# Patient Record
Sex: FEMALE | Race: WHITE | Employment: OTHER | URBAN - METROPOLITAN AREA
[De-identification: names, ages, dates, MRNs, and addresses within clinical notes are randomized per-mention and may not be internally consistent; named-entity substitution may affect disease eponyms.]

---

## 2017-01-03 ENCOUNTER — ANTI-COAG VISIT (OUTPATIENT)
Dept: INTERNAL MEDICINE CLINIC | Age: 82
End: 2017-01-03

## 2017-01-03 DIAGNOSIS — I48.91 ATRIAL FIBRILLATION, UNSPECIFIED TYPE (HCC): ICD-10-CM

## 2017-01-03 LAB
INR BLD: 2.03 (ref 0.85–1.16)
PROTHROMBIN TIME: 23.5 SEC (ref 9.8–13)

## 2017-01-03 RX ORDER — LEVOTHYROXINE SODIUM 0.05 MG/1
TABLET ORAL
Qty: 45 TABLET | Refills: 3 | Status: SHIPPED | OUTPATIENT
Start: 2017-01-03 | End: 2017-12-18 | Stop reason: SDUPTHER

## 2017-01-08 RX ORDER — SIMVASTATIN 40 MG
TABLET ORAL
Qty: 90 TABLET | Refills: 3 | Status: SHIPPED | OUTPATIENT
Start: 2017-01-08 | End: 2017-11-06 | Stop reason: SDUPTHER

## 2017-01-10 ENCOUNTER — ANTI-COAG VISIT (OUTPATIENT)
Dept: INTERNAL MEDICINE CLINIC | Age: 82
End: 2017-01-10

## 2017-01-10 DIAGNOSIS — I48.91 ATRIAL FIBRILLATION, UNSPECIFIED TYPE (HCC): ICD-10-CM

## 2017-01-10 LAB
INR BLD: 2.87 (ref 0.85–1.16)
PROTHROMBIN TIME: 33.4 SEC (ref 9.8–13)

## 2017-01-10 RX ORDER — WARFARIN SODIUM 5 MG/1
TABLET ORAL
Qty: 270 TABLET | Refills: 3 | Status: ON HOLD | OUTPATIENT
Start: 2017-01-10 | End: 2019-06-21 | Stop reason: HOSPADM

## 2017-01-13 ENCOUNTER — TELEPHONE (OUTPATIENT)
Dept: INTERNAL MEDICINE CLINIC | Age: 82
End: 2017-01-13

## 2017-01-19 ENCOUNTER — OFFICE VISIT (OUTPATIENT)
Age: 82
End: 2017-01-19

## 2017-01-19 VITALS
DIASTOLIC BLOOD PRESSURE: 70 MMHG | SYSTOLIC BLOOD PRESSURE: 110 MMHG | OXYGEN SATURATION: 96 % | BODY MASS INDEX: 21.49 KG/M2 | WEIGHT: 116.8 LBS | HEIGHT: 62 IN | HEART RATE: 65 BPM

## 2017-01-19 DIAGNOSIS — I48.91 ATRIAL FIBRILLATION WITH RVR (HCC): Primary | ICD-10-CM

## 2017-01-19 DIAGNOSIS — I10 ESSENTIAL HYPERTENSION: ICD-10-CM

## 2017-01-19 DIAGNOSIS — I34.0 NON-RHEUMATIC MITRAL REGURGITATION: ICD-10-CM

## 2017-01-19 PROCEDURE — G8399 PT W/DXA RESULTS DOCUMENT: HCPCS | Performed by: INTERNAL MEDICINE

## 2017-01-19 PROCEDURE — 99214 OFFICE O/P EST MOD 30 MIN: CPT | Performed by: INTERNAL MEDICINE

## 2017-01-19 PROCEDURE — 1090F PRES/ABSN URINE INCON ASSESS: CPT | Performed by: INTERNAL MEDICINE

## 2017-01-19 PROCEDURE — 1123F ACP DISCUSS/DSCN MKR DOCD: CPT | Performed by: INTERNAL MEDICINE

## 2017-01-19 PROCEDURE — G8419 CALC BMI OUT NRM PARAM NOF/U: HCPCS | Performed by: INTERNAL MEDICINE

## 2017-01-19 PROCEDURE — 93000 ELECTROCARDIOGRAM COMPLETE: CPT | Performed by: INTERNAL MEDICINE

## 2017-01-19 PROCEDURE — G8484 FLU IMMUNIZE NO ADMIN: HCPCS | Performed by: INTERNAL MEDICINE

## 2017-01-19 PROCEDURE — G8427 DOCREV CUR MEDS BY ELIG CLIN: HCPCS | Performed by: INTERNAL MEDICINE

## 2017-01-19 PROCEDURE — 1036F TOBACCO NON-USER: CPT | Performed by: INTERNAL MEDICINE

## 2017-01-19 PROCEDURE — 4040F PNEUMOC VAC/ADMIN/RCVD: CPT | Performed by: INTERNAL MEDICINE

## 2017-01-19 RX ORDER — AMIODARONE HYDROCHLORIDE 200 MG/1
200 TABLET ORAL DAILY
Qty: 90 TABLET | Refills: 3 | Status: SHIPPED | OUTPATIENT
Start: 2017-01-19 | End: 2017-01-25 | Stop reason: SDUPTHER

## 2017-01-24 ENCOUNTER — ANTI-COAG VISIT (OUTPATIENT)
Dept: INTERNAL MEDICINE CLINIC | Age: 82
End: 2017-01-24

## 2017-01-24 DIAGNOSIS — I48.91 ATRIAL FIBRILLATION, UNSPECIFIED TYPE (HCC): ICD-10-CM

## 2017-01-24 LAB
INR BLD: 2.61 (ref 0.85–1.16)
PROTHROMBIN TIME: 30.4 SEC (ref 9.8–13)

## 2017-01-25 ENCOUNTER — TELEPHONE (OUTPATIENT)
Dept: CARDIOLOGY CLINIC | Age: 82
End: 2017-01-25

## 2017-01-25 RX ORDER — AMIODARONE HYDROCHLORIDE 200 MG/1
200 TABLET ORAL DAILY
Qty: 90 TABLET | Refills: 1 | Status: SHIPPED | OUTPATIENT
Start: 2017-01-25 | End: 2018-01-16 | Stop reason: SDUPTHER

## 2017-01-31 ENCOUNTER — OFFICE VISIT (OUTPATIENT)
Dept: INTERNAL MEDICINE CLINIC | Age: 82
End: 2017-01-31

## 2017-01-31 VITALS
DIASTOLIC BLOOD PRESSURE: 60 MMHG | SYSTOLIC BLOOD PRESSURE: 104 MMHG | HEART RATE: 64 BPM | HEIGHT: 62 IN | WEIGHT: 118.6 LBS | BODY MASS INDEX: 21.83 KG/M2 | RESPIRATION RATE: 16 BRPM

## 2017-01-31 DIAGNOSIS — I48.20 CHRONIC ATRIAL FIBRILLATION (HCC): ICD-10-CM

## 2017-01-31 DIAGNOSIS — I50.32 CHRONIC DIASTOLIC CONGESTIVE HEART FAILURE (HCC): Primary | ICD-10-CM

## 2017-01-31 PROCEDURE — 4040F PNEUMOC VAC/ADMIN/RCVD: CPT | Performed by: INTERNAL MEDICINE

## 2017-01-31 PROCEDURE — G8399 PT W/DXA RESULTS DOCUMENT: HCPCS | Performed by: INTERNAL MEDICINE

## 2017-01-31 PROCEDURE — 1090F PRES/ABSN URINE INCON ASSESS: CPT | Performed by: INTERNAL MEDICINE

## 2017-01-31 PROCEDURE — 1123F ACP DISCUSS/DSCN MKR DOCD: CPT | Performed by: INTERNAL MEDICINE

## 2017-01-31 PROCEDURE — 99213 OFFICE O/P EST LOW 20 MIN: CPT | Performed by: INTERNAL MEDICINE

## 2017-01-31 PROCEDURE — G8419 CALC BMI OUT NRM PARAM NOF/U: HCPCS | Performed by: INTERNAL MEDICINE

## 2017-01-31 PROCEDURE — G8427 DOCREV CUR MEDS BY ELIG CLIN: HCPCS | Performed by: INTERNAL MEDICINE

## 2017-01-31 PROCEDURE — G8484 FLU IMMUNIZE NO ADMIN: HCPCS | Performed by: INTERNAL MEDICINE

## 2017-01-31 PROCEDURE — 1036F TOBACCO NON-USER: CPT | Performed by: INTERNAL MEDICINE

## 2017-02-01 ENCOUNTER — TELEPHONE (OUTPATIENT)
Dept: CARDIOLOGY CLINIC | Age: 82
End: 2017-02-01

## 2017-02-02 ENCOUNTER — HOSPITAL ENCOUNTER (OUTPATIENT)
Dept: OTHER | Age: 82
Discharge: HOME | End: 2017-12-04
Attending: INTERNAL MEDICINE | Admitting: INTERNAL MEDICINE

## 2017-02-12 RX ORDER — LEVOTHYROXINE SODIUM 0.07 MG/1
TABLET ORAL
Qty: 45 TABLET | Refills: 3 | Status: SHIPPED | OUTPATIENT
Start: 2017-02-12 | End: 2018-02-18 | Stop reason: SDUPTHER

## 2017-02-22 ENCOUNTER — ANTI-COAG VISIT (OUTPATIENT)
Dept: INTERNAL MEDICINE CLINIC | Age: 82
End: 2017-02-22

## 2017-02-22 DIAGNOSIS — I48.91 ATRIAL FIBRILLATION, UNSPECIFIED TYPE (HCC): ICD-10-CM

## 2017-02-22 LAB
INR BLD: 3.8 (ref 0.85–1.15)
PROTHROMBIN TIME: 42.9 SEC (ref 9.6–13)

## 2017-02-24 ENCOUNTER — ANTI-COAG VISIT (OUTPATIENT)
Dept: INTERNAL MEDICINE CLINIC | Age: 82
End: 2017-02-24

## 2017-02-24 DIAGNOSIS — I48.91 ATRIAL FIBRILLATION, UNSPECIFIED TYPE (HCC): ICD-10-CM

## 2017-02-24 LAB
INR BLD: 3.95 (ref 0.85–1.15)
PROTHROMBIN TIME: 44.6 SEC (ref 9.6–13)

## 2017-03-06 RX ORDER — MEMANTINE HYDROCHLORIDE 10 MG/1
TABLET ORAL
Qty: 180 TABLET | Refills: 3 | Status: SHIPPED | OUTPATIENT
Start: 2017-03-06 | End: 2018-02-26 | Stop reason: SDUPTHER

## 2017-03-06 RX ORDER — FUROSEMIDE 40 MG/1
TABLET ORAL
Qty: 90 TABLET | Refills: 3 | Status: SHIPPED | OUTPATIENT
Start: 2017-03-06 | End: 2017-07-27

## 2017-03-08 DIAGNOSIS — I48.91 ATRIAL FIBRILLATION, UNSPECIFIED TYPE (HCC): ICD-10-CM

## 2017-03-08 LAB
INR BLD: 3.73 (ref 0.85–1.15)
PROTHROMBIN TIME: 42.1 SEC (ref 9.6–13)

## 2017-03-09 ENCOUNTER — ANTI-COAG VISIT (OUTPATIENT)
Dept: INTERNAL MEDICINE CLINIC | Age: 82
End: 2017-03-09

## 2017-03-24 DIAGNOSIS — I48.91 ATRIAL FIBRILLATION, UNSPECIFIED TYPE (HCC): ICD-10-CM

## 2017-03-24 LAB
INR BLD: 4.96 (ref 0.85–1.15)
PROTHROMBIN TIME: >170 SEC (ref 9.6–13)

## 2017-03-27 ENCOUNTER — ANTI-COAG VISIT (OUTPATIENT)
Dept: INTERNAL MEDICINE CLINIC | Age: 82
End: 2017-03-27

## 2017-03-27 DIAGNOSIS — I48.91 ATRIAL FIBRILLATION, UNSPECIFIED TYPE (HCC): ICD-10-CM

## 2017-03-27 LAB
INR BLD: 2.58 (ref 0.85–1.15)
PROTHROMBIN TIME: 29.1 SEC (ref 9.6–13)

## 2017-03-28 ENCOUNTER — ANTI-COAG VISIT (OUTPATIENT)
Dept: INTERNAL MEDICINE CLINIC | Age: 82
End: 2017-03-28

## 2017-03-30 ENCOUNTER — OFFICE VISIT (OUTPATIENT)
Dept: INTERNAL MEDICINE CLINIC | Age: 82
End: 2017-03-30

## 2017-03-30 ENCOUNTER — ANTI-COAG VISIT (OUTPATIENT)
Dept: INTERNAL MEDICINE CLINIC | Age: 82
End: 2017-03-30

## 2017-03-30 VITALS
SYSTOLIC BLOOD PRESSURE: 128 MMHG | BODY MASS INDEX: 22.19 KG/M2 | HEIGHT: 62 IN | RESPIRATION RATE: 16 BRPM | DIASTOLIC BLOOD PRESSURE: 82 MMHG | HEART RATE: 60 BPM | WEIGHT: 120.6 LBS

## 2017-03-30 DIAGNOSIS — I10 ESSENTIAL HYPERTENSION, BENIGN: Primary | ICD-10-CM

## 2017-03-30 DIAGNOSIS — I48.91 ATRIAL FIBRILLATION, UNSPECIFIED TYPE (HCC): ICD-10-CM

## 2017-03-30 DIAGNOSIS — I50.32 CHRONIC DIASTOLIC CONGESTIVE HEART FAILURE (HCC): ICD-10-CM

## 2017-03-30 DIAGNOSIS — E03.9 ACQUIRED HYPOTHYROIDISM: ICD-10-CM

## 2017-03-30 LAB
INR BLD: 1.77 (ref 0.85–1.15)
PROTHROMBIN TIME: 20 SEC (ref 9.6–13)

## 2017-03-30 PROCEDURE — G8419 CALC BMI OUT NRM PARAM NOF/U: HCPCS | Performed by: INTERNAL MEDICINE

## 2017-03-30 PROCEDURE — 1036F TOBACCO NON-USER: CPT | Performed by: INTERNAL MEDICINE

## 2017-03-30 PROCEDURE — 4040F PNEUMOC VAC/ADMIN/RCVD: CPT | Performed by: INTERNAL MEDICINE

## 2017-03-30 PROCEDURE — G8427 DOCREV CUR MEDS BY ELIG CLIN: HCPCS | Performed by: INTERNAL MEDICINE

## 2017-03-30 PROCEDURE — 99213 OFFICE O/P EST LOW 20 MIN: CPT | Performed by: INTERNAL MEDICINE

## 2017-03-30 PROCEDURE — G8484 FLU IMMUNIZE NO ADMIN: HCPCS | Performed by: INTERNAL MEDICINE

## 2017-03-30 PROCEDURE — 1090F PRES/ABSN URINE INCON ASSESS: CPT | Performed by: INTERNAL MEDICINE

## 2017-03-30 PROCEDURE — G8399 PT W/DXA RESULTS DOCUMENT: HCPCS | Performed by: INTERNAL MEDICINE

## 2017-03-30 PROCEDURE — 1123F ACP DISCUSS/DSCN MKR DOCD: CPT | Performed by: INTERNAL MEDICINE

## 2017-03-30 ASSESSMENT — PATIENT HEALTH QUESTIONNAIRE - PHQ9
2. FEELING DOWN, DEPRESSED OR HOPELESS: 0
SUM OF ALL RESPONSES TO PHQ9 QUESTIONS 1 & 2: 0
1. LITTLE INTEREST OR PLEASURE IN DOING THINGS: 0
SUM OF ALL RESPONSES TO PHQ QUESTIONS 1-9: 0

## 2017-04-18 DIAGNOSIS — I48.91 ATRIAL FIBRILLATION, UNSPECIFIED TYPE (HCC): ICD-10-CM

## 2017-04-18 LAB
INR BLD: 6.24 (ref 0.85–1.15)
PROTHROMBIN TIME: 70.5 SEC (ref 9.6–13)

## 2017-04-19 ENCOUNTER — ANTI-COAG VISIT (OUTPATIENT)
Dept: INTERNAL MEDICINE CLINIC | Age: 82
End: 2017-04-19

## 2017-04-21 ENCOUNTER — TELEPHONE (OUTPATIENT)
Dept: INTERNAL MEDICINE CLINIC | Age: 82
End: 2017-04-21

## 2017-04-21 ENCOUNTER — ANTI-COAG VISIT (OUTPATIENT)
Dept: INTERNAL MEDICINE CLINIC | Age: 82
End: 2017-04-21

## 2017-04-21 DIAGNOSIS — M25.512 LEFT SHOULDER PAIN, UNSPECIFIED CHRONICITY: Primary | ICD-10-CM

## 2017-04-21 DIAGNOSIS — I48.91 ATRIAL FIBRILLATION, UNSPECIFIED TYPE (HCC): ICD-10-CM

## 2017-04-21 LAB
INR BLD: 4.8 (ref 0.85–1.15)
PROTHROMBIN TIME: 54.2 SEC (ref 9.6–13)

## 2017-04-24 ENCOUNTER — ANTI-COAG VISIT (OUTPATIENT)
Dept: INTERNAL MEDICINE CLINIC | Age: 82
End: 2017-04-24

## 2017-04-24 ENCOUNTER — TELEPHONE (OUTPATIENT)
Dept: INTERNAL MEDICINE CLINIC | Age: 82
End: 2017-04-24

## 2017-04-24 DIAGNOSIS — I48.91 ATRIAL FIBRILLATION, UNSPECIFIED TYPE (HCC): ICD-10-CM

## 2017-04-24 LAB
INR BLD: 1.76 (ref 0.85–1.15)
PROTHROMBIN TIME: 19.9 SEC (ref 9.6–13)

## 2017-04-27 ENCOUNTER — OFFICE VISIT (OUTPATIENT)
Dept: INTERNAL MEDICINE CLINIC | Age: 82
End: 2017-04-27

## 2017-04-27 VITALS
HEART RATE: 60 BPM | SYSTOLIC BLOOD PRESSURE: 146 MMHG | BODY MASS INDEX: 22.19 KG/M2 | RESPIRATION RATE: 16 BRPM | WEIGHT: 120.6 LBS | HEIGHT: 62 IN | DIASTOLIC BLOOD PRESSURE: 60 MMHG

## 2017-04-27 DIAGNOSIS — R26.81 GAIT INSTABILITY: Primary | ICD-10-CM

## 2017-04-27 DIAGNOSIS — I48.20 CHRONIC ATRIAL FIBRILLATION (HCC): ICD-10-CM

## 2017-04-27 LAB
BASOPHILS ABSOLUTE: 0.1 K/UL (ref 0–0.2)
BASOPHILS RELATIVE PERCENT: 1.1 %
EOSINOPHILS ABSOLUTE: 0.4 K/UL (ref 0–0.6)
EOSINOPHILS RELATIVE PERCENT: 6.1 %
HCT VFR BLD CALC: 35.4 % (ref 36–48)
HEMOGLOBIN: 11.4 G/DL (ref 12–16)
LYMPHOCYTES ABSOLUTE: 1.5 K/UL (ref 1–5.1)
LYMPHOCYTES RELATIVE PERCENT: 21.6 %
MCH RBC QN AUTO: 29.2 PG (ref 26–34)
MCHC RBC AUTO-ENTMCNC: 32.1 G/DL (ref 31–36)
MCV RBC AUTO: 90.8 FL (ref 80–100)
MONOCYTES ABSOLUTE: 0.4 K/UL (ref 0–1.3)
MONOCYTES RELATIVE PERCENT: 6 %
NEUTROPHILS ABSOLUTE: 4.4 K/UL (ref 1.7–7.7)
NEUTROPHILS RELATIVE PERCENT: 65.2 %
PDW BLD-RTO: 17.7 % (ref 12.4–15.4)
PLATELET # BLD: 176 K/UL (ref 135–450)
PMV BLD AUTO: 9.3 FL (ref 5–10.5)
RBC # BLD: 3.9 M/UL (ref 4–5.2)
WBC # BLD: 6.8 K/UL (ref 4–11)

## 2017-04-27 PROCEDURE — G8399 PT W/DXA RESULTS DOCUMENT: HCPCS | Performed by: INTERNAL MEDICINE

## 2017-04-27 PROCEDURE — 4040F PNEUMOC VAC/ADMIN/RCVD: CPT | Performed by: INTERNAL MEDICINE

## 2017-04-27 PROCEDURE — 1090F PRES/ABSN URINE INCON ASSESS: CPT | Performed by: INTERNAL MEDICINE

## 2017-04-27 PROCEDURE — G8427 DOCREV CUR MEDS BY ELIG CLIN: HCPCS | Performed by: INTERNAL MEDICINE

## 2017-04-27 PROCEDURE — 99213 OFFICE O/P EST LOW 20 MIN: CPT | Performed by: INTERNAL MEDICINE

## 2017-04-27 PROCEDURE — 1123F ACP DISCUSS/DSCN MKR DOCD: CPT | Performed by: INTERNAL MEDICINE

## 2017-04-27 PROCEDURE — 1036F TOBACCO NON-USER: CPT | Performed by: INTERNAL MEDICINE

## 2017-04-27 PROCEDURE — G8419 CALC BMI OUT NRM PARAM NOF/U: HCPCS | Performed by: INTERNAL MEDICINE

## 2017-05-01 DIAGNOSIS — I48.91 ATRIAL FIBRILLATION, UNSPECIFIED TYPE (HCC): ICD-10-CM

## 2017-05-01 LAB
INR BLD: 1.45 (ref 0.85–1.15)
PROTHROMBIN TIME: 16.4 SEC (ref 9.6–13)

## 2017-05-02 ENCOUNTER — ANTI-COAG VISIT (OUTPATIENT)
Dept: INTERNAL MEDICINE CLINIC | Age: 82
End: 2017-05-02

## 2017-05-04 ENCOUNTER — HOSPITAL ENCOUNTER (OUTPATIENT)
Dept: MRI IMAGING | Age: 82
Discharge: HOME | End: 2017-05-04
Attending: INTERNAL MEDICINE | Admitting: INTERNAL MEDICINE

## 2017-05-04 DIAGNOSIS — R26.81 UNSTEADINESS ON FEET: ICD-10-CM

## 2017-05-04 DIAGNOSIS — R26.81 GAIT INSTABILITY: ICD-10-CM

## 2017-05-08 ENCOUNTER — TELEPHONE (OUTPATIENT)
Dept: FAMILY MEDICINE CLINIC | Age: 82
End: 2017-05-08

## 2017-05-08 ENCOUNTER — ANTI-COAG VISIT (OUTPATIENT)
Dept: INTERNAL MEDICINE CLINIC | Age: 82
End: 2017-05-08

## 2017-05-08 DIAGNOSIS — I48.91 ATRIAL FIBRILLATION, UNSPECIFIED TYPE (HCC): ICD-10-CM

## 2017-05-08 LAB
INR BLD: 4.75 (ref 0.85–1.15)
PROTHROMBIN TIME: 53.7 SEC (ref 9.6–13)

## 2017-05-11 ENCOUNTER — OFFICE VISIT (OUTPATIENT)
Dept: INTERNAL MEDICINE CLINIC | Age: 82
End: 2017-05-11

## 2017-05-11 ENCOUNTER — ANTI-COAG VISIT (OUTPATIENT)
Dept: INTERNAL MEDICINE CLINIC | Age: 82
End: 2017-05-11

## 2017-05-11 VITALS
BODY MASS INDEX: 22.63 KG/M2 | HEIGHT: 62 IN | HEART RATE: 50 BPM | SYSTOLIC BLOOD PRESSURE: 120 MMHG | RESPIRATION RATE: 16 BRPM | DIASTOLIC BLOOD PRESSURE: 60 MMHG | WEIGHT: 123 LBS

## 2017-05-11 DIAGNOSIS — I48.91 ATRIAL FIBRILLATION, UNSPECIFIED TYPE (HCC): ICD-10-CM

## 2017-05-11 DIAGNOSIS — R42 LIGHTHEADEDNESS: Primary | ICD-10-CM

## 2017-05-11 DIAGNOSIS — R26.81 GAIT INSTABILITY: ICD-10-CM

## 2017-05-11 LAB
INR BLD: 2.46 (ref 0.85–1.15)
PROTHROMBIN TIME: 27.8 SEC (ref 9.6–13)

## 2017-05-11 PROCEDURE — G8427 DOCREV CUR MEDS BY ELIG CLIN: HCPCS | Performed by: INTERNAL MEDICINE

## 2017-05-11 PROCEDURE — G8399 PT W/DXA RESULTS DOCUMENT: HCPCS | Performed by: INTERNAL MEDICINE

## 2017-05-11 PROCEDURE — 1123F ACP DISCUSS/DSCN MKR DOCD: CPT | Performed by: INTERNAL MEDICINE

## 2017-05-11 PROCEDURE — 99213 OFFICE O/P EST LOW 20 MIN: CPT | Performed by: INTERNAL MEDICINE

## 2017-05-11 PROCEDURE — 1090F PRES/ABSN URINE INCON ASSESS: CPT | Performed by: INTERNAL MEDICINE

## 2017-05-11 PROCEDURE — 4040F PNEUMOC VAC/ADMIN/RCVD: CPT | Performed by: INTERNAL MEDICINE

## 2017-05-11 PROCEDURE — G8419 CALC BMI OUT NRM PARAM NOF/U: HCPCS | Performed by: INTERNAL MEDICINE

## 2017-05-11 PROCEDURE — 1036F TOBACCO NON-USER: CPT | Performed by: INTERNAL MEDICINE

## 2017-05-25 ENCOUNTER — TELEPHONE (OUTPATIENT)
Dept: INTERNAL MEDICINE CLINIC | Age: 82
End: 2017-05-25

## 2017-05-25 ENCOUNTER — ANTI-COAG VISIT (OUTPATIENT)
Dept: INTERNAL MEDICINE CLINIC | Age: 82
End: 2017-05-25

## 2017-05-25 DIAGNOSIS — I48.91 ATRIAL FIBRILLATION, UNSPECIFIED TYPE (HCC): ICD-10-CM

## 2017-05-25 LAB
INR BLD: 5.97 (ref 0.85–1.15)
PROTHROMBIN TIME: 67.5 SEC (ref 9.6–13)

## 2017-06-01 ENCOUNTER — ANTI-COAG VISIT (OUTPATIENT)
Dept: INTERNAL MEDICINE CLINIC | Age: 82
End: 2017-06-01

## 2017-06-01 DIAGNOSIS — I48.91 ATRIAL FIBRILLATION, UNSPECIFIED TYPE (HCC): ICD-10-CM

## 2017-06-01 LAB
INR BLD: 2.32 (ref 0.85–1.15)
PROTHROMBIN TIME: 26.2 SEC (ref 9.6–13)

## 2017-06-05 ENCOUNTER — OFFICE VISIT (OUTPATIENT)
Dept: INTERNAL MEDICINE CLINIC | Age: 82
End: 2017-06-05

## 2017-06-05 VITALS
HEART RATE: 52 BPM | BODY MASS INDEX: 22.12 KG/M2 | DIASTOLIC BLOOD PRESSURE: 60 MMHG | HEIGHT: 62 IN | WEIGHT: 120.2 LBS | SYSTOLIC BLOOD PRESSURE: 150 MMHG | RESPIRATION RATE: 16 BRPM

## 2017-06-05 DIAGNOSIS — I10 ESSENTIAL HYPERTENSION, BENIGN: ICD-10-CM

## 2017-06-05 DIAGNOSIS — R42 LIGHTHEADEDNESS: Primary | ICD-10-CM

## 2017-06-05 PROCEDURE — 99213 OFFICE O/P EST LOW 20 MIN: CPT | Performed by: INTERNAL MEDICINE

## 2017-06-05 PROCEDURE — G8399 PT W/DXA RESULTS DOCUMENT: HCPCS | Performed by: INTERNAL MEDICINE

## 2017-06-05 PROCEDURE — 1123F ACP DISCUSS/DSCN MKR DOCD: CPT | Performed by: INTERNAL MEDICINE

## 2017-06-05 PROCEDURE — G8419 CALC BMI OUT NRM PARAM NOF/U: HCPCS | Performed by: INTERNAL MEDICINE

## 2017-06-05 PROCEDURE — 1036F TOBACCO NON-USER: CPT | Performed by: INTERNAL MEDICINE

## 2017-06-05 PROCEDURE — 4040F PNEUMOC VAC/ADMIN/RCVD: CPT | Performed by: INTERNAL MEDICINE

## 2017-06-05 PROCEDURE — 1090F PRES/ABSN URINE INCON ASSESS: CPT | Performed by: INTERNAL MEDICINE

## 2017-06-05 PROCEDURE — G8427 DOCREV CUR MEDS BY ELIG CLIN: HCPCS | Performed by: INTERNAL MEDICINE

## 2017-06-05 RX ORDER — METOPROLOL SUCCINATE 50 MG/1
25 TABLET, EXTENDED RELEASE ORAL 2 TIMES DAILY
Qty: 90 TABLET | Refills: 3 | Status: SHIPPED | OUTPATIENT
Start: 2017-06-05 | End: 2017-06-29 | Stop reason: DRUGHIGH

## 2017-06-08 ENCOUNTER — ANTI-COAG VISIT (OUTPATIENT)
Dept: INTERNAL MEDICINE CLINIC | Age: 82
End: 2017-06-08

## 2017-06-08 DIAGNOSIS — I48.91 ATRIAL FIBRILLATION, UNSPECIFIED TYPE (HCC): ICD-10-CM

## 2017-06-08 LAB
INR BLD: 3.29 (ref 0.85–1.15)
PROTHROMBIN TIME: 37.2 SEC (ref 9.6–13)

## 2017-06-20 ENCOUNTER — ANTI-COAG VISIT (OUTPATIENT)
Dept: INTERNAL MEDICINE CLINIC | Age: 82
End: 2017-06-20

## 2017-06-20 DIAGNOSIS — I48.91 ATRIAL FIBRILLATION, UNSPECIFIED TYPE (HCC): ICD-10-CM

## 2017-06-20 LAB
INR BLD: 3.68 (ref 0.85–1.15)
PROTHROMBIN TIME: 41.6 SEC (ref 9.6–13)

## 2017-06-28 ENCOUNTER — ANTI-COAG VISIT (OUTPATIENT)
Dept: INTERNAL MEDICINE CLINIC | Age: 82
End: 2017-06-28

## 2017-06-28 DIAGNOSIS — I48.91 ATRIAL FIBRILLATION, UNSPECIFIED TYPE (HCC): ICD-10-CM

## 2017-06-28 LAB
INR BLD: 3.43 (ref 0.85–1.15)
PROTHROMBIN TIME: 38.8 SEC (ref 9.6–13)

## 2017-06-29 ENCOUNTER — OFFICE VISIT (OUTPATIENT)
Dept: INTERNAL MEDICINE CLINIC | Age: 82
End: 2017-06-29

## 2017-06-29 VITALS
OXYGEN SATURATION: 96 % | DIASTOLIC BLOOD PRESSURE: 50 MMHG | SYSTOLIC BLOOD PRESSURE: 138 MMHG | WEIGHT: 120.8 LBS | HEART RATE: 52 BPM | BODY MASS INDEX: 22.23 KG/M2 | HEIGHT: 62 IN

## 2017-06-29 DIAGNOSIS — R53.83 FATIGUE, UNSPECIFIED TYPE: ICD-10-CM

## 2017-06-29 DIAGNOSIS — I10 ESSENTIAL HYPERTENSION, BENIGN: Primary | ICD-10-CM

## 2017-06-29 PROCEDURE — 1123F ACP DISCUSS/DSCN MKR DOCD: CPT | Performed by: INTERNAL MEDICINE

## 2017-06-29 PROCEDURE — 1090F PRES/ABSN URINE INCON ASSESS: CPT | Performed by: INTERNAL MEDICINE

## 2017-06-29 PROCEDURE — 1036F TOBACCO NON-USER: CPT | Performed by: INTERNAL MEDICINE

## 2017-06-29 PROCEDURE — 4040F PNEUMOC VAC/ADMIN/RCVD: CPT | Performed by: INTERNAL MEDICINE

## 2017-06-29 PROCEDURE — G8427 DOCREV CUR MEDS BY ELIG CLIN: HCPCS | Performed by: INTERNAL MEDICINE

## 2017-06-29 PROCEDURE — G8399 PT W/DXA RESULTS DOCUMENT: HCPCS | Performed by: INTERNAL MEDICINE

## 2017-06-29 PROCEDURE — G8420 CALC BMI NORM PARAMETERS: HCPCS | Performed by: INTERNAL MEDICINE

## 2017-06-29 PROCEDURE — 99213 OFFICE O/P EST LOW 20 MIN: CPT | Performed by: INTERNAL MEDICINE

## 2017-06-29 RX ORDER — METOPROLOL SUCCINATE 50 MG/1
25 TABLET, EXTENDED RELEASE ORAL NIGHTLY
Qty: 90 TABLET | Refills: 3 | Status: SHIPPED | OUTPATIENT
Start: 2017-06-29 | End: 2018-02-04 | Stop reason: SDUPTHER

## 2017-07-10 ENCOUNTER — ANTI-COAG VISIT (OUTPATIENT)
Dept: INTERNAL MEDICINE CLINIC | Age: 82
End: 2017-07-10

## 2017-07-10 DIAGNOSIS — I48.91 ATRIAL FIBRILLATION, UNSPECIFIED TYPE (HCC): ICD-10-CM

## 2017-07-10 LAB
INR BLD: 2.42 (ref 0.85–1.15)
PROTHROMBIN TIME: 27.3 SEC (ref 9.6–13)

## 2017-07-20 ENCOUNTER — ANTI-COAG VISIT (OUTPATIENT)
Dept: INTERNAL MEDICINE CLINIC | Age: 82
End: 2017-07-20

## 2017-07-20 ENCOUNTER — OFFICE VISIT (OUTPATIENT)
Age: 82
End: 2017-07-20

## 2017-07-20 VITALS
DIASTOLIC BLOOD PRESSURE: 60 MMHG | HEART RATE: 60 BPM | HEIGHT: 62 IN | OXYGEN SATURATION: 93 % | BODY MASS INDEX: 22.49 KG/M2 | WEIGHT: 122.2 LBS | SYSTOLIC BLOOD PRESSURE: 110 MMHG

## 2017-07-20 DIAGNOSIS — I48.91 ATRIAL FIBRILLATION, UNSPECIFIED TYPE (HCC): ICD-10-CM

## 2017-07-20 DIAGNOSIS — I27.20 PULMONARY HYPERTENSION (HCC): ICD-10-CM

## 2017-07-20 DIAGNOSIS — I48.0 PAROXYSMAL ATRIAL FIBRILLATION (HCC): Primary | ICD-10-CM

## 2017-07-20 DIAGNOSIS — I34.0 NON-RHEUMATIC MITRAL REGURGITATION: ICD-10-CM

## 2017-07-20 LAB
INR BLD: 3.25 (ref 0.85–1.15)
PROTHROMBIN TIME: 36.7 SEC (ref 9.6–13)

## 2017-07-20 PROCEDURE — G8427 DOCREV CUR MEDS BY ELIG CLIN: HCPCS | Performed by: INTERNAL MEDICINE

## 2017-07-20 PROCEDURE — 4040F PNEUMOC VAC/ADMIN/RCVD: CPT | Performed by: INTERNAL MEDICINE

## 2017-07-20 PROCEDURE — G8399 PT W/DXA RESULTS DOCUMENT: HCPCS | Performed by: INTERNAL MEDICINE

## 2017-07-20 PROCEDURE — 1090F PRES/ABSN URINE INCON ASSESS: CPT | Performed by: INTERNAL MEDICINE

## 2017-07-20 PROCEDURE — 99214 OFFICE O/P EST MOD 30 MIN: CPT | Performed by: INTERNAL MEDICINE

## 2017-07-20 PROCEDURE — G8420 CALC BMI NORM PARAMETERS: HCPCS | Performed by: INTERNAL MEDICINE

## 2017-07-20 PROCEDURE — 93000 ELECTROCARDIOGRAM COMPLETE: CPT | Performed by: INTERNAL MEDICINE

## 2017-07-20 PROCEDURE — 1123F ACP DISCUSS/DSCN MKR DOCD: CPT | Performed by: INTERNAL MEDICINE

## 2017-07-20 PROCEDURE — 1036F TOBACCO NON-USER: CPT | Performed by: INTERNAL MEDICINE

## 2017-07-27 ENCOUNTER — OFFICE VISIT (OUTPATIENT)
Dept: INTERNAL MEDICINE CLINIC | Age: 82
End: 2017-07-27

## 2017-07-27 VITALS
HEIGHT: 61 IN | HEART RATE: 60 BPM | BODY MASS INDEX: 23.07 KG/M2 | WEIGHT: 122.2 LBS | OXYGEN SATURATION: 90 % | DIASTOLIC BLOOD PRESSURE: 62 MMHG | SYSTOLIC BLOOD PRESSURE: 142 MMHG

## 2017-07-27 DIAGNOSIS — Z23 NEED FOR VACCINATION FOR PNEUMOCOCCUS: ICD-10-CM

## 2017-07-27 DIAGNOSIS — E03.9 ACQUIRED HYPOTHYROIDISM: ICD-10-CM

## 2017-07-27 DIAGNOSIS — I34.0 NON-RHEUMATIC MITRAL REGURGITATION: ICD-10-CM

## 2017-07-27 DIAGNOSIS — I50.32 CHRONIC DIASTOLIC CONGESTIVE HEART FAILURE (HCC): ICD-10-CM

## 2017-07-27 DIAGNOSIS — I10 ESSENTIAL HYPERTENSION, BENIGN: Primary | ICD-10-CM

## 2017-07-27 LAB
A/G RATIO: 1.3 (ref 1.1–2.2)
ALBUMIN SERPL-MCNC: 3.9 G/DL (ref 3.4–5)
ALP BLD-CCNC: 125 U/L (ref 40–129)
ALT SERPL-CCNC: 26 U/L (ref 10–40)
ANION GAP SERPL CALCULATED.3IONS-SCNC: 15 MMOL/L (ref 3–16)
AST SERPL-CCNC: 51 U/L (ref 15–37)
BILIRUB SERPL-MCNC: 0.4 MG/DL (ref 0–1)
BUN BLDV-MCNC: 23 MG/DL (ref 7–20)
CALCIUM SERPL-MCNC: 9.2 MG/DL (ref 8.3–10.6)
CHLORIDE BLD-SCNC: 104 MMOL/L (ref 99–110)
CHOLESTEROL, TOTAL: 149 MG/DL (ref 0–199)
CO2: 26 MMOL/L (ref 21–32)
CREAT SERPL-MCNC: 1 MG/DL (ref 0.6–1.2)
GFR AFRICAN AMERICAN: >60
GFR NON-AFRICAN AMERICAN: 53
GLOBULIN: 3.1 G/DL
GLUCOSE BLD-MCNC: 94 MG/DL (ref 70–99)
HDLC SERPL-MCNC: 60 MG/DL (ref 40–60)
LDL CHOLESTEROL CALCULATED: 73 MG/DL
POTASSIUM SERPL-SCNC: 4.5 MMOL/L (ref 3.5–5.1)
SODIUM BLD-SCNC: 145 MMOL/L (ref 136–145)
TOTAL PROTEIN: 7 G/DL (ref 6.4–8.2)
TRIGL SERPL-MCNC: 80 MG/DL (ref 0–150)
TSH SERPL DL<=0.05 MIU/L-ACNC: 3.84 UIU/ML (ref 0.27–4.2)
VLDLC SERPL CALC-MCNC: 16 MG/DL

## 2017-07-27 PROCEDURE — 1036F TOBACCO NON-USER: CPT | Performed by: INTERNAL MEDICINE

## 2017-07-27 PROCEDURE — 1123F ACP DISCUSS/DSCN MKR DOCD: CPT | Performed by: INTERNAL MEDICINE

## 2017-07-27 PROCEDURE — G0009 ADMIN PNEUMOCOCCAL VACCINE: HCPCS | Performed by: INTERNAL MEDICINE

## 2017-07-27 PROCEDURE — 99214 OFFICE O/P EST MOD 30 MIN: CPT | Performed by: INTERNAL MEDICINE

## 2017-07-27 PROCEDURE — G8399 PT W/DXA RESULTS DOCUMENT: HCPCS | Performed by: INTERNAL MEDICINE

## 2017-07-27 PROCEDURE — G8420 CALC BMI NORM PARAMETERS: HCPCS | Performed by: INTERNAL MEDICINE

## 2017-07-27 PROCEDURE — 90732 PPSV23 VACC 2 YRS+ SUBQ/IM: CPT | Performed by: INTERNAL MEDICINE

## 2017-07-27 PROCEDURE — 1090F PRES/ABSN URINE INCON ASSESS: CPT | Performed by: INTERNAL MEDICINE

## 2017-07-27 PROCEDURE — 4040F PNEUMOC VAC/ADMIN/RCVD: CPT | Performed by: INTERNAL MEDICINE

## 2017-07-27 PROCEDURE — G8427 DOCREV CUR MEDS BY ELIG CLIN: HCPCS | Performed by: INTERNAL MEDICINE

## 2017-08-08 ENCOUNTER — ANTI-COAG VISIT (OUTPATIENT)
Dept: INTERNAL MEDICINE CLINIC | Age: 82
End: 2017-08-08

## 2017-08-08 DIAGNOSIS — I48.91 ATRIAL FIBRILLATION, UNSPECIFIED TYPE (HCC): ICD-10-CM

## 2017-08-08 LAB
INR BLD: 2.74 (ref 0.85–1.15)
PROTHROMBIN TIME: 31 SEC (ref 9.6–13)

## 2017-08-24 ENCOUNTER — OFFICE VISIT (OUTPATIENT)
Dept: INTERNAL MEDICINE CLINIC | Age: 82
End: 2017-08-24

## 2017-08-24 VITALS
WEIGHT: 121 LBS | SYSTOLIC BLOOD PRESSURE: 148 MMHG | HEART RATE: 54 BPM | BODY MASS INDEX: 23.75 KG/M2 | RESPIRATION RATE: 16 BRPM | DIASTOLIC BLOOD PRESSURE: 60 MMHG | HEIGHT: 60 IN | OXYGEN SATURATION: 92 %

## 2017-08-24 DIAGNOSIS — R06.02 SOB (SHORTNESS OF BREATH): Primary | ICD-10-CM

## 2017-08-24 DIAGNOSIS — I34.0 NON-RHEUMATIC MITRAL REGURGITATION: ICD-10-CM

## 2017-08-24 DIAGNOSIS — I27.20 PULMONARY HYPERTENSION (HCC): ICD-10-CM

## 2017-08-24 LAB
BASOPHILS ABSOLUTE: 0.1 K/UL (ref 0–0.2)
BASOPHILS RELATIVE PERCENT: 1.1 %
EOSINOPHILS ABSOLUTE: 0.4 K/UL (ref 0–0.6)
EOSINOPHILS RELATIVE PERCENT: 6.2 %
HCT VFR BLD CALC: 37.1 % (ref 36–48)
HEMOGLOBIN: 11.8 G/DL (ref 12–16)
LYMPHOCYTES ABSOLUTE: 1 K/UL (ref 1–5.1)
LYMPHOCYTES RELATIVE PERCENT: 16.7 %
MCH RBC QN AUTO: 29.3 PG (ref 26–34)
MCHC RBC AUTO-ENTMCNC: 31.9 G/DL (ref 31–36)
MCV RBC AUTO: 91.9 FL (ref 80–100)
MONOCYTES ABSOLUTE: 0.5 K/UL (ref 0–1.3)
MONOCYTES RELATIVE PERCENT: 7.3 %
NEUTROPHILS ABSOLUTE: 4.3 K/UL (ref 1.7–7.7)
NEUTROPHILS RELATIVE PERCENT: 68.7 %
PDW BLD-RTO: 16 % (ref 12.4–15.4)
PLATELET # BLD: 198 K/UL (ref 135–450)
PMV BLD AUTO: 9.1 FL (ref 5–10.5)
RBC # BLD: 4.03 M/UL (ref 4–5.2)
WBC # BLD: 6.3 K/UL (ref 4–11)

## 2017-08-24 PROCEDURE — 1090F PRES/ABSN URINE INCON ASSESS: CPT | Performed by: INTERNAL MEDICINE

## 2017-08-24 PROCEDURE — 4040F PNEUMOC VAC/ADMIN/RCVD: CPT | Performed by: INTERNAL MEDICINE

## 2017-08-24 PROCEDURE — G8399 PT W/DXA RESULTS DOCUMENT: HCPCS | Performed by: INTERNAL MEDICINE

## 2017-08-24 PROCEDURE — G8427 DOCREV CUR MEDS BY ELIG CLIN: HCPCS | Performed by: INTERNAL MEDICINE

## 2017-08-24 PROCEDURE — G8420 CALC BMI NORM PARAMETERS: HCPCS | Performed by: INTERNAL MEDICINE

## 2017-08-24 PROCEDURE — 1036F TOBACCO NON-USER: CPT | Performed by: INTERNAL MEDICINE

## 2017-08-24 PROCEDURE — 99213 OFFICE O/P EST LOW 20 MIN: CPT | Performed by: INTERNAL MEDICINE

## 2017-08-24 PROCEDURE — 1123F ACP DISCUSS/DSCN MKR DOCD: CPT | Performed by: INTERNAL MEDICINE

## 2017-08-31 ENCOUNTER — HOSPITAL ENCOUNTER (OUTPATIENT)
Dept: NON INVASIVE DIAGNOSTICS | Age: 82
Discharge: HOME | End: 2017-08-31
Attending: INTERNAL MEDICINE | Admitting: INTERNAL MEDICINE

## 2017-08-31 DIAGNOSIS — R06.02 SOB (SHORTNESS OF BREATH): ICD-10-CM

## 2017-08-31 DIAGNOSIS — R06.02 SHORTNESS OF BREATH: ICD-10-CM

## 2017-08-31 LAB
LV EF: 60 %
LVEF MODALITY: NORMAL

## 2017-08-31 RX ORDER — ALBUTEROL SULFATE 90 UG/1
4 AEROSOL, METERED RESPIRATORY (INHALATION) ONCE
Status: COMPLETED | OUTPATIENT
Start: 2017-08-31 | End: 2017-08-31

## 2017-08-31 RX ADMIN — ALBUTEROL SULFATE 4 PUFF: 90 AEROSOL, METERED RESPIRATORY (INHALATION) at 12:44

## 2017-09-01 ENCOUNTER — TELEPHONE (OUTPATIENT)
Dept: CARDIOLOGY CLINIC | Age: 82
End: 2017-09-01

## 2017-09-01 LAB
DLCO %PRED: NORMAL
DLCO PRE: NORMAL
FEF 25-75%-POST: NORMAL
FEF 25-75%-PRE: NORMAL
FEV1-POST: NORMAL
FEV1-PRE: NORMAL
FEV1/FVC-POST: NORMAL
FEV1/FVC-PRE: NORMAL
FVC-POST: NORMAL
FVC-PRE: NORMAL
MEP: NORMAL
MIP: NORMAL
MVV %PRED-PRE: NORMAL
MVV-PRE: NORMAL
TLC %PRED: NORMAL
TLC PRE: NORMAL

## 2017-09-01 PROCEDURE — 94060 EVALUATION OF WHEEZING: CPT | Performed by: INTERNAL MEDICINE

## 2017-09-01 PROCEDURE — 94727 GAS DIL/WSHOT DETER LNG VOL: CPT | Performed by: INTERNAL MEDICINE

## 2017-09-01 PROCEDURE — 94729 DIFFUSING CAPACITY: CPT | Performed by: INTERNAL MEDICINE

## 2017-09-05 ENCOUNTER — ANTI-COAG VISIT (OUTPATIENT)
Dept: INTERNAL MEDICINE CLINIC | Age: 82
End: 2017-09-05

## 2017-09-05 DIAGNOSIS — I48.91 ATRIAL FIBRILLATION, UNSPECIFIED TYPE (HCC): ICD-10-CM

## 2017-09-05 LAB
INR BLD: 5.79 (ref 0.85–1.15)
PROTHROMBIN TIME: 65.4 SEC (ref 9.6–13)

## 2017-09-06 ENCOUNTER — TELEPHONE (OUTPATIENT)
Dept: INTERNAL MEDICINE CLINIC | Age: 82
End: 2017-09-06

## 2017-09-07 ENCOUNTER — ANTI-COAG VISIT (OUTPATIENT)
Dept: INTERNAL MEDICINE CLINIC | Age: 82
End: 2017-09-07

## 2017-09-07 DIAGNOSIS — I48.91 ATRIAL FIBRILLATION, UNSPECIFIED TYPE (HCC): ICD-10-CM

## 2017-09-07 LAB
INR BLD: 4.28 (ref 0.85–1.15)
PROTHROMBIN TIME: 48.4 SEC (ref 9.6–13)

## 2017-09-11 ENCOUNTER — ANTI-COAG VISIT (OUTPATIENT)
Dept: INTERNAL MEDICINE CLINIC | Age: 82
End: 2017-09-11

## 2017-09-11 DIAGNOSIS — I48.91 ATRIAL FIBRILLATION, UNSPECIFIED TYPE (HCC): ICD-10-CM

## 2017-09-11 LAB
INR BLD: 1.63 (ref 0.85–1.15)
PROTHROMBIN TIME: 18.4 SEC (ref 9.6–13)

## 2017-09-14 ENCOUNTER — OFFICE VISIT (OUTPATIENT)
Age: 82
End: 2017-09-14

## 2017-09-14 VITALS
WEIGHT: 122 LBS | BODY MASS INDEX: 23.95 KG/M2 | HEIGHT: 60 IN | HEART RATE: 54 BPM | OXYGEN SATURATION: 90 % | DIASTOLIC BLOOD PRESSURE: 70 MMHG | SYSTOLIC BLOOD PRESSURE: 130 MMHG

## 2017-09-14 DIAGNOSIS — I34.0 NON-RHEUMATIC MITRAL REGURGITATION: Primary | ICD-10-CM

## 2017-09-14 DIAGNOSIS — I27.20 PULMONARY HTN (HCC): ICD-10-CM

## 2017-09-14 DIAGNOSIS — I48.91 ATRIAL FIBRILLATION, UNSPECIFIED TYPE (HCC): ICD-10-CM

## 2017-09-14 DIAGNOSIS — I48.0 PAROXYSMAL ATRIAL FIBRILLATION (HCC): ICD-10-CM

## 2017-09-14 LAB
INR BLD: 1.54 (ref 0.85–1.15)
PROTHROMBIN TIME: 17.4 SEC (ref 9.6–13)

## 2017-09-14 PROCEDURE — G8399 PT W/DXA RESULTS DOCUMENT: HCPCS | Performed by: INTERNAL MEDICINE

## 2017-09-14 PROCEDURE — 1036F TOBACCO NON-USER: CPT | Performed by: INTERNAL MEDICINE

## 2017-09-14 PROCEDURE — G8427 DOCREV CUR MEDS BY ELIG CLIN: HCPCS | Performed by: INTERNAL MEDICINE

## 2017-09-14 PROCEDURE — 4040F PNEUMOC VAC/ADMIN/RCVD: CPT | Performed by: INTERNAL MEDICINE

## 2017-09-14 PROCEDURE — 93000 ELECTROCARDIOGRAM COMPLETE: CPT | Performed by: INTERNAL MEDICINE

## 2017-09-14 PROCEDURE — 1123F ACP DISCUSS/DSCN MKR DOCD: CPT | Performed by: INTERNAL MEDICINE

## 2017-09-14 PROCEDURE — 99215 OFFICE O/P EST HI 40 MIN: CPT | Performed by: INTERNAL MEDICINE

## 2017-09-14 PROCEDURE — G8420 CALC BMI NORM PARAMETERS: HCPCS | Performed by: INTERNAL MEDICINE

## 2017-09-14 PROCEDURE — 1090F PRES/ABSN URINE INCON ASSESS: CPT | Performed by: INTERNAL MEDICINE

## 2017-09-15 ENCOUNTER — ANTI-COAG VISIT (OUTPATIENT)
Dept: INTERNAL MEDICINE CLINIC | Age: 82
End: 2017-09-15

## 2017-09-18 ENCOUNTER — TELEPHONE (OUTPATIENT)
Dept: CARDIOLOGY CLINIC | Age: 82
End: 2017-09-18

## 2017-09-19 DIAGNOSIS — I34.0 NON-RHEUMATIC MITRAL REGURGITATION: ICD-10-CM

## 2017-09-19 LAB
ANION GAP SERPL CALCULATED.3IONS-SCNC: 18 MMOL/L (ref 3–16)
BUN BLDV-MCNC: 14 MG/DL (ref 7–20)
CALCIUM SERPL-MCNC: 9.3 MG/DL (ref 8.3–10.6)
CHLORIDE BLD-SCNC: 101 MMOL/L (ref 99–110)
CO2: 25 MMOL/L (ref 21–32)
CREAT SERPL-MCNC: 1.1 MG/DL (ref 0.6–1.2)
GFR AFRICAN AMERICAN: 57
GFR NON-AFRICAN AMERICAN: 47
GLUCOSE BLD-MCNC: 117 MG/DL (ref 70–99)
HCT VFR BLD CALC: 36.5 % (ref 36–48)
HEMOGLOBIN: 11.9 G/DL (ref 12–16)
INR BLD: 1.38 (ref 0.85–1.15)
MCH RBC QN AUTO: 29.7 PG (ref 26–34)
MCHC RBC AUTO-ENTMCNC: 32.7 G/DL (ref 31–36)
MCV RBC AUTO: 90.9 FL (ref 80–100)
PDW BLD-RTO: 16.4 % (ref 12.4–15.4)
PLATELET # BLD: 168 K/UL (ref 135–450)
PMV BLD AUTO: 9.4 FL (ref 5–10.5)
POTASSIUM SERPL-SCNC: 4.1 MMOL/L (ref 3.5–5.1)
PROTHROMBIN TIME: 15.6 SEC (ref 9.6–13)
RBC # BLD: 4.01 M/UL (ref 4–5.2)
SODIUM BLD-SCNC: 144 MMOL/L (ref 136–145)
WBC # BLD: 5.8 K/UL (ref 4–11)

## 2017-09-20 ENCOUNTER — HOSPITAL ENCOUNTER (OUTPATIENT)
Dept: CARDIAC CATH/INVASIVE PROCEDURES | Age: 82
Discharge: HOME | End: 2017-09-20
Admitting: INTERNAL MEDICINE

## 2017-09-20 VITALS — BODY MASS INDEX: 21.99 KG/M2 | WEIGHT: 112 LBS | HEIGHT: 60 IN

## 2017-09-20 LAB
LV EF: 65 %
LVEF MODALITY: NORMAL

## 2017-09-20 PROCEDURE — 93312 ECHO TRANSESOPHAGEAL: CPT | Performed by: INTERNAL MEDICINE

## 2017-09-20 PROCEDURE — 99152 MOD SED SAME PHYS/QHP 5/>YRS: CPT | Performed by: INTERNAL MEDICINE

## 2017-09-20 PROCEDURE — 93453 R&L HRT CATH W/VENTRICLGRPHY: CPT | Performed by: INTERNAL MEDICINE

## 2017-09-20 PROCEDURE — 93325 DOPPLER ECHO COLOR FLOW MAPG: CPT | Performed by: INTERNAL MEDICINE

## 2017-09-20 RX ORDER — ACETAMINOPHEN 325 MG/1
650 TABLET ORAL EVERY 4 HOURS PRN
Status: DISCONTINUED | OUTPATIENT
Start: 2017-09-20 | End: 2017-09-21 | Stop reason: HOSPADM

## 2017-09-20 RX ORDER — SODIUM CHLORIDE 0.9 % (FLUSH) 0.9 %
10 SYRINGE (ML) INJECTION PRN
Status: CANCELLED | OUTPATIENT
Start: 2017-09-20

## 2017-09-20 RX ORDER — SODIUM CHLORIDE 9 MG/ML
INJECTION, SOLUTION INTRAVENOUS CONTINUOUS
Status: DISCONTINUED | OUTPATIENT
Start: 2017-09-20 | End: 2017-09-20 | Stop reason: SDUPTHER

## 2017-09-20 RX ORDER — ASPIRIN 325 MG
325 TABLET ORAL ONCE
Status: DISCONTINUED | OUTPATIENT
Start: 2017-09-20 | End: 2017-09-21 | Stop reason: HOSPADM

## 2017-09-20 RX ORDER — ONDANSETRON 2 MG/ML
4 INJECTION INTRAMUSCULAR; INTRAVENOUS EVERY 6 HOURS PRN
Status: DISCONTINUED | OUTPATIENT
Start: 2017-09-20 | End: 2017-09-21 | Stop reason: HOSPADM

## 2017-09-20 RX ORDER — SODIUM CHLORIDE 0.9 % (FLUSH) 0.9 %
10 SYRINGE (ML) INJECTION EVERY 12 HOURS SCHEDULED
Status: CANCELLED | OUTPATIENT
Start: 2017-09-20

## 2017-09-20 RX ORDER — SODIUM CHLORIDE 9 MG/ML
INJECTION, SOLUTION INTRAVENOUS CONTINUOUS
Status: DISCONTINUED | OUTPATIENT
Start: 2017-09-20 | End: 2017-09-21 | Stop reason: HOSPADM

## 2017-09-22 DIAGNOSIS — R06.02 SHORTNESS OF BREATH: ICD-10-CM

## 2017-09-22 DIAGNOSIS — I27.20 PULMONARY HYPERTENSION (HCC): Primary | ICD-10-CM

## 2017-09-25 ENCOUNTER — TELEPHONE (OUTPATIENT)
Dept: INTERNAL MEDICINE CLINIC | Age: 82
End: 2017-09-25

## 2017-09-25 RX ORDER — METHYLPREDNISOLONE 4 MG/1
TABLET ORAL
Qty: 1 KIT | Refills: 0 | Status: SHIPPED | OUTPATIENT
Start: 2017-09-25 | End: 2017-10-01

## 2017-09-26 ENCOUNTER — TELEPHONE (OUTPATIENT)
Dept: INTERNAL MEDICINE CLINIC | Age: 82
End: 2017-09-26

## 2017-09-28 ENCOUNTER — OFFICE VISIT (OUTPATIENT)
Dept: PULMONOLOGY | Age: 82
End: 2017-09-28

## 2017-09-28 VITALS
SYSTOLIC BLOOD PRESSURE: 120 MMHG | DIASTOLIC BLOOD PRESSURE: 70 MMHG | HEIGHT: 60 IN | OXYGEN SATURATION: 96 % | TEMPERATURE: 98.2 F | HEART RATE: 52 BPM | RESPIRATION RATE: 16 BRPM

## 2017-09-28 DIAGNOSIS — J98.4 RESTRICTIVE LUNG DISEASE: ICD-10-CM

## 2017-09-28 DIAGNOSIS — R93.89 ABNORMAL CT SCAN, CHEST: ICD-10-CM

## 2017-09-28 DIAGNOSIS — R06.02 SOB (SHORTNESS OF BREATH): Primary | ICD-10-CM

## 2017-09-28 DIAGNOSIS — I27.20 PULMONARY HYPERTENSION (HCC): ICD-10-CM

## 2017-09-28 DIAGNOSIS — J98.51 FIBROSING MEDIASTINITIS: ICD-10-CM

## 2017-09-28 PROCEDURE — 1036F TOBACCO NON-USER: CPT | Performed by: INTERNAL MEDICINE

## 2017-09-28 PROCEDURE — G8420 CALC BMI NORM PARAMETERS: HCPCS | Performed by: INTERNAL MEDICINE

## 2017-09-28 PROCEDURE — G8399 PT W/DXA RESULTS DOCUMENT: HCPCS | Performed by: INTERNAL MEDICINE

## 2017-09-28 PROCEDURE — 4040F PNEUMOC VAC/ADMIN/RCVD: CPT | Performed by: INTERNAL MEDICINE

## 2017-09-28 PROCEDURE — 1123F ACP DISCUSS/DSCN MKR DOCD: CPT | Performed by: INTERNAL MEDICINE

## 2017-09-28 PROCEDURE — 99204 OFFICE O/P NEW MOD 45 MIN: CPT | Performed by: INTERNAL MEDICINE

## 2017-09-28 PROCEDURE — 1090F PRES/ABSN URINE INCON ASSESS: CPT | Performed by: INTERNAL MEDICINE

## 2017-09-28 PROCEDURE — G8427 DOCREV CUR MEDS BY ELIG CLIN: HCPCS | Performed by: INTERNAL MEDICINE

## 2017-09-28 RX ORDER — FUROSEMIDE 40 MG/1
40 TABLET ORAL
COMMUNITY
End: 2017-10-17 | Stop reason: ALTCHOICE

## 2017-09-28 RX ORDER — ALBUTEROL SULFATE 90 UG/1
2 AEROSOL, METERED RESPIRATORY (INHALATION) EVERY 4 HOURS PRN
Qty: 1 INHALER | Refills: 4 | Status: SHIPPED | OUTPATIENT
Start: 2017-09-28 | End: 2018-10-31 | Stop reason: SDUPTHER

## 2017-10-02 ENCOUNTER — ANTI-COAG VISIT (OUTPATIENT)
Dept: INTERNAL MEDICINE CLINIC | Age: 82
End: 2017-10-02

## 2017-10-02 ENCOUNTER — TELEPHONE (OUTPATIENT)
Dept: INTERNAL MEDICINE CLINIC | Age: 82
End: 2017-10-02

## 2017-10-02 DIAGNOSIS — I48.91 ATRIAL FIBRILLATION, UNSPECIFIED TYPE (HCC): ICD-10-CM

## 2017-10-02 LAB
INR BLD: 5.7 (ref 0.85–1.15)
PROTHROMBIN TIME: 64.4 SEC (ref 9.6–13)

## 2017-10-03 ENCOUNTER — OFFICE VISIT (OUTPATIENT)
Dept: INTERNAL MEDICINE CLINIC | Age: 82
End: 2017-10-03

## 2017-10-03 VITALS
SYSTOLIC BLOOD PRESSURE: 154 MMHG | HEIGHT: 60 IN | DIASTOLIC BLOOD PRESSURE: 76 MMHG | WEIGHT: 119 LBS | HEART RATE: 50 BPM | RESPIRATION RATE: 16 BRPM | BODY MASS INDEX: 23.36 KG/M2

## 2017-10-03 DIAGNOSIS — Z23 NEED FOR INFLUENZA VACCINATION: ICD-10-CM

## 2017-10-03 DIAGNOSIS — M79.605 PAIN OF BACK AND LEFT LOWER EXTREMITY: Primary | ICD-10-CM

## 2017-10-03 DIAGNOSIS — M54.9 PAIN OF BACK AND LEFT LOWER EXTREMITY: Primary | ICD-10-CM

## 2017-10-03 PROCEDURE — 90662 IIV NO PRSV INCREASED AG IM: CPT | Performed by: INTERNAL MEDICINE

## 2017-10-03 PROCEDURE — G8420 CALC BMI NORM PARAMETERS: HCPCS | Performed by: INTERNAL MEDICINE

## 2017-10-03 PROCEDURE — G8399 PT W/DXA RESULTS DOCUMENT: HCPCS | Performed by: INTERNAL MEDICINE

## 2017-10-03 PROCEDURE — G8427 DOCREV CUR MEDS BY ELIG CLIN: HCPCS | Performed by: INTERNAL MEDICINE

## 2017-10-03 PROCEDURE — 1090F PRES/ABSN URINE INCON ASSESS: CPT | Performed by: INTERNAL MEDICINE

## 2017-10-03 PROCEDURE — 1036F TOBACCO NON-USER: CPT | Performed by: INTERNAL MEDICINE

## 2017-10-03 PROCEDURE — 99213 OFFICE O/P EST LOW 20 MIN: CPT | Performed by: INTERNAL MEDICINE

## 2017-10-03 PROCEDURE — G0008 ADMIN INFLUENZA VIRUS VAC: HCPCS | Performed by: INTERNAL MEDICINE

## 2017-10-03 PROCEDURE — 1123F ACP DISCUSS/DSCN MKR DOCD: CPT | Performed by: INTERNAL MEDICINE

## 2017-10-03 PROCEDURE — 4040F PNEUMOC VAC/ADMIN/RCVD: CPT | Performed by: INTERNAL MEDICINE

## 2017-10-03 PROCEDURE — G8484 FLU IMMUNIZE NO ADMIN: HCPCS | Performed by: INTERNAL MEDICINE

## 2017-10-03 RX ORDER — METHYLPREDNISOLONE 4 MG/1
TABLET ORAL
Qty: 1 KIT | Refills: 0 | Status: SHIPPED | OUTPATIENT
Start: 2017-10-03 | End: 2017-10-09

## 2017-10-03 NOTE — MR AVS SNAPSHOT
After Visit Summary             [address]   10/3/2017 11:45 AM   Office Visit    Description:  Female : 1935   Provider:  Mali Shah MD   Department:  [facility]              Your Follow-Up and Future Appointments         Below is a list of your follow-up and future appointments. This may not be a complete list as you may have made appointments directly with providers that we are not aware of or your providers may have made some for you. Please call your providers to confirm appointments. It is important to keep your appointments. Please bring your current insurance card, photo ID, co-pay, and all medication bottles to your appointment. If self-pay, payment is expected at the time of service. Your To-Do List     Future Appointments Provider Department Dept Phone    2017 2:00 PM Lamberto Gonzales [address] Pulmonology Sleep and Critical Care [phone]    Please arrive 15 minutes prior to appointment, bring photo ID and insurance card. 2018 10:15 AM Mali Shah MD [facility] Internal Medicine [phone]    Please arrive 15 minutes prior to appointment, bring photo ID and insurance card. Follow-Up    Return if symptoms worsen or fail to improve. Information from Your Visit        Department     Name Address Phone Fax    [facility] Internal Medicine [address] [phone]      You Were Seen for:         Comments    Pain of back and left lower extremity   [[id number]]         Vital Signs     Blood Pressure Pulse Respirations Height Weight Body Mass Index    154/76 50 16 5' (1.524 m) 119 lb (54 kg) 23.24 kg/m2    Smoking Status                   Former Smoker              Today's Medication Changes          These changes are accurate as of: 10/3/17 12:12 PM.  If you have any questions, ask your nurse or doctor.                START taking these medications methylPREDNISolone 4 MG tablet   Commonly known as:  MEDROL (TRUDY)   Instructions: Take by mouth, as directed   Quantity:  1 kit   Refills:  0   Started by:  Ming Li MD            Where to Get Your Medications      These medications were sent to [facility], [address], [address] [place] [id number]     Phone:  [phone]     methylPREDNISolone 4 MG tablet               Your Current Medications Are              methylPREDNISolone (MEDROL, TRUDY,) 4 MG tablet Take by mouth, as directed    furosemide (LASIX) 40 MG tablet Take 40 mg by mouth every 48 hours    albuterol sulfate HFA (PROAIR HFA) 108 (90 Base) MCG/ACT inhaler Inhale 2 puffs into the lungs every 4 hours as needed for Wheezing or Shortness of Breath    metoprolol succinate (TOPROL XL) 50 MG extended release tablet Take 0.5 tablets by mouth nightly    memantine (NAMENDA) 10 MG tablet TAKE 1 TABLET TWICE DAILY    levothyroxine (SYNTHROID) 75 MCG tablet TAKE 1 TABLET EVERY OTHER DAY    amiodarone (CORDARONE) 200 MG tablet Take 1 tablet by mouth daily    warfarin (COUMADIN) 5 MG tablet TAKE 2 TO 3 TABLETS EVERY DAY AS DIRECTED    simvastatin (ZOCOR) 40 MG tablet TAKE 1 TABLET EVERY NIGHT    levothyroxine (SYNTHROID) 50 MCG tablet TAKE 1 TABLET EVERY OTHER DAY    latanoprost (XALATAN) 0.005 % ophthalmic solution Place 1 drop into both eyes nightly. vitamin D (CHOLECALCIFEROL) 400 UNITS TABS tablet Take 400 Units by mouth daily. calcium carbonate (OSCAL) 500 MG TABS tablet Take 500 mg by mouth daily.         Allergies              Ace Inhibitors     coughing    Aricept [Donepezil Hydrochloride]     Can not recall    Benicar [Olmesartan Medoxomil]     Can not recall    Exelon [Rivastigmine Tartrate]     Can not recall    Pcn [Penicillins] Hives, Swelling    Quinapril Hcl     Can not recall         Additional Information        Basic Information

## 2017-10-03 NOTE — PROGRESS NOTES
Vaccine Information Sheet, \"Influenza - Inactivated\"  given to Liz Bui, or parent/legal guardian of  Liz Bui and verbalized understanding. Patient responses:    Have you ever had a reaction to a flu vaccine? No  Are you able to eat eggs without adverse effects? Yes  Do you have any current illness? No  Have you ever had Guillian Lockhart Syndrome? No    Flu vaccine given per order. Please see immunization tab.

## 2017-10-05 ENCOUNTER — ANTI-COAG VISIT (OUTPATIENT)
Dept: INTERNAL MEDICINE CLINIC | Age: 82
End: 2017-10-05

## 2017-10-05 ENCOUNTER — TELEPHONE (OUTPATIENT)
Dept: INTERNAL MEDICINE CLINIC | Age: 82
End: 2017-10-05

## 2017-10-05 DIAGNOSIS — I48.91 ATRIAL FIBRILLATION, UNSPECIFIED TYPE (HCC): ICD-10-CM

## 2017-10-05 LAB
INR BLD: 2.2 (ref 0.85–1.15)
PROTHROMBIN TIME: 24.9 SEC (ref 9.6–13)

## 2017-10-05 NOTE — TELEPHONE ENCOUNTER
I called patient and gave her Dr. Thornton Males INR results and instructions. Patient asked if Dr. Kae Closs knew of any \"arthroscopic\" type reatment for her leaky heart valve. Please advise.

## 2017-10-05 NOTE — PROGRESS NOTES
Called patient and reported that Dr. Romulo Lino instructions are to restart the Coumadin at 2.5 mg daily, with no Coumadin on Mondays and Fridays.   Repeat the INR in 2 weeks

## 2017-10-09 ENCOUNTER — TELEPHONE (OUTPATIENT)
Dept: INTERNAL MEDICINE CLINIC | Age: 82
End: 2017-10-09

## 2017-10-09 NOTE — TELEPHONE ENCOUNTER
Pt states she finished her prednisone and still having pain. She is asking to drop off a urine sample tomorrow to see if that has anything to do with her pain?     #[phone]

## 2017-10-10 ENCOUNTER — NURSE ONLY (OUTPATIENT)
Dept: INTERNAL MEDICINE CLINIC | Age: 82
End: 2017-10-10

## 2017-10-10 DIAGNOSIS — N39.0 URINARY TRACT INFECTION WITHOUT HEMATURIA, SITE UNSPECIFIED: Primary | ICD-10-CM

## 2017-10-10 LAB
BILIRUBIN, POC: NORMAL
BLOOD URINE, POC: NORMAL
CLARITY, POC: NORMAL
COLOR, POC: NORMAL
GLUCOSE URINE, POC: NORMAL
KETONES, POC: NORMAL
LEUKOCYTE EST, POC: NORMAL
NITRITE, POC: POSITIVE
PH, POC: 5.5
PROTEIN, POC: NORMAL
SPECIFIC GRAVITY, POC: >1.005
UROBILINOGEN, POC: 0.2

## 2017-10-10 PROCEDURE — 81002 URINALYSIS NONAUTO W/O SCOPE: CPT | Performed by: INTERNAL MEDICINE

## 2017-10-12 ENCOUNTER — HOSPITAL ENCOUNTER (OUTPATIENT)
Dept: CARDIAC REHAB | Age: 82
Discharge: HOME | End: 2017-10-12
Attending: INTERNAL MEDICINE | Admitting: INTERNAL MEDICINE

## 2017-10-12 LAB
ORGANISM: ABNORMAL
URINE CULTURE, ROUTINE: ABNORMAL
URINE CULTURE, ROUTINE: ABNORMAL

## 2017-10-12 PROCEDURE — 94620 PR PULMONARY STRESS TESTING,SIMPLE: CPT | Performed by: INTERNAL MEDICINE

## 2017-10-12 RX ORDER — TETRACYCLINE HYDROCHLORIDE 500 MG/1
500 CAPSULE ORAL 2 TIMES DAILY
Qty: 14 CAPSULE | Refills: 0 | Status: SHIPPED | OUTPATIENT
Start: 2017-10-12 | End: 2017-10-13

## 2017-10-12 NOTE — PROGRESS NOTES
[facility] PULMONARY REHABILITATION ORDER  Medical Director:  Dr. Tan Ly  (X)Phase II Pulmonary Rehabilitation [facility] Facility-based, supervised exercise with SpO2 / HR monitoring and Oxygen Titration (if necessary) each session, 2-3 times weekly, with individualized education sessions. Patient Name: Arjun Bishop  : 1935  Referring Physician: Dr. Jordan Tovar  Date: 10/12/2017    Medically Necessary Pulmonary Rehab for:  Severe COPD (from my dictation or the PFT report), which is GOLD Stage 3. Physician Prescribed Exercise:  Length of program:  Up to 36 sessions, subject to insurance limitations. Program to include aerobic endurance conditioning, resistance training (RT), step training (ST), flexibility training, and education (all relevant topics including psychosocial assessment). FITT Principles + Progression for Exercise Prescription (also found on the ITP):     Frequency: 2-3x / wk for up to 36 sessions    Intensity:   Set from Initial 6MWT (Feet achieved converted to METs)   Type:          Aerobic and Strength (Treadmill, AD, NuStep, SciFit, UBE, RT, ST)   Time:        15-60 min. of Treatment; Aerobic, RT, ST, Rests and Education  Progression:  1-2 minute increase in Time, per Type, per session, 5-20% increase in Intensity per week if SpO2 >88 AND Kofi RPE/RPD is <14      Note:  These are guidelines. The Pulmonary Rehab staff may adjust the treatment to suit the patient's individual needs, goals, oxygen saturation and functional level. Plan of Care:  Pulmonary Rehab aerobic endurance and strength training sessions for a total of 30-91 min/day, including Education time, 2-3 days/week with suggested supplemented matching minutes of walking at home on most days not participating in Pulmonary Rehab. Patient is willing to cooperate and participate in the plan of care.  Patient is physically able, motivated, and willing to participate in ID, and I

## 2017-10-13 ENCOUNTER — TELEPHONE (OUTPATIENT)
Dept: INTERNAL MEDICINE CLINIC | Age: 82
End: 2017-10-13

## 2017-10-13 RX ORDER — CEFUROXIME AXETIL 250 MG/1
250 TABLET ORAL 2 TIMES DAILY
Qty: 14 TABLET | Refills: 0 | Status: SHIPPED | OUTPATIENT
Start: 2017-10-13 | End: 2017-10-20

## 2017-10-13 NOTE — TELEPHONE ENCOUNTER
She has a urinary tract infection and requires tetracycline. Doxycycline does not get excreted into the urine, therefore cannot be used for urinary tract infections. She gets hives with penicillins, and I don't see that she has been exposed to cephalosporins in the past.    How expensive it is the tetracycline?

## 2017-10-16 ENCOUNTER — TELEPHONE (OUTPATIENT)
Dept: PULMONOLOGY | Age: 82
End: 2017-10-16

## 2017-10-17 ENCOUNTER — HOSPITAL ENCOUNTER (OUTPATIENT)
Dept: CARDIAC REHAB | Age: 82
Discharge: HOME | End: 2017-12-31
Attending: INTERNAL MEDICINE | Admitting: INTERNAL MEDICINE

## 2017-10-17 VITALS — BODY MASS INDEX: 23.53 KG/M2 | WEIGHT: 120.5 LBS

## 2017-10-17 NOTE — PROGRESS NOTES
[address] Facility-Based Therapy for COPD  INDIVIDUAL TREATMENT PLAN (ITP)     NAME: Cecilio Dotson  YOB: 1935  Account Number: [de-identified]  AGE: 80 y.o. Diagnosis: MODERATELY SEVERE COPD which is GOLD Stage 2-3. PFT:  FEV1/FVCl: 68%, FEV1: 58% FVC: 63%  Notes: Medicare requirements for Pulmonary Rehabilitation include a PFT within the last 12 months revealing: FEV1/FVC <70, and FEV1 <80%, and documentation from the referring physician stating that the patient has quit smoking or will participate in smoking cessation activities prior to, or during the Pulmonary Rehab program.  A 6 Minute Walk Test (6 MWT) at the beginning and end of the program is also indicated.   GLOSSARY: PF= Physical Fitness  TM=Treadmill  AD= [employer] Bike  UBE=Upperbody Ergometry LBE=Lowerbody Ergometer  NS=NuStep SF= SciFit  ST=Step Training  RT=Resistance Training   PLB=Pursed Lip Breathing   DY= Dynabands  HW= Handweights   Cybex RT= Cybex Mult istation weight machine   RB=Recumbent    REFERRING PHYSICIAN: Dr. Sara Roth History:     Past Medical History:   Diagnosis Date    Anemia     Atrial fibrillation ([address] 6/2011    Macel Dust CHF (congestive heart failure) ([address]     Clostridium difficile diarrhea 09/14/2016    Humerus fracture     Hyperlipidemia     Hypertension     Mitral regurgitation     Optic neuritis     Osteopenia     Fosamax stopped 2/2014, had been treated at least since 2004    Polymyalgia rheumatica ([address]     Pulmonary embolus ([address] 6/2011    Right pulmonary obstruction suspected to be possible chronic pulmonary embolus    Thyroid disease     Vitamin D deficiency        Surgical History:     Past Surgical History:   Procedure Laterality Date    COLONOSCOPY  09/14/2016    [facility]    LUNG SURGERY      MALIGNANT SKIN LESION EXCISION      UPPER GASTROINTESTINAL ENDOSCOPY  09/14/2016    [facility] Eliptical:level  X  Min  [] Arc: level   X    mins  [] RB:  Level  X   mins  [] AB: level   X     Min              Did pt. progress in rehab?:     30 DAY TARGET GOAL  [x] Start slowly but progress each week  [x] Increase duration on the equipment  [x] Start resistance training    -30 day PF goal: 5/10                 Current Home Exercise :None    Frequency:      Type:                     Health Knowledge   Test Score:N/A       Current Home Exercise:   Frequency:      Type:         Time:  min                                  Current Home Exercise:   Frequency:       Type:         Time:  min                                Current Home Exercise:   Frequency:       Type:         Time:  min                                                      Discharge exercise plan                                  Repeat Health Knowledge Test Score :    /25     [facility] INDIVIDUAL TREATMENT PLAN  SMOKING AND   OTHER COMPONENTS    Smoking/Other  Components   Initial Assessment  Day 1 Smoking/Other  Components  Intervention  Day 2-30 Smoking/Other  Components  Re-Assessment  Day 31-60 Smoking/Other  Components  Re-Assessment Day 61-90+ Smoking/Other  Components  Discharge  Final Day   Tobacco Use Hx  [x] Y  [] N?:   Quit Date: 1964  Cig/Day: 10  Years: 6  Tobacco Use  Any change in Smoking Status?:  no  []  not smoking  Quit Date:   (if applicable)  Intervention  []  Information/ed material given on cessation techniques  []  smoking cessation class schedule given Tobacco Use  Any change in Smoking Status?:      Quit Date:   (if applicable)          Intervention  [] Referral to Tobacco Cessation Specialist (TCS) Tobacco Use  Any change in Smoking Status?:     Quit Date:   (if applicable)        Intervention Tobacco Use  Any change in Smoking Status?:     Quit Date:   (if applicable)          Intervention Tobacco Use  Any change in Smoking Status?:   Quit Date:   (if applicable)            Intervention  [] Pt used TCS counseling Intervention    Pt aware of:     [] Pulmonary eating techniques   [] Smart choices, Calories   []Gas-producing foods   [] Wt gain / loss strategies     GOALS    Weight Loss         30 DAY TARGET GOAL: N/A    [] Decrease portion size by 250-500 calories/day  [] Increase fluid intake to 6-8 8oz.  [] Eat less sweets      Reasonable, achievable 30 day weight goal:lbs   (1-2 lb per week is recommended)            Weight Gain      30 day   Target Goal:  N/A   [] increase proteins  [] add nutrition  Supplement  [] 6 small meals      Did pt meet Initial 30 day Target Goal(s)?:     New 30 DAY TARGET GOAL:    [] Decrease saturated fats  [] Decrease gas producing  cruciferous veggies   -  Reasonable, achievable 30 day weight goal:     Did pt meet previous 30 day Target   Goal(s)?:     New 30 DAY TARGET GOAL:    [] Switch to whole grains whenever possible  [] Decrease/ Eliminate carbonated beverages    Reasonable, achievable 30 day weight goal:    Did pt meet previous 30 day Target   Goal(s)?:      New 30 DAY TARGET GOAL:    [] Smaller meals more frequently  [] Decrease portion sizes by 25%      Reasonable, achievable 30 day weight goal:                  Did pt meet previous 30 day Target   Goal(s)?:                         [facility] INDIVIDUAL TREATMENT PLAN  PSYCHOSOCIAL DOMAIN:    Psychosocial   Initial Assessment  Day 1 Psychosocial   Intervention  Day 2-30 Psychosocial  Re-Assessment  Day 31-60 Psychosocial   Re-Assessment  Day 61-90+ Psychosocial Discharge  Final Day   Psychosocial Screening Tools    (X) PHQ-9 score: 9      (X) SF-36: 72       (X) UCSD SOB score: 36         PHQ-9 Score: If score >or =15, Action:     SF-36 Mental Score:     UCSD Score: Any action on Screening Tools?:                  Psychosocial Screening   Tools    Repeat PHQ-9 Score if henri:    Repeat SF-36      Repeat UCSD SOB Score:       Assessment  []  S/S of depression identified?  NO    []  PCP notified of PHQ-9 results      []  On managing depression  []  A&P of the Respiratory System   [] Environ. Issues+ Travel   [] Bronchial Hygiene / Preventing Infection  []  Resistance Training  [] Benefits of Exercise  [] Energy Cons    Education  Individual instruction  [] PLB  [] RPD/RPE   []  O2 use  []  review PFT  [] Inhalers   [] Home Activity/Warm up/ stretches  Attend Classes:  [] Nutrition  []  Panic conntrol, relaxation, managing depression  [] A&P of the Respiratory System   [] Environ. Issues+ Travel   [] Bronchial Hygiene / Preventing Infection  []  Resistance Training  [] Benefits of Exercise  [] Energy Cons   Education  []  Addressed pt questions on Education Topics                                                         Physician Interaction / Response:  (If none, continue on present care plan)     Physician Interaction / Response:   (If none, continue on present care plan)   Physician Interaction / Response:   (If none, continue on present care plan)   Physician Interaction / Response:   (If none, continue on present care plan)   Physician Interaction / Response:       Discharge the patient. Rehab Potential:  [x]  Good [] Fair [] Poor    Notes: Gurpreet Hoff is and [place] Sender old female with Hx of COPD, Pulmonary HTN, CHF, AF, Thyroid disease who has been referred to [facility]. She lives alone and is retired. She recently had problems with left hip and lower back pain but states this was treated with a course of steroids and is much better. She is currently on and antibiotic for UTI. She states she has had some degree of shortness of breath for years but has worsened since the first of this year. Conner Tobar states she had to give up playing golf and is unable to walk very far without getting short of breath and occasionally light headed. She feels like her sob has limited her activity level and subsequently gets easily fatigued. She is eager to participate in [facility] to build up her endurance and improve her sob.  She will attend [facility] 2 days a

## 2017-10-19 ENCOUNTER — OFFICE VISIT (OUTPATIENT)
Dept: INTERNAL MEDICINE CLINIC | Age: 82
End: 2017-10-19

## 2017-10-19 ENCOUNTER — HOSPITAL ENCOUNTER (OUTPATIENT)
Dept: CARDIAC REHAB | Age: 82
Discharge: HOME | End: 2017-10-20

## 2017-10-19 ENCOUNTER — ANTI-COAG VISIT (OUTPATIENT)
Dept: INTERNAL MEDICINE CLINIC | Age: 82
End: 2017-10-19

## 2017-10-19 VITALS — BODY MASS INDEX: 23.44 KG/M2 | WEIGHT: 120 LBS

## 2017-10-19 VITALS
DIASTOLIC BLOOD PRESSURE: 58 MMHG | HEART RATE: 60 BPM | BODY MASS INDEX: 23.24 KG/M2 | SYSTOLIC BLOOD PRESSURE: 104 MMHG | WEIGHT: 119 LBS | OXYGEN SATURATION: 93 %

## 2017-10-19 DIAGNOSIS — D23.9 DERMATOFIBROMA: Primary | ICD-10-CM

## 2017-10-19 DIAGNOSIS — T14.8XXA HEMATOMA: ICD-10-CM

## 2017-10-19 DIAGNOSIS — Z92.29 HX OF LONG TERM USE OF BLOOD THINNERS: ICD-10-CM

## 2017-10-19 DIAGNOSIS — I48.91 ATRIAL FIBRILLATION, UNSPECIFIED TYPE (HCC): ICD-10-CM

## 2017-10-19 LAB
INR BLD: 2.36 (ref 0.85–1.15)
PROTHROMBIN TIME: 26.7 SEC (ref 9.6–13)

## 2017-10-19 PROCEDURE — 99213 OFFICE O/P EST LOW 20 MIN: CPT | Performed by: INTERNAL MEDICINE

## 2017-10-19 NOTE — PROGRESS NOTES
Department of Internal Medicine  Clinic Note    Date: 10/19/2017                                               Subjective/Objective:     Chief Complaint   Patient presents with    Other     moveable lump under skin on rt forearm noted this past weekend. States wqas really red but not now., no pain, itch drainage. HPI: Patient presents today for evaluation of mass on right lateral proximal forearm. She states she notices first over the weekend. She inadvertently noticed a bump first and upon inspection noticed a very large area of erythema around it. This erythema has now . No bruise is roughly 5 cm in diameter. The patient denies any trauma to the area, she is on blood thinners and this may have contributed to the size of the bruise from the mass in question.            Patient Active Problem List    Diagnosis Date Noted    CHF (congestive heart failure) ([address] 07/10/2014     Priority: High    Hx of venous thromboembolic disease     Shortness of breath     S/P right heart catheterization 07/21/2016    A-fib ([address] 07/21/2016    Abnormal chest x-ray 08/14/2015    Weight loss 03/12/2015    Atrial fibrillation with RVR ([address]     Non-rheumatic mitral regurgitation 07/10/2014    Pulmonary hypertension 07/10/2014    Osteopenia 10/29/2013    Fatigue 04/26/2013    Anemia 10/26/2012    Vitamin D deficiency 10/26/2012    Disorder of bone and cartilage     Edema     Essential hypertension, benign     Other and unspecified hyperlipidemia     Hypothyroidism        Past Medical History:   Diagnosis Date    Anemia     Atrial fibrillation ([address] 6/2011    Beverly Nolen CHF (congestive heart failure) ([address]     Clostridium difficile diarrhea 09/14/2016    Humerus fracture     Hyperlipidemia     Hypertension     Mitral regurgitation     Optic neuritis     Osteopenia     Fosamax stopped 2/2014, had been treated at least since 2004    Polymyalgia rheumatica ([address]     Pulmonary embolus ([address] 6/2011 solution Place 1 drop into both eyes nightly.  vitamin D (CHOLECALCIFEROL) 400 UNITS TABS tablet Take 400 Units by mouth daily.  calcium carbonate (OSCAL) 500 MG TABS tablet Take 500 mg by mouth daily. No current facility-administered medications for this visit. Allergies   Allergen Reactions    Ace Inhibitors      coughing    Aricept [Donepezil Hydrochloride]      Can not recall    Benicar [Olmesartan Medoxomil]      Can not recall      Exelon [Rivastigmine Tartrate]      Can not recall      Pcn [Penicillins] Hives and Swelling    Quinapril Hcl      Can not recall         Review of Systems   Constitutional: Negative for chills, fatigue and fever. HENT: Negative for ear pain, sore throat, tinnitus and trouble swallowing. Eyes: Negative for visual disturbance. Respiratory: Negative for shortness of breath and wheezing. Cardiovascular: Negative for chest pain and palpitations. Gastrointestinal: Negative for abdominal pain, diarrhea, nausea and vomiting. Endocrine: Negative for cold intolerance and heat intolerance. Genitourinary: Negative for difficulty urinating and dysuria. Skin: Positive for color change. Negative for rash and wound. Bruise on right proximal lateral forearm roughly 5 cm in diameter   Neurological: Negative for dizziness, weakness and numbness. Psychiatric/Behavioral: Negative for agitation, decreased concentration and suicidal ideas. The patient is not nervous/anxious. All other systems reviewed and are negative. Vitals:  BP (!) 104/58 (Site: Right Arm, Cuff Size: Medium Adult)   Pulse 60   Wt 119 lb (54 kg)   SpO2 93%   BMI 23.24 kg/m²     Physical Exam   Constitutional: She is oriented to person, place, and time. She appears well-developed and well-nourished. No distress. HENT:   Head: Normocephalic and atraumatic.    Right Ear: Hearing, tympanic membrane and external ear normal.   Left Ear: Hearing, tympanic membrane and inadvertent unintentional computerized transcription errors may be present.

## 2017-10-20 ASSESSMENT — ENCOUNTER SYMPTOMS
SORE THROAT: 0
SHORTNESS OF BREATH: 0
TROUBLE SWALLOWING: 0
DIARRHEA: 0
COLOR CHANGE: 1
WHEEZING: 0
VOMITING: 0
NAUSEA: 0
ABDOMINAL PAIN: 0

## 2017-10-24 ENCOUNTER — HOSPITAL ENCOUNTER (OUTPATIENT)
Dept: CARDIAC REHAB | Age: 82
Discharge: HOME | End: 2017-10-25

## 2017-10-24 VITALS — BODY MASS INDEX: 23.57 KG/M2 | WEIGHT: 120.7 LBS

## 2017-10-26 ENCOUNTER — HOSPITAL ENCOUNTER (OUTPATIENT)
Dept: CARDIAC REHAB | Age: 82
Discharge: HOME | End: 2017-10-27

## 2017-10-26 VITALS — BODY MASS INDEX: 23.61 KG/M2 | WEIGHT: 120.9 LBS

## 2017-10-31 ENCOUNTER — HOSPITAL ENCOUNTER (OUTPATIENT)
Dept: OTHER | Age: 82
Discharge: HOME | End: 2017-10-31
Attending: INTERNAL MEDICINE | Admitting: INTERNAL MEDICINE

## 2017-10-31 ENCOUNTER — HOSPITAL ENCOUNTER (OUTPATIENT)
Dept: CARDIAC REHAB | Age: 82
Discharge: HOME | End: 2017-11-01

## 2017-10-31 VITALS — WEIGHT: 120.8 LBS | BODY MASS INDEX: 23.59 KG/M2

## 2017-11-01 ENCOUNTER — HOSPITAL ENCOUNTER (OUTPATIENT)
Dept: OTHER | Age: 82
Discharge: HOME | End: 2017-11-30
Attending: INTERNAL MEDICINE | Admitting: INTERNAL MEDICINE

## 2017-11-02 ENCOUNTER — HOSPITAL ENCOUNTER (OUTPATIENT)
Dept: CARDIAC REHAB | Age: 82
Discharge: HOME | End: 2017-11-03

## 2017-11-02 VITALS — WEIGHT: 120.6 LBS | BODY MASS INDEX: 23.55 KG/M2

## 2017-11-03 DIAGNOSIS — I48.91 ATRIAL FIBRILLATION, UNSPECIFIED TYPE (HCC): ICD-10-CM

## 2017-11-03 LAB
INR BLD: 2.18 (ref 0.85–1.15)
PROTHROMBIN TIME: 24.6 SEC (ref 9.6–13)

## 2017-11-06 ENCOUNTER — TELEPHONE (OUTPATIENT)
Dept: CARDIOLOGY CLINIC | Age: 82
End: 2017-11-06

## 2017-11-06 ENCOUNTER — ANTI-COAG VISIT (OUTPATIENT)
Dept: INTERNAL MEDICINE CLINIC | Age: 82
End: 2017-11-06

## 2017-11-06 RX ORDER — SIMVASTATIN 20 MG
20 TABLET ORAL DAILY
Qty: 30 TABLET | Refills: 3
Start: 2017-11-06 | End: 2018-03-01 | Stop reason: SDUPTHER

## 2017-11-06 NOTE — TELEPHONE ENCOUNTER
Spoke with patient and informed her that we have received her message, once we receive an answer , we will call her back either this evening or tomorrow morning. Per Melia Treadwell, forward to nurse liu as Dr Tata Silva may want to change her statin therapy?

## 2017-11-07 ENCOUNTER — HOSPITAL ENCOUNTER (OUTPATIENT)
Dept: CARDIAC REHAB | Age: 82
Discharge: HOME | End: 2017-11-08

## 2017-11-07 VITALS — WEIGHT: 119.4 LBS | BODY MASS INDEX: 23.32 KG/M2

## 2017-11-09 ENCOUNTER — HOSPITAL ENCOUNTER (OUTPATIENT)
Dept: CARDIAC REHAB | Age: 82
Discharge: HOME | End: 2017-11-10

## 2017-11-09 VITALS — WEIGHT: 117.6 LBS | BODY MASS INDEX: 22.97 KG/M2

## 2017-11-14 ENCOUNTER — HOSPITAL ENCOUNTER (OUTPATIENT)
Dept: CARDIAC REHAB | Age: 82
Discharge: HOME | End: 2017-11-15

## 2017-11-14 VITALS — WEIGHT: 117 LBS | BODY MASS INDEX: 22.85 KG/M2

## 2017-11-16 ENCOUNTER — ANTI-COAG VISIT (OUTPATIENT)
Dept: INTERNAL MEDICINE CLINIC | Age: 82
End: 2017-11-16

## 2017-11-16 ENCOUNTER — HOSPITAL ENCOUNTER (OUTPATIENT)
Dept: CARDIAC REHAB | Age: 82
Discharge: HOME | End: 2017-11-17

## 2017-11-16 VITALS — WEIGHT: 117 LBS | BODY MASS INDEX: 22.85 KG/M2

## 2017-11-16 DIAGNOSIS — I48.91 ATRIAL FIBRILLATION, UNSPECIFIED TYPE (HCC): ICD-10-CM

## 2017-11-16 LAB
INR BLD: 2.7 (ref 0.85–1.15)
PROTHROMBIN TIME: 30.5 SEC (ref 9.6–13)

## 2017-11-21 ENCOUNTER — HOSPITAL ENCOUNTER (OUTPATIENT)
Dept: CARDIAC REHAB | Age: 82
Discharge: HOME | End: 2017-11-22

## 2017-11-21 VITALS — WEIGHT: 114.8 LBS | BODY MASS INDEX: 22.42 KG/M2

## 2017-11-28 ENCOUNTER — HOSPITAL ENCOUNTER (OUTPATIENT)
Dept: CARDIAC REHAB | Age: 82
Discharge: HOME | End: 2017-11-29

## 2017-11-28 VITALS — WEIGHT: 116.5 LBS | BODY MASS INDEX: 22.75 KG/M2

## 2017-11-30 ENCOUNTER — HOSPITAL ENCOUNTER (OUTPATIENT)
Dept: CARDIAC REHAB | Age: 82
Discharge: HOME | End: 2017-12-01

## 2017-11-30 VITALS — WEIGHT: 117 LBS | BODY MASS INDEX: 22.85 KG/M2

## 2017-12-07 ENCOUNTER — HOSPITAL ENCOUNTER (OUTPATIENT)
Dept: CARDIAC REHAB | Age: 82
Discharge: HOME | End: 2017-12-08

## 2017-12-07 VITALS — BODY MASS INDEX: 23.36 KG/M2 | WEIGHT: 119.6 LBS

## 2017-12-11 ENCOUNTER — OFFICE VISIT (OUTPATIENT)
Dept: PULMONOLOGY | Age: 82
End: 2017-12-11

## 2017-12-11 VITALS
BODY MASS INDEX: 23.16 KG/M2 | TEMPERATURE: 97.5 F | OXYGEN SATURATION: 97 % | RESPIRATION RATE: 16 BRPM | DIASTOLIC BLOOD PRESSURE: 60 MMHG | SYSTOLIC BLOOD PRESSURE: 158 MMHG | HEIGHT: 60 IN | HEART RATE: 50 BPM | WEIGHT: 118 LBS

## 2017-12-11 DIAGNOSIS — I27.20 PULMONARY HYPERTENSION (HCC): ICD-10-CM

## 2017-12-11 DIAGNOSIS — J98.51 FIBROSING MEDIASTINITIS: ICD-10-CM

## 2017-12-11 DIAGNOSIS — R93.89 ABNORMAL CT SCAN, CHEST: ICD-10-CM

## 2017-12-11 DIAGNOSIS — R06.02 SOB (SHORTNESS OF BREATH): Primary | ICD-10-CM

## 2017-12-11 PROCEDURE — 1090F PRES/ABSN URINE INCON ASSESS: CPT | Performed by: INTERNAL MEDICINE

## 2017-12-11 PROCEDURE — G8420 CALC BMI NORM PARAMETERS: HCPCS | Performed by: INTERNAL MEDICINE

## 2017-12-11 PROCEDURE — G8427 DOCREV CUR MEDS BY ELIG CLIN: HCPCS | Performed by: INTERNAL MEDICINE

## 2017-12-11 PROCEDURE — 99214 OFFICE O/P EST MOD 30 MIN: CPT | Performed by: INTERNAL MEDICINE

## 2017-12-11 PROCEDURE — 1123F ACP DISCUSS/DSCN MKR DOCD: CPT | Performed by: INTERNAL MEDICINE

## 2017-12-11 PROCEDURE — 4040F PNEUMOC VAC/ADMIN/RCVD: CPT | Performed by: INTERNAL MEDICINE

## 2017-12-11 PROCEDURE — G8399 PT W/DXA RESULTS DOCUMENT: HCPCS | Performed by: INTERNAL MEDICINE

## 2017-12-11 PROCEDURE — 1036F TOBACCO NON-USER: CPT | Performed by: INTERNAL MEDICINE

## 2017-12-11 PROCEDURE — G8484 FLU IMMUNIZE NO ADMIN: HCPCS | Performed by: INTERNAL MEDICINE

## 2017-12-11 NOTE — PROGRESS NOTES
Chief complaint  This is a 80y.o. year old female  who comes to see me with a chief complaint of   Chief Complaint   Patient presents with    COPD       HPI  Here with cc of SOB    Last visit I ordered 6 MWT and referred her to pulmonary rehab.  6 MWT showed desaturation down to 89% (for which she qualifies for oxygen based on having pulmonary hypertension) but she did not want oxygen. She is in rehab and feels that her breathing has improved. She is down to using 1 liter of oxygen with exercise. She has not used albuterol at all. Doing well. Says she paces herself      Past Medical History:   Diagnosis Date    Anemia     Atrial fibrillation ([address] 6/2011    Ortiz Alvarez CHF (congestive heart failure) ([address]     Clostridium difficile diarrhea 09/14/2016    Humerus fracture     Hyperlipidemia     Hypertension     Mitral regurgitation     Optic neuritis     Osteopenia     Fosamax stopped 2/2014, had been treated at least since 2004    Polymyalgia rheumatica ([address]     Pulmonary embolus ([address] 6/2011    Right pulmonary obstruction suspected to be possible chronic pulmonary embolus    Thyroid disease     Vitamin D deficiency        Past Surgical History:   Procedure Laterality Date    [facility]    LUNG SURGERY      MALIGNANT SKIN LESION EXCISION      UPPER GASTROINTESTINAL ENDOSCOPY  09/14/2016    [facility]       Social History     Social History    Marital status:      Spouse name: N/A    Number of children: 2    Years of education: N/A     Occupational History    Not on file.      Social History Main Topics    Smoking status: Former Smoker     Quit date: 1/1/1965    Smokeless tobacco: Never Used    Alcohol use Yes      Comment: occ    Drug use: Unknown    Sexual activity: Not on file     Other Topics Concern    Not on file     Social History Narrative    No narrative on file       Family History   Problem Relation Age of Onset    Cancer Mother unknown primary    Other Father      Tuberculosis    Lung Cancer Sister     Tuberculosis Brother     Diabetes Brother          Current Outpatient Prescriptions:     simvastatin (ZOCOR) 20 MG tablet, Take 1 tablet by mouth daily, Disp: 30 tablet, Rfl: 3    albuterol sulfate HFA (PROAIR HFA) 108 (90 Base) MCG/ACT inhaler, Inhale 2 puffs into the lungs every 4 hours as needed for Wheezing or Shortness of Breath, Disp: 1 Inhaler, Rfl: 4    metoprolol succinate (TOPROL XL) 50 MG extended release tablet, Take 0.5 tablets by mouth nightly, Disp: 90 tablet, Rfl: 3    memantine (NAMENDA) 10 MG tablet, TAKE 1 TABLET TWICE DAILY, Disp: 180 tablet, Rfl: 3    levothyroxine (SYNTHROID) 75 MCG tablet, TAKE 1 TABLET EVERY OTHER DAY, Disp: 45 tablet, Rfl: 3    amiodarone (CORDARONE) 200 MG tablet, Take 1 tablet by mouth daily, Disp: 90 tablet, Rfl: 1    warfarin (COUMADIN) 5 MG tablet, TAKE 2 TO 3 TABLETS EVERY DAY AS DIRECTED, Disp: 270 tablet, Rfl: 3    levothyroxine (SYNTHROID) 50 MCG tablet, TAKE 1 TABLET EVERY OTHER DAY, Disp: 45 tablet, Rfl: 3    latanoprost (XALATAN) 0.005 % ophthalmic solution, Place 1 drop into both eyes nightly., Disp: , Rfl:     vitamin D (CHOLECALCIFEROL) 400 UNITS TABS tablet, Take 400 Units by mouth daily. , Disp: , Rfl:     calcium carbonate (OSCAL) 500 MG TABS tablet, Take 500 mg by mouth daily.   , Disp: , Rfl:       ROS:  GENERAL:  No fevers, chills, or night sweats  HEENT:  No double vision, blurry vision, or difficulty swallowing  HEART:  No chest pain, no palpitations  LUNGS: Less SOB with exertion  ABDOMEN:  No abdominal pains, no changes in stools  GENITOURINARY:  No increased frequency, no blood in urine  EXTREMITIES:  No swelling, no lesions  NEURO:  No numbness or tingling, no seizures  SKIN:  No new rashes, no changes in hair or nails  LYMPH:  No masses, no swelling neck, armpits, or groin    PHYSICAL EXAM:  Vitals:    12/11/17 1354   BP: (!) 154/59   Pulse: 50   Resp: 16

## 2017-12-12 ENCOUNTER — HOSPITAL ENCOUNTER (OUTPATIENT)
Dept: CARDIAC REHAB | Age: 82
Discharge: HOME | End: 2017-12-13

## 2017-12-12 VITALS — BODY MASS INDEX: 23.32 KG/M2 | WEIGHT: 119.4 LBS

## 2017-12-14 ENCOUNTER — HOSPITAL ENCOUNTER (OUTPATIENT)
Dept: CARDIAC REHAB | Age: 82
Discharge: HOME | End: 2017-12-15

## 2017-12-14 ENCOUNTER — ANTI-COAG VISIT (OUTPATIENT)
Dept: INTERNAL MEDICINE CLINIC | Age: 82
End: 2017-12-14

## 2017-12-14 VITALS — BODY MASS INDEX: 23.51 KG/M2 | WEIGHT: 120.4 LBS

## 2017-12-14 DIAGNOSIS — I48.91 ATRIAL FIBRILLATION, UNSPECIFIED TYPE (HCC): ICD-10-CM

## 2017-12-14 LAB
INR BLD: 1.86 (ref 0.85–1.15)
PROTHROMBIN TIME: 21 SEC (ref 9.6–13)

## 2017-12-19 ENCOUNTER — HOSPITAL ENCOUNTER (OUTPATIENT)
Dept: CARDIAC REHAB | Age: 82
Discharge: HOME | End: 2017-12-20

## 2017-12-19 VITALS — WEIGHT: 121 LBS | BODY MASS INDEX: 23.63 KG/M2

## 2017-12-19 RX ORDER — LEVOTHYROXINE SODIUM 0.05 MG/1
TABLET ORAL
Qty: 45 TABLET | Refills: 3 | Status: SHIPPED | OUTPATIENT
Start: 2017-12-19 | End: 2018-08-02

## 2017-12-21 ENCOUNTER — HOSPITAL ENCOUNTER (OUTPATIENT)
Dept: CARDIAC REHAB | Age: 82
Discharge: HOME | End: 2017-12-22

## 2017-12-21 VITALS — BODY MASS INDEX: 23.69 KG/M2 | WEIGHT: 121.3 LBS

## 2018-01-01 ENCOUNTER — HOSPITAL ENCOUNTER (OUTPATIENT)
Dept: OTHER | Age: 83
Discharge: HOME | End: 2018-01-31
Attending: INTERNAL MEDICINE | Admitting: INTERNAL MEDICINE

## 2018-01-09 ENCOUNTER — HOSPITAL ENCOUNTER (OUTPATIENT)
Dept: CARDIAC REHAB | Age: 83
Discharge: HOME | End: 2018-01-10

## 2018-01-09 VITALS — BODY MASS INDEX: 23.14 KG/M2 | WEIGHT: 118.5 LBS

## 2018-01-10 ENCOUNTER — ANTI-COAG VISIT (OUTPATIENT)
Dept: INTERNAL MEDICINE CLINIC | Age: 83
End: 2018-01-10

## 2018-01-10 DIAGNOSIS — I48.91 ATRIAL FIBRILLATION, UNSPECIFIED TYPE (HCC): ICD-10-CM

## 2018-01-10 LAB
INR BLD: 2.38 (ref 0.85–1.15)
PROTHROMBIN TIME: 26.9 SEC (ref 9.6–13)

## 2018-01-11 ENCOUNTER — HOSPITAL ENCOUNTER (OUTPATIENT)
Dept: CARDIAC REHAB | Age: 83
Discharge: HOME | End: 2018-01-12

## 2018-01-11 NOTE — PROGRESS NOTES
[address] Facility-Based Therapy for COPD  INDIVIDUAL TREATMENT PLAN (ITP)     NAME: Donta Jacquesllor  FEMI WEBBERB: 1935  Account Number: [de-identified]  AGE: 80 y.o. Diagnosis: MODERATELY SEVERE COPD which is GOLD Stage 2-3. PFT:  FEV1/FVCl: 68%, FEV1: 58% FVC: 63%  Notes: Medicare requirements for Pulmonary Rehabilitation include a PFT within the last 12 months revealing: FEV1/FVC <70, and FEV1 <80%, and documentation from the referring physician stating that the patient has quit smoking or will participate in smoking cessation activities prior to, or during the Pulmonary Rehab program.  A 6 Minute Walk Test (6 MWT) at the beginning and end of the program is also indicated.   GLOSSARY: PF= Physical Fitness  TM=Treadmill  AD= [employer] AirD[employer] Bike  UBE=Upperbody Ergometry LBE=Lowerbody Ergometer  NS=NuStep SF= SciFit  ST=Step Training  RT=Resistance Training   PLB=Pursed Lip Breathing   DY= Dynabands  HW= Handweights   Cybex RT= Cybex Mult istation weight machine   RB=Recumbent    REFERRING PHYSICIAN: Dr. Julaine Gilford History:     Past Medical History:   Diagnosis Date    Anemia     Atrial fibrillation ([address] 6/2011    Trammell Rings CHF (congestive heart failure) ([address]     Clostridium difficile diarrhea 09/14/2016    Humerus fracture     Hyperlipidemia     Hypertension     Mitral regurgitation     Optic neuritis     Osteopenia     Fosamax stopped 2/2014, had been treated at least since 2004    Polymyalgia rheumatica ([address]     Pulmonary embolus ([address] 6/2011    Right pulmonary obstruction suspected to be possible chronic pulmonary embolus    Thyroid disease     Vitamin D deficiency        Surgical History:     Past Surgical History:   Procedure Laterality Date    COLONOSCOPY  09/14/2016    Ilya    LUNG SURGERY      MALIGNANT SKIN LESION EXCISION      UPPER GASTROINTESTINAL ENDOSCOPY  09/14/2016    Ilya limitations:  No      Psychosocial assessment:    Support System:Friends, 2 sons that live in [place] & visit occasionally    Physical/Behavioral signs of abuse/neglect:No    Advance Directives:  Yes  [] info given    Learning Preferences: Primary Language:English        Preferred method of learning []  video []  handouts        [] listening/lecture   [x]  all    Memory impairment? No     EXERCISE  Initial Assessment  Day 1 EXERCISE  Intervention  Day 2-30 EXERCISE  Re-Assessment  Day 31-60 EXERCISE  Re-Assessment  Day 61-90+ EXERCISE  Last Day   Date: 10/17/17   Date: 11/14/17 Date: 12/14/17 Date: 1/11/18 Date:           Pre Rehab  6 MWT  1000 ft = 77% pred (normal)  2.45 METs  SpO2: 89%    1.9  MPH   HR: 72bpm      RPD: 3, RPE: 5                                           Post Rehab   6 MWT  ft = % pred   METs  SpO2: %  MPH  HR:  bpm      RPD:  , RPE:                                        GOALS  List at least 2 patient specific goals    1. Would like to be able to walk without exertion. 2.Be able to perform household chores without difficulty                            Learning Barrier(s)  [] Speech           [] Literacy              [] Hearing          [] Cognitive            [] Vision             [x]  Ready to Learn          Balance Issues  [] Y  [x] N            Orthopedic Issues  []  Y[x]  N        Rate your Physical   Fitness (PF)?    4/10    Handgrip:  Right:  Left:    EDUCATION  [x]  Staff Introduction  [x]  Proper setup/O2 use  [x]  Equipment [place]  [x]  RPD/RPE Explain  [x]  SpO2/HR Explain  [x]  Breathing Retraining  [x]  Explain Intensity  [x] Initial Levels set GOALS      1. Ongoing    2.  Can't do much-5/10; vacuuming is stuff                                  If Patient has a Learning Barrier, action:                   If pt has balance or orthopedic issues:  Interventions:                    Rate your physical   fitness (PF)?:   4/10        -30 day PF goal:  5/10    EDUCATION  [] Understands proper set/up O2 use  []  Understands SpO2 and RPD? [] Aerobic progression explained? []  Intensity progression explained? GOALS      1. [place] pacing    2. 5/10- Has \"cleaning lady\"                                                                                     Rate your physical   fitness (PF)?:   5-6/10        -30 day PF goal: 6-7/10    EDUCATION   []  Home Activity education offered  [x] Stretching/ Flexibility education offered  [x] Attended Benefits of Exercise Class  [x]  Attended Resistance Training Class                                                    GOALS        1. [place] and is somewhat better    2. Yes- 8/10- but \"doesn't do much. \"                                                                                   Rate your physical   fitness (PF)?:   5-6/10    -30 day PF goal: 6-7/10    EDUCATION  [x] Attended all individually relevant exercise education sessions? [x]  Knows current exercise goals and recmndtns?:                                                  Goals Achieved? Rate your physical fitness now?:     /10  Handgrip:  Right:  Left:    Discharge  EDUCATION  []  Attended all individually relevant exercise education sessions?    [] Knows current exercise goals and recmndtns?:                                                                                        PHYSICIAN DIRECTED   EXERCISE RX     RPE : 11 - 14  Titrate O2 to keep SpO2 >89%    Frequency (F): 2-3x/wk in Rehab,         Initial Intensity : 2.45 METs (from 6MWT)   1.5-2.0/ 60-80% of walk speed for TM    Type (T): Aerobic (TM,NS, SF, AE, AB, RB, EL, Arc), RT 1-3#, 8-16 reps    CAN USE ALL EQUIPMENT EXCEPT       Time (Ti): Aerobic:   15-40 min               Progression: 1-2 min total time for TM, AB, NS, SF or Arm ergometer per session or when a steady state has occurred in RPE if  SpO2 and HR levels are acceptable applicable)          Intervention  [] Referral to Tobacco Cessation Specialist (TCS) Tobacco Use  Any change in Smoking Status?: N/A    Quit Date:   (if applicable)        Intervention Tobacco Use  Any change in Smoking Status?: N/A    Quit Date:   (if applicable)          Intervention Tobacco Use  Any change in Smoking Status?:   Quit Date:   (if applicable)            Intervention  [] Pt used TCS counseling           Other  Components:    [x]  Environmental Factors    [x]  Prevention Management of Exacerbations    [x]  CHF Management    []  Hypertension Management     Intervention/  Education                Martha's INDIVIDUAL TREATMENT PLAN  OXYGEN AND MEDICATIONS    OXYGEN  MEDICATIONS   Initial Assessment  Day 1 OXYGEN  MEDICATIONS  Intervention  Day 2-30 OXYGEN  MEDICATION  Re-Assessment  Day 31-60 OXYGEN  MEDICATION  ReAssessment Day 61-90+ OXYGEN  MEDICATION  Discharge  Final Day    10/17/17 11/14/17 12/14/17    1/11/18          Medications  [x]  Uses as prescribed  []  Non- compliant with prescribed meds    Respiratory Medications    [x]  Proper use   and technique of MDI, DPI and/or nebs  []  Incorrect use and technique of MDI, DPI and /or nebs    Hypoxemia  Problem:    []  No problem  [] Noncompliant with O2 use  [x]  Oxygen in [place] only  []  No home O2  []  No portable O2  []  Needs O2 prescription and O2 qualifying data sent for setup   ()Poor knowledge of O2 use/safety    Current Oxygen Prescription:   0l/min rest  2 l/min exercise    CPAP/BIPAP:N/A    Goals:     []  Manage Hypoxemia  []  receive adequate portable system  []  Compliant with use as prescribed  []  Learn O2 safety guidelines   Medications  [x]  Uses as prescribed  []  Non- compliant with prescribed meds    Respiratory Medications    [] Proper use and technique of MDI, DPI and/or nebs  []  Incorrect use and technique of MDI, DPI and /or nebs    Hypoxemia  Problem:      Current Oxygen Prescription:   0 l/min rest   2 l/min exercise Rosanna Lugo is and Laban Jumbo old female with Hx of COPD, Pulmonary HTN, CHF, AF, Thyroid disease who has been referred to AK. She lives alone and is retired. She recently had problems with left hip and lower back pain but states this was treated with a course of steroids and is much better. She is currently on and antibiotic for UTI. She states she has had some degree of shortness of breath for years but has worsened since the first of this year. Franklin Santiago states she had to give up playing golf and is unable to walk very far without getting short of breath and occasionally light headed. She feels like her sob has limited her activity level and subsequently gets easily fatigued. She is eager to participate in AK to build up her endurance and improve her sob. She will attend AK 2 days a week. Summary    Franklin Santiago will attend 28 Sessions of AK Phase 2. Exercise: Exercise intensity and time based on 6 min walk results and will be increased based on patients RPE. Oxygen: 2 lpm while exercising    Medications: 1. Albuterol MDI 2 puffs q 4hr prn                       2. Metoprolol 25mg daily                       3. Amiodarone 200mg daily    Nutrition: Wt is 120.5, BMI 22.8. This is stable for her. She will attend a general nutrition class during AK    Psychosocial including Dyspnea with ADL's, Depression/Anxiety and Social Functioning: Franklin Santiago denies any issues with anxiety/depression. She has 2 son's that live in [place] that she only sees occasionally. She goes out to dinner regularly with friends and relies on them for support. Other:      30 Day Report: 11/14/17-  Attendance: Completed 9 sessions and 3 education classes. Exercise: Exercises for 35 minutes at low levels. Oxygen: Uses O2 in rehab only at 2lpm. SaO2 levels well above 90%   Medications: Takes as prescribed. Nutrition: Trying to gain/maintain weight. Increasing protein by 3-5oz per day.   Psychosocial: Reports no anxiety, minimal depression, and

## 2018-01-16 VITALS — WEIGHT: 119.7 LBS | BODY MASS INDEX: 23.38 KG/M2

## 2018-01-18 RX ORDER — AMIODARONE HYDROCHLORIDE 200 MG/1
TABLET ORAL
Qty: 90 TABLET | Refills: 3 | Status: SHIPPED | OUTPATIENT
Start: 2018-01-18 | End: 2018-07-26 | Stop reason: SDUPTHER

## 2018-01-23 ENCOUNTER — HOSPITAL ENCOUNTER (OUTPATIENT)
Dept: CARDIAC REHAB | Age: 83
Discharge: HOME | End: 2018-01-24
Admitting: INTERNAL MEDICINE

## 2018-01-25 ENCOUNTER — HOSPITAL ENCOUNTER (OUTPATIENT)
Dept: CARDIAC REHAB | Age: 83
Discharge: HOME | End: 2018-01-26
Admitting: INTERNAL MEDICINE

## 2018-01-25 VITALS — BODY MASS INDEX: 22.6 KG/M2 | WEIGHT: 115.7 LBS

## 2018-02-01 ENCOUNTER — HOSPITAL ENCOUNTER (OUTPATIENT)
Dept: CARDIAC REHAB | Age: 83
Discharge: HOME | End: 2018-02-02
Admitting: INTERNAL MEDICINE

## 2018-02-01 ENCOUNTER — HOSPITAL ENCOUNTER (OUTPATIENT)
Dept: OTHER | Age: 83
Discharge: HOME | End: 2018-02-28
Attending: INTERNAL MEDICINE | Admitting: INTERNAL MEDICINE

## 2018-02-02 ENCOUNTER — OFFICE VISIT (OUTPATIENT)
Dept: INTERNAL MEDICINE CLINIC | Age: 83
End: 2018-02-02

## 2018-02-02 VITALS
HEIGHT: 60 IN | WEIGHT: 117.4 LBS | SYSTOLIC BLOOD PRESSURE: 128 MMHG | HEART RATE: 78 BPM | BODY MASS INDEX: 23.05 KG/M2 | RESPIRATION RATE: 16 BRPM | DIASTOLIC BLOOD PRESSURE: 54 MMHG

## 2018-02-02 DIAGNOSIS — I50.32 CHRONIC DIASTOLIC CONGESTIVE HEART FAILURE (HCC): ICD-10-CM

## 2018-02-02 DIAGNOSIS — I10 ESSENTIAL HYPERTENSION, BENIGN: Primary | ICD-10-CM

## 2018-02-02 DIAGNOSIS — I48.20 CHRONIC ATRIAL FIBRILLATION (HCC): ICD-10-CM

## 2018-02-02 DIAGNOSIS — E03.9 ACQUIRED HYPOTHYROIDISM: ICD-10-CM

## 2018-02-02 LAB
A/G RATIO: 1.3 (ref 1.1–2.2)
ALBUMIN SERPL-MCNC: 4.1 G/DL (ref 3.4–5)
ALP BLD-CCNC: 99 U/L (ref 40–129)
ALT SERPL-CCNC: 35 U/L (ref 10–40)
ANION GAP SERPL CALCULATED.3IONS-SCNC: 11 MMOL/L (ref 3–16)
AST SERPL-CCNC: 64 U/L (ref 15–37)
BASOPHILS ABSOLUTE: 0.1 K/UL (ref 0–0.2)
BASOPHILS RELATIVE PERCENT: 1 %
BILIRUB SERPL-MCNC: 0.4 MG/DL (ref 0–1)
BUN BLDV-MCNC: 23 MG/DL (ref 7–20)
CALCIUM SERPL-MCNC: 9.6 MG/DL (ref 8.3–10.6)
CHLORIDE BLD-SCNC: 99 MMOL/L (ref 99–110)
CO2: 34 MMOL/L (ref 21–32)
CREAT SERPL-MCNC: 1.4 MG/DL (ref 0.6–1.2)
EOSINOPHILS ABSOLUTE: 0.4 K/UL (ref 0–0.6)
EOSINOPHILS RELATIVE PERCENT: 7 %
GFR AFRICAN AMERICAN: 43
GFR NON-AFRICAN AMERICAN: 36
GLOBULIN: 3.1 G/DL
GLUCOSE BLD-MCNC: 82 MG/DL (ref 70–99)
HCT VFR BLD CALC: 36.2 % (ref 36–48)
HEMOGLOBIN: 11.8 G/DL (ref 12–16)
LDL CHOLESTEROL DIRECT: 82 MG/DL
LYMPHOCYTES ABSOLUTE: 1.1 K/UL (ref 1–5.1)
LYMPHOCYTES RELATIVE PERCENT: 19.6 %
MCH RBC QN AUTO: 30.5 PG (ref 26–34)
MCHC RBC AUTO-ENTMCNC: 32.4 G/DL (ref 31–36)
MCV RBC AUTO: 94.2 FL (ref 80–100)
MONOCYTES ABSOLUTE: 0.5 K/UL (ref 0–1.3)
MONOCYTES RELATIVE PERCENT: 8.5 %
NEUTROPHILS ABSOLUTE: 3.5 K/UL (ref 1.7–7.7)
NEUTROPHILS RELATIVE PERCENT: 63.9 %
PDW BLD-RTO: 15.6 % (ref 12.4–15.4)
PLATELET # BLD: 162 K/UL (ref 135–450)
PMV BLD AUTO: 9.7 FL (ref 5–10.5)
POTASSIUM SERPL-SCNC: 4.6 MMOL/L (ref 3.5–5.1)
RBC # BLD: 3.85 M/UL (ref 4–5.2)
SODIUM BLD-SCNC: 144 MMOL/L (ref 136–145)
TOTAL PROTEIN: 7.2 G/DL (ref 6.4–8.2)
TSH SERPL DL<=0.05 MIU/L-ACNC: 4.54 UIU/ML (ref 0.27–4.2)
WBC # BLD: 5.5 K/UL (ref 4–11)

## 2018-02-02 PROCEDURE — G8484 FLU IMMUNIZE NO ADMIN: HCPCS | Performed by: INTERNAL MEDICINE

## 2018-02-02 PROCEDURE — 1123F ACP DISCUSS/DSCN MKR DOCD: CPT | Performed by: INTERNAL MEDICINE

## 2018-02-02 PROCEDURE — G8399 PT W/DXA RESULTS DOCUMENT: HCPCS | Performed by: INTERNAL MEDICINE

## 2018-02-02 PROCEDURE — 1036F TOBACCO NON-USER: CPT | Performed by: INTERNAL MEDICINE

## 2018-02-02 PROCEDURE — G8420 CALC BMI NORM PARAMETERS: HCPCS | Performed by: INTERNAL MEDICINE

## 2018-02-02 PROCEDURE — 99214 OFFICE O/P EST MOD 30 MIN: CPT | Performed by: INTERNAL MEDICINE

## 2018-02-02 PROCEDURE — 1090F PRES/ABSN URINE INCON ASSESS: CPT | Performed by: INTERNAL MEDICINE

## 2018-02-02 PROCEDURE — G8427 DOCREV CUR MEDS BY ELIG CLIN: HCPCS | Performed by: INTERNAL MEDICINE

## 2018-02-02 PROCEDURE — 4040F PNEUMOC VAC/ADMIN/RCVD: CPT | Performed by: INTERNAL MEDICINE

## 2018-02-02 RX ORDER — FUROSEMIDE 40 MG/1
40 TABLET ORAL DAILY
COMMUNITY
End: 2018-05-30 | Stop reason: SDUPTHER

## 2018-02-02 NOTE — PROGRESS NOTES
She has no cervical adenopathy. Assessment:      1. Essential hypertension, benign  - well controlled, no changes needed. 2. Acquired hypothyroidism  - TSH today, energy is not great. 3. Chronic diastolic congestive heart failure ([address]  - well compensated, has minimal pretibial edema. Continue the current dosing   4.  Chronic atrial fibrillation (HCC)  - RRR on exam, check INR today          Plan:      F/u in 6 months

## 2018-02-04 DIAGNOSIS — I10 ESSENTIAL HYPERTENSION, BENIGN: Primary | ICD-10-CM

## 2018-02-04 DIAGNOSIS — E03.9 ACQUIRED HYPOTHYROIDISM: ICD-10-CM

## 2018-02-04 RX ORDER — METOPROLOL SUCCINATE 50 MG/1
TABLET, EXTENDED RELEASE ORAL
Qty: 180 TABLET | Refills: 3 | Status: ON HOLD | OUTPATIENT
Start: 2018-02-04 | End: 2019-06-21 | Stop reason: HOSPADM

## 2018-02-06 ENCOUNTER — HOSPITAL ENCOUNTER (OUTPATIENT)
Dept: CARDIAC REHAB | Age: 83
Discharge: HOME | End: 2018-02-07
Admitting: INTERNAL MEDICINE

## 2018-02-06 ENCOUNTER — ANTI-COAG VISIT (OUTPATIENT)
Dept: INTERNAL MEDICINE CLINIC | Age: 83
End: 2018-02-06

## 2018-02-06 VITALS — WEIGHT: 116.7 LBS | BODY MASS INDEX: 22.79 KG/M2

## 2018-02-06 DIAGNOSIS — I10 ESSENTIAL HYPERTENSION, BENIGN: ICD-10-CM

## 2018-02-06 DIAGNOSIS — E03.9 ACQUIRED HYPOTHYROIDISM: ICD-10-CM

## 2018-02-06 DIAGNOSIS — I48.91 ATRIAL FIBRILLATION, UNSPECIFIED TYPE (HCC): ICD-10-CM

## 2018-02-06 LAB
INR BLD: 2.27 (ref 0.85–1.15)
PROTHROMBIN TIME: 25.6 SEC (ref 9.6–13)

## 2018-02-06 NOTE — PROGRESS NOTES
Attended education class on preventing and recognizing infection and when to call the doctor.  Also explained bronchial hygiene techniques

## 2018-02-08 ENCOUNTER — HOSPITAL ENCOUNTER (OUTPATIENT)
Dept: CARDIAC REHAB | Age: 83
Discharge: HOME | End: 2018-02-09
Admitting: INTERNAL MEDICINE

## 2018-02-09 ENCOUNTER — TELEPHONE (OUTPATIENT)
Dept: INTERNAL MEDICINE CLINIC | Age: 83
End: 2018-02-09

## 2018-02-13 ENCOUNTER — HOSPITAL ENCOUNTER (OUTPATIENT)
Dept: CARDIAC REHAB | Age: 83
Discharge: HOME | End: 2018-02-14
Admitting: INTERNAL MEDICINE

## 2018-02-13 VITALS — WEIGHT: 117 LBS | BODY MASS INDEX: 22.85 KG/M2

## 2018-02-15 ENCOUNTER — HOSPITAL ENCOUNTER (OUTPATIENT)
Dept: CARDIAC REHAB | Age: 83
Discharge: HOME | End: 2018-02-16
Admitting: INTERNAL MEDICINE

## 2018-02-15 VITALS — BODY MASS INDEX: 23.1 KG/M2 | WEIGHT: 118.3 LBS

## 2018-02-16 ENCOUNTER — TELEPHONE (OUTPATIENT)
Dept: INTERNAL MEDICINE CLINIC | Age: 83
End: 2018-02-16

## 2018-02-16 NOTE — TELEPHONE ENCOUNTER
Left message on machine to let us know if she intends on having TSH Without Reflex and BMP ordered by Dr. Herbert Lopez.

## 2018-02-18 RX ORDER — LEVOTHYROXINE SODIUM 0.07 MG/1
TABLET ORAL
Qty: 45 TABLET | Refills: 3 | Status: SHIPPED | OUTPATIENT
Start: 2018-02-18 | End: 2018-08-02 | Stop reason: SDUPTHER

## 2018-02-20 ENCOUNTER — HOSPITAL ENCOUNTER (OUTPATIENT)
Dept: CARDIAC REHAB | Age: 83
Discharge: HOME | End: 2018-02-21
Admitting: INTERNAL MEDICINE

## 2018-02-20 VITALS — BODY MASS INDEX: 23.36 KG/M2 | WEIGHT: 119.6 LBS

## 2018-02-20 PROCEDURE — 94618 PULMONARY STRESS TESTING: CPT | Performed by: INTERNAL MEDICINE

## 2018-02-20 NOTE — PROCEDURES
normal, the blood pressure response was normal, oxygen saturations were abnormal in that she desaturated while on room air. The patient desaturated to 87% while ambulating on room air, and was placed on 2 L of oxygen to keep saturations above 90%. The degree of symptoms based on the Kofi Dyspnea/Fatigue scale were increased with testing. This test does indicate the need for supplemental oxygen. She walked an additional 18 meters when compared to pre-rehab walk test.  This is not a substantial change        MATEO Díaz, DO  Pulmonary Rehab Director

## 2018-02-22 DIAGNOSIS — E03.9 ACQUIRED HYPOTHYROIDISM: ICD-10-CM

## 2018-02-22 DIAGNOSIS — I10 ESSENTIAL HYPERTENSION, BENIGN: Primary | ICD-10-CM

## 2018-02-26 RX ORDER — MEMANTINE HYDROCHLORIDE 10 MG/1
TABLET ORAL
Qty: 180 TABLET | Refills: 3 | Status: SHIPPED | OUTPATIENT
Start: 2018-02-26 | End: 2019-02-11 | Stop reason: SDUPTHER

## 2018-03-01 ENCOUNTER — HOSPITAL ENCOUNTER (OUTPATIENT)
Dept: OTHER | Age: 83
Discharge: HOME | End: 2018-03-31
Attending: INTERNAL MEDICINE | Admitting: INTERNAL MEDICINE

## 2018-03-01 ENCOUNTER — ANTI-COAG VISIT (OUTPATIENT)
Dept: INTERNAL MEDICINE CLINIC | Age: 83
End: 2018-03-01

## 2018-03-01 DIAGNOSIS — I10 ESSENTIAL HYPERTENSION, BENIGN: ICD-10-CM

## 2018-03-01 DIAGNOSIS — E03.9 ACQUIRED HYPOTHYROIDISM: ICD-10-CM

## 2018-03-01 DIAGNOSIS — I48.91 ATRIAL FIBRILLATION, UNSPECIFIED TYPE (HCC): ICD-10-CM

## 2018-03-01 LAB
ANION GAP SERPL CALCULATED.3IONS-SCNC: 15 MMOL/L (ref 3–16)
BUN BLDV-MCNC: 23 MG/DL (ref 7–20)
CALCIUM SERPL-MCNC: 9.2 MG/DL (ref 8.3–10.6)
CHLORIDE BLD-SCNC: 100 MMOL/L (ref 99–110)
CO2: 27 MMOL/L (ref 21–32)
CREAT SERPL-MCNC: 1.3 MG/DL (ref 0.6–1.2)
GFR AFRICAN AMERICAN: 47
GFR NON-AFRICAN AMERICAN: 39
GLUCOSE BLD-MCNC: 87 MG/DL (ref 70–99)
INR BLD: 2.03 (ref 0.85–1.15)
POTASSIUM SERPL-SCNC: 4.4 MMOL/L (ref 3.5–5.1)
PROTHROMBIN TIME: 22.9 SEC (ref 9.6–13)
SODIUM BLD-SCNC: 142 MMOL/L (ref 136–145)
TSH SERPL DL<=0.05 MIU/L-ACNC: 6.35 UIU/ML (ref 0.27–4.2)

## 2018-03-01 RX ORDER — SIMVASTATIN 20 MG
20 TABLET ORAL DAILY
Qty: 90 TABLET | Refills: 3 | Status: SHIPPED | OUTPATIENT
Start: 2018-03-01 | End: 2019-02-25 | Stop reason: SDUPTHER

## 2018-03-04 DIAGNOSIS — E03.9 ACQUIRED HYPOTHYROIDISM: Primary | ICD-10-CM

## 2018-03-09 ENCOUNTER — TELEPHONE (OUTPATIENT)
Dept: INTERNAL MEDICINE CLINIC | Age: 83
End: 2018-03-09

## 2018-03-10 ENCOUNTER — OFFICE VISIT (OUTPATIENT)
Dept: INTERNAL MEDICINE CLINIC | Age: 83
End: 2018-03-10

## 2018-03-10 VITALS
SYSTOLIC BLOOD PRESSURE: 158 MMHG | HEART RATE: 50 BPM | BODY MASS INDEX: 22.13 KG/M2 | WEIGHT: 117.2 LBS | OXYGEN SATURATION: 93 % | DIASTOLIC BLOOD PRESSURE: 70 MMHG | HEIGHT: 61 IN

## 2018-03-10 DIAGNOSIS — J40 BRONCHITIS: Primary | ICD-10-CM

## 2018-03-10 PROCEDURE — 4040F PNEUMOC VAC/ADMIN/RCVD: CPT | Performed by: INTERNAL MEDICINE

## 2018-03-10 PROCEDURE — 1090F PRES/ABSN URINE INCON ASSESS: CPT | Performed by: INTERNAL MEDICINE

## 2018-03-10 PROCEDURE — G8482 FLU IMMUNIZE ORDER/ADMIN: HCPCS | Performed by: INTERNAL MEDICINE

## 2018-03-10 PROCEDURE — G8399 PT W/DXA RESULTS DOCUMENT: HCPCS | Performed by: INTERNAL MEDICINE

## 2018-03-10 PROCEDURE — 1123F ACP DISCUSS/DSCN MKR DOCD: CPT | Performed by: INTERNAL MEDICINE

## 2018-03-10 PROCEDURE — 99213 OFFICE O/P EST LOW 20 MIN: CPT | Performed by: INTERNAL MEDICINE

## 2018-03-10 PROCEDURE — G8420 CALC BMI NORM PARAMETERS: HCPCS | Performed by: INTERNAL MEDICINE

## 2018-03-10 PROCEDURE — 1036F TOBACCO NON-USER: CPT | Performed by: INTERNAL MEDICINE

## 2018-03-10 PROCEDURE — G8428 CUR MEDS NOT DOCUMENT: HCPCS | Performed by: INTERNAL MEDICINE

## 2018-03-10 RX ORDER — DOXYCYCLINE HYCLATE 100 MG
100 TABLET ORAL 2 TIMES DAILY
Qty: 14 TABLET | Refills: 0 | Status: SHIPPED | OUTPATIENT
Start: 2018-03-10 | End: 2018-03-13

## 2018-03-10 RX ORDER — GUAIFENESIN AND CODEINE PHOSPHATE 100; 10 MG/5ML; MG/5ML
5-10 SOLUTION ORAL EVERY 4 HOURS PRN
Qty: 180 ML | Refills: 1 | Status: SHIPPED | OUTPATIENT
Start: 2018-03-10 | End: 2018-04-04

## 2018-03-10 NOTE — PROGRESS NOTES
appears well-developed and well-nourished. HENT:   Head: Normocephalic and atraumatic. Right Ear: Tympanic membrane and external ear normal.   Left Ear: Tympanic membrane and external ear normal.   Nose: Nose normal.   Mouth/Throat: Oropharynx is clear and moist. No oropharyngeal exudate. Neck: Normal range of motion. Neck supple. No thyromegaly present. Cardiovascular: Normal rate, regular rhythm and normal heart sounds. Pulmonary/Chest: Effort normal. She has no wheezes. She has rales (right lower lung with crackles). The patient has a non-productive cough but it is very moist   Lymphadenopathy:     She has no cervical adenopathy. Vitals reviewed. Assessment:      Bronchitis-given the moist nature of the cough, despite not expectorating, will treat with doxycycline and Robitussin with codeine. Repeat the INR on Monday.       Plan:      As above

## 2018-03-13 ENCOUNTER — TELEPHONE (OUTPATIENT)
Dept: INTERNAL MEDICINE CLINIC | Age: 83
End: 2018-03-13

## 2018-03-13 RX ORDER — CEFUROXIME AXETIL 250 MG/1
250 TABLET ORAL 2 TIMES DAILY
Qty: 10 TABLET | Refills: 0 | Status: SHIPPED | OUTPATIENT
Start: 2018-03-13 | End: 2018-03-14 | Stop reason: SDUPTHER

## 2018-03-13 NOTE — TELEPHONE ENCOUNTER
Pt states that she would like Dr Goldstein Spotted to know that she is nauseous and vomited yesterday. Pt states that she think it is that antibiotic doxycycline hyclate 100mg. Pt states that she has been following directions for dosage. Pt would like to called back to discuss possible options to help with nausea.

## 2018-03-13 NOTE — TELEPHONE ENCOUNTER
The best option would be to probably switch her antibiotic. Alternatively I can give her a medication for nausea.

## 2018-03-14 ENCOUNTER — TELEPHONE (OUTPATIENT)
Dept: INTERNAL MEDICINE CLINIC | Age: 83
End: 2018-03-14

## 2018-03-14 RX ORDER — CEFUROXIME AXETIL 250 MG/1
250 TABLET ORAL 2 TIMES DAILY
Qty: 10 TABLET | Refills: 0 | Status: SHIPPED | OUTPATIENT
Start: 2018-03-14 | End: 2018-03-19

## 2018-03-14 NOTE — TELEPHONE ENCOUNTER
Pt states that Dr Rob Serrato was going to send a script for an antibiotic to treat chest congestion in to pt pharmacy. Pharmacy states that they have not yet received a script. Please send to [address] [facility] [address]. Please advise pt when script has been sent.

## 2018-03-26 ENCOUNTER — TELEPHONE (OUTPATIENT)
Dept: PULMONOLOGY | Age: 83
End: 2018-03-26

## 2018-04-01 ENCOUNTER — HOSPITAL ENCOUNTER (OUTPATIENT)
Dept: OTHER | Age: 83
Discharge: HOME | End: 2018-04-30
Attending: INTERNAL MEDICINE | Admitting: INTERNAL MEDICINE

## 2018-04-03 ENCOUNTER — OFFICE VISIT (OUTPATIENT)
Dept: PULMONOLOGY | Age: 83
End: 2018-04-03

## 2018-04-03 VITALS
TEMPERATURE: 97 F | DIASTOLIC BLOOD PRESSURE: 60 MMHG | HEART RATE: 50 BPM | SYSTOLIC BLOOD PRESSURE: 150 MMHG | OXYGEN SATURATION: 90 % | HEIGHT: 60 IN | RESPIRATION RATE: 16 BRPM | WEIGHT: 119 LBS | BODY MASS INDEX: 23.36 KG/M2

## 2018-04-03 DIAGNOSIS — I48.91 ATRIAL FIBRILLATION, UNSPECIFIED TYPE (HCC): ICD-10-CM

## 2018-04-03 DIAGNOSIS — R06.02 SOB (SHORTNESS OF BREATH): Primary | ICD-10-CM

## 2018-04-03 DIAGNOSIS — E03.9 ACQUIRED HYPOTHYROIDISM: ICD-10-CM

## 2018-04-03 DIAGNOSIS — J98.51 FIBROSING MEDIASTINITIS: ICD-10-CM

## 2018-04-03 DIAGNOSIS — I34.0 MITRAL VALVE INSUFFICIENCY, UNSPECIFIED ETIOLOGY: ICD-10-CM

## 2018-04-03 DIAGNOSIS — I27.20 PULMONARY HYPERTENSION (HCC): ICD-10-CM

## 2018-04-03 LAB
INR BLD: 2.11 (ref 0.85–1.15)
PROTHROMBIN TIME: 23.8 SEC (ref 9.6–13)
TSH SERPL DL<=0.05 MIU/L-ACNC: 5.8 UIU/ML (ref 0.27–4.2)

## 2018-04-03 PROCEDURE — 1036F TOBACCO NON-USER: CPT | Performed by: INTERNAL MEDICINE

## 2018-04-03 PROCEDURE — G8420 CALC BMI NORM PARAMETERS: HCPCS | Performed by: INTERNAL MEDICINE

## 2018-04-03 PROCEDURE — 99214 OFFICE O/P EST MOD 30 MIN: CPT | Performed by: INTERNAL MEDICINE

## 2018-04-03 PROCEDURE — 1123F ACP DISCUSS/DSCN MKR DOCD: CPT | Performed by: INTERNAL MEDICINE

## 2018-04-03 PROCEDURE — 4040F PNEUMOC VAC/ADMIN/RCVD: CPT | Performed by: INTERNAL MEDICINE

## 2018-04-03 PROCEDURE — G8399 PT W/DXA RESULTS DOCUMENT: HCPCS | Performed by: INTERNAL MEDICINE

## 2018-04-03 PROCEDURE — 1090F PRES/ABSN URINE INCON ASSESS: CPT | Performed by: INTERNAL MEDICINE

## 2018-04-03 PROCEDURE — G8427 DOCREV CUR MEDS BY ELIG CLIN: HCPCS | Performed by: INTERNAL MEDICINE

## 2018-04-04 ENCOUNTER — ANTI-COAG VISIT (OUTPATIENT)
Dept: INTERNAL MEDICINE CLINIC | Age: 83
End: 2018-04-04

## 2018-04-04 DIAGNOSIS — E03.9 ACQUIRED HYPOTHYROIDISM: Primary | ICD-10-CM

## 2018-04-20 ENCOUNTER — OFFICE VISIT (OUTPATIENT)
Dept: CARDIOLOGY CLINIC | Age: 83
End: 2018-04-20

## 2018-04-20 VITALS
HEART RATE: 43 BPM | SYSTOLIC BLOOD PRESSURE: 122 MMHG | WEIGHT: 112.8 LBS | OXYGEN SATURATION: 81 % | HEIGHT: 60 IN | DIASTOLIC BLOOD PRESSURE: 70 MMHG | BODY MASS INDEX: 22.15 KG/M2

## 2018-04-20 DIAGNOSIS — I27.20 PULMONARY HTN (HCC): ICD-10-CM

## 2018-04-20 DIAGNOSIS — R06.02 SHORTNESS OF BREATH: ICD-10-CM

## 2018-04-20 DIAGNOSIS — I48.0 PAROXYSMAL ATRIAL FIBRILLATION (HCC): Primary | ICD-10-CM

## 2018-04-20 DIAGNOSIS — I34.0 SEVERE MITRAL REGURGITATION: ICD-10-CM

## 2018-04-20 PROCEDURE — G8420 CALC BMI NORM PARAMETERS: HCPCS | Performed by: INTERNAL MEDICINE

## 2018-04-20 PROCEDURE — 93000 ELECTROCARDIOGRAM COMPLETE: CPT | Performed by: INTERNAL MEDICINE

## 2018-04-20 PROCEDURE — G8427 DOCREV CUR MEDS BY ELIG CLIN: HCPCS | Performed by: INTERNAL MEDICINE

## 2018-04-20 PROCEDURE — 4040F PNEUMOC VAC/ADMIN/RCVD: CPT | Performed by: INTERNAL MEDICINE

## 2018-04-20 PROCEDURE — 99214 OFFICE O/P EST MOD 30 MIN: CPT | Performed by: INTERNAL MEDICINE

## 2018-04-20 PROCEDURE — 1090F PRES/ABSN URINE INCON ASSESS: CPT | Performed by: INTERNAL MEDICINE

## 2018-04-20 PROCEDURE — G8399 PT W/DXA RESULTS DOCUMENT: HCPCS | Performed by: INTERNAL MEDICINE

## 2018-04-20 PROCEDURE — 1123F ACP DISCUSS/DSCN MKR DOCD: CPT | Performed by: INTERNAL MEDICINE

## 2018-04-20 PROCEDURE — 1036F TOBACCO NON-USER: CPT | Performed by: INTERNAL MEDICINE

## 2018-05-01 ENCOUNTER — HOSPITAL ENCOUNTER (OUTPATIENT)
Dept: OTHER | Age: 83
Discharge: HOME | End: 2018-05-31
Attending: INTERNAL MEDICINE | Admitting: INTERNAL MEDICINE

## 2018-05-10 ENCOUNTER — TELEPHONE (OUTPATIENT)
Dept: INTERNAL MEDICINE CLINIC | Age: 83
End: 2018-05-10

## 2018-05-10 ENCOUNTER — ANTI-COAG VISIT (OUTPATIENT)
Dept: INTERNAL MEDICINE CLINIC | Age: 83
End: 2018-05-10

## 2018-05-10 DIAGNOSIS — I48.91 ATRIAL FIBRILLATION, UNSPECIFIED TYPE (HCC): ICD-10-CM

## 2018-05-10 DIAGNOSIS — E03.8 OTHER SPECIFIED HYPOTHYROIDISM: Primary | ICD-10-CM

## 2018-05-10 LAB
INR BLD: 1.66 (ref 0.85–1.15)
PROTHROMBIN TIME: 18.8 SEC (ref 9.6–13)
TSH SERPL DL<=0.05 MIU/L-ACNC: 2.94 UIU/ML (ref 0.27–4.2)

## 2018-05-17 ENCOUNTER — ANTI-COAG VISIT (OUTPATIENT)
Dept: INTERNAL MEDICINE CLINIC | Age: 83
End: 2018-05-17

## 2018-05-17 DIAGNOSIS — I48.91 ATRIAL FIBRILLATION, UNSPECIFIED TYPE (HCC): ICD-10-CM

## 2018-05-17 LAB
INR BLD: 2.07 (ref 0.85–1.15)
PROTHROMBIN TIME: 23.4 SEC (ref 9.6–13)

## 2018-05-30 RX ORDER — FUROSEMIDE 40 MG/1
40 TABLET ORAL DAILY
Qty: 180 TABLET | Refills: 3 | Status: ON HOLD | OUTPATIENT
Start: 2018-05-30 | End: 2019-06-21 | Stop reason: HOSPADM

## 2018-06-01 ENCOUNTER — HOSPITAL ENCOUNTER (OUTPATIENT)
Dept: OTHER | Age: 83
Discharge: HOME | End: 2018-06-30
Attending: INTERNAL MEDICINE | Admitting: INTERNAL MEDICINE

## 2018-06-19 DIAGNOSIS — I48.91 ATRIAL FIBRILLATION, UNSPECIFIED TYPE (HCC): ICD-10-CM

## 2018-06-19 LAB
INR BLD: 2.02 (ref 0.86–1.14)
PROTHROMBIN TIME: 23 SEC (ref 9.8–13)

## 2018-06-20 ENCOUNTER — ANTI-COAG VISIT (OUTPATIENT)
Dept: INTERNAL MEDICINE CLINIC | Age: 83
End: 2018-06-20

## 2018-06-27 ENCOUNTER — TELEPHONE (OUTPATIENT)
Dept: INTERNAL MEDICINE CLINIC | Age: 83
End: 2018-06-27

## 2018-07-01 ENCOUNTER — HOSPITAL ENCOUNTER (OUTPATIENT)
Dept: OTHER | Age: 83
Discharge: HOME | End: 2018-07-31
Attending: INTERNAL MEDICINE | Admitting: INTERNAL MEDICINE

## 2018-07-19 ENCOUNTER — ANTI-COAG VISIT (OUTPATIENT)
Dept: INTERNAL MEDICINE CLINIC | Age: 83
End: 2018-07-19

## 2018-07-19 DIAGNOSIS — I48.91 ATRIAL FIBRILLATION, UNSPECIFIED TYPE (HCC): ICD-10-CM

## 2018-07-19 LAB
INR BLD: 2.09 (ref 0.86–1.14)
PROTHROMBIN TIME: 23.8 SEC (ref 9.8–13)

## 2018-07-26 ENCOUNTER — OFFICE VISIT (OUTPATIENT)
Age: 83
End: 2018-07-26

## 2018-07-26 VITALS
DIASTOLIC BLOOD PRESSURE: 80 MMHG | WEIGHT: 114 LBS | SYSTOLIC BLOOD PRESSURE: 138 MMHG | HEART RATE: 50 BPM | HEIGHT: 60 IN | OXYGEN SATURATION: 90 % | BODY MASS INDEX: 22.38 KG/M2

## 2018-07-26 DIAGNOSIS — I48.0 PAROXYSMAL ATRIAL FIBRILLATION (HCC): Primary | ICD-10-CM

## 2018-07-26 DIAGNOSIS — I27.20 PULMONARY HTN (HCC): ICD-10-CM

## 2018-07-26 DIAGNOSIS — I34.0 SEVERE MITRAL REGURGITATION: ICD-10-CM

## 2018-07-26 PROCEDURE — 93000 ELECTROCARDIOGRAM COMPLETE: CPT | Performed by: INTERNAL MEDICINE

## 2018-07-26 PROCEDURE — 99214 OFFICE O/P EST MOD 30 MIN: CPT | Performed by: INTERNAL MEDICINE

## 2018-07-26 PROCEDURE — 1090F PRES/ABSN URINE INCON ASSESS: CPT | Performed by: INTERNAL MEDICINE

## 2018-07-26 PROCEDURE — 1101F PT FALLS ASSESS-DOCD LE1/YR: CPT | Performed by: INTERNAL MEDICINE

## 2018-07-26 PROCEDURE — G8420 CALC BMI NORM PARAMETERS: HCPCS | Performed by: INTERNAL MEDICINE

## 2018-07-26 PROCEDURE — 1036F TOBACCO NON-USER: CPT | Performed by: INTERNAL MEDICINE

## 2018-07-26 PROCEDURE — 1123F ACP DISCUSS/DSCN MKR DOCD: CPT | Performed by: INTERNAL MEDICINE

## 2018-07-26 PROCEDURE — G8399 PT W/DXA RESULTS DOCUMENT: HCPCS | Performed by: INTERNAL MEDICINE

## 2018-07-26 PROCEDURE — 4040F PNEUMOC VAC/ADMIN/RCVD: CPT | Performed by: INTERNAL MEDICINE

## 2018-07-26 PROCEDURE — G8427 DOCREV CUR MEDS BY ELIG CLIN: HCPCS | Performed by: INTERNAL MEDICINE

## 2018-07-26 RX ORDER — AMIODARONE HYDROCHLORIDE 200 MG/1
100 TABLET ORAL DAILY
Qty: 90 TABLET | Refills: 3
Start: 2018-07-26 | End: 2019-01-24 | Stop reason: SDUPTHER

## 2018-07-26 NOTE — PROGRESS NOTES
[address] - Cardiology      CC: Shortness of breath    HPI:  Naomi Garcia is a 80 y.o. patient with a past medical history significant for COPD, severe mitral regurgitation, paroxysmal atrial fibrillation, chronic diastolic CHF, pulmonary hypertension and chronic venous thromboembolic disease. She also follows with Dr. Delvis Moore from Pulmonary. She was not considered a candidate for repair of her mitral valve due to her comorbidities including only having one functioning lung with concomitant COPD. She has been refusing home oxygen from Dr. Delvis Moore despite hypoxia. She returns to the office today in follow-up     Since we last saw Tavo Ruiz she reports feeling okay. She is not currently using any supplement O2. She is currently participating in pulmonary rehab but denies much improvement with this. There has been no chest pain or awareness of her heart racing. She is on chronic anticoagulation that is managed Dr. Flores Friday. Denies any PND or orthopnea. Denies any cough productive or otherwise. No recent change in weight. No recent changes in bowel or bladder habits. No easy bleeding or bruising. Denies any arthralgias or myalgias. Review of systems is negative to a 12 point review except as noted above.       Social History   Substance Use Topics    Smoking status: Former Smoker     Quit date: 1/1/1965    Smokeless tobacco: Never Used    Alcohol use Yes      Comment: occ       Current Outpatient Prescriptions   Medication Sig Dispense Refill    furosemide (LASIX) 40 MG tablet Take 1 tablet by mouth daily 180 tablet 3    simvastatin (ZOCOR) 20 MG tablet Take 1 tablet by mouth daily 90 tablet 3    memantine (NAMENDA) 10 MG tablet TAKE 1 TABLET TWICE DAILY 180 tablet 3    levothyroxine (SYNTHROID) 75 MCG tablet TAKE 1 TABLET EVERY OTHER DAY 45 tablet 3    metoprolol succinate (TOPROL XL) 50 MG extended release tablet TAKE 1 TABLET BY MOUTH 2 TIMES DAILY 180 tablet 3    amiodarone (CORDARONE) 200 MG tablet TAKE 1 TABLET EVERY DAY 90 tablet 3    levothyroxine (SYNTHROID) 50 MCG tablet TAKE 1 TABLET EVERY OTHER DAY 45 tablet 3    albuterol sulfate HFA (PROAIR HFA) 108 (90 Base) MCG/ACT inhaler Inhale 2 puffs into the lungs every 4 hours as needed for Wheezing or Shortness of Breath 1 Inhaler 4    warfarin (COUMADIN) 5 MG tablet TAKE 2 TO 3 TABLETS EVERY DAY AS DIRECTED 270 tablet 3    latanoprost (XALATAN) 0.005 % ophthalmic solution Place 1 drop into both eyes nightly.  vitamin D (CHOLECALCIFEROL) 400 UNITS TABS tablet Take 400 Units by mouth daily.  calcium carbonate (OSCAL) 500 MG TABS tablet Take 500 mg by mouth daily. No current facility-administered medications for this visit.         Allergies   Allergen Reactions    Ace Inhibitors      coughing    Aricept [Donepezil Hydrochloride]      Can not recall    Benicar [Olmesartan Medoxomil]      Can not recall      Exelon [Rivastigmine Tartrate]      Can not recall      Pcn [Penicillins] Hives and Swelling    Quinapril Hcl      Can not recall      Doxycycline Nausea And Vomiting       Physical Exam:  Vitals:    07/26/18 1249   BP: 138/80   Pulse: 50   SpO2:        General Appearance:  Alert, cooperative, no distress, appears stated age   Head:  Normocephalic, without obvious abnormality, atraumatic   Eyes:  PERRL, conjunctiva/corneas clear       Nose: Nares normal, no drainage or sinus tenderness   Throat: Lips, mucosa, and tongue normal   Neck: Supple, symmetrical, trachea midline, no adenopathy, thyroid: not enlarged, symmetric, no tenderness/mass/nodules, no carotid bruit or JVD       Lungs:   Clear to auscultation bilaterally, respirations unlabored   Chest Wall:  No tenderness or deformity   Heart:  Irreg irreg, S1, S2 with loud P2 component, 3/6 holosystolic murmur at the apex, no rub or gallop   Abdomen:   Soft, non-tender, bowel sounds active all four quadrants,  no masses, no flow/continuous and pulse wave Doppler used to assess valvular   function.   Normal left ventricle size, wall thickness and systolic function with an   estimated ejection fraction of 65%. No regional wall motion abnormalities   are seen.   Normal right ventricular size and function.   Moderately dilated left atrium. Aneurysmal atrial septum with bowing from   the left to the right.   The mitral valve leaflets are thickened.   There is prolapse of the P2 segment of the posterior leaflet resulting in   moderately severe mitral regurgitation. There is no stenosis.   Moderate tricuspid regurgitation.   No shunt at the atrial level by agitated saline contrast study.   No thrombus visualized in the left atrium, left atrial appendage or left   ventricle. Cath 9/21/17  FINDINGS:  1.  Mild nonobstructive coronary artery disease with a 40% ostial LAD  lesion and 25% mid-RCA disease. This is a right-dominant coronary  arterial system. 2.  Normal left ventricular systolic function with LV ejection  fraction of 65%. 3.  Mildly elevated left ventricular end-diastolic pressure of 15  mmHg. 4.  Moderate to severe mitral regurgitation with moderate dilatation  of the left atrium on the left ventriculogram.  5.  No gradient across the aortic valve on pullback to suggest aortic  stenosis. 6.  Evidence of mild volume overload with a pulmonary capillary wedge  pressure of 22 and a left ventricular end-diastolic pressure on repeat  of 15 to 20.  7.  Evidence of pulmonary hypertension, possibly pulmonary arterial  hypertension, arteriovenous malformation and prior pneumonectomy. Instantaneous pulmonary artery pressure was 68/19 for a mean pulmonary  arterial pressure of 37 mmHg. 8.  Oxygen step-up in the right pulmonary artery to 90%, pulmonary  artery main was 62% with a right atrial pressure of 52%. Inferior  vena cava was 57%.   9.  Pulmonary angiogram showing minimal pulmonary arterial flow to the  right lung with flow in the right upper lobe but no flow into  the right lower middle lobe. In fact, there appears to be a possible  arteriovenous malformation with backflow of blood towards the pigtail  catheter into the right pulmonary artery and an oxygen saturation of  90% in the right lower lobe. ECG 9/22/15: Atrial fibrillation  ECG 4/20/18: Sinus bradycardia with first degree AV block   ECG 7/26/18: Sinus bradycardia with first degree AV block     Assessment:  1. Atrial fibrillation, paroxysmal  2. Severe mitral regurgitation  3. Pulmonary Venous Hypertension      Plan:  She remains in a regular rhythm today in office. I will have her decrease her dose of amiodarone to 100 mg daily. She may remain on Coumadin for anticoagulation and this is managed by Dr. Elias Boucher office. Her respiratory status is stable. I will see her in office for follow up in 6 months.

## 2018-07-26 NOTE — PATIENT INSTRUCTIONS
Patient Education        Atrial Fibrillation: Care Instructions  Your Care Instructions    Atrial fibrillation is an irregular and often fast heartbeat. Treating this condition is important for several reasons. It can cause blood clots, which can travel from your heart to your brain and cause a stroke. If you have a fast heartbeat, you may feel lightheaded, dizzy, and weak. An irregular heartbeat can also increase your risk for heart failure. Atrial fibrillation is often the result of another heart condition, such as high blood pressure or coronary artery disease. Making changes to improve your heart condition will help you stay healthy and active. Follow-up care is a key part of your treatment and safety. Be sure to make and go to all appointments, and call your doctor if you are having problems. It's also a good idea to know your test results and keep a list of the medicines you take. How can you care for yourself at home? Medicines    · Take your medicines exactly as prescribed. Call your doctor if you think you are having a problem with your medicine. You will get more details on the specific medicines your doctor prescribes.     · If your doctor has given you a blood thinner to prevent a stroke, be sure you get instructions about how to take your medicine safely. Blood thinners can cause serious bleeding problems.     · Do not take any vitamins, over-the-counter drugs, or herbal products without talking to your doctor first.    Lifestyle changes    · Do not smoke. Smoking can increase your chance of a stroke and heart attack. If you need help quitting, talk to your doctor about stop-smoking programs and medicines. These can increase your chances of quitting for good.     · Eat a heart-healthy diet.     · Stay at a healthy weight. Lose weight if you need to.     · Limit alcohol to 2 drinks a day for men and 1 drink a day for women. Too much alcohol can cause health problems.     · Avoid colds and flu.  Get · You have symptoms of a stroke. These may include:  ¨ Sudden numbness, tingling, weakness, or loss of movement in your face, arm, or leg, especially on only one side of your body. ¨ Sudden vision changes. ¨ Sudden trouble speaking. ¨ Sudden confusion or trouble understanding simple statements. ¨ Sudden problems with walking or balance. ¨ A sudden, severe headache that is different from past headaches.     · You passed out (lost consciousness).    Call your doctor now or seek immediate medical care if:    · You have new or increased shortness of breath.     · You feel dizzy or lightheaded, or you feel like you may faint.     · Your heart rate becomes irregular.     · You can feel your heart flutter in your chest or skip heartbeats. Tell your doctor if these symptoms are new or worse.    Watch closely for changes in your health, and be sure to contact your doctor if you have any problems. Where can you learn more? Go to https://[email or web address]. org and sign in to your [employer] account. Enter U020 in the AMVONET box to learn more about \"Atrial Fibrillation: Care Instructions. \"     If you do not have an account, please click on the \"Sign Up Now\" link. Current as of: December 6, 2017  Content Version: 11.6  © 5526-1466 [employer]. Care instructions adapted under license by [facility] ([facility]). If you have questions about a medical condition or this instruction, always ask your healthcare professional. [place] [address] any warranty or liability for your use of this information.

## 2018-08-01 ENCOUNTER — HOSPITAL ENCOUNTER (OUTPATIENT)
Dept: OTHER | Age: 83
Discharge: HOME | End: 2018-08-31
Attending: INTERNAL MEDICINE | Admitting: INTERNAL MEDICINE

## 2018-08-02 ENCOUNTER — OFFICE VISIT (OUTPATIENT)
Dept: INTERNAL MEDICINE CLINIC | Age: 83
End: 2018-08-02

## 2018-08-02 ENCOUNTER — ANTI-COAG VISIT (OUTPATIENT)
Dept: INTERNAL MEDICINE CLINIC | Age: 83
End: 2018-08-02

## 2018-08-02 VITALS
HEART RATE: 52 BPM | WEIGHT: 115.2 LBS | SYSTOLIC BLOOD PRESSURE: 138 MMHG | RESPIRATION RATE: 16 BRPM | OXYGEN SATURATION: 92 % | DIASTOLIC BLOOD PRESSURE: 60 MMHG | HEIGHT: 60 IN | BODY MASS INDEX: 22.62 KG/M2

## 2018-08-02 DIAGNOSIS — E03.2 HYPOTHYROIDISM DUE TO MEDICATION: Primary | ICD-10-CM

## 2018-08-02 DIAGNOSIS — D64.9 ANEMIA, UNSPECIFIED TYPE: ICD-10-CM

## 2018-08-02 DIAGNOSIS — M85.89 OSTEOPENIA OF MULTIPLE SITES: ICD-10-CM

## 2018-08-02 DIAGNOSIS — I48.91 ATRIAL FIBRILLATION, UNSPECIFIED TYPE (HCC): ICD-10-CM

## 2018-08-02 DIAGNOSIS — I48.20 CHRONIC ATRIAL FIBRILLATION (HCC): ICD-10-CM

## 2018-08-02 DIAGNOSIS — I50.32 CHRONIC DIASTOLIC CONGESTIVE HEART FAILURE (HCC): ICD-10-CM

## 2018-08-02 DIAGNOSIS — E55.9 VITAMIN D DEFICIENCY: ICD-10-CM

## 2018-08-02 DIAGNOSIS — I10 ESSENTIAL HYPERTENSION, BENIGN: Primary | ICD-10-CM

## 2018-08-02 DIAGNOSIS — E03.2 HYPOTHYROIDISM DUE TO MEDICATION: ICD-10-CM

## 2018-08-02 LAB
A/G RATIO: 1.3 (ref 1.1–2.2)
ALBUMIN SERPL-MCNC: 3.9 G/DL (ref 3.4–5)
ALP BLD-CCNC: 106 U/L (ref 40–129)
ALT SERPL-CCNC: 30 U/L (ref 10–40)
ANION GAP SERPL CALCULATED.3IONS-SCNC: 12 MMOL/L (ref 3–16)
AST SERPL-CCNC: 46 U/L (ref 15–37)
BASOPHILS ABSOLUTE: 0.1 K/UL (ref 0–0.2)
BASOPHILS RELATIVE PERCENT: 1.3 %
BILIRUB SERPL-MCNC: 0.4 MG/DL (ref 0–1)
BUN BLDV-MCNC: 17 MG/DL (ref 7–20)
CALCIUM SERPL-MCNC: 9.4 MG/DL (ref 8.3–10.6)
CHLORIDE BLD-SCNC: 105 MMOL/L (ref 99–110)
CHOLESTEROL, TOTAL: 151 MG/DL (ref 0–199)
CO2: 26 MMOL/L (ref 21–32)
CREAT SERPL-MCNC: 1.3 MG/DL (ref 0.6–1.2)
EOSINOPHILS ABSOLUTE: 0.6 K/UL (ref 0–0.6)
EOSINOPHILS RELATIVE PERCENT: 9.8 %
GFR AFRICAN AMERICAN: 47
GFR NON-AFRICAN AMERICAN: 39
GLOBULIN: 3.1 G/DL
GLUCOSE BLD-MCNC: 113 MG/DL (ref 70–99)
HCT VFR BLD CALC: 35.2 % (ref 36–48)
HDLC SERPL-MCNC: 68 MG/DL (ref 40–60)
HEMOGLOBIN: 11.8 G/DL (ref 12–16)
INR BLD: 2.02 (ref 0.86–1.14)
LDL CHOLESTEROL CALCULATED: 67 MG/DL
LYMPHOCYTES ABSOLUTE: 1.1 K/UL (ref 1–5.1)
LYMPHOCYTES RELATIVE PERCENT: 18.1 %
MCH RBC QN AUTO: 31.5 PG (ref 26–34)
MCHC RBC AUTO-ENTMCNC: 33.5 G/DL (ref 31–36)
MCV RBC AUTO: 93.9 FL (ref 80–100)
MONOCYTES ABSOLUTE: 0.4 K/UL (ref 0–1.3)
MONOCYTES RELATIVE PERCENT: 7.3 %
NEUTROPHILS ABSOLUTE: 3.7 K/UL (ref 1.7–7.7)
NEUTROPHILS RELATIVE PERCENT: 63.5 %
PDW BLD-RTO: 15.3 % (ref 12.4–15.4)
PLATELET # BLD: 158 K/UL (ref 135–450)
PMV BLD AUTO: 9.4 FL (ref 5–10.5)
POTASSIUM SERPL-SCNC: 4.3 MMOL/L (ref 3.5–5.1)
PROTHROMBIN TIME: 23 SEC (ref 9.8–13)
RBC # BLD: 3.75 M/UL (ref 4–5.2)
SODIUM BLD-SCNC: 143 MMOL/L (ref 136–145)
TOTAL PROTEIN: 7 G/DL (ref 6.4–8.2)
TRIGL SERPL-MCNC: 81 MG/DL (ref 0–150)
TSH SERPL DL<=0.05 MIU/L-ACNC: 6.68 UIU/ML (ref 0.27–4.2)
VITAMIN D 25-HYDROXY: 52.6 NG/ML
VLDLC SERPL CALC-MCNC: 16 MG/DL
WBC # BLD: 5.8 K/UL (ref 4–11)

## 2018-08-02 PROCEDURE — 1101F PT FALLS ASSESS-DOCD LE1/YR: CPT | Performed by: INTERNAL MEDICINE

## 2018-08-02 PROCEDURE — 1123F ACP DISCUSS/DSCN MKR DOCD: CPT | Performed by: INTERNAL MEDICINE

## 2018-08-02 PROCEDURE — 4040F PNEUMOC VAC/ADMIN/RCVD: CPT | Performed by: INTERNAL MEDICINE

## 2018-08-02 PROCEDURE — 99214 OFFICE O/P EST MOD 30 MIN: CPT | Performed by: INTERNAL MEDICINE

## 2018-08-02 PROCEDURE — G8420 CALC BMI NORM PARAMETERS: HCPCS | Performed by: INTERNAL MEDICINE

## 2018-08-02 PROCEDURE — G8427 DOCREV CUR MEDS BY ELIG CLIN: HCPCS | Performed by: INTERNAL MEDICINE

## 2018-08-02 PROCEDURE — 1036F TOBACCO NON-USER: CPT | Performed by: INTERNAL MEDICINE

## 2018-08-02 PROCEDURE — G8399 PT W/DXA RESULTS DOCUMENT: HCPCS | Performed by: INTERNAL MEDICINE

## 2018-08-02 PROCEDURE — 1090F PRES/ABSN URINE INCON ASSESS: CPT | Performed by: INTERNAL MEDICINE

## 2018-08-02 RX ORDER — LEVOTHYROXINE SODIUM 88 UG/1
88 TABLET ORAL DAILY
Qty: 30 TABLET | Refills: 1 | Status: SHIPPED | OUTPATIENT
Start: 2018-08-02 | End: 2018-08-02

## 2018-08-02 ASSESSMENT — PATIENT HEALTH QUESTIONNAIRE - PHQ9
2. FEELING DOWN, DEPRESSED OR HOPELESS: 0
SUM OF ALL RESPONSES TO PHQ QUESTIONS 1-9: 0
2. FEELING DOWN, DEPRESSED OR HOPELESS: 0
SUM OF ALL RESPONSES TO PHQ9 QUESTIONS 1 & 2: 0
1. LITTLE INTEREST OR PLEASURE IN DOING THINGS: 0
1. LITTLE INTEREST OR PLEASURE IN DOING THINGS: 0
SUM OF ALL RESPONSES TO PHQ9 QUESTIONS 1 & 2: 0
SUM OF ALL RESPONSES TO PHQ QUESTIONS 1-9: 0

## 2018-08-02 NOTE — PROGRESS NOTES
Subjective:      Patient ID: Johnathan Nelson is a 80 y.o. female. HPI  Here for f/u of her htn and a fib. She has been doing well overall. No new problems noted since the last visit. She has been without SOB. Goes to rehab twice weekly and feels well with this. 1. Essential hypertension, benign  - Denies chest pain or shortness of breath. Denies PND or orthopnea. No lower extremity edema noted. 2. Chronic atrial fibrillation (HCC)  - No palpitations, no CP or SOB. No significant bruising, hematuria, melena, or hematochezia    3. Chronic diastolic congestive heart failure (HCC)  - no PND or orthopnea, no edema   4. Hypothyroidism due to medication  -clinically euthyroid. No changes in her hair or nails   5. Anemia, unspecified type  -no SOB, no bleeding   6. Osteopenia of multiple sites  - Off meds for several years. DEXA is due   7. Vitamin D deficiency  - taking Vit d supplements      Review of Systems   All other systems reviewed and are negative.     Current Outpatient Prescriptions   Medication Sig Dispense Refill    amiodarone (CORDARONE) 200 MG tablet Take 0.5 tablets by mouth daily 90 tablet 3    furosemide (LASIX) 40 MG tablet Take 1 tablet by mouth daily 180 tablet 3    simvastatin (ZOCOR) 20 MG tablet Take 1 tablet by mouth daily 90 tablet 3    memantine (NAMENDA) 10 MG tablet TAKE 1 TABLET TWICE DAILY 180 tablet 3    levothyroxine (SYNTHROID) 75 MCG tablet TAKE 1 TABLET EVERY OTHER DAY 45 tablet 3    metoprolol succinate (TOPROL XL) 50 MG extended release tablet TAKE 1 TABLET BY MOUTH 2 TIMES DAILY 180 tablet 3    levothyroxine (SYNTHROID) 50 MCG tablet TAKE 1 TABLET EVERY OTHER DAY 45 tablet 3    albuterol sulfate HFA (PROAIR HFA) 108 (90 Base) MCG/ACT inhaler Inhale 2 puffs into the lungs every 4 hours as needed for Wheezing or Shortness of Breath 1 Inhaler 4    warfarin (COUMADIN) 5 MG tablet TAKE 2 TO 3 TABLETS EVERY DAY AS DIRECTED 270 tablet 3    latanoprost (XALATAN) 0.005 % ophthalmic solution Place 1 drop into both eyes nightly.  vitamin D (CHOLECALCIFEROL) 400 UNITS TABS tablet Take 400 Units by mouth daily.  calcium carbonate (OSCAL) 500 MG TABS tablet Take 500 mg by mouth daily. No current facility-administered medications for this visit. Allergies:  Ace inhibitors; Aricept [donepezil hydrochloride]; Benicar [olmesartan medoxomil]; Exelon [rivastigmine tartrate];  Pcn [penicillins]; Quinapril hcl; and Doxycycline      Past Medical History:   Diagnosis Date    Anemia     Anticoagulant long-term use     Atrial fibrillation ([address] 6/2011    Jerry Crate CHF [facility]congestive heart failure) [facility]     Clostridium difficile diarrhea 09/14/2016    Depression     Humerus fracture     Hyperlipidemia     Hypertension     Hypothyroidism     Mitral regurgitation     Optic neuritis     Osteopenia     Fosamax stopped 2/2014, had been treated at least since 2004    Polymyalgia rheumatica ([facility] [address]     Pulmonary embolus ([facility] [address] 6/2011    Right pulmonary obstruction suspected to be possible chronic pulmonary embolus    Thyroid disease     Vitamin D deficiency          Past Surgical History:   Procedure Laterality Date    [facility]14[facility]2016    [facility]    LUNG SURGERY      MALIGNANT SKIN LESION EXCISION      UPPER GASTROINTESTINAL ENDOSCOPY  09/14/2016    [facility]         Social History   Substance Use Topics    Smoking status: Former Smoker     Quit date: 1/1/1965    Smokeless tobacco: Never Used    Alcohol use Yes      Comment: occ         Family History   Problem Relation Age of Onset    Cancer Mother         unknown primary    Other Father         Tuberculosis    Lung Cancer Sister     Tuberculosis Brother     Diabetes Brother          Vitals:    08/02/18 1047   BP: 126/60   Site: Right Arm   Position: Sitting   Cuff Size: Medium Adult   Pulse: 52   Resp: 16   SpO2: 92%   Weight: 115 lb 3.2 oz (52.3 kg)   Height: 5' (1.524 m)        Objective: Physical Exam   Constitutional: She appears well-developed and well-nourished. Cardiovascular: Normal rate and regular rhythm. Murmur (3/6 syst murmur) heard. Pulmonary/Chest: Effort normal and breath sounds normal. She has no wheezes. She has no rales. Vitals reviewed. Assessment:      1. Essential hypertension, benign  - the blood pressure is very well controlled. No changes are needed at this time. Follow. 2. Chronic atrial fibrillation ([address]  -she is regular on exam, the amiodarone dose was reduced 100 mg daily last week. Continues with Coumadin for stroke prophylaxis. 3. Chronic diastolic congestive heart failure ([address]  -no signs of decompensation. Follow on the current regimen. 4. Hypothyroidism due to medication  -last thyroid blood test was okay, repeat today. Particular need to follow this since the amiodarone dose has been reduced. 5. Anemia, unspecified type  -repeat the CBC today. 6. Osteopenia of multiple sites  -has been on a 4 year holiday from alendronate. Repeat the DEXA scan. 7. Vitamin D deficiency  -plan to double check a vitamin D level today.  Continue with current supplements          Plan:      F/u in 6 months

## 2018-08-03 ENCOUNTER — TELEPHONE (OUTPATIENT)
Dept: INTERNAL MEDICINE CLINIC | Age: 83
End: 2018-08-03

## 2018-08-03 DIAGNOSIS — E03.2 HYPOTHYROIDISM DUE TO MEDICATION: Primary | ICD-10-CM

## 2018-08-03 RX ORDER — LEVOTHYROXINE SODIUM 0.07 MG/1
TABLET ORAL
Qty: 30 TABLET | Refills: 1 | Status: SHIPPED | OUTPATIENT
Start: 2018-08-03 | End: 2018-10-18 | Stop reason: SDUPTHER

## 2018-08-07 ENCOUNTER — TELEPHONE (OUTPATIENT)
Dept: INTERNAL MEDICINE CLINIC | Age: 83
End: 2018-08-07

## 2018-08-07 NOTE — TELEPHONE ENCOUNTER
Pharmacy calling wanting to verify which prescription Dr. Nikita Bailey actually wanted, I told them it looks like he discontinued the      levothyroxine (SYNTHROID) 88 MCG tablet (Discontinued) 30 tablet 1 8/2/2018 8/2/2018    Sig - Route: Take 1 tablet by mouth Daily - Oral      An reordered     levothyroxine (SYNTHROID) 75 MCG tablet 30 tablet 1 8/3/2018     Sig: TAKE 1 TABLET EVERY OTHER DAY      Would like someone to call an confirm this switch for sure as the patient wasn't aware of the change of medications an new set of directions for every other day.     Please Advise   #[phone]

## 2018-08-07 NOTE — TELEPHONE ENCOUNTER
Patient went to  prescription for levothyroxine (Synthroid)   the pharmacy had two prescriptions ready for patient of the same medication   One was 75 mcg and the other one was 80    Patient would like to know which one she should be picking up    Please [address] # [phone]  Fax #[phone]

## 2018-08-09 ENCOUNTER — OFFICE VISIT (OUTPATIENT)
Dept: PULMONOLOGY | Age: 83
End: 2018-08-09

## 2018-08-09 ENCOUNTER — TELEPHONE (OUTPATIENT)
Dept: INTERNAL MEDICINE CLINIC | Age: 83
End: 2018-08-09

## 2018-08-09 VITALS
WEIGHT: 115 LBS | OXYGEN SATURATION: 92 % | SYSTOLIC BLOOD PRESSURE: 138 MMHG | BODY MASS INDEX: 22.58 KG/M2 | HEIGHT: 60 IN | TEMPERATURE: 97.5 F | DIASTOLIC BLOOD PRESSURE: 64 MMHG | HEART RATE: 50 BPM | RESPIRATION RATE: 16 BRPM

## 2018-08-09 DIAGNOSIS — J98.51 FIBROSING MEDIASTINITIS: ICD-10-CM

## 2018-08-09 DIAGNOSIS — J98.4 RESTRICTIVE LUNG DISEASE: ICD-10-CM

## 2018-08-09 DIAGNOSIS — I27.20 PULMONARY HYPERTENSION (HCC): ICD-10-CM

## 2018-08-09 DIAGNOSIS — R06.02 SOB (SHORTNESS OF BREATH): Primary | ICD-10-CM

## 2018-08-09 PROCEDURE — 1123F ACP DISCUSS/DSCN MKR DOCD: CPT | Performed by: INTERNAL MEDICINE

## 2018-08-09 PROCEDURE — 1036F TOBACCO NON-USER: CPT | Performed by: INTERNAL MEDICINE

## 2018-08-09 PROCEDURE — 99213 OFFICE O/P EST LOW 20 MIN: CPT | Performed by: INTERNAL MEDICINE

## 2018-08-09 PROCEDURE — 4040F PNEUMOC VAC/ADMIN/RCVD: CPT | Performed by: INTERNAL MEDICINE

## 2018-08-09 PROCEDURE — G8420 CALC BMI NORM PARAMETERS: HCPCS | Performed by: INTERNAL MEDICINE

## 2018-08-09 PROCEDURE — 1101F PT FALLS ASSESS-DOCD LE1/YR: CPT | Performed by: INTERNAL MEDICINE

## 2018-08-09 PROCEDURE — G8399 PT W/DXA RESULTS DOCUMENT: HCPCS | Performed by: INTERNAL MEDICINE

## 2018-08-09 PROCEDURE — G8427 DOCREV CUR MEDS BY ELIG CLIN: HCPCS | Performed by: INTERNAL MEDICINE

## 2018-08-09 PROCEDURE — 1090F PRES/ABSN URINE INCON ASSESS: CPT | Performed by: INTERNAL MEDICINE

## 2018-08-09 RX ORDER — LEVOTHYROXINE SODIUM 0.07 MG/1
TABLET ORAL
Qty: 90 TABLET | Refills: 0 | OUTPATIENT
Start: 2018-08-09

## 2018-08-09 NOTE — TELEPHONE ENCOUNTER
Patient calling about a mix up with filling her medication levothyroxine 75MCG, wont go into details just wants to talk with Alyssia.      Please Advise   RR#[phone]

## 2018-08-09 NOTE — PROGRESS NOTES
Chief complaint  This is a 80y.o. year old female  who comes to see me with a chief complaint of   Chief Complaint   Patient presents with    Follow-up     COPD       HPI  Here with cc of SOB    Doing better. Her weight is lower and BP is better controlled, thus her symptoms have improved. She is less SOB and has never used albuterol.   She is content with things and active with pulmonary rehab    Past Medical History:   Diagnosis Date    Anemia     Anticoagulant long-term use     Atrial fibrillation ([address] 6/2011    Tolu Backer CHF (congestive heart failure) ([address]     Clostridium difficile diarrhea 09/14/2016    Depression     Humerus fracture     Hyperlipidemia     Hypertension     Hypothyroidism     Mitral regurgitation     Optic neuritis     Osteopenia     Fosamax stopped 2/2014, had been treated at least since 2004    Polymyalgia rheumatica ([address]     Pulmonary embolus ([address] 6/2011    Right pulmonary obstruction suspected to be possible chronic pulmonary embolus    Thyroid disease     Vitamin D deficiency        Past Surgical History:   Procedure Laterality Date    [facility]  [facility]14/2016    [facility]    LUNG SURGERY      MALIGNANT SKIN LESION EXCISION      UPPER GASTROINTESTINAL ENDOSCOPY  09/14/2016    [facility]         Current Outpatient Prescriptions:     levothyroxine (SYNTHROID) 75 MCG tablet, TAKE 1 TABLET EVERY OTHER DAY, Disp: 30 tablet, Rfl: 1    amiodarone (CORDARONE) 200 MG tablet, Take 0.5 tablets by mouth daily, Disp: 90 tablet, Rfl: 3    furosemide (LASIX) 40 MG tablet, Take 1 tablet by mouth daily, Disp: 180 tablet, Rfl: 3    simvastatin (ZOCOR) 20 MG tablet, Take 1 tablet by mouth daily, Disp: 90 tablet, Rfl: 3    memantine (NAMENDA) 10 MG tablet, TAKE 1 TABLET TWICE DAILY, Disp: 180 tablet, Rfl: 3    metoprolol succinate (TOPROL XL) 50 MG extended release tablet, TAKE 1 TABLET BY MOUTH 2 TIMES DAILY, Disp: 180 tablet, Rfl: 3    albuterol sulfate HFA (PROAIR HFA) 108 (90 Base) MCG/ACT inhaler, Inhale 2 puffs into the lungs every 4 hours as needed for Wheezing or Shortness of Breath, Disp: 1 Inhaler, Rfl: 4    warfarin (COUMADIN) 5 MG tablet, TAKE 2 TO 3 TABLETS EVERY DAY AS DIRECTED, Disp: 270 tablet, Rfl: 3    latanoprost (XALATAN) 0.005 % ophthalmic solution, Place 1 drop into both eyes nightly., Disp: , Rfl:     vitamin D (CHOLECALCIFEROL) 400 UNITS TABS tablet, Take 400 Units by mouth daily. , Disp: , Rfl:     calcium carbonate (OSCAL) 500 MG TABS tablet, Take 500 mg by mouth daily. , Disp: , Rfl:       PHYSICAL EXAM:  There were no vitals filed for this visit. 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  There was associated   decrease in diffusion capacity.      Chest imaging from 8/2017 is reviewed. My interpretation is RLL effusion, elevated diaphragm, scarring     Chest imaging from 2015 is reviewed. My interpretation is RLL patchy infiltrates, small effusion, calcific changes to mediastinal nodes, calcified airways, calcified mediastinum, mosaic lung in the left    FLAVIO 9/2017  Normal left ventricle size, wall thickness and systolic function with an   estimated ejection fraction of 65%. No regional wall motion abnormalities   are seen. Normal right ventricular size and function.   Moderately dilated left atrium. Aneurysmal atrial septum with bowing from   the left to the right.  The mitral valve leaflets are thickened.   There is prolapse of the P2 segment of the posterior leaflet resulting in   moderately severe mitral regurgitation. There is no stenosis.   Moderate tricuspid regurgitation. No shunt at the atrial level by agitated saline contrast study.   No thrombus visualized in the left atrium, left atrial appendage or left   ventricle. Heart cath 9/2017  1. Mild nonobstructive coronary artery disease with a 40% ostial LAD  lesion and 25% mid-RCA disease. This is a right-dominant coronary  arterial system. 2.  Normal left ventricular systolic function with LV ejection  fraction of 65%. 3.  Mildly elevated left ventricular end-diastolic pressure of 15  mmHg. 4.  Moderate to severe mitral regurgitation with moderate dilatation  of the left atrium on the left ventriculogram.  5.  No gradient across the aortic valve on pullback to suggest aortic  stenosis. 6.  Evidence of mild volume overload with a pulmonary capillary wedge  pressure of 22 and a left ventricular end-diastolic pressure on repeat  of 15 to 20.  7.  Evidence of pulmonary hypertension, possibly pulmonary arterial  hypertension, arteriovenous malformation and prior pneumonectomy. Instantaneous pulmonary artery pressure was 68/19 for a mean pulmonary  arterial pressure of 37 mmHg. 8.  Oxygen step-up in the right pulmonary artery to 90%, pulmonary  artery main was 62% with a right atrial pressure of 52%. Inferior  vena cava was 57%. 9.  Pulmonary angiogram showing minimal pulmonary arterial flow to the  right lung with _____ flow in the right upper lobe but no flow into  the right lower middle lobe. In fact, there appears to be a possible  arteriovenous malformation with backflow of blood towards the pigtail  catheter into the right pulmonary artery and an oxygen saturation of  90% in the right lower lobe.    6 MWT 10/2017  The patient ambulated 305 meters which is normal-77% predicted.   Her  heart rate response was normal, the blood pressure

## 2018-08-09 NOTE — TELEPHONE ENCOUNTER
Called rx in again because pharmacy cancelled out both prescriptions instead of the 88mcg(thyriod med)

## 2018-08-15 ENCOUNTER — TELEPHONE (OUTPATIENT)
Dept: INTERNAL MEDICINE CLINIC | Age: 83
End: 2018-08-15

## 2018-08-30 DIAGNOSIS — E03.2 HYPOTHYROIDISM DUE TO MEDICATION: ICD-10-CM

## 2018-08-30 DIAGNOSIS — I48.91 ATRIAL FIBRILLATION, UNSPECIFIED TYPE (HCC): ICD-10-CM

## 2018-08-30 LAB
INR BLD: 2.39 (ref 0.86–1.14)
PROTHROMBIN TIME: 27.2 SEC (ref 9.8–13)
TSH SERPL DL<=0.05 MIU/L-ACNC: 3.4 UIU/ML (ref 0.27–4.2)

## 2018-08-31 ENCOUNTER — ANTI-COAG VISIT (OUTPATIENT)
Dept: INTERNAL MEDICINE CLINIC | Age: 83
End: 2018-08-31

## 2018-09-01 ENCOUNTER — HOSPITAL ENCOUNTER (OUTPATIENT)
Dept: OTHER | Age: 83
Discharge: HOME | End: 2018-09-01
Attending: INTERNAL MEDICINE | Admitting: INTERNAL MEDICINE

## 2018-10-01 ENCOUNTER — HOSPITAL ENCOUNTER (OUTPATIENT)
Age: 83
Discharge: HOME | End: 2018-10-01

## 2018-10-01 PROCEDURE — 9900000038 HC CARDIAC REHAB PHASE 3 - MONTHLY

## 2018-10-09 DIAGNOSIS — I48.91 ATRIAL FIBRILLATION, UNSPECIFIED TYPE (HCC): ICD-10-CM

## 2018-10-09 LAB
INR BLD: 1.98 (ref 0.86–1.14)
PROTHROMBIN TIME: 22.6 SEC (ref 9.8–13)

## 2018-10-18 RX ORDER — LEVOTHYROXINE SODIUM 0.07 MG/1
TABLET ORAL
Qty: 45 TABLET | Refills: 3 | Status: SHIPPED | OUTPATIENT
Start: 2018-10-18 | End: 2019-04-01 | Stop reason: SDUPTHER

## 2018-10-31 ENCOUNTER — OFFICE VISIT (OUTPATIENT)
Dept: PULMONOLOGY | Age: 83
End: 2018-10-31
Payer: MEDICARE

## 2018-10-31 VITALS
BODY MASS INDEX: 22.19 KG/M2 | SYSTOLIC BLOOD PRESSURE: 173 MMHG | OXYGEN SATURATION: 92 % | HEIGHT: 60 IN | DIASTOLIC BLOOD PRESSURE: 62 MMHG | HEART RATE: 46 BPM | RESPIRATION RATE: 16 BRPM | TEMPERATURE: 96.9 F | WEIGHT: 113 LBS

## 2018-10-31 DIAGNOSIS — J98.51 FIBROSING MEDIASTINITIS: ICD-10-CM

## 2018-10-31 DIAGNOSIS — R06.02 SOB (SHORTNESS OF BREATH): Primary | ICD-10-CM

## 2018-10-31 DIAGNOSIS — I27.20 PULMONARY HYPERTENSION (HCC): ICD-10-CM

## 2018-10-31 DIAGNOSIS — J98.4 RESTRICTIVE LUNG DISEASE: ICD-10-CM

## 2018-10-31 DIAGNOSIS — R00.1 BRADYCARDIA: ICD-10-CM

## 2018-10-31 PROCEDURE — 1123F ACP DISCUSS/DSCN MKR DOCD: CPT | Performed by: INTERNAL MEDICINE

## 2018-10-31 PROCEDURE — G8399 PT W/DXA RESULTS DOCUMENT: HCPCS | Performed by: INTERNAL MEDICINE

## 2018-10-31 PROCEDURE — 1036F TOBACCO NON-USER: CPT | Performed by: INTERNAL MEDICINE

## 2018-10-31 PROCEDURE — 4040F PNEUMOC VAC/ADMIN/RCVD: CPT | Performed by: INTERNAL MEDICINE

## 2018-10-31 PROCEDURE — G8420 CALC BMI NORM PARAMETERS: HCPCS | Performed by: INTERNAL MEDICINE

## 2018-10-31 PROCEDURE — 99213 OFFICE O/P EST LOW 20 MIN: CPT | Performed by: INTERNAL MEDICINE

## 2018-10-31 PROCEDURE — G8427 DOCREV CUR MEDS BY ELIG CLIN: HCPCS | Performed by: INTERNAL MEDICINE

## 2018-10-31 PROCEDURE — G8482 FLU IMMUNIZE ORDER/ADMIN: HCPCS | Performed by: INTERNAL MEDICINE

## 2018-10-31 PROCEDURE — 1101F PT FALLS ASSESS-DOCD LE1/YR: CPT | Performed by: INTERNAL MEDICINE

## 2018-10-31 PROCEDURE — 1090F PRES/ABSN URINE INCON ASSESS: CPT | Performed by: INTERNAL MEDICINE

## 2018-10-31 RX ORDER — ALBUTEROL SULFATE 90 UG/1
2 AEROSOL, METERED RESPIRATORY (INHALATION) EVERY 4 HOURS PRN
Qty: 3 INHALER | Refills: 1 | Status: SHIPPED | OUTPATIENT
Start: 2018-10-31

## 2018-10-31 NOTE — PROGRESS NOTES
Inhale 2 puffs into the lungs every 4 hours as needed for Wheezing or Shortness of Breath  Dispense: 3 Inhaler; Refill: 1    2. Bradycardia  Likely due to beta blocker. I asked that she check her heart rate with exercise to make sure it is appropriately increasing. She may not have an appropriate response and this could limit her exercise ability    3. Restrictive lung disease  Due to #4    4. Fibrosing mediastinitis  Nothing that can be done about this. 5. Pulmonary hypertension (HCC)  Multifactorial.  Needs better BP control too.      Flu shot up to date    Follow up in 6 months

## 2018-11-01 ENCOUNTER — HOSPITAL ENCOUNTER (OUTPATIENT)
Age: 83
Discharge: HOME | End: 2018-11-01

## 2018-11-01 PROCEDURE — 9900000038 HC CARDIAC REHAB PHASE 3 - MONTHLY

## 2018-11-30 DIAGNOSIS — I48.91 ATRIAL FIBRILLATION, UNSPECIFIED TYPE (HCC): ICD-10-CM

## 2018-11-30 LAB
INR BLD: 2.82 (ref 0.86–1.14)
PROTHROMBIN TIME: 32.1 SEC (ref 9.8–13)

## 2019-01-15 ENCOUNTER — HOSPITAL ENCOUNTER (OUTPATIENT)
Dept: GENERAL RADIOLOGY | Age: 84
Discharge: HOME | End: 2019-01-15
Payer: MEDICARE

## 2019-01-15 ENCOUNTER — HOSPITAL ENCOUNTER (OUTPATIENT)
Age: 84
Discharge: HOME | End: 2019-01-15
Payer: MEDICARE

## 2019-01-15 DIAGNOSIS — R05.3 CHRONIC COUGH: ICD-10-CM

## 2019-01-15 PROCEDURE — 71046 X-RAY EXAM CHEST 2 VIEWS: CPT

## 2019-01-24 RX ORDER — AMIODARONE HYDROCHLORIDE 200 MG/1
100 TABLET ORAL DAILY
Qty: 90 TABLET | Refills: 3 | Status: SHIPPED | OUTPATIENT
Start: 2019-01-24 | End: 2019-02-21 | Stop reason: SDUPTHER

## 2019-02-11 PROBLEM — N18.30 CKD (CHRONIC KIDNEY DISEASE) STAGE 3, GFR 30-59 ML/MIN (HCC): Status: ACTIVE | Noted: 2019-02-11

## 2019-02-21 ENCOUNTER — OFFICE VISIT (OUTPATIENT)
Dept: CARDIOLOGY CLINIC | Age: 84
End: 2019-02-21
Payer: MEDICARE

## 2019-02-21 VITALS
HEART RATE: 88 BPM | OXYGEN SATURATION: 94 % | WEIGHT: 113 LBS | HEIGHT: 60 IN | SYSTOLIC BLOOD PRESSURE: 124 MMHG | BODY MASS INDEX: 22.19 KG/M2 | DIASTOLIC BLOOD PRESSURE: 72 MMHG

## 2019-02-21 DIAGNOSIS — I48.0 PAROXYSMAL ATRIAL FIBRILLATION (HCC): Primary | ICD-10-CM

## 2019-02-21 DIAGNOSIS — I10 ESSENTIAL HYPERTENSION, BENIGN: ICD-10-CM

## 2019-02-21 DIAGNOSIS — I34.0 SEVERE MITRAL REGURGITATION: ICD-10-CM

## 2019-02-21 PROCEDURE — G8428 CUR MEDS NOT DOCUMENT: HCPCS | Performed by: INTERNAL MEDICINE

## 2019-02-21 PROCEDURE — G8482 FLU IMMUNIZE ORDER/ADMIN: HCPCS | Performed by: INTERNAL MEDICINE

## 2019-02-21 PROCEDURE — 1101F PT FALLS ASSESS-DOCD LE1/YR: CPT | Performed by: INTERNAL MEDICINE

## 2019-02-21 PROCEDURE — 99214 OFFICE O/P EST MOD 30 MIN: CPT | Performed by: INTERNAL MEDICINE

## 2019-02-21 PROCEDURE — G8399 PT W/DXA RESULTS DOCUMENT: HCPCS | Performed by: INTERNAL MEDICINE

## 2019-02-21 PROCEDURE — 1036F TOBACCO NON-USER: CPT | Performed by: INTERNAL MEDICINE

## 2019-02-21 PROCEDURE — 1123F ACP DISCUSS/DSCN MKR DOCD: CPT | Performed by: INTERNAL MEDICINE

## 2019-02-21 PROCEDURE — 4040F PNEUMOC VAC/ADMIN/RCVD: CPT | Performed by: INTERNAL MEDICINE

## 2019-02-21 PROCEDURE — G8420 CALC BMI NORM PARAMETERS: HCPCS | Performed by: INTERNAL MEDICINE

## 2019-02-21 PROCEDURE — 93000 ELECTROCARDIOGRAM COMPLETE: CPT | Performed by: INTERNAL MEDICINE

## 2019-02-21 PROCEDURE — 1090F PRES/ABSN URINE INCON ASSESS: CPT | Performed by: INTERNAL MEDICINE

## 2019-02-21 RX ORDER — AMIODARONE HYDROCHLORIDE 200 MG/1
200 TABLET ORAL DAILY
Qty: 90 TABLET | Refills: 3 | Status: ON HOLD | OUTPATIENT
Start: 2019-02-21 | End: 2019-06-21 | Stop reason: HOSPADM

## 2019-03-01 ENCOUNTER — HOSPITAL ENCOUNTER (OUTPATIENT)
Dept: WOMENS IMAGING | Age: 84
Discharge: HOME | End: 2019-03-01
Payer: MEDICARE

## 2019-03-01 DIAGNOSIS — Z13.820 SCREENING FOR OSTEOPOROSIS: ICD-10-CM

## 2019-03-01 DIAGNOSIS — I48.91 ATRIAL FIBRILLATION, UNSPECIFIED TYPE (HCC): ICD-10-CM

## 2019-03-01 LAB
INR BLD: 2.31 (ref 0.86–1.14)
PROTHROMBIN TIME: 26.3 SEC (ref 9.8–13)

## 2019-03-01 PROCEDURE — 77080 DXA BONE DENSITY AXIAL: CPT

## 2019-03-04 DIAGNOSIS — I48.91 ATRIAL FIBRILLATION, UNSPECIFIED TYPE (HCC): ICD-10-CM

## 2019-03-04 LAB
INR BLD: 1.9 (ref 0.86–1.14)
PROTHROMBIN TIME: 21.7 SEC (ref 9.8–13)

## 2019-03-07 DIAGNOSIS — I48.91 ATRIAL FIBRILLATION, UNSPECIFIED TYPE (HCC): ICD-10-CM

## 2019-03-07 LAB
INR BLD: 1.85 (ref 0.86–1.14)
PROTHROMBIN TIME: 21.1 SEC (ref 9.8–13)

## 2019-03-08 ENCOUNTER — HOSPITAL ENCOUNTER (OUTPATIENT)
Dept: CT IMAGING | Age: 84
Discharge: HOME | End: 2019-03-08
Payer: MEDICARE

## 2019-03-08 DIAGNOSIS — R05.3 CHRONIC COUGH: ICD-10-CM

## 2019-03-08 PROCEDURE — 71250 CT THORAX DX C-: CPT

## 2019-03-12 DIAGNOSIS — I48.91 ATRIAL FIBRILLATION, UNSPECIFIED TYPE (HCC): ICD-10-CM

## 2019-03-12 LAB
INR BLD: 2.68 (ref 0.86–1.14)
PROTHROMBIN TIME: 30.5 SEC (ref 9.8–13)

## 2019-03-26 ENCOUNTER — OFFICE VISIT (OUTPATIENT)
Dept: PULMONOLOGY | Age: 84
End: 2019-03-26
Payer: MEDICARE

## 2019-03-26 VITALS
BODY MASS INDEX: 21.6 KG/M2 | SYSTOLIC BLOOD PRESSURE: 151 MMHG | TEMPERATURE: 96.7 F | RESPIRATION RATE: 20 BRPM | OXYGEN SATURATION: 96 % | DIASTOLIC BLOOD PRESSURE: 64 MMHG | WEIGHT: 110 LBS | HEIGHT: 60 IN | HEART RATE: 58 BPM

## 2019-03-26 DIAGNOSIS — D72.10 EOSINOPHILIA: ICD-10-CM

## 2019-03-26 DIAGNOSIS — J98.4 RESTRICTIVE LUNG DISEASE: ICD-10-CM

## 2019-03-26 DIAGNOSIS — J98.51 FIBROSING MEDIASTINITIS: ICD-10-CM

## 2019-03-26 DIAGNOSIS — R06.02 SOB (SHORTNESS OF BREATH): Primary | ICD-10-CM

## 2019-03-26 DIAGNOSIS — I48.91 ATRIAL FIBRILLATION, UNSPECIFIED TYPE (HCC): ICD-10-CM

## 2019-03-26 LAB
INR BLD: 2.82 (ref 0.86–1.14)
PROTHROMBIN TIME: 32.2 SEC (ref 9.8–13)

## 2019-03-26 PROCEDURE — 4040F PNEUMOC VAC/ADMIN/RCVD: CPT | Performed by: INTERNAL MEDICINE

## 2019-03-26 PROCEDURE — 1036F TOBACCO NON-USER: CPT | Performed by: INTERNAL MEDICINE

## 2019-03-26 PROCEDURE — 1090F PRES/ABSN URINE INCON ASSESS: CPT | Performed by: INTERNAL MEDICINE

## 2019-03-26 PROCEDURE — G8427 DOCREV CUR MEDS BY ELIG CLIN: HCPCS | Performed by: INTERNAL MEDICINE

## 2019-03-26 PROCEDURE — 1123F ACP DISCUSS/DSCN MKR DOCD: CPT | Performed by: INTERNAL MEDICINE

## 2019-03-26 PROCEDURE — G8420 CALC BMI NORM PARAMETERS: HCPCS | Performed by: INTERNAL MEDICINE

## 2019-03-26 PROCEDURE — G8399 PT W/DXA RESULTS DOCUMENT: HCPCS | Performed by: INTERNAL MEDICINE

## 2019-03-26 PROCEDURE — G8482 FLU IMMUNIZE ORDER/ADMIN: HCPCS | Performed by: INTERNAL MEDICINE

## 2019-03-26 PROCEDURE — 99214 OFFICE O/P EST MOD 30 MIN: CPT | Performed by: INTERNAL MEDICINE

## 2019-03-27 RX ORDER — BUDESONIDE AND FORMOTEROL FUMARATE DIHYDRATE 160; 4.5 UG/1; UG/1
2 AEROSOL RESPIRATORY (INHALATION) 2 TIMES DAILY
Qty: 1 INHALER | Refills: 0 | Status: ON HOLD | COMMUNITY
Start: 2019-03-27 | End: 2019-06-18

## 2019-04-08 ENCOUNTER — TELEPHONE (OUTPATIENT)
Dept: PULMONOLOGY | Age: 84
End: 2019-04-08

## 2019-04-09 RX ORDER — FLUTICASONE FUROATE AND VILANTEROL 200; 25 UG/1; UG/1
1 POWDER RESPIRATORY (INHALATION) DAILY
Qty: 1 EACH | Refills: 1 | COMMUNITY
Start: 2019-04-09 | End: 2020-01-24 | Stop reason: ALTCHOICE

## 2019-04-09 NOTE — TELEPHONE ENCOUNTER
Did it make her less SOB at all? We can try samples of Breo but it won't likely be covered. But she can try that.   I would not want her to continue with symbicort if it did not help her

## 2019-04-11 DIAGNOSIS — I48.91 ATRIAL FIBRILLATION, UNSPECIFIED TYPE (HCC): ICD-10-CM

## 2019-04-11 LAB
INR BLD: 4.36 (ref 0.86–1.14)
PROTHROMBIN TIME: 49.7 SEC (ref 9.8–13)

## 2019-04-29 DIAGNOSIS — I48.91 ATRIAL FIBRILLATION, UNSPECIFIED TYPE (HCC): ICD-10-CM

## 2019-04-29 LAB
INR BLD: 2.68 (ref 0.86–1.14)
PROTHROMBIN TIME: 30.5 SEC (ref 9.8–13)

## 2019-05-01 ENCOUNTER — TELEPHONE (OUTPATIENT)
Dept: PULMONOLOGY | Age: 84
End: 2019-05-01

## 2019-05-01 DIAGNOSIS — J45.40 MODERATE PERSISTENT ASTHMA WITHOUT COMPLICATION: Primary | ICD-10-CM

## 2019-05-02 RX ORDER — FLUTICASONE FUROATE AND VILANTEROL 200; 25 UG/1; UG/1
1 POWDER RESPIRATORY (INHALATION) DAILY
Qty: 3 EACH | Refills: 1 | Status: ON HOLD | OUTPATIENT
Start: 2019-05-02 | End: 2019-06-18

## 2019-05-28 ENCOUNTER — HOSPITAL ENCOUNTER (INPATIENT)
Age: 84
LOS: 9 days | Discharge: NURSING, REHAB OR LONG-TERM CARE | DRG: 377 | End: 2019-06-06
Attending: EMERGENCY MEDICINE | Admitting: INTERNAL MEDICINE
Payer: MEDICARE

## 2019-05-28 DIAGNOSIS — R79.1 SUPRATHERAPEUTIC INR: ICD-10-CM

## 2019-05-28 DIAGNOSIS — D64.9 ANEMIA, UNSPECIFIED TYPE: ICD-10-CM

## 2019-05-28 DIAGNOSIS — K92.2 UGI BLEED: Primary | ICD-10-CM

## 2019-05-28 LAB
A/G RATIO: 1 (ref 1.1–2.2)
ABO/RH: NORMAL
ALBUMIN SERPL-MCNC: 3.6 G/DL (ref 3.4–5)
ALP BLD-CCNC: 95 U/L (ref 40–129)
ALT SERPL-CCNC: 19 U/L (ref 10–40)
ANION GAP SERPL CALCULATED.3IONS-SCNC: 14 MMOL/L (ref 3–16)
ANTIBODY SCREEN: NORMAL
AST SERPL-CCNC: 27 U/L (ref 15–37)
BASOPHILS ABSOLUTE: 0.1 K/UL (ref 0–0.2)
BASOPHILS RELATIVE PERCENT: 0.8 %
BILIRUB SERPL-MCNC: 0.4 MG/DL (ref 0–1)
BUN BLDV-MCNC: 40 MG/DL (ref 7–20)
CALCIUM SERPL-MCNC: 9.3 MG/DL (ref 8.3–10.6)
CHLORIDE BLD-SCNC: 103 MMOL/L (ref 99–110)
CO2: 26 MMOL/L (ref 21–32)
CREAT SERPL-MCNC: 1.3 MG/DL (ref 0.6–1.2)
EOSINOPHILS ABSOLUTE: 0.3 K/UL (ref 0–0.6)
EOSINOPHILS RELATIVE PERCENT: 4.1 %
GFR AFRICAN AMERICAN: 47
GFR NON-AFRICAN AMERICAN: 39
GLOBULIN: 3.7 G/DL
GLUCOSE BLD-MCNC: 108 MG/DL (ref 70–99)
HCT VFR BLD CALC: 24 % (ref 36–48)
HEMOGLOBIN: 7.9 G/DL (ref 12–16)
INR BLD: 4.33 (ref 0.86–1.14)
LYMPHOCYTES ABSOLUTE: 1.2 K/UL (ref 1–5.1)
LYMPHOCYTES RELATIVE PERCENT: 14.5 %
MCH RBC QN AUTO: 29 PG (ref 26–34)
MCHC RBC AUTO-ENTMCNC: 32.8 G/DL (ref 31–36)
MCV RBC AUTO: 88.4 FL (ref 80–100)
MONOCYTES ABSOLUTE: 0.6 K/UL (ref 0–1.3)
MONOCYTES RELATIVE PERCENT: 7.1 %
NEUTROPHILS ABSOLUTE: 6.1 K/UL (ref 1.7–7.7)
NEUTROPHILS RELATIVE PERCENT: 73.5 %
OCCULT BLOOD DIAGNOSTIC: ABNORMAL
PDW BLD-RTO: 15.4 % (ref 12.4–15.4)
PLATELET # BLD: 199 K/UL (ref 135–450)
PMV BLD AUTO: 8.7 FL (ref 5–10.5)
POTASSIUM SERPL-SCNC: 3.8 MMOL/L (ref 3.5–5.1)
PROTHROMBIN TIME: 49.4 SEC (ref 9.8–13)
RBC # BLD: 2.71 M/UL (ref 4–5.2)
SODIUM BLD-SCNC: 143 MMOL/L (ref 136–145)
TOTAL PROTEIN: 7.3 G/DL (ref 6.4–8.2)
TROPONIN: <0.01 NG/ML
WBC # BLD: 8.2 K/UL (ref 4–11)

## 2019-05-28 PROCEDURE — 80053 COMPREHEN METABOLIC PANEL: CPT

## 2019-05-28 PROCEDURE — 6370000000 HC RX 637 (ALT 250 FOR IP): Performed by: INTERNAL MEDICINE

## 2019-05-28 PROCEDURE — 85610 PROTHROMBIN TIME: CPT

## 2019-05-28 PROCEDURE — 2580000003 HC RX 258: Performed by: EMERGENCY MEDICINE

## 2019-05-28 PROCEDURE — 93005 ELECTROCARDIOGRAM TRACING: CPT | Performed by: EMERGENCY MEDICINE

## 2019-05-28 PROCEDURE — 99285 EMERGENCY DEPT VISIT HI MDM: CPT

## 2019-05-28 PROCEDURE — 2000000000 HC ICU R&B

## 2019-05-28 PROCEDURE — 6360000002 HC RX W HCPCS: Performed by: EMERGENCY MEDICINE

## 2019-05-28 PROCEDURE — 86901 BLOOD TYPING SEROLOGIC RH(D): CPT

## 2019-05-28 PROCEDURE — 86850 RBC ANTIBODY SCREEN: CPT

## 2019-05-28 PROCEDURE — 84484 ASSAY OF TROPONIN QUANT: CPT

## 2019-05-28 PROCEDURE — 86900 BLOOD TYPING SEROLOGIC ABO: CPT

## 2019-05-28 PROCEDURE — 85025 COMPLETE CBC W/AUTO DIFF WBC: CPT

## 2019-05-28 PROCEDURE — G0328 FECAL BLOOD SCRN IMMUNOASSAY: HCPCS

## 2019-05-28 PROCEDURE — 96372 THER/PROPH/DIAG INJ SC/IM: CPT

## 2019-05-28 PROCEDURE — C9113 INJ PANTOPRAZOLE SODIUM, VIA: HCPCS | Performed by: EMERGENCY MEDICINE

## 2019-05-28 PROCEDURE — 96366 THER/PROPH/DIAG IV INF ADDON: CPT

## 2019-05-28 PROCEDURE — 86923 COMPATIBILITY TEST ELECTRIC: CPT

## 2019-05-28 PROCEDURE — 2580000003 HC RX 258: Performed by: INTERNAL MEDICINE

## 2019-05-28 PROCEDURE — 96365 THER/PROPH/DIAG IV INF INIT: CPT

## 2019-05-28 PROCEDURE — P9016 RBC LEUKOCYTES REDUCED: HCPCS

## 2019-05-28 PROCEDURE — 36430 TRANSFUSION BLD/BLD COMPNT: CPT

## 2019-05-28 PROCEDURE — 96361 HYDRATE IV INFUSION ADD-ON: CPT

## 2019-05-28 RX ORDER — ONDANSETRON 2 MG/ML
4 INJECTION INTRAMUSCULAR; INTRAVENOUS EVERY 6 HOURS PRN
Status: DISCONTINUED | OUTPATIENT
Start: 2019-05-28 | End: 2019-06-06 | Stop reason: HOSPADM

## 2019-05-28 RX ORDER — ALBUTEROL SULFATE 90 UG/1
2 AEROSOL, METERED RESPIRATORY (INHALATION) EVERY 4 HOURS PRN
Status: DISCONTINUED | OUTPATIENT
Start: 2019-05-28 | End: 2019-06-06 | Stop reason: HOSPADM

## 2019-05-28 RX ORDER — ACETAMINOPHEN 325 MG/1
650 TABLET ORAL EVERY 4 HOURS PRN
Status: DISCONTINUED | OUTPATIENT
Start: 2019-05-28 | End: 2019-06-06 | Stop reason: HOSPADM

## 2019-05-28 RX ORDER — SODIUM CHLORIDE 9 MG/ML
INJECTION, SOLUTION INTRAVENOUS CONTINUOUS
Status: DISCONTINUED | OUTPATIENT
Start: 2019-05-28 | End: 2019-05-29

## 2019-05-28 RX ORDER — 0.9 % SODIUM CHLORIDE 0.9 %
1000 INTRAVENOUS SOLUTION INTRAVENOUS ONCE
Status: COMPLETED | OUTPATIENT
Start: 2019-05-28 | End: 2019-05-28

## 2019-05-28 RX ORDER — PHYTONADIONE 10 MG/ML
10 INJECTION, EMULSION INTRAMUSCULAR; INTRAVENOUS; SUBCUTANEOUS ONCE
Status: COMPLETED | OUTPATIENT
Start: 2019-05-28 | End: 2019-05-28

## 2019-05-28 RX ORDER — MEMANTINE HYDROCHLORIDE 5 MG/1
10 TABLET ORAL 2 TIMES DAILY
Status: DISCONTINUED | OUTPATIENT
Start: 2019-05-28 | End: 2019-06-06 | Stop reason: HOSPADM

## 2019-05-28 RX ORDER — LEVOTHYROXINE SODIUM 0.07 MG/1
75 TABLET ORAL DAILY
Status: DISCONTINUED | OUTPATIENT
Start: 2019-05-29 | End: 2019-06-06 | Stop reason: HOSPADM

## 2019-05-28 RX ORDER — LANOLIN ALCOHOL/MO/W.PET/CERES
3 CREAM (GRAM) TOPICAL NIGHTLY PRN
Status: DISCONTINUED | OUTPATIENT
Start: 2019-05-28 | End: 2019-06-06 | Stop reason: HOSPADM

## 2019-05-28 RX ORDER — LATANOPROST 50 UG/ML
1 SOLUTION/ DROPS OPHTHALMIC NIGHTLY
Status: DISCONTINUED | OUTPATIENT
Start: 2019-05-28 | End: 2019-06-06 | Stop reason: HOSPADM

## 2019-05-28 RX ORDER — 0.9 % SODIUM CHLORIDE 0.9 %
250 INTRAVENOUS SOLUTION INTRAVENOUS ONCE
Status: COMPLETED | OUTPATIENT
Start: 2019-05-28 | End: 2019-05-29

## 2019-05-28 RX ORDER — AMIODARONE HYDROCHLORIDE 200 MG/1
200 TABLET ORAL DAILY
Status: DISCONTINUED | OUTPATIENT
Start: 2019-05-29 | End: 2019-06-02

## 2019-05-28 RX ORDER — SODIUM CHLORIDE 0.9 % (FLUSH) 0.9 %
10 SYRINGE (ML) INJECTION EVERY 12 HOURS SCHEDULED
Status: DISCONTINUED | OUTPATIENT
Start: 2019-05-28 | End: 2019-06-06 | Stop reason: HOSPADM

## 2019-05-28 RX ORDER — SODIUM CHLORIDE 0.9 % (FLUSH) 0.9 %
10 SYRINGE (ML) INJECTION PRN
Status: DISCONTINUED | OUTPATIENT
Start: 2019-05-28 | End: 2019-06-06 | Stop reason: HOSPADM

## 2019-05-28 RX ADMIN — SODIUM CHLORIDE 8 MG/HR: 9 INJECTION, SOLUTION INTRAVENOUS at 19:40

## 2019-05-28 RX ADMIN — SODIUM CHLORIDE: 9 INJECTION, SOLUTION INTRAVENOUS at 23:09

## 2019-05-28 RX ADMIN — Medication 10 ML: at 23:09

## 2019-05-28 RX ADMIN — PHYTONADIONE 10 MG: 10 INJECTION, EMULSION INTRAMUSCULAR; INTRAVENOUS; SUBCUTANEOUS at 19:44

## 2019-05-28 RX ADMIN — SODIUM CHLORIDE 80 MG: 9 INJECTION, SOLUTION INTRAVENOUS at 19:01

## 2019-05-28 RX ADMIN — Medication 3 MG: at 23:39

## 2019-05-28 RX ADMIN — SODIUM CHLORIDE 1000 ML: 9 INJECTION, SOLUTION INTRAVENOUS at 18:13

## 2019-05-28 RX ADMIN — MEMANTINE HYDROCHLORIDE 10 MG: 5 TABLET, FILM COATED ORAL at 23:39

## 2019-05-28 RX ADMIN — SODIUM CHLORIDE 250 ML: 9 INJECTION, SOLUTION INTRAVENOUS at 23:08

## 2019-05-28 ASSESSMENT — PAIN SCALES - GENERAL: PAINLEVEL_OUTOF10: 0

## 2019-05-28 NOTE — ED NOTES
Bed: [facility]  Expected date:   Expected time:   Means of arrival:   Comments:     Kevin Edwards RN  05/28/19 1585

## 2019-05-28 NOTE — ED PROVIDER NOTES
Past Medical History:   Diagnosis Date    Anemia     Anticoagulant long-term use     Atrial fibrillation ([address] 6/2011    Sammy Gross CHF [facility]congestive [facility] failure) ([facility])     CKD (chronic kidney disease) stage 3, GFR 30-59 ml/min ([facility]) 2/11/2019    Clostridium difficile diarrhea 09/14/2016    Depression     Humerus fracture     Hyperlipidemia     Hypertension     Hypothyroidism     Mitral regurgitation     Optic neuritis     Osteopenia     Fosamax stopped 2/2014, had been treated at least since 2004    Polymyalgia rheumatica ([address]     Pulmonary embolus ([address] 6/2011    Right pulmonary obstruction suspected to be possible chronic pulmonary embolus    Thyroid disease     Vitamin D deficiency          SURGICAL HISTORY       Past Surgical History:   Procedure Laterality Date    COLONOSCOPY  09/14/2016    Ilya    LUNG SURGERY      MALIGNANT SKIN LESION EXCISION      UPPER GASTROINTESTINAL ENDOSCOPY  09/14/2016    Ilya         CURRENT MEDICATIONS       Current Discharge Medication List      CONTINUE these medications which have NOT CHANGED    Details   !! Fluticasone Furoate-Vilanterol (BREO ELLIPTA) 200-25 MCG/INH AEPB Inhale 1 puff into the lungs daily  Qty: 3 each, Refills: 1    Comments: 90 day supply  Associated Diagnoses: Moderate persistent asthma without complication      !!  Fluticasone Furoate-Vilanterol (BREO ELLIPTA) 200-25 MCG/INH AEPB Inhale 1 puff into the lungs daily  Qty: 1 each, Refills: 1    Comments: [id number]  4/20      levothyroxine (SYNTHROID) 75 MCG tablet Take 1 tablet by mouth Daily  Qty: 90 tablet, Refills: 3      budesonide-formoterol (SYMBICORT) 160-4.5 MCG/ACT AERO Inhale 2 puffs into the lungs 2 times daily  Qty: 1 Inhaler, Refills: 0    Comments: [id number]  2/20  Associated Diagnoses: SOB (shortness of breath)      Multiple Vitamins-Minerals (PRESERVISION AREDS 2+MULTI VIT PO) Take by mouth      simvastatin (ZOCOR) 20 MG tablet TAKE 1 TABLET EVERY DAY  Qty: 90 tablet, Refills: 3      amiodarone (CORDARONE) 200 MG tablet Take 1 tablet by mouth daily  Qty: 90 tablet, Refills: 3      memantine (NAMENDA) 10 MG tablet TAKE 1 TABLET TWICE DAILY  Qty: 180 tablet, Refills: 3      ipratropium (ATROVENT) 0.06 % nasal spray 2 sprays by Nasal route 4 times daily  Qty: 1 Bottle, Refills: 3      albuterol sulfate HFA (PROAIR HFA) 108 (90 Base) MCG/ACT inhaler Inhale 2 puffs into the lungs every 4 hours as needed for Wheezing or Shortness of Breath  Qty: 3 Inhaler, Refills: 1    Associated Diagnoses: SOB (shortness of breath)      furosemide (LASIX) 40 MG tablet Take 1 tablet by mouth daily  Qty: 180 tablet, Refills: 3      metoprolol succinate (TOPROL XL) 50 MG extended release tablet TAKE 1 TABLET BY MOUTH 2 TIMES DAILY  Qty: 180 tablet, Refills: 3      warfarin (COUMADIN) 5 MG tablet TAKE 2 TO 3 TABLETS EVERY DAY AS DIRECTED  Qty: 270 tablet, Refills: 3      latanoprost (XALATAN) 0.005 % ophthalmic solution Place 1 drop into both eyes nightly. vitamin D (CHOLECALCIFEROL) 400 UNITS TABS tablet Take 400 Units by mouth daily. calcium carbonate (OSCAL) 500 MG TABS tablet Take 500 mg by mouth daily. !! - Potential duplicate medications found. Please discuss with provider. ALLERGIES     Ace inhibitors; Aricept [donepezil hydrochloride]; Benicar [olmesartan medoxomil]; Exelon [rivastigmine tartrate]; Pcn [penicillins]; Quinapril hcl; and Doxycycline    FAMILY HISTORY       Family History   Problem Relation Age of Onset   [address] [facility] Cancer Mother         unknown primary    Other Father         Tuberculosis    Lung Cancer Sister     Tuberculosis Brother     Diabetes Brother           SOCIAL HISTORY       Social History     Socioeconomic History    Marital status:       Spouse name: None    Number of children: 2    Years of education: None    Highest education level: None   Occupational History    None   Social Needs    Financial resource strain: None   Tennyson-Melania insecurity:     Worry: None     Inability: None    Transportation needs:     Medical: None     Non-medical: None   Tobacco Use    Smoking status: Former Smoker     Last attempt to quit: 1965     Years since quittin.4    Smokeless tobacco: Never Used   Substance and Sexual Activity    Alcohol use: Yes     Comment: occ    Drug use: No    Sexual activity: None   Lifestyle    Physical activity:     Days per week: None     Minutes per session: None    Stress: None   Relationships    Social connections:     Talks on phone: None     Gets together: None     Attends Religion service: None     Active member of club or organization: None     Attends meetings of clubs or organizations: None     Relationship status: None    Intimate partner violence:     Fear of current or ex partner: None     Emotionally abused: None     Physically abused: None     Forced sexual activity: None   Other Topics Concern    None   Social History Narrative    None         PHYSICAL EXAM    (up to 7 for level 4, 8 or more for level 5)     ED Triage Vitals [19 1748]   BP Temp Temp Source Pulse Resp SpO2 Height Weight   (!) 114/93 98.2 °F (36.8 °C) Oral 113 16 96 % 5' (1.524 m) 116 lb 13.5 oz (53 kg)       Gen. : Alert thin elderly female in no acute distress. Head: Atraumatic and normocephalic. Eyes: No conjunctival injection. Pupils equal round reactive. No conjunctival pallor. HEENT: Nose is clear. Oropharynx is moist without erythema. Neck: Supple without adenopathy or JVD. Heart: Regular rate and rhythm. No murmur lungs: Breath sounds equal bilaterally and clear. Abdomen: Soft, nondistended, nontender. No masses or organomegaly. Bowel sounds normal.  Rectal exam: Melanotic stool. No masses. Nontender. Stool Hemoccult sent. Musculoskeletal: No lower extremity edema. Skin: Warm and dry. No cyanosis or diaphoresis. No pallor. Neuro: Awake alert oriented. No focal motor deficits.       DIFFERENTIAL DIAGNOSIS Differential includes but is not limited to peptic ulcer disease, gastritis, AV malformation, esophagitis, supratherapeutic INR. DIAGNOSTIC RESULTS     EKG: All EKG's are interpreted by Fuentes Vela MD in the absence of a cardiologist.    Sinus tachycardia, rate of 103, inferior lateral ischemic changes with ST segment depression. Rhythm strip shows sinus tachycardia with rate 103, MO interval 168 ms, QRS 86 ms with no other ectopy is interpreted by me. This is compared to an EKG dated 2/21/19, the inferior lateral changes are more pronounced on today's tracing.     RADIOLOGY:   Non-plain film images such as CT, Ultrasound and MRI are read by the radiologist. Plain radiographic images are visualized and preliminarily interpreted Fuentes Vela MD with the below findings:      Interpretation per the Radiologist below, if available at the time of this note:    No orders to display         ED BEDSIDE ULTRASOUND:   Performed by ED Physician - none    LABS:  Labs Reviewed   CBC WITH AUTO DIFFERENTIAL - Abnormal; Notable for the following components:       Result Value    RBC 2.71 (*)     Hemoglobin 7.9 (*)     Hematocrit 24.0 (*)     All other components within normal limits    Narrative:     Performed at:  [facility]  [address] [employer] [facility] [address]   Phone [phone]   COMPREHENSIVE METABOLIC PANEL - Abnormal; Notable for the following components:    Glucose 108 (*)     BUN 40 (*)     CREATININE 1.3 (*)     GFR Non- 39 (*)     GFR African American 47 (*)     Albumin/Globulin Ratio 1.0 (*)     All other components within normal limits    Narrative:     Performed at:  [facility]  [address] [employer] [address]   Phone [phone]   PROTIME-INR - Abnormal; Notable for the following components:    Protime 49.4 (*)     INR 4.33 (*)     All other components within normal limits Narrative:     Performed at:  [facility]  [address] [address]   Phone [phone]   BLOOD OCCULT STOOL DIAGNOSTIC - Abnormal; Notable for the following components:    Occult Blood Diagnostic   (*)     Value: Result: POSITIVE  Normal range: Negative      All other components within normal limits    Narrative:     ORDER#: [id number]                          ORDERED BY: Shikha Banks  SOURCE: Stool                              COLLECTED:  05/28/19 18:12  ANTIBIOTICS AT HENRY.:                      RECEIVED :  05/28/19 18:12  Performed at:  [facility]  [address] [employer] [facility] [place] [address]   Phone [phone]   TROPONIN    Narrative:     Performed at:  [facility]  [address] [place] [address]   Phone [phone]   TYPE AND SCREEN    Narrative:     Performed at:  [facility]  [address] [place] [address]   Phone [phone]   PREPARE RBC (CROSSMATCH)       All other labs were within normal range or not returned as of this dictation. EMERGENCY DEPARTMENT COURSE and DIFFERENTIAL DIAGNOSIS/MDM:   Vitals:    Vitals:    05/28/19 1845 05/28/19 1900 05/28/19 1948 05/28/19 2135   BP: (!) 138/48 (!) 149/51 (!) 136/48 (!) 144/58   Pulse: 65 66 66 66   Resp: 17 17 19 18   Temp:    98.4 °F (36.9 °C)   TempSrc:    Oral   SpO2: 92% 90% 92% 92%   Weight:    108 lb 14.5 oz (49.4 kg)   Height:    5' (1.524 m)       The patient has melanotic stools. Her hemoglobin is low at 7.9. She has a suprapubic therapeutic INR over 4. She was told with Protonix and placed on a Protonix drip. I consult to GI, Dr. Stevie Armendariz. We are in agreement to proceed with vitamin K. We will not give [facility] at this point in time.  We are going to have 2 units of packed red blood cells and she is tachycardic and her hemoglobin is below 8, and she may still be actively bleeding. I spoke with Dr. Miky Zamarripa covering for Dr. Joy Sapp. Since the patient will need to go to the intensive care unit she asked me to consult the hospitalist for an ICU admission. Test results and treatment plan were discussed with the patient. She understands treatment plan and the need for admission. Her heart rate is down to 101. Her blood pressure remained stable. Her abdomen is benign, nontender. CONSULTS:  IP CONSULT TO GI  IP CONSULT TO INTERNAL MEDICINE  IP CONSULT TO HOSPITALIST  IP CONSULT TO GI    PROCEDURES:  None    FINAL IMPRESSION      1. UGI bleed    2. Anemia, unspecified type    3.  Supratherapeutic INR          DISPOSITION/PLAN   DISPOSITION Admitted 05/28/2019 07:38:17 PM      PATIENT REFERRED TO:  uSlema Villasenor MD  [facility]  [phone]            DISCHARGE MEDICATIONS:  Current Discharge Medication List          (Please note that portions of this note were completed with a voice recognition program.  Efforts were made to edit the dictations but occasionally words are mis-transcribed.)    Florencia Osborn MD  Attending Emergency Physician        Jorge Ponce MD  05/28/19 8011

## 2019-05-29 ENCOUNTER — ANESTHESIA EVENT (OUTPATIENT)
Dept: ENDOSCOPY | Age: 84
DRG: 377 | End: 2019-05-29
Payer: MEDICARE

## 2019-05-29 ENCOUNTER — APPOINTMENT (OUTPATIENT)
Dept: GENERAL RADIOLOGY | Age: 84
DRG: 377 | End: 2019-05-29
Payer: MEDICARE

## 2019-05-29 PROBLEM — K92.2 GI BLEED: Status: RESOLVED | Noted: 2019-05-28 | Resolved: 2019-05-29

## 2019-05-29 LAB
ANION GAP SERPL CALCULATED.3IONS-SCNC: 11 MMOL/L (ref 3–16)
ANION GAP SERPL CALCULATED.3IONS-SCNC: 16 MMOL/L (ref 3–16)
BASE EXCESS ARTERIAL: -3.3 MMOL/L (ref -3–3)
BASE EXCESS ARTERIAL: 1.6 MMOL/L (ref -3–3)
BLOOD BANK DISPENSE STATUS: NORMAL
BLOOD BANK PRODUCT CODE: NORMAL
BPU ID: NORMAL
BUN BLDV-MCNC: 33 MG/DL (ref 7–20)
BUN BLDV-MCNC: 33 MG/DL (ref 7–20)
CALCIUM SERPL-MCNC: 8.1 MG/DL (ref 8.3–10.6)
CALCIUM SERPL-MCNC: 9.3 MG/DL (ref 8.3–10.6)
CARBOXYHEMOGLOBIN ARTERIAL: 0.8 % (ref 0–1.5)
CARBOXYHEMOGLOBIN ARTERIAL: 1.1 % (ref 0–1.5)
CHLORIDE BLD-SCNC: 106 MMOL/L (ref 99–110)
CHLORIDE BLD-SCNC: 107 MMOL/L (ref 99–110)
CO2: 23 MMOL/L (ref 21–32)
CO2: 24 MMOL/L (ref 21–32)
CREAT SERPL-MCNC: 1.2 MG/DL (ref 0.6–1.2)
CREAT SERPL-MCNC: 1.3 MG/DL (ref 0.6–1.2)
DESCRIPTION BLOOD BANK: NORMAL
EKG ATRIAL RATE: 103 BPM
EKG ATRIAL RATE: 76 BPM
EKG DIAGNOSIS: NORMAL
EKG DIAGNOSIS: NORMAL
EKG P AXIS: 48 DEGREES
EKG P AXIS: 95 DEGREES
EKG P-R INTERVAL: 210 MS
EKG P-R INTERVAL: 218 MS
EKG Q-T INTERVAL: 364 MS
EKG Q-T INTERVAL: 402 MS
EKG QRS DURATION: 86 MS
EKG QRS DURATION: 96 MS
EKG QTC CALCULATION (BAZETT): 452 MS
EKG QTC CALCULATION (BAZETT): 476 MS
EKG R AXIS: 1 DEGREES
EKG R AXIS: 55 DEGREES
EKG T AXIS: 217 DEGREES
EKG T AXIS: 52 DEGREES
EKG VENTRICULAR RATE: 103 BPM
EKG VENTRICULAR RATE: 76 BPM
GFR AFRICAN AMERICAN: 47
GFR AFRICAN AMERICAN: 52
GFR NON-AFRICAN AMERICAN: 39
GFR NON-AFRICAN AMERICAN: 43
GLUCOSE BLD-MCNC: 132 MG/DL (ref 70–99)
GLUCOSE BLD-MCNC: 88 MG/DL (ref 70–99)
HCO3 ARTERIAL: 25.3 MMOL/L (ref 21–29)
HCO3 ARTERIAL: 25.5 MMOL/L (ref 21–29)
HCT VFR BLD CALC: 28.4 % (ref 36–48)
HCT VFR BLD CALC: 28.8 % (ref 36–48)
HCT VFR BLD CALC: 31.7 % (ref 36–48)
HEMOGLOBIN, ART, EXTENDED: 10.3 G/DL (ref 12–16)
HEMOGLOBIN, ART, EXTENDED: 8.9 G/DL (ref 12–16)
HEMOGLOBIN: 10.2 G/DL (ref 12–16)
HEMOGLOBIN: 9.4 G/DL (ref 12–16)
HEMOGLOBIN: 9.6 G/DL (ref 12–16)
INR BLD: 1.84 (ref 0.86–1.14)
INR BLD: 3.68 (ref 0.86–1.14)
MAGNESIUM: 2.2 MG/DL (ref 1.8–2.4)
MCH RBC QN AUTO: 29.8 PG (ref 26–34)
MCHC RBC AUTO-ENTMCNC: 33.2 G/DL (ref 31–36)
MCV RBC AUTO: 89.8 FL (ref 80–100)
METHEMOGLOBIN ARTERIAL: 1 %
METHEMOGLOBIN ARTERIAL: 1 %
O2 CONTENT ARTERIAL: 12 ML/DL
O2 CONTENT ARTERIAL: 14 ML/DL
O2 SAT, ARTERIAL: 94.2 %
O2 SAT, ARTERIAL: 98.9 %
O2 THERAPY: ABNORMAL
O2 THERAPY: ABNORMAL
PCO2 ARTERIAL: 38.3 MMHG (ref 35–45)
PCO2 ARTERIAL: 70.8 MMHG (ref 35–45)
PDW BLD-RTO: 15.1 % (ref 12.4–15.4)
PH ARTERIAL: 7.18 (ref 7.35–7.45)
PH ARTERIAL: 7.44 (ref 7.35–7.45)
PHOSPHORUS: 2.8 MG/DL (ref 2.5–4.9)
PLATELET # BLD: 146 K/UL (ref 135–450)
PMV BLD AUTO: 8.3 FL (ref 5–10.5)
PO2 ARTERIAL: 135 MMHG (ref 75–108)
PO2 ARTERIAL: 82.7 MMHG (ref 75–108)
POTASSIUM REFLEX MAGNESIUM: 3.7 MMOL/L (ref 3.5–5.1)
POTASSIUM SERPL-SCNC: 3.3 MMOL/L (ref 3.5–5.1)
PROTHROMBIN TIME: 21 SEC (ref 9.8–13)
PROTHROMBIN TIME: 41.9 SEC (ref 9.8–13)
RBC # BLD: 3.16 M/UL (ref 4–5.2)
SODIUM BLD-SCNC: 141 MMOL/L (ref 136–145)
SODIUM BLD-SCNC: 146 MMOL/L (ref 136–145)
TCO2 ARTERIAL: 26.5 MMOL/L
TCO2 ARTERIAL: 27.7 MMOL/L
TROPONIN: <0.01 NG/ML
WBC # BLD: 9 K/UL (ref 4–11)

## 2019-05-29 PROCEDURE — 94640 AIRWAY INHALATION TREATMENT: CPT

## 2019-05-29 PROCEDURE — 93005 ELECTROCARDIOGRAM TRACING: CPT | Performed by: INTERNAL MEDICINE

## 2019-05-29 PROCEDURE — 94660 CPAP INITIATION&MGMT: CPT

## 2019-05-29 PROCEDURE — 51702 INSERT TEMP BLADDER CATH: CPT

## 2019-05-29 PROCEDURE — P9016 RBC LEUKOCYTES REDUCED: HCPCS

## 2019-05-29 PROCEDURE — 2580000003 HC RX 258: Performed by: INTERNAL MEDICINE

## 2019-05-29 PROCEDURE — 6370000000 HC RX 637 (ALT 250 FOR IP): Performed by: INTERNAL MEDICINE

## 2019-05-29 PROCEDURE — 36430 TRANSFUSION BLD/BLD COMPNT: CPT

## 2019-05-29 PROCEDURE — 94761 N-INVAS EAR/PLS OXIMETRY MLT: CPT

## 2019-05-29 PROCEDURE — 85610 PROTHROMBIN TIME: CPT

## 2019-05-29 PROCEDURE — 2580000003 HC RX 258: Performed by: NURSE PRACTITIONER

## 2019-05-29 PROCEDURE — APPNB15 APP NON BILLABLE TIME 0-15 MINS: Performed by: NURSE PRACTITIONER

## 2019-05-29 PROCEDURE — C9113 INJ PANTOPRAZOLE SODIUM, VIA: HCPCS | Performed by: INTERNAL MEDICINE

## 2019-05-29 PROCEDURE — P9017 PLASMA 1 DONOR FRZ W/IN 8 HR: HCPCS

## 2019-05-29 PROCEDURE — 6360000002 HC RX W HCPCS: Performed by: INTERNAL MEDICINE

## 2019-05-29 PROCEDURE — 94762 N-INVAS EAR/PLS OXIMTRY CONT: CPT

## 2019-05-29 PROCEDURE — 83735 ASSAY OF MAGNESIUM: CPT

## 2019-05-29 PROCEDURE — 84484 ASSAY OF TROPONIN QUANT: CPT

## 2019-05-29 PROCEDURE — 85027 COMPLETE CBC AUTOMATED: CPT

## 2019-05-29 PROCEDURE — 6360000002 HC RX W HCPCS: Performed by: NURSE PRACTITIONER

## 2019-05-29 PROCEDURE — 80048 BASIC METABOLIC PNL TOTAL CA: CPT

## 2019-05-29 PROCEDURE — 85014 HEMATOCRIT: CPT

## 2019-05-29 PROCEDURE — 2700000000 HC OXYGEN THERAPY PER DAY

## 2019-05-29 PROCEDURE — 2000000000 HC ICU R&B

## 2019-05-29 PROCEDURE — 93010 ELECTROCARDIOGRAM REPORT: CPT | Performed by: INTERNAL MEDICINE

## 2019-05-29 PROCEDURE — 94760 N-INVAS EAR/PLS OXIMETRY 1: CPT

## 2019-05-29 PROCEDURE — 85018 HEMOGLOBIN: CPT

## 2019-05-29 PROCEDURE — 99233 SBSQ HOSP IP/OBS HIGH 50: CPT | Performed by: INTERNAL MEDICINE

## 2019-05-29 PROCEDURE — 82803 BLOOD GASES ANY COMBINATION: CPT

## 2019-05-29 PROCEDURE — 99223 1ST HOSP IP/OBS HIGH 75: CPT | Performed by: INTERNAL MEDICINE

## 2019-05-29 PROCEDURE — 84100 ASSAY OF PHOSPHORUS: CPT

## 2019-05-29 PROCEDURE — 36415 COLL VENOUS BLD VENIPUNCTURE: CPT

## 2019-05-29 PROCEDURE — 2500000003 HC RX 250 WO HCPCS: Performed by: INTERNAL MEDICINE

## 2019-05-29 PROCEDURE — 71045 X-RAY EXAM CHEST 1 VIEW: CPT

## 2019-05-29 RX ORDER — FUROSEMIDE 10 MG/ML
40 INJECTION INTRAMUSCULAR; INTRAVENOUS ONCE
Status: COMPLETED | OUTPATIENT
Start: 2019-05-29 | End: 2019-05-29

## 2019-05-29 RX ORDER — IPRATROPIUM BROMIDE AND ALBUTEROL SULFATE 2.5; .5 MG/3ML; MG/3ML
1 SOLUTION RESPIRATORY (INHALATION)
Status: DISCONTINUED | OUTPATIENT
Start: 2019-05-29 | End: 2019-06-06 | Stop reason: HOSPADM

## 2019-05-29 RX ORDER — POTASSIUM CHLORIDE 7.45 MG/ML
10 INJECTION INTRAVENOUS
Status: COMPLETED | OUTPATIENT
Start: 2019-05-29 | End: 2019-05-30

## 2019-05-29 RX ORDER — 0.9 % SODIUM CHLORIDE 0.9 %
250 INTRAVENOUS SOLUTION INTRAVENOUS ONCE
Status: DISCONTINUED | OUTPATIENT
Start: 2019-05-29 | End: 2019-05-30

## 2019-05-29 RX ORDER — NITROGLYCERIN 20 MG/100ML
20 INJECTION INTRAVENOUS CONTINUOUS
Status: DISCONTINUED | OUTPATIENT
Start: 2019-05-29 | End: 2019-05-31

## 2019-05-29 RX ADMIN — Medication 10 ML: at 20:29

## 2019-05-29 RX ADMIN — PHYTONADIONE 5 MG: 10 INJECTION, EMULSION INTRAMUSCULAR; INTRAVENOUS; SUBCUTANEOUS at 09:39

## 2019-05-29 RX ADMIN — FUROSEMIDE 40 MG: 10 INJECTION, SOLUTION INTRAMUSCULAR; INTRAVENOUS at 10:31

## 2019-05-29 RX ADMIN — LEVOTHYROXINE SODIUM 75 MCG: 75 TABLET ORAL at 05:48

## 2019-05-29 RX ADMIN — IPRATROPIUM BROMIDE AND ALBUTEROL SULFATE 1 AMPULE: .5; 3 SOLUTION RESPIRATORY (INHALATION) at 12:25

## 2019-05-29 RX ADMIN — FUROSEMIDE 40 MG: 10 INJECTION, SOLUTION INTRAMUSCULAR; INTRAVENOUS at 11:39

## 2019-05-29 RX ADMIN — ALBUTEROL SULFATE 2 PUFF: 90 AEROSOL, METERED RESPIRATORY (INHALATION) at 09:26

## 2019-05-29 RX ADMIN — POTASSIUM CHLORIDE 10 MEQ: 7.46 INJECTION, SOLUTION INTRAVENOUS at 23:59

## 2019-05-29 RX ADMIN — LATANOPROST 1 DROP: 50 SOLUTION OPHTHALMIC at 20:30

## 2019-05-29 RX ADMIN — SODIUM CHLORIDE 8 MG/HR: 9 INJECTION, SOLUTION INTRAVENOUS at 17:39

## 2019-05-29 RX ADMIN — Medication 10 ML: at 09:00

## 2019-05-29 RX ADMIN — SODIUM CHLORIDE 8 MG/HR: 9 INJECTION, SOLUTION INTRAVENOUS at 01:49

## 2019-05-29 RX ADMIN — NITROGLYCERIN 10 MCG/MIN: 20 INJECTION INTRAVENOUS at 12:29

## 2019-05-29 RX ADMIN — POTASSIUM CHLORIDE 10 MEQ: 7.46 INJECTION, SOLUTION INTRAVENOUS at 22:59

## 2019-05-29 RX ADMIN — IPRATROPIUM BROMIDE AND ALBUTEROL SULFATE 1 AMPULE: .5; 3 SOLUTION RESPIRATORY (INHALATION) at 19:31

## 2019-05-29 RX ADMIN — LATANOPROST 1 DROP: 50 SOLUTION OPHTHALMIC at 00:15

## 2019-05-29 RX ADMIN — NITROGLYCERIN 0.5 INCH: 20 OINTMENT TOPICAL at 11:39

## 2019-05-29 RX ADMIN — CALCIUM GLUCONATE 2 G: 98 INJECTION, SOLUTION INTRAVENOUS at 09:02

## 2019-05-29 RX ADMIN — MEMANTINE HYDROCHLORIDE 10 MG: 5 TABLET, FILM COATED ORAL at 20:29

## 2019-05-29 RX ADMIN — IPRATROPIUM BROMIDE AND ALBUTEROL SULFATE 1 AMPULE: .5; 3 SOLUTION RESPIRATORY (INHALATION) at 15:26

## 2019-05-29 RX ADMIN — SODIUM CHLORIDE: 9 INJECTION, SOLUTION INTRAVENOUS at 09:02

## 2019-05-29 RX ADMIN — MOMETASONE FUROATE AND FORMOTEROL FUMARATE DIHYDRATE 2 PUFF: 200; 5 AEROSOL RESPIRATORY (INHALATION) at 09:26

## 2019-05-29 ASSESSMENT — PAIN SCALES - GENERAL
PAINLEVEL_OUTOF10: 0

## 2019-05-29 NOTE — H&P
Hospital Medicine History & Physical      PCP: Sreedhar Posada MD    Date of Admission: 5/28/2019    Date of Service: Pt seen/examined on 5/28/19 and Admitted to Inpatient with expected LOS greater than two midnights due to medical therapy. Chief Complaint:  Dark stools      History Of Present Illness:    80 y.o. female who presented to [facility] [place] with bloody stools. Patient was sent in from primary care physician's office. She reported dark stools today and some fatigue and dizziness. Patient denied abdominal pain. Patient is on chronic anticoagulation. She denied coffee-ground emesis. Denied recent changes in diet or antibiotics. Patient was lightheaded in her primary care physician's office and was sent to the ED for evaluation  Past Medical History:          Diagnosis Date    Anemia     Anticoagulant long-term use     Atrial fibrillation ([address] 6/2011    Christina Fernandes CHF (congestive heart failure) ([address]     CKD (chronic kidney disease) stage 3, GFR 30-59 ml/min ([address] 2/11/2019    Clostridium difficile diarrhea 09/14/2016    Depression     Humerus fracture     Hyperlipidemia     Hypertension     Hypothyroidism     Mitral regurgitation     Optic neuritis     Osteopenia     Fosamax stopped 2/2014, had been treated at least since 2004    Polymyalgia rheumatica ([address]     Pulmonary embolus ([address] 6/2011    Right pulmonary obstruction suspected to be possible chronic pulmonary embolus    Thyroid disease     Vitamin D deficiency        Past Surgical History:          Procedure Laterality Date    [facility]    LUNG SURGERY      MALIGNANT SKIN LESION EXCISION      UPPER GASTROINTESTINAL ENDOSCOPY  09/14/2016    Ilya       Medications Prior to Admission:      Prior to Admission medications    Medication Sig Start Date End Date Taking?  Authorizing Provider   Fluticasone Furoate-Vilanterol (BREO ELLIPTA) 200-25 MCG/INH AEPB Inhale 1 puff into the Exelon [rivastigmine tartrate]; Pcn [penicillins]; Quinapril hcl; and Doxycycline    Social History:      The patient currently lives with family    TOBACCO:   reports that she quit smoking about 54 years ago. She has never used smokeless tobacco.  ETOH:   reports that she drinks alcohol. Family History:       Reviewed in detail and negative for DM, CAD, Cancer, CVA. Positive as follows:        Problem Relation Age of Onset    Cancer Mother         unknown primary    Other Father         Tuberculosis    Lung Cancer Sister     Tuberculosis Brother     Diabetes Brother        REVIEW OF SYSTEMS:   Pertinent positives as noted in the HPI. All other systems reviewed and negative. PHYSICAL EXAM PERFORMED:    BP (!) 137/45   Pulse 65   Temp 97.5 °F (36.4 °C) (Oral)   Resp 17   Ht 5' (1.524 m)   Wt 105 lb 9.6 oz (47.9 kg)   SpO2 (!) 88%   BMI 20.62 kg/m²     General appearance:  No apparent distress, appears stated age and cooperative. HEENT:  Normal cephalic, atraumatic without obvious deformity. Pupils equal, round, and reactive to light. Extra ocular muscles intact. Conjunctivae pale  Neck: Supple, with full range of motion. No jugular venous distention. Trachea midline. Respiratory:  Normal respiratory effort. Clear to auscultation, bilaterally without Rales/Wheezes/Rhonchi. Cardiovascular:  Regular rate and rhythm with normal S1/S2 without murmurs, rubs or gallops. Abdomen: Soft, non-tender, non-distended with normal bowel sounds. Musculoskeletal:  No clubbing, cyanosis or edema bilaterally. Full range of motion without deformity. Skin: Skin color, texture, turgor normal.  No rashes or lesions. Neurologic:  Neurovascularly intact without any focal sensory/motor deficits.  Cranial nerves: II-XII intact, grossly non-focal.  Psychiatric:  Alert and oriented, thought content appropriate, normal insight  Capillary Refill: Brisk,< 3 seconds   Peripheral Pulses: +2 palpable, equal bilaterally Labs:     Recent Labs     05/28/19  1807   WBC 8.2   HGB 7.9*   HCT 24.0*        Recent Labs     05/28/19  1807      K 3.8      CO2 26   BUN 40*   CREATININE 1.3*   CALCIUM 9.3     Recent Labs     05/28/19  1807   AST 27   ALT 19   BILITOT 0.4   ALKPHOS 95     Recent Labs     05/28/19  1807   INR 4.33*     Recent Labs     05/28/19  1807   TROPONINI <0.01       Urinalysis:      Lab Results   Component Value Date    NITRU Negative 07/10/2014    WBCUA  07/10/2014    BACTERIA 3+ 06/11/2011    RBCUA 5-10 07/10/2014    BLOODU trace 10/10/2017    BLOODU SMALL 07/10/2014    SPECGRAV >1.005 10/10/2017    SPECGRAV 1.020 07/10/2014    GLUCOSEU neg 10/10/2017    GLUCOSEU Negative 07/10/2014    GLUCOSEU NEGATIVE 06/11/2011       Radiology:         ASSESSMENT:      GI bleed  Acute blood loss anemia due to gi bleed  supratherapeutic inr  parox afib  htn  hypothyroidism    PLAN:    2 large bore iv access  protonix drip  Serial h and h  Let inr drift down  GI consulted  Clears  Npo after mn  Planned endoscopy in am  toprol and amiodarone for rate rhythm control  Synthroid po for hypothyroidism    DVT Prophylaxis: scd  Diet: Diet NPO Effective Now Exceptions are: Ice Chips  Code Status: Full Code      Dispo - 2-3 days    Pt belongs to Dr Roman Dominguez on call does not admit to ICU--we will care for patient until am and Dr Lily Recinos can resume care in the am. GI requested ICU admission     Rebekah Lagunas MD    Thank you Josh Corrales MD for the opportunity to be involved in this patient's care. If you have any questions or concerns please feel free to contact me at [phone].

## 2019-05-29 NOTE — PLAN OF CARE
Nutrition Problem: Inadequate oral intake  Intervention: Food and/or Nutrient Delivery: Continue NPO(monitor plan for nutrition)  Nutritional Goals: tolerate most appropriate form of nutrition

## 2019-05-29 NOTE — CONSULTS
PATIENT IS SEEN AT THE REQUEST OF DR. Modesto Esparza for GI Bleeding    CONSULTING PHYSICIAN: Denisse    HISTORY OF PRESENT ILLNESS:  This is a 80 y.o. female who presented to her PCP's office with SOB and dark stools. She was went to the ED due to concerns for GI bleeding. She has been having maroon stools and had one this am.  She is on coumadin as outpatient. INR was slightly high. She has been more SOB due to humidity and more lightheaded. She has been avoiding going outside due to humidity. She did receive 2 units of PRBC      Established Pulmonologist:  Aracely    PAST MEDICAL HISTORY:  Past Medical History:   Diagnosis Date    Anemia     Anticoagulant long-term use     Atrial fibrillation ([address] 6/2011    Armand Lester [facility]     CKD (chronic kidney disease) stage 3, GFR 30-59 ml/min ([facility]) 2/11/2019    Clostridium difficile diarrhea 09/14/2016    Depression     Humerus fracture     Hyperlipidemia     Hypertension     Hypothyroidism     Mitral regurgitation     Optic neuritis     Osteopenia     Fosamax stopped 2/2014, had been treated at least since 2004    Polymyalgia rheumatica ([address]     Pulmonary embolus ([address] 6/2011    Right pulmonary obstruction suspected to be possible chronic pulmonary embolus    Thyroid disease     Vitamin D deficiency        PAST SURGICAL HISTORY:  Past Surgical History:   Procedure Laterality Date    COLONOSCOPY  09/14/2016    Ilya    LUNG SURGERY      MALIGNANT SKIN LESION EXCISION      UPPER GASTROINTESTINAL ENDOSCOPY  09/14/2016    Ilya       FAMILY HISTORY:  family history includes Cancer in her mother; Diabetes in her brother; Abram Jj in her sister; Other in her father; Tuberculosis in her brother. SOCIAL HISTORY:   reports that she quit smoking about 54 years ago.  She has never used smokeless tobacco.    Scheduled Meds:   sodium chloride  250 mL Intravenous Once    calcium gluconate IVPB  2 g Intravenous Once    sodium chloride  250 mL Intravenous Once    amiodarone  200 mg Oral Daily    mometasone-formoterol  2 puff Inhalation BID    latanoprost  1 drop Both Eyes Nightly    levothyroxine  75 mcg Oral Daily    memantine  10 mg Oral BID    sodium chloride flush  10 mL Intravenous 2 times per day       Continuous Infusions:   sodium chloride 100 mL/hr at 05/28/19 2309    pantoprozole (PROTONIX) infusion 8 mg/hr (05/29/19 0641)       PRN Meds:  albuterol sulfate HFA, sodium chloride flush, acetaminophen, ondansetron, melatonin    ALLERGIES:  Patient is allergic to ace inhibitors; aricept [donepezil hydrochloride]; benicar [olmesartan medoxomil]; exelon [rivastigmine tartrate]; pcn [penicillins]; quinapril hcl; and doxycycline. REVIEW OF SYSTEMS:  Constitutional: Negative for fever or chills  HENT: Negative for sore throat  Eyes: Negative for redness   Respiratory: SOB  Cardiovascular: Negative for chest pain  Gastrointestinal: Dark stools   Genitourinary: Negative for hematuria, negative for dysuria  Musculoskeletal: Negative for arthralgias   Skin: Negative for rash  Neurological: Lightheaded  Hematological: Negative for adenopathy  Extremities:  Negative for swelling    PHYSICAL EXAM:  Blood pressure (!) 158/55, pulse 61, temperature 97.3 °F (36.3 °C), temperature source Oral, resp. rate 21, height 5' (1.524 m), weight 106 lb 11.2 oz (48.4 kg), SpO2 91 %, not currently breastfeeding.'  Gen: No distress. Eyes: PERRL. No sclera icterus. No conjunctival injection. ENT: No discharge. Pharynx clear. Neck: Trachea midline. No obvious mass. Resp:Crackles   CV: Regular rate. Regular rhythm. No murmur or rub. GI: Non-tender. Non-distended. No hernia. BS present. Skin: Warm and dry. No nodule on exposed extremities. Lymph: No cervical LAD. No supraclavicular LAD. M/S: No cyanosis. No joint deformity. Neuro: Awake. Alert. Moves all four extremities.    EXT:   no edema, no clubbing    LABS:  CBC:   Recent Labs 05/28/19 1807 05/29/19 0450   WBC 8.2 9.0   HGB 7.9* 9.4*   HCT 24.0* 28.4*   MCV 88.4 89.8    146     BMP:   Recent Labs     05/28/19 1807 05/29/19 0450    141   K 3.8 3.7    107   CO2 26 23   BUN 40* 33*   CREATININE 1.3* 1.2     LIVER PROFILE:   Recent Labs     05/28/19 1807   AST 27   ALT 19   BILITOT 0.4   ALKPHOS 95     PT/INR:   Recent Labs     05/28/19 1807 05/29/19 0450   PROTIME 49.4* 41.9*   INR 4.33* 3.68*     APTT: No results for input(s): APTT in the last 72 hours. UA:No results for input(s): NITRITE, COLORU, PHUR, LABCAST, WBCUA, RBCUA, MUCUS, TRICHOMONAS, YEAST, BACTERIA, CLARITYU, SPECGRAV, LEUKOCYTESUR, UROBILINOGEN, BILIRUBINUR, BLOODU, GLUCOSEU, AMORPHOUS in the last 72 hours. Invalid input(s): KETONESU  No results for input(s): PHART, GJM6SKT, PO2ART in the last 72 hours. Cultures:  None    PFTs:     /  ECHO: 8/2017  Normal left ventricle size, wall thickness, and systolic function with an   estimated ejection fraction of 60%. No regional wall motion abnormalities   are seen. Normal right ventricular size with mildly reduced systolic function.   Severely dilated left atrium. Severe eccentric mitral regurgitation.   Severe tricuspid regurgitation. Estimated pulmonary artery systolic pressure is severely elevated at 81 mmHg assuming a right atrial pressure of 3 mmHg. ABG:  None    Chest X-ray:  None    Chest CT: 3/2019  Chest imaging was reviewed by me and showed heavy calcific burden in the right hilar region affecting vasculature. Calcified airways. Mosaic pattern. Nodule    I reviewed all the above labs and studies pertaining to this visit.       ASSESSMENT:  · Acute GI Bleeding  · Acute Blood loss anemia  · Coumadin Coagulopathy   · SOB  · Pulmonary Hypertension:  Due to lung disease, heart disease and obstruction of PA in the right lung (noted on previous V/Q scan 2017)  · Bronchiolitis  · Fibrosing Mediastinitis     PLAN:  · DC IV fluids due to volume status and SOB. May need IV Lasix due to pulmonary hypertension.   She is +3.5 liters   · Duonebs q 4 hours  · Dulera q 12  · Transfuse for Hb <7  · FFP and Vitamin K  · Protonix gtt  · GI consult  · SCDs for DVT prophylaxis once INR is subtherapeutic       D/W Dr. Romeo Resendiz, [facility]

## 2019-05-29 NOTE — PROGRESS NOTES
Nutrition Assessment    Type and Reason for Visit: Initial, Positive Nutrition Screen(poor po)    Nutrition Recommendations:   Offer supplementation when appropriate  NFPE when appropriate    Nutrition Assessment: Pt at risk for nutrition compromise r/t increased needs, altered GI, altered labs r/t GI bleed, need for limited diet orders, CKD. Pt currently npo for endoscopy this date. Malnutrition Assessment:  · Malnutrition Status: Insufficient data    Nutrition Risk Level: Moderate    Nutrient Needs:  · Estimated Daily Total Kcal: 0122-7019 (25-30 x ABW of 49 kg)  · Estimated Daily Protein (g): 39-59 (.8-1.2 x ABW (adj for CKD)  · Estimated Daily Total Fluid (ml/day): 1 ml per kcal    Nutrition Diagnosis:   · Problem: Inadequate oral intake  · Etiology: related to Alteration in GI function     Signs and symptoms:  as evidenced by NPO status due to medical condition    Objective Information:  · Nutrition-Focused Physical Findings: BM 5/28.  Noted no edema  · Wound Type: None  · Current Nutrition Therapies:  · Oral Diet Orders: NPO   · Oral Diet intake: NPO  · Oral Nutrition Supplement (ONS) Orders: None  · ONS intake: NPO  · Anthropometric Measures:  · Ht: 5' (152.4 cm)   · Current Body Wt: 107 lb (48.5 kg)  · Admission Body Wt: 109 lb (49.4 kg)  · % Weight Change:  ,  Pt looks to have wt loss trend but not at significant rate  · Ideal Body Wt: 100 lb (45.4 kg), % Ideal Body    · BMI Classification: BMI 18.5 - 24.9 Normal Weight    Nutrition Interventions:   Continue NPO(monitor plan for nutrition)  Continued Inpatient Monitoring, Education Not Indicated    Nutrition Evaluation:   · Evaluation: Goals set   · Goals: tolerate most appropriate form of nutrition    · Monitoring: Nutrition Progression, Skin Integrity, Weight, Pertinent Labs, Diarrhea, Constipation      Electronically signed by Cherry Olvera RD, LD on 5/29/19 at 10:07 AM    Contact Number: [phone]

## 2019-05-29 NOTE — ED NOTES
SBAR report called to [facility]. Informed that room is being cleaned and not ready. Will monitor for change in room status.      Lenwood Bumpers, RN  05/28/19 2003

## 2019-05-29 NOTE — PROGRESS NOTES
Pt received from ER via stretcher, RN reviewed orders. Spoke with Dr. Gerson Alatorre CNP. Pt is to be admitted to ICU not Med surg. Pt is advised, obtained consent for blood transfusion. VSS. RN called report to Nissa Meng in ER. Will transfer via bed.

## 2019-05-29 NOTE — PROGRESS NOTES
4 Eyes Skin Assessment     The patient is being assess for  Admission    I agree that 2 RN's have performed a thorough Head to Toe Skin Assessment on the patient. ALL assessment sites listed below have been assessed. Areas assessed by both nurses: kate/jimi  [x]   Head, Face, and Ears   [x]   Shoulders, Back, and Chest  [x]   Arms, Elbows, and Hands   [x]   Coccyx, Sacrum, and IschIum  [x]   Legs, Feet, and Heels        Does the Patient have Skin Breakdown? No , right knee bruise      Jef Prevention initiated:  Yes   Wound Care Orders initiated:  NA      [facility] nurse consulted for Pressure Injury (Stage 3,4, Unstageable, DTI, NWPT, and Complex wounds), New and Established Ostomies:  NA      Nurse 1 eSignature: Electronically signed by Guanaco Gaffney RN on 5/28/19 at 10:33 PM    **SHARE this note so that the co-signing nurse is able to place an eSignature**    Nurse 2 eSignature: Electronically signed by Mary Carmen Carvajal.  Karin Peralta RN on 5/29/19 at 2:45 AM

## 2019-05-29 NOTE — CONSULTS
[address] [facility]  1935    May 29, 2019    Reason for Consult: Atrial fibrillation    CC: Dark Stools  HPI:  The patient is 80 y.o. female with a past medical history significant for COPD, severe mitral regurgitation, paroxysmal atrial fibrillation, chronic diastolic CHF, pulmonary hypertension and chronic venous thromboembolic disease. She also follows with Dr. Demi Mercer from Pulmonary. She was not considered a candidate for repair of her mitral valve due to her comorbidities including only having one functioning lung with concomitant COPD. She presented to the ED form Dr. Lauren Thomasw office with dark stools. On presentation to the ED she was Hemoccult positive with a supratherapeutic INR and anemic to a hemoglobin of 7.9. Velvet Jones is not conversant now as she is on Bipap ventilation. She does open her eyes and respond to me. She has nitropaste in place and her nurse just gave her another dose of IV lasix with good urine output. She apparently received 2 units of PRBC's and 3 liters of fluid overnight. She was doing okay this morning but abruptly developed respiratory failure. Review of Systems:  As above and otherwise unobtainable due to Bipap ventilation mask and lethargy.       Past Medical History:   Diagnosis Date    Anemia     Anticoagulant long-term use     Atrial fibrillation ([address] 6/2011    Ana Rudy [facility]congestive [facility] failure) [facility]     CKD (chronic kidney disease) stage 3, GFR 30-59 ml/min ([facility]) 2/11/2019    Clostridium difficile diarrhea 09/14/2016    Depression     Humerus fracture     Hyperlipidemia     Hypertension     Hypothyroidism     Mitral regurgitation     Optic neuritis     Osteopenia     Fosamax stopped 2/2014, had been treated at least since 2004    Polymyalgia rheumatica ([address]     Pulmonary embolus ([address] 6/2011    Right pulmonary obstruction suspected to be possible chronic pulmonary embolus    Thyroid disease     Vitamin D deficiency      Past Surgical History:   Procedure Laterality Date    COLONOSCOPY  2016    Devota Pleasure LUNG SURGERY      MALIGNANT SKIN LESION EXCISION      UPPER GASTROINTESTINAL ENDOSCOPY  2016    Ilya     Family History   Problem Relation Age of Onset    Cancer Mother         unknown primary    Other Father         Tuberculosis    Lung Cancer Sister     Tuberculosis Brother     Diabetes Brother      Social History     Tobacco Use    Smoking status: Former Smoker     Last attempt to quit: 1965     Years since quittin.4    Smokeless tobacco: Never Used   Substance Use Topics    Alcohol use: Yes     Comment: occ    Drug use: No       Allergies   Allergen Reactions    Ace Inhibitors      coughing    Aricept [Donepezil Hydrochloride]      Can not recall    Benicar [Olmesartan Medoxomil]      Can not recall      Exelon [Rivastigmine Tartrate]      Can not recall      Pcn [Penicillins] Hives and Swelling    Quinapril Hcl      Can not recall      Doxycycline Nausea And Vomiting     Current Facility-Administered Medications   Medication Dose Route Frequency Provider Last Rate Last Dose    0.9 % sodium chloride bolus  250 mL Intravenous Once Deny Walsh MD        ipratropium-albuterol (DUONEB) nebulizer solution 1 ampule  1 ampule Inhalation Q4H RENU Cutler DO        nitroglycerin (NITRO-BID) 2 % ointment 0.5 inch  0.5 inch Topical Once Deny Walsh MD        furosemide (LASIX) injection 40 mg  40 mg Intravenous Once Dney Walsh MD        0.9 % sodium chloride bolus  250 mL Intravenous Once Mimi Urbina MD 20 mL/hr at 19 2308 250 mL at 19 2308    albuterol sulfate  (90 Base) MCG/ACT inhaler 2 puff  2 puff Inhalation Q4H PRN Mimi Urbina MD   2 puff at 19 0926    amiodarone (CORDARONE) tablet 200 mg  200 mg Oral Daily Mimi Urbina MD        mometasone-formoterol [facility]) 200-5 MCG/ACT inhaler 2 puff  2 puff Inhalation BID Meeta Mazariegos MD   2 puff at 05/29/19 0926    latanoprost (XALATAN) 0.005 % ophthalmic solution 1 drop  1 drop Both Eyes Nightly Meeta Mazariegos MD   1 drop at 05/29/19 0015    levothyroxine (SYNTHROID) tablet 75 mcg  75 mcg Oral Daily Meeta Mazariegos MD   75 mcg at 05/29/19 0548    memantine (NAMENDA) tablet 10 mg  10 mg Oral BID Meeta Mazariegos MD   10 mg at 05/28/19 2339    sodium chloride flush 0.9 % injection 10 mL  10 mL Intravenous 2 times per day Meeta Mazariegos MD   10 mL at 05/28/19 2309    sodium chloride flush 0.9 % injection 10 mL  10 mL Intravenous PRN Meeta Mazariegos MD        acetaminophen (TYLENOL) tablet 650 mg  650 mg Oral Q4H PRN Meeta Mazariegos MD        ondansetron [facility] injection 4 mg  4 mg Intravenous Q6H PRN Meeta Mazariegos MD        pantoprazole (PROTONIX) 80 mg in sodium chloride 0.9 % 100 mL infusion  8 mg/hr Intravenous Continuous Meeta Mazariegos MD 10 mL/hr at 05/29/19 0641 8 mg/hr at 05/29/19 0641    melatonin tablet 3 mg  3 mg Oral Nightly PRN Meeta Mazariegos MD   3 mg at 05/28/19 2339       Physical Exam:   BP (!) 170/59   Pulse 76   Temp 98.1 °F (36.7 °C)   Resp 30   Ht 5' (1.524 m)   Wt 106 lb 11.2 oz (48.4 kg)   SpO2 94%   BMI 20.84 kg/m²     Intake/Output Summary (Last 24 hours) at 5/29/2019 1137  Last data filed at 5/29/2019 1054  Gross per 24 hour   Intake 4665.01 ml   Output 270 ml   Net 4395.01 ml     Wt Readings from Last 2 Encounters:   05/29/19 106 lb 11.2 oz (48.4 kg)   05/28/19 107 lb 6.4 oz (48.7 kg)     Constitutional: She is oriented to person, place, and time. She appears well-developed and well-nourished. In no acute distress. Head: Normocephalic and atraumatic. Neck: Neck supple. No JVD present. Carotid bruit is not present. No mass and no thyromegaly present. No lymphadenopathy present. Cardiovascular: Normal rate, regular rhythm, normal heart sounds and intact distal pulses. Exam reveals no gallop and no friction rub.   No murmur heard.  Pulmonary/Chest: Effort . She has bilateral rales to mid lung. Abdominal: Soft, non-tender. Bowel sounds and aorta are normal. She exhibits no organomegaly, mass or bruit. Extremities: No edema, cyanosis, or clubbing. Pulses are 2+ radial/carotid/dorsalis pedis and posterior tibial bilaterally. Neurological: She is alert and oriented to person, place, and time. She has normal reflexes. No cranial nerve deficit. Coordination normal.   Skin: Skin is warm and dry. There is no rash or diaphoresis. Psychiatric: She has a normal mood and affect. Her speech is normal and behavior is normal.     EKG Interpretation: Sinus tachycardia with lateral ST changes suggestive of possible ischemia    Lab Review:   Lab Results   Component Value Date    TRIG 81 08/02/2018    HDL 68 08/02/2018    HDL 78 04/27/2012    LDLCALC 67 08/02/2018    LDLDIRECT 81 02/11/2019    LABVLDL 16 08/02/2018     Lab Results   Component Value Date     05/29/2019    K 3.7 05/29/2019    BUN 33 05/29/2019    CREATININE 1.2 05/29/2019     Recent Labs     05/28/19  1807 05/29/19  0450   WBC 8.2 9.0   HGB 7.9* 9.4*   HCT 24.0* 28.4*    146     Right Heart Catheterization 9/20/17:  1. Mild nonobstructive coronary artery disease with a 40% ostial LAD  lesion and 25% mid-RCA disease. This is a right-dominant coronary  arterial system. 2.  Normal left ventricular systolic function with LV ejection  fraction of 65%. 3.  Mildly elevated left ventricular end-diastolic pressure of 15  mmHg. 4.  Moderate to severe mitral regurgitation with moderate dilatation  of the left atrium on the left ventriculogram.  5.  No gradient across the aortic valve on pullback to suggest aortic  stenosis.   6.  Evidence of mild volume overload with a pulmonary capillary wedge  pressure of 22 and a left ventricular end-diastolic pressure on repeat  of 15 to 20.  7.  Evidence of pulmonary hypertension, possibly pulmonary arterial  hypertension, arteriovenous malformation and prior pneumonectomy. Instantaneous pulmonary artery pressure was 68/19 for a mean pulmonary  arterial pressure of 37 mmHg. 8.  Oxygen step-up in the right pulmonary artery to 90%, pulmonary  artery main was 62% with a right atrial pressure of 52%. Inferior  vena cava was 57%. 9.  Pulmonary angiogram showing minimal pulmonary arterial flow to the  right lung with _____ flow in the right upper lobe but no flow into  the right lower middle lobe. In fact, there appears to be a possible  arteriovenous malformation with backflow of blood towards the pigtail  catheter into the right pulmonary artery and an oxygen saturation of  90% in the right lower lobe. Echo 8/31/17:  Normal left ventricle size, wall thickness, and systolic function with an   estimated ejection fraction of 60%. No regional wall motion abnormalities   are seen.   Normal right ventricular size with mildly reduced systolic function.   Severely dilated left atrium.   Severe eccentric mitral regurgitation.   Severe tricuspid regurgitation.   Estimated pulmonary artery systolic pressure is severely elevated at 81 mmHg   assuming a right atrial pressure of 3 mmHg. Assessment:  1. Anemia secondary to chronic GI blood loss  2. Paroxysmal Atrial fibrillation  3. Severe mitral regurgitation  4. Pulmonary Venous Hypertension  5. COPD    Plan:  Kay Walls is in respiratory failure from volume overload with her severe mitral regurgitation. I agree with nitrates but would take off the paste and start a nitro drip. This can be started at 30mcg/min and titrated up to keep a SBP<130mmHg. She will diurese better with this. Continue IV lasix as ordered. I also agree that she would not do well with intubated and mechanical invasive ventilation.

## 2019-05-29 NOTE — PROGRESS NOTES
[address] [facility] Internal Medicine  History and Physical      CHIEF COMPLAINT: I am so weak    History of Present Illness: This is an 57-year-old white female who presented to the outpatient office yesterday with complaints of weakness and dark stools that have been ongoing for approximately one day. Patient reports weakness for the last week, increasing shortness of breath, and dark stools that started the day she presented to the outpatient office. She was very lightheaded in the outpatient office and given her symptoms was directed to the emergency room. In the ER she was found to have an elevated INR and her hemoglobin was down to 7.9. She was transfused 2 units of packed red blood cells and her hemoglobin increased to an 0.4 overnight. Her INR was 4.33 on admission and with 10 mg of vitamin K has improved to report 3.68. The patient had a maroon stool this morning. She denies pain. No chest pain. She's had increasing shortness of breath. She denies nausea or vomiting or diarrhea. She states her appetite overall is very poor. The review of systems is otherwise negative. The patient was admitted to the ICU last night, and was seen initially by the hospitalists. Dr. Mauricio Muro is now assuming care for this patient. The patient has been discussed with the ICU nurse Froy Rutledge as well as Dr. Ibrahima Richards. Her past medical history is been reviewed in detail, and the patient has been known to me for many years.         Past Medical History:   Diagnosis Date    Anemia     Anticoagulant long-term use     Atrial fibrillation ([facility] [address] 6/2011    Marissa Quintero CHF (congestive heart failure) ([facility])     CKD (chronic kidney disease) stage 3, GFR 30-59 ml/min ([facility]) 2/11/2019    Clostridium difficile diarrhea 09/14/2016    Depression     Humerus fracture     Hyperlipidemia     Hypertension     Hypothyroidism     Mitral regurgitation     Optic neuritis     Osteopenia     Fosamax stopped 2/2014, had been treated at least since 2004    Polymyalgia rheumatica ([facility] [address]     Pulmonary embolus (HCC) 6/2011    Right pulmonary obstruction suspected to be possible chronic pulmonary embolus    Thyroid disease     Vitamin D deficiency          Past Surgical History:   Procedure Laterality Date    [facility]14/2016    [facility]    LUNG SURGERY      MALIGNANT SKIN LESION EXCISION      UPPER GASTROINTESTINAL ENDOSCOPY  09/14/2016    [facility]       Medications Prior to Admission:    Medications Prior to Admission: Fluticasone Furoate-Vilanterol (BREO ELLIPTA) 200-25 MCG/INH AEPB, Inhale 1 puff into the lungs daily  Fluticasone Furoate-Vilanterol (BREO ELLIPTA) 200-25 MCG/INH AEPB, Inhale 1 puff into the lungs daily  levothyroxine (SYNTHROID) 75 MCG tablet, Take 1 tablet by mouth Daily  budesonide-formoterol (SYMBICORT) 160-4.5 MCG/ACT AERO, Inhale 2 puffs into the lungs 2 times daily  Multiple Vitamins-Minerals (PRESERVISION AREDS 2+MULTI VIT PO), Take by mouth  simvastatin (ZOCOR) 20 MG tablet, TAKE 1 TABLET EVERY DAY  amiodarone (CORDARONE) 200 MG tablet, Take 1 tablet by mouth daily  memantine (NAMENDA) 10 MG tablet, TAKE 1 TABLET TWICE DAILY  ipratropium (ATROVENT) 0.06 % nasal spray, 2 sprays by Nasal route 4 times daily  albuterol sulfate HFA (PROAIR HFA) 108 (90 Base) MCG/ACT inhaler, Inhale 2 puffs into the lungs every 4 hours as needed for Wheezing or Shortness of Breath  furosemide (LASIX) 40 MG tablet, Take 1 tablet by mouth daily  metoprolol succinate (TOPROL XL) 50 MG extended release tablet, TAKE 1 TABLET BY MOUTH 2 TIMES DAILY  warfarin (COUMADIN) 5 MG tablet, TAKE 2 TO 3 TABLETS EVERY DAY AS DIRECTED  latanoprost (XALATAN) 0.005 % ophthalmic solution, Place 1 drop into both eyes nightly. vitamin D (CHOLECALCIFEROL) 400 UNITS TABS tablet, Take 400 Units by mouth daily. calcium carbonate (OSCAL) 500 MG TABS tablet, Take 500 mg by mouth daily. Allergies:    Ace inhibitors; Aricept [donepezil hydrochloride];  Benicar [olmesartan medoxomil]; Exelon [rivastigmine tartrate]; Pcn [penicillins]; Quinapril hcl; and Doxycycline    Social History:    reports that she quit smoking about 54 years ago. She has never used smokeless tobacco. She reports that she drinks alcohol. She reports that she does not use drugs. Family History:   family history includes Cancer in her mother; Diabetes in her brother; Cinda Krishnamurthy in her sister; Other in her father; Tuberculosis in her brother. REVIEW OF SYSTEMS:  As above in the HPI, otherwise negative    PHYSICAL EXAM:    Vitals:  BP (!) 147/86   Pulse 65   Temp 97.3 °F (36.3 °C) (Oral)   Resp 18   Ht 5' (1.524 m)   Wt 106 lb 11.2 oz (48.4 kg)   SpO2 96%   BMI 20.84 kg/m²   Temp  Av °F (36.7 °C)  Min: 97.3 °F (36.3 °C)  Max: 98.4 °F (36.9 °C)    General:  Awake, alert, oriented X 3. Well developed, well nourished. No apparent distress. Room smells of melena. HEENT:  Normocephalic, atraumatic. Pupils equal, round, reactive to light. No scleral icterus. No conjunctival injection. Normal lips, teeth, and gums. No nasal discharge. Neck:  Supple. No carotid bruit, carotid upstroke normal.  Heart:  RRR, heart sounds distant. PMI non-displaced  Lungs:  CTA bilaterally, bilat symmetrical expansion, no wheeze, rales, or rhonchi. Respirations easy at rest.  Abdomen: Bowel sounds present, normoactive. Soft, nontender/nondistended. No masses, no peritoneal signs. Extremities:  No clubbing, cyanosis, or edema  Skin:  Warm and dry, no open lesions or rash. Neuro:  Cranial nerves 2-12 intact, no focal deficits. Moves all extremities without difficulty, just generally weak without localizing deficits.     LABS:    Recent Results (from the past 24 hour(s))   EKG 12 Lead    Collection Time: 19  6:01 PM   Result Value Ref Range    Ventricular Rate 103 BPM    Atrial Rate 103 BPM    P-R Interval 168 ms    QRS Duration 86 ms    Q-T Interval 364 ms    QTc Calculation (Bazett) 476 ms P Axis 95 degrees    R Axis 1 degrees    T Axis 217 degrees    Diagnosis       Sinus tachycardiaMarked ST abnormality, possible inferior subendocardial injuryAbnormal ECGWhen compared with ECG of 10-JUL-2014 06:56,Sinus rhythm has replaced Atrial fibrillationST now depressed in Inferior leadsST more depressed Anterior leadsT wave inversion now evident in Lateral leads   CBC Auto Differential    Collection Time: 05/28/19  6:07 PM   Result Value Ref Range    WBC 8.2 4.0 - 11.0 K/uL    RBC 2.71 (L) 4.00 - 5.20 M/uL    Hemoglobin 7.9 (L) 12.0 - 16.0 g/dL    Hematocrit 24.0 (L) 36.0 - 48.0 %    MCV 88.4 80.0 - 100.0 fL    MCH 29.0 26.0 - 34.0 pg    MCHC 32.8 31.0 - 36.0 g/dL    RDW 15.4 12.4 - 15.4 %    Platelets 111 915 - 328 K/uL    MPV 8.7 5.0 - 10.5 fL    Neutrophils % 73.5 %    Lymphocytes % 14.5 %    Monocytes % 7.1 %    Eosinophils % 4.1 %    Basophils % 0.8 %    Neutrophils # 6.1 1.7 - 7.7 K/uL    Lymphocytes # 1.2 1.0 - 5.1 K/uL    Monocytes # 0.6 0.0 - 1.3 K/uL    Eosinophils # 0.3 0.0 - 0.6 K/uL    Basophils # 0.1 0.0 - 0.2 K/uL   Comprehensive Metabolic Panel    Collection Time: 05/28/19  6:07 PM   Result Value Ref Range    Sodium 143 136 - 145 mmol/L    Potassium 3.8 3.5 - 5.1 mmol/L    Chloride 103 99 - 110 mmol/L    CO2 26 21 - 32 mmol/L    Anion Gap 14 3 - 16    Glucose 108 (H) 70 - 99 mg/dL    BUN 40 (H) 7 - 20 mg/dL    CREATININE 1.3 (H) 0.6 - 1.2 mg/dL    GFR Non-African American 39 (A) >60    GFR  47 (A) >60    Calcium 9.3 8.3 - 10.6 mg/dL    Total Protein 7.3 6.4 - 8.2 g/dL    Alb 3.6 3.4 - 5.0 g/dL    Albumin/Globulin Ratio 1.0 (L) 1.1 - 2.2    Total Bilirubin 0.4 0.0 - 1.0 mg/dL    Alkaline Phosphatase 95 40 - 129 U/L    ALT 19 10 - 40 U/L    AST 27 15 - 37 U/L    Globulin 3.7 g/dL   Protime-INR    Collection Time: 05/28/19  6:07 PM   Result Value Ref Range    Protime 49.4 (H) 9.8 - 13.0 sec    INR 4.33 (H) 0.86 - 1.14   Troponin    Collection Time: 05/28/19  6:07 PM   Result Value Ref Range    Troponin <0.01 <0.01 ng/mL   TYPE AND SCREEN    Collection Time: 05/28/19  6:07 PM   Result Value Ref Range    ABO/Rh O POS     Antibody Screen NEG    PREPARE RBC (CROSSMATCH), 2 Units    Collection Time: 05/28/19  6:07 PM   Result Value Ref Range    Product Code Blood Bank [id number]     Description Blood Bank Red Blood Cells, Leuko-reduced     Unit Number [id number]     Dispense Status Blood Bank transfused     Product Code Blood Bank [id number]     Description Blood Bank Red Blood Cells, Leuko-reduced     Unit Number [id number]     Dispense Status Blood Bank transfused    Blood Occult Stool #1    Collection Time: 05/28/19  6:12 PM   Result Value Ref Range    Occult Blood Diagnostic Result: POSITIVE  Normal range: Negative   (A)    Basic Metabolic Panel w/ Reflex to MG    Collection Time: 05/29/19  4:50 AM   Result Value Ref Range    Sodium 141 136 - 145 mmol/L    Potassium reflex Magnesium 3.7 3.5 - 5.1 mmol/L    Chloride 107 99 - 110 mmol/L    CO2 23 21 - 32 mmol/L    Anion Gap 11 3 - 16    Glucose 88 70 - 99 mg/dL    BUN 33 (H) 7 - 20 mg/dL    CREATININE 1.2 0.6 - 1.2 mg/dL    GFR Non- 43 (A) >60    GFR  52 (A) >60    Calcium 8.1 (L) 8.3 - 10.6 mg/dL   CBC    Collection Time: 05/29/19  4:50 AM   Result Value Ref Range    WBC 9.0 4.0 - 11.0 K/uL    RBC 3.16 (L) 4.00 - 5.20 M/uL    Hemoglobin 9.4 (L) 12.0 - 16.0 g/dL    Hematocrit 28.4 (L) 36.0 - 48.0 %    MCV 89.8 80.0 - 100.0 fL    MCH 29.8 26.0 - 34.0 pg    MCHC 33.2 31.0 - 36.0 g/dL    RDW 15.1 12.4 - 15.4 %    Platelets 056 166 - 707 K/uL    MPV 8.3 5.0 - 10.5 fL   Protime-INR    Collection Time: 05/29/19  4:50 AM   Result Value Ref Range    Protime 41.9 (H) 9.8 - 13.0 sec    INR 3.68 (H) 0.86 - 1.14   Magnesium    Collection Time: 05/29/19  4:50 AM   Result Value Ref Range    Magnesium 2.20 1.80 - 2.40 mg/dL   Phosphorus    Collection Time: 05/29/19  4:50 AM   Result Value Ref Range    Phosphorus 2.8 2.5 - 4.9 mg/dL       ASSESSMENT/PLAN:      Principal Problem:    UGI bleed-  maroon stools this morning and elevated INR persists. Transfused 2 units of FFP and GI has been consulted for possible esophagogastroduodenoscopy this morning. Check H/H every 6 hours and monitor closely. For the time being keep her in the intensive care unit. Active Problems:    Chronic diastolic congestive heart failure (HCC)-no evidence of heart failure at this point. Continue to monitor with transfusions and volume resuscitation. Supratherapeutic INR-monitor INR daily for now, and repeat INR later this morning. Abnormal EKG-repeat troponin this morning and have cardiology look at the patient. Repeat EKG. There are ischemic changes which are new compared to last EKG. Hopefully resolve with blood transfusion. Non-rheumatic mitral regurgitation-continue to monitor. Pulmonary hypertension-continue supplemental oxygen. Essential hypertension, benign-follow, no changes right now. Hypothyroidism due to medication- TSH therapeutic 3 months ago. Continue current supplementation. Chronic atrial fibrillation (HCC)-currently sinus rhythm. Follow. CKD (chronic kidney disease) stage 3, GFR 30-59 ml/min ([facility])-baseline creatinine 1.13 months ago.  Continue to monitor    Please note that over 30 minutes was spent in evaluating the patient, review of records and results, discussion with staff/family, etc.    Abhay Mcintosh MD  9:17 AM  5/29/2019

## 2019-05-29 NOTE — PROGRESS NOTES
26- Dr. Sophie Roman at bedside and given updates. Rec'd new order for FFP. Pt had large, maroon BM on bedpan. On 1 L O2 and gets tired with activity. 0106- Critical care rounds with Dr. Duncan Burkitt. New order rec'd for vit. K. Pt c/o SOB. Pt saturation 90% on 1 L- O2 increased to 3L at this time    0845- Dr. Cecelia Mata at bedside. 1000- Pt RR 37-40 and appears distressed. Lungs are wheezy and crackly. O2 increased to 5L. Dr. Duncan Burkitt notified at this time. Rec'd new order for 40 lasix and benitez catheter. 1020- HOB <30 degrees for benitez catheter placement. Pt held O2 sats >90% until post catheter placement. Pt's O2 sat dropped to 70%, HOB was elevated, and non rebreather was placed at 15L. Pt's O2 sats slowly increased to 90%. RT called to bedside. Dr. Duncan Burkitt aware. No new orders. 80- Dr. Sophie Roman paged and updated. Rec'd new orders at this time. 1140- Critical results from ABG rec'd pH 7.182, PCo2 71, PO2 83. Results reported to EMERSON Macdonald. Rec'd new order for Bipap. Baljeet, RT notified. 1145- Updates given to Dr. Sophie Roman. No new orders at this time. 80- Dr. Kailyn Donahue at bedside. Rec'd new orders to remove nitro paste and start nitro gtt. Will titrate for SBP <130.     1430- Dr. Cecelia Mata at bedside. Bipap briefly removed and pt was able to converse. Will attempt EGD tomorrow. 1500- ABG results reviewed. FiO2 decreased. Pt tolerating Bipap well. 1900- Report given to Paxton Banegas RN.      Electronically signed by Blessing Garcia RN on 5/29/2019 at 6:58 PM

## 2019-05-29 NOTE — PROGRESS NOTES
1900: Handoff received from [place], [facility]. Medication check and 4 eyes skin assessment complete. Patient mouth swabbed to help moisten mucous membranes. Patient remains on Bipap at this time. Pt benitez care complete. 2210: Patient Bipap removed for mouth swab. Small indentation on bridge of nose. Mepilex border placed on nose for prevention. Pt requesting RN call son, Annette Unger. Annette Unger called, updated. Stating he has [place] flight to [place], should get in around 3 pm.\"   6908: Patient SBP >130, nitro gtt restarted. Aimee White, pharmacist aware of vital signs,   3235: Patient SBP >130, nitro titrated. See emar for details. 4067: Patient had large, black bowel movement. Patient cleaned and repositioned. Patient has red excoriation on labia folds. Zinc cream placed. 0700: Handoff with Jax De Los Santos RN. Bedside handoff and 4 eyes skin assessment complete. Medication scan complete.

## 2019-05-29 NOTE — PROGRESS NOTES
2157: Report called to this RN from patient's nurse on floor 4N, [place] [address]. 2220: Patient in room 2123, transferred from floor 4N for GI bleed. Patient to receive 2 PRBC. 4 eyes handoff complete with KENN Wylie. Admission assessment complete. [id number] Morales NP notified of patient's arrival to unit over secure messaging service. No new orders. Dr. Israel Hennessy notified of [address] warning in Handshake, verbally. Stated that it was a normal dose for the patient and that it was okay to give. 0117: 1 Unit PRBC complete. Call to [facility] in Blood Bank for Second unit of PRBC  0340: Second unit of blood complete. Patient tolerated to units well. Call to lab for CBC at 0400. Patient using bedpan at this time. Patient urinated in bedpan and on bed pad. 1 urine occurrence documented. 0974: Lab at patient's bedside for morning labs. 9902: Patient placed on bedpan per request. Patient voided a small amount of urine. 0600: Patient resting in bed quietly. 0700: Report given to KENN Woo. Bedside handoff and 4 eyes skin assessment complete. Medication check complete. VSS. Patient kept NPO throughout the night.      Electronically signed by Ashvin Costa RN on 5/29/2019 at 7:30 PM

## 2019-05-29 NOTE — ED NOTES
Rm marked ready Shannan Alvares RN notified that there are 2 units of PRBC ready and will need to notify them when pt arrives in room     Maty PimentelCrichton Rehabilitation Center  05/28/19 2048

## 2019-05-29 NOTE — CONSULTS
GASTROENTEROLOGY INPATIENT CONSULTATION:      IDENTIFYING DATA/REASON FOR CONSULTATION   PATIENT:  Elsie Bellamy  MRN:  [id number]  ADMIT DATE: 5/28/2019  TIME OF EVALUATION: 5/29/2019 2:59 PM  HOSPITAL STAY:   LOS: 1 day     REASON FOR CONSULTATION:  Concern for upper gastrointestinal bleeding. HISTORY OF PRESENT ILLNESS   Elsie Bellamy is a 80 y.o. female who has a past history notable for CHF, CKD, A. Fib on coumadin, HTN, HLD, hypothyroidism, and PE who presented [facility] [place] [facility] [place] with symptomatic anemia. We have been consulted regarding concern for upper gastrointestinal bleeding. The patient contacted her PCP Briana Magallanes MD 5/28/19 after an episode of painless rectal bleeding. She presented to the ED, where she was noted to have melenic stool on rectal exam, a Hgb 7.9 (baseline ~12), and an INR of 4.33. In the ED she was supported with IVF bolus, 2U PRBC, and subQ vitamin K. She was admitted to the MICU, and was noted to have a maroon/melenic stool following admission. This morning, she noted feeling fatigued, and mildly short breath. She subsequently received 2U FFP, and developed respiratory distress, resulting in hypercapneic respiratory failure, requiring PPV. She denies any abdominal pain, NSAID use, or hematemesis/coffee ground emesis prior to onset of symptoms, or thereafter.      EGD 9/14/16: Esophageal diverticulum  Colonoscopy 9/14/16: Diffuse colitis    PAST MEDICAL, SURGICAL, FAMILY, and SOCIAL HISTORY     Past Medical History:   Diagnosis Date    Anemia     Anticoagulant long-term use     Atrial fibrillation ([facility] [address] 6/2011    Madhav Edward [facility]congestive heart failure) [facility])     CKD (chronic kidney disease) stage 3, GFR 30-59 ml/min ([facility]) 2/11/2019    Clostridium difficile diarrhea 09/14/2016    Depression     Humerus fracture     Hyperlipidemia     Hypertension     Hypothyroidism     Mitral regurgitation     Optic neuritis     Osteopenia Fosamax stopped 2014, had been treated at least since     Polymyalgia rheumatica ([facility] [address]     Pulmonary embolus ([address] 2011    Right pulmonary obstruction suspected to be possible chronic pulmonary embolus    Thyroid disease     Vitamin D deficiency      Past Surgical History:   Procedure Laterality Date    COLONOSCOPY  2016    Stephanie Mendezer LUNG SURGERY      MALIGNANT SKIN LESION EXCISION      UPPER GASTROINTESTINAL ENDOSCOPY  2016    Ilya     Family History   Problem Relation Age of Onset    Cancer Mother         unknown primary    Other Father         Tuberculosis    Lung Cancer Sister     Tuberculosis Brother     Diabetes Brother      Social History     Socioeconomic History    Marital status:       Spouse name: None    Number of children: 2    Years of education: None    Highest education level: None   Occupational History    None   Social Needs    Financial resource strain: None    Food insecurity:     Worry: None     Inability: None    Transportation needs:     Medical: None     Non-medical: None   Tobacco Use    Smoking status: Former Smoker     Last attempt to quit: 1965     Years since quittin.4    Smokeless tobacco: Never Used   Substance and Sexual Activity    Alcohol use: Yes     Comment: occ    Drug use: No    Sexual activity: None   Lifestyle    Physical activity:     Days per week: None     Minutes per session: None    Stress: None   Relationships    Social connections:     Talks on phone: None     Gets together: None     Attends Yarsanism service: None     Active member of club or organization: None     Attends meetings of clubs or organizations: None     Relationship status: None    Intimate partner violence:     Fear of current or ex partner: None     Emotionally abused: None     Physically abused: None     Forced sexual activity: None   Other Topics Concern    None   Social History Narrative    None       MEDICATIONS   SCHEDULED: sodium chloride 250 mL Once   ipratropium-albuterol 1 ampule Q4H WA   amiodarone 200 mg Daily   mometasone-formoterol 2 puff BID   latanoprost 1 drop Nightly   levothyroxine 75 mcg Daily   memantine 10 mg BID   sodium chloride flush 10 mL 2 times per day     FLUIDS/DRIPS:     nitroGLYCERIN Stopped (05/29/19 1247)    pantoprozole (PROTONIX) infusion 8 mg/hr (05/29/19 0641)     PRNs:   albuterol sulfate HFA 2 puff Q4H PRN   sodium chloride flush 10 mL PRN   acetaminophen 650 mg Q4H PRN   ondansetron 4 mg Q6H PRN   melatonin 3 mg Nightly PRN     ALLERGIES:    Allergies   Allergen Reactions    Ace Inhibitors      coughing    Aricept [Donepezil Hydrochloride]      Can not recall    Benicar [Olmesartan Medoxomil]      Can not recall      Exelon [Rivastigmine Tartrate]      Can not recall      Pcn [Penicillins] Hives and Swelling    Quinapril Hcl      Can not recall      Doxycycline Nausea And Vomiting         REVIEW OF SYSTEMS   A full 12 pt ROS is negative other than noted in HPI    PHYSICAL EXAM     Vitals:    05/29/19 1200 05/29/19 1230 05/29/19 1300 05/29/19 1400   BP: (!) 158/49 136/82 (!) 122/46 (!) 102/30   Pulse: 72 75 67 61   Resp: 26 24 19 16   Temp:  97.6 °F (36.4 °C)     TempSrc:  Axillary     SpO2: 97% 94% 96% 97%   Weight:       Height:          Physical Exam 8:15 AM:  Gen: Resting in bed, NAD   CV: RRR, + systolic murmur, no RG   Pul: CTAB on anterior auscultation with normal WOB.   Abd: Good bowel sounds throughout, no scars, soft, NT/ND, no masses, no HSM   Ext: No edema, 2+ LE pulses   Neuro: No gross deficits, moves all 4 extremities  Skin: No jaundice, spider angiomas, vu erythema    LABS AND IMAGING     Recent Results (from the past 24 hour(s))   EKG 12 Lead    Collection Time: 05/28/19  6:01 PM   Result Value Ref Range    Ventricular Rate 103 BPM    Atrial Rate 103 BPM    P-R Interval 168 ms    QRS Duration 86 ms    Q-T Interval 364 ms    QTc Calculation (Bazett) 476 ms    P Axis 95 Troponin <0.01 <0.01 ng/mL   TYPE AND SCREEN    Collection Time: 05/28/19  6:07 PM   Result Value Ref Range    ABO/Rh O POS     Antibody Screen NEG    PREPARE RBC (CROSSMATCH), 2 Units    Collection Time: 05/28/19  6:07 PM   Result Value Ref Range    Product Code Blood Bank T5097T58     Description Blood Bank Red Blood Cells, Leuko-reduced     Unit Number [id number]     Dispense Status Blood Bank transfused     Product Code Blood Bank N0770C48     Description Blood Bank Red Blood Cells, Leuko-reduced     Unit Number [id number]     Dispense Status Blood Bank transfused    PREPARE FRESH FROZEN PLASMA, 2 Units    Collection Time: 05/28/19  6:07 PM   Result Value Ref Range    Product Code Blood Bank B2083N04     Description Blood Bank Fresh Frozen Plasma, Thawed     Unit Number [id number]     Dispense Status Blood Bank transfused     Product Code Blood Bank [id number]     Description Blood Bank Fresh Frozen Plasma, Thawed     Unit Number [id number]     Dispense Status Blood Bank transfused    Blood Occult Stool #1    Collection Time: 05/28/19  6:12 PM   Result Value Ref Range    Occult Blood Diagnostic Result: POSITIVE  Normal range: Negative   (A)    Basic Metabolic Panel w/ Reflex to MG    Collection Time: 05/29/19  4:50 AM   Result Value Ref Range    Sodium 141 136 - 145 mmol/L    Potassium reflex Magnesium 3.7 3.5 - 5.1 mmol/L    Chloride 107 99 - 110 mmol/L    CO2 23 21 - 32 mmol/L    Anion Gap 11 3 - 16    Glucose 88 70 - 99 mg/dL    BUN 33 (H) 7 - 20 mg/dL    CREATININE 1.2 0.6 - 1.2 mg/dL    GFR Non- 43 (A) >60    GFR  52 (A) >60    Calcium 8.1 (L) 8.3 - 10.6 mg/dL   CBC    Collection Time: 05/29/19  4:50 AM   Result Value Ref Range    WBC 9.0 4.0 - 11.0 K/uL    RBC 3.16 (L) 4.00 - 5.20 M/uL    Hemoglobin 9.4 (L) 12.0 - 16.0 g/dL    Hematocrit 28.4 (L) 36.0 - 48.0 %    MCV 89.8 80.0 - 100.0 fL    MCH 29.8 26.0 - 34.0 pg    MCHC 33.2 31.0 - 36.0 g/dL    RDW 15.1 12.4 - 15.4 %    Platelets 255 767 - 873 K/uL    MPV 8.3 5.0 - 10.5 fL   Protime-INR    Collection Time: 05/29/19  4:50 AM   Result Value Ref Range    Protime 41.9 (H) 9.8 - 13.0 sec    INR 3.68 (H) 0.86 - 1.14   Magnesium    Collection Time: 05/29/19  4:50 AM   Result Value Ref Range    Magnesium 2.20 1.80 - 2.40 mg/dL   Phosphorus    Collection Time: 05/29/19  4:50 AM   Result Value Ref Range    Phosphorus 2.8 2.5 - 4.9 mg/dL   Troponin    Collection Time: 05/29/19  9:46 AM   Result Value Ref Range    Troponin <0.01 <0.01 ng/mL   EKG 12 Lead    Collection Time: 05/29/19 11:06 AM   Result Value Ref Range    Ventricular Rate 76 BPM    Atrial Rate 76 BPM    P-R Interval 218 ms    QRS Duration 96 ms    Q-T Interval 402 ms    QTc Calculation (Bazett) 452 ms    P Axis 48 degrees    R Axis 55 degrees    T Axis 52 degrees    Diagnosis       Sinus rhythm with 1st degree A-V block with occasional Premature ventricular complexesNonspecific ST and T wave abnormalityAbnormal ECGWhen compared with ECG of 28-MAY-2019 18:01, (unconfirmed)Premature ventricular complexes are now PresentPR interval has increasedST no longer depressed in Inferior leadsST less depressed in Anterior leads   Blood Gas, Arterial    Collection Time: 05/29/19 11:17 AM   Result Value Ref Range    pH, Arterial 7.182 (LL) 7.350 - 7.450    pCO2, Arterial 70.8 (HH) 35.0 - 45.0 mmHg    pO2, Arterial 82.7 75.0 - 108.0 mmHg    HCO3, Arterial 25.5 21.0 - 29.0 mmol/L    Base Excess, Arterial -3.3 (L) -3.0 - 3.0 mmol/L    Hemoglobin, Art, Extended 10.3 (L) 12.0 - 16.0 g/dL    O2 Sat, Arterial 94.2 >92 %    Carboxyhgb, Arterial 0.8 0.0 - 1.5 %    Methemoglobin, Arterial 1.0 <1.5 %    TCO2, Arterial 27.7 Not Established mmol/L    O2 Content, Arterial 14 Not Established mL/dL    O2 Therapy Unknown    Protime-INR    Collection Time: 05/29/19 11:24 AM   Result Value Ref Range    Protime 21.0 (H) 9.8 - 13.0 sec    INR 1.84 (H) 0.86 - 1.14   Hemoglobin and Hematocrit, Blood Collection Time: 05/29/19 11:24 AM   Result Value Ref Range    Hemoglobin 10.2 (L) 12.0 - 16.0 g/dL    Hematocrit 31.7 (L) 36.0 - 48.0 %     Other Labs      Imaging      ASSESSMENT AND RECOMMENDATIONS   Jules Rodriguez is a 80 y.o. female who has a past medical history of Anemia, Anticoagulant long-term use, Atrial fibrillation ([facility] [address] CHF (congestive heart failure) ([facility] [address] CKD (chronic kidney disease) stage 3, GFR 30-59 ml/min ([facility]), Clostridium difficile diarrhea, Depression, Humerus fracture, Hyperlipidemia, Hypertension, Hypothyroidism, Mitral regurgitation, Optic neuritis, Osteopenia, Polymyalgia rheumatica ([facility] [address] Pulmonary embolus ([facility] [address] Thyroid disease, and Vitamin D deficiency. . We have been consulted regarding  concern for upper gastrointestinal bleeding. IMPRESSION:    1. GI bleed-Concern for Upper GI source given reported melena, and BUN 40. Differential includes PUD, angioectasia, Dieulafoy lesion. 2. Acute blood loss anemia  3. Hypercapnic respiratory failure:  4. A. Fib and h/o PE on coumadin: Hold coumadin, s/p 2U FFP and vitamin K x2 doses    RECOMMENDATIONS:    -Monitor H&H, transfuse to Hgb >7.  -Maintain at least 2 large bore (18g or larger) IV lines. -Recommend continuous PPI drip   -Keep NPO, with plan for EGD 5/30/19 to evaluate for source of bleeding. Deferring EGD at this time due to respiratory status, given hemodynamic stability.  -Continue BiPap and wean per critical care. -Monitor daily INR, and continue vitamin K with goal INR <1.5      If you have any questions or need any further information, please feel free to contact our consult team.  Thank you for allowing us to participate in the care of Jules Rodriguez. Navid Torres.  [address] [facility]

## 2019-05-29 NOTE — PROGRESS NOTES
4 Eyes Skin Assessment     The patient is being assess for  Shift Handoff    I agree that 2 RN's have performed a thorough Head to Toe Skin Assessment on the patient. ALL assessment sites listed below have been assessed. Areas assessed by both nurses: tonny/kate  [x]   Head, Face, and Ears   [x]   Shoulders, Back, and Chest  [x]   Arms, Elbows, and Hands   [x]   Coccyx, Sacrum, and IschIum  [x]   Legs, Feet, and Heels        Does the Patient have Skin Breakdown?   No, blanchable redness on gluteal cleft        Jef Prevention initiated:  Yes   Wound Care Orders initiated:  No      WOC nurse consulted for Pressure Injury (Stage 3,4, Unstageable, DTI, NWPT, and Complex wounds), New and Established Ostomies:  No      Nurse 1 eSignature: Electronically signed by Caleb Serrano RN on 5/29/19 at 6:59 PM    **SHARE this note so that the co-signing nurse is able to place an eSignature**    Nurse 2 eSignature: Electronically signed by Usman Patel RN on 5/29/19 at 7:30 PM

## 2019-05-29 NOTE — FLOWSHEET NOTE
Received consult for DNR. Per flowsheet pt has AD docs but not on file. Per RN pt not able to appropriately respond today. Left AD doc follow-up card for family in pt's room. Talked with pt's RN in regards to DNR being a doctor's order.      05/29/19 1118   Encounter Summary   Services provided to: Patient   Referral/Consult From: Nurse   Support System Family members   Continue Visiting   (left AD doc request note in pt's room 5/30 CL)   Complexity of Encounter Moderate   Length of Encounter 15 minutes   Routine   Type Initial   Assessment Unable to respond   Advance Directives (For Healthcare)   Healthcare Directive Yes, patient has an advance directive for healthcare treatment   Type of Healthcare Directive Durable power of  for health care;Living will   Copy in Chart No, copy requested from family  (left AD reminder in pt's room, consult complete)   Electronically signed by Sidney Miller on 5/29/2019 at 11:29 AM

## 2019-05-29 NOTE — PROGRESS NOTES
4 Eyes Skin Assessment     The patient is being assess for  Shift Handoff    I agree that 2 RN's have performed a thorough Head to Toe Skin Assessment on the patient. ALL assessment sites listed below have been assessed. Areas assessed by both nurses: kate/  [x]   Head, Face, and Ears   [x]   Shoulders, Back, and Chest  [x]   Arms, Elbows, and Hands   [x]   Coccyx, Sacrum, and IschIum  [x]   Legs, Feet, and Heels        Does the Patient have Skin Breakdown?   No         Jef Prevention initiated:  Yes   Wound Care Orders initiated:  NA      [facility] nurse consulted for Pressure Injury (Stage 3,4, Unstageable, DTI, NWPT, and Complex wounds), New and Established Ostomies:  NA      Nurse 1 eSignature: Electronically signed by Jennifer Shah RN on 5/29/19 at 6:13 AM    **SHARE this note so that the co-signing nurse is able to place an eSignature**    Nurse 2 eSignature: Electronically signed by Eze Hidalgo RN on 5/29/19 at 8:02 AM

## 2019-05-30 ENCOUNTER — ANESTHESIA (OUTPATIENT)
Dept: ENDOSCOPY | Age: 84
DRG: 377 | End: 2019-05-30
Payer: MEDICARE

## 2019-05-30 VITALS — DIASTOLIC BLOOD PRESSURE: 51 MMHG | SYSTOLIC BLOOD PRESSURE: 108 MMHG

## 2019-05-30 LAB
ANION GAP SERPL CALCULATED.3IONS-SCNC: 11 MMOL/L (ref 3–16)
APTT: 35 SEC (ref 26–36)
BUN BLDV-MCNC: 32 MG/DL (ref 7–20)
CALCIUM SERPL-MCNC: 8.9 MG/DL (ref 8.3–10.6)
CHLORIDE BLD-SCNC: 107 MMOL/L (ref 99–110)
CO2: 27 MMOL/L (ref 21–32)
CREAT SERPL-MCNC: 1.4 MG/DL (ref 0.6–1.2)
GFR AFRICAN AMERICAN: 43
GFR NON-AFRICAN AMERICAN: 36
GLUCOSE BLD-MCNC: 94 MG/DL (ref 70–99)
HCT VFR BLD CALC: 26.9 % (ref 36–48)
HCT VFR BLD CALC: 28.2 % (ref 36–48)
HEMOGLOBIN: 8.9 G/DL (ref 12–16)
HEMOGLOBIN: 9.4 G/DL (ref 12–16)
INR BLD: 1.43 (ref 0.86–1.14)
MAGNESIUM: 1.9 MG/DL (ref 1.8–2.4)
MCH RBC QN AUTO: 29.5 PG (ref 26–34)
MCHC RBC AUTO-ENTMCNC: 33.2 G/DL (ref 31–36)
MCV RBC AUTO: 88.8 FL (ref 80–100)
PDW BLD-RTO: 15.2 % (ref 12.4–15.4)
PHOSPHORUS: 3.2 MG/DL (ref 2.5–4.9)
PLATELET # BLD: 140 K/UL (ref 135–450)
PMV BLD AUTO: 8.5 FL (ref 5–10.5)
POTASSIUM SERPL-SCNC: 4.2 MMOL/L (ref 3.5–5.1)
PROTHROMBIN TIME: 16.3 SEC (ref 9.8–13)
RBC # BLD: 3.18 M/UL (ref 4–5.2)
SODIUM BLD-SCNC: 145 MMOL/L (ref 136–145)
WBC # BLD: 13.8 K/UL (ref 4–11)

## 2019-05-30 PROCEDURE — 85610 PROTHROMBIN TIME: CPT

## 2019-05-30 PROCEDURE — 83735 ASSAY OF MAGNESIUM: CPT

## 2019-05-30 PROCEDURE — 3609013200 HC EGD W/ ABLATION: Performed by: INTERNAL MEDICINE

## 2019-05-30 PROCEDURE — 6370000000 HC RX 637 (ALT 250 FOR IP): Performed by: INTERNAL MEDICINE

## 2019-05-30 PROCEDURE — 94640 AIRWAY INHALATION TREATMENT: CPT

## 2019-05-30 PROCEDURE — 6360000002 HC RX W HCPCS: Performed by: INTERNAL MEDICINE

## 2019-05-30 PROCEDURE — 2500000003 HC RX 250 WO HCPCS

## 2019-05-30 PROCEDURE — 2709999900 HC NON-CHARGEABLE SUPPLY: Performed by: INTERNAL MEDICINE

## 2019-05-30 PROCEDURE — 99233 SBSQ HOSP IP/OBS HIGH 50: CPT | Performed by: INTERNAL MEDICINE

## 2019-05-30 PROCEDURE — 84100 ASSAY OF PHOSPHORUS: CPT

## 2019-05-30 PROCEDURE — 80048 BASIC METABOLIC PNL TOTAL CA: CPT

## 2019-05-30 PROCEDURE — 2000000000 HC ICU R&B

## 2019-05-30 PROCEDURE — 0D598ZZ DESTRUCTION OF DUODENUM, VIA NATURAL OR ARTIFICIAL OPENING ENDOSCOPIC: ICD-10-PCS | Performed by: INTERNAL MEDICINE

## 2019-05-30 PROCEDURE — 2580000003 HC RX 258: Performed by: ANESTHESIOLOGY

## 2019-05-30 PROCEDURE — 6370000000 HC RX 637 (ALT 250 FOR IP): Performed by: PHYSICIAN ASSISTANT

## 2019-05-30 PROCEDURE — APPNB15 APP NON BILLABLE TIME 0-15 MINS: Performed by: NURSE PRACTITIONER

## 2019-05-30 PROCEDURE — 85730 THROMBOPLASTIN TIME PARTIAL: CPT

## 2019-05-30 PROCEDURE — 2580000003 HC RX 258: Performed by: INTERNAL MEDICINE

## 2019-05-30 PROCEDURE — 3700000000 HC ANESTHESIA ATTENDED CARE: Performed by: INTERNAL MEDICINE

## 2019-05-30 PROCEDURE — 2720000010 HC SURG SUPPLY STERILE: Performed by: INTERNAL MEDICINE

## 2019-05-30 PROCEDURE — 94761 N-INVAS EAR/PLS OXIMETRY MLT: CPT

## 2019-05-30 PROCEDURE — 3700000001 HC ADD 15 MINUTES (ANESTHESIA): Performed by: INTERNAL MEDICINE

## 2019-05-30 PROCEDURE — C9113 INJ PANTOPRAZOLE SODIUM, VIA: HCPCS | Performed by: INTERNAL MEDICINE

## 2019-05-30 PROCEDURE — 2700000000 HC OXYGEN THERAPY PER DAY

## 2019-05-30 PROCEDURE — 99232 SBSQ HOSP IP/OBS MODERATE 35: CPT | Performed by: INTERNAL MEDICINE

## 2019-05-30 PROCEDURE — 94762 N-INVAS EAR/PLS OXIMTRY CONT: CPT

## 2019-05-30 PROCEDURE — 85027 COMPLETE CBC AUTOMATED: CPT

## 2019-05-30 PROCEDURE — 36415 COLL VENOUS BLD VENIPUNCTURE: CPT

## 2019-05-30 PROCEDURE — 6360000002 HC RX W HCPCS

## 2019-05-30 RX ORDER — PROPOFOL 10 MG/ML
INJECTION, EMULSION INTRAVENOUS PRN
Status: DISCONTINUED | OUTPATIENT
Start: 2019-05-30 | End: 2019-05-30 | Stop reason: SDUPTHER

## 2019-05-30 RX ORDER — SODIUM CHLORIDE 0.9 % (FLUSH) 0.9 %
10 SYRINGE (ML) INJECTION PRN
Status: DISCONTINUED | OUTPATIENT
Start: 2019-05-30 | End: 2019-05-31

## 2019-05-30 RX ORDER — FUROSEMIDE 10 MG/ML
40 INJECTION INTRAMUSCULAR; INTRAVENOUS ONCE
Status: COMPLETED | OUTPATIENT
Start: 2019-05-30 | End: 2019-05-30

## 2019-05-30 RX ORDER — LIDOCAINE HYDROCHLORIDE 20 MG/ML
INJECTION, SOLUTION INFILTRATION; PERINEURAL PRN
Status: DISCONTINUED | OUTPATIENT
Start: 2019-05-30 | End: 2019-05-30 | Stop reason: SDUPTHER

## 2019-05-30 RX ORDER — SODIUM CHLORIDE 0.9 % (FLUSH) 0.9 %
10 SYRINGE (ML) INJECTION EVERY 12 HOURS SCHEDULED
Status: DISCONTINUED | OUTPATIENT
Start: 2019-05-30 | End: 2019-05-31

## 2019-05-30 RX ORDER — METOPROLOL SUCCINATE 25 MG/1
12.5 TABLET, EXTENDED RELEASE ORAL 2 TIMES DAILY
Status: DISCONTINUED | OUTPATIENT
Start: 2019-05-30 | End: 2019-05-31

## 2019-05-30 RX ORDER — SODIUM CHLORIDE 9 MG/ML
INJECTION, SOLUTION INTRAVENOUS CONTINUOUS
Status: DISCONTINUED | OUTPATIENT
Start: 2019-05-30 | End: 2019-05-30

## 2019-05-30 RX ADMIN — PROPOFOL 20 MG: 10 INJECTION, EMULSION INTRAVENOUS at 13:57

## 2019-05-30 RX ADMIN — Medication 10 ML: at 21:15

## 2019-05-30 RX ADMIN — Medication 10 ML: at 20:48

## 2019-05-30 RX ADMIN — MOMETASONE FUROATE AND FORMOTEROL FUMARATE DIHYDRATE 2 PUFF: 200; 5 AEROSOL RESPIRATORY (INHALATION) at 08:32

## 2019-05-30 RX ADMIN — POTASSIUM CHLORIDE 10 MEQ: 7.46 INJECTION, SOLUTION INTRAVENOUS at 01:00

## 2019-05-30 RX ADMIN — LIDOCAINE HYDROCHLORIDE 20 MG: 20 INJECTION, SOLUTION INFILTRATION; PERINEURAL at 13:53

## 2019-05-30 RX ADMIN — Medication 10 ML: at 08:58

## 2019-05-30 RX ADMIN — POLYETHYLENE GLYCOL-3350 AND ELECTROLYTES 4000 ML: 236; 6.74; 5.86; 2.97; 22.74 POWDER, FOR SOLUTION ORAL at 18:37

## 2019-05-30 RX ADMIN — IPRATROPIUM BROMIDE AND ALBUTEROL SULFATE 1 AMPULE: .5; 3 SOLUTION RESPIRATORY (INHALATION) at 20:46

## 2019-05-30 RX ADMIN — LATANOPROST 1 DROP: 50 SOLUTION OPHTHALMIC at 20:48

## 2019-05-30 RX ADMIN — AMIODARONE HYDROCHLORIDE 200 MG: 200 TABLET ORAL at 08:57

## 2019-05-30 RX ADMIN — SODIUM CHLORIDE 8 MG/HR: 9 INJECTION, SOLUTION INTRAVENOUS at 14:33

## 2019-05-30 RX ADMIN — SODIUM CHLORIDE 8 MG/HR: 9 INJECTION, SOLUTION INTRAVENOUS at 02:01

## 2019-05-30 RX ADMIN — IPRATROPIUM BROMIDE AND ALBUTEROL SULFATE 1 AMPULE: .5; 3 SOLUTION RESPIRATORY (INHALATION) at 15:32

## 2019-05-30 RX ADMIN — METOPROLOL SUCCINATE 12.5 MG: 25 TABLET, EXTENDED RELEASE ORAL at 12:48

## 2019-05-30 RX ADMIN — MEMANTINE HYDROCHLORIDE 10 MG: 5 TABLET, FILM COATED ORAL at 20:45

## 2019-05-30 RX ADMIN — PROPOFOL 50 MG: 10 INJECTION, EMULSION INTRAVENOUS at 13:48

## 2019-05-30 RX ADMIN — METOPROLOL SUCCINATE 12.5 MG: 25 TABLET, EXTENDED RELEASE ORAL at 20:45

## 2019-05-30 RX ADMIN — IPRATROPIUM BROMIDE AND ALBUTEROL SULFATE 1 AMPULE: .5; 3 SOLUTION RESPIRATORY (INHALATION) at 11:34

## 2019-05-30 RX ADMIN — LEVOTHYROXINE SODIUM 75 MCG: 75 TABLET ORAL at 05:31

## 2019-05-30 RX ADMIN — FUROSEMIDE 40 MG: 10 INJECTION, SOLUTION INTRAMUSCULAR; INTRAVENOUS at 20:44

## 2019-05-30 RX ADMIN — IPRATROPIUM BROMIDE AND ALBUTEROL SULFATE 1 AMPULE: .5; 3 SOLUTION RESPIRATORY (INHALATION) at 08:32

## 2019-05-30 RX ADMIN — MEMANTINE HYDROCHLORIDE 10 MG: 5 TABLET, FILM COATED ORAL at 08:57

## 2019-05-30 RX ADMIN — POTASSIUM CHLORIDE 10 MEQ: 7.46 INJECTION, SOLUTION INTRAVENOUS at 01:57

## 2019-05-30 ASSESSMENT — PAIN SCALES - GENERAL
PAINLEVEL_OUTOF10: 0

## 2019-05-30 ASSESSMENT — ENCOUNTER SYMPTOMS: SHORTNESS OF BREATH: 1

## 2019-05-30 ASSESSMENT — PAIN - FUNCTIONAL ASSESSMENT: PAIN_FUNCTIONAL_ASSESSMENT: 0-10

## 2019-05-30 NOTE — H&P
Pre-operative History and Physical    Patient: Ronald Crook  : 1935  Acct#:     Intended Procedure:  EGD    HISTORY OF PRESENT ILLNESS:  The patient is a 80 y.o. female  who presents for EGD due to melena and acute blood loss anemia. Past Medical History:        Diagnosis Date    Anemia     Anticoagulant long-term use     Atrial fibrillation ([address] 2011    Meena Ocasio [facility]congestive [facility] failure) ([facility])     CKD (chronic kidney disease) stage 3, GFR 30-59 ml/min ([facility]) 2019    Clostridium difficile diarrhea 2016    Depression     Humerus fracture     Hyperlipidemia     Hypertension     Hypothyroidism     Mitral regurgitation     Optic neuritis     Osteopenia     Fosamax stopped 2014, had been treated at least since     Polymyalgia rheumatica ([address]     Pulmonary embolus ([address] 2011    Right pulmonary obstruction suspected to be possible chronic pulmonary embolus    Thyroid disease     Vitamin D deficiency      Past Surgical History:        Procedure Laterality Date    [facility]  2016    [facility]    LUNG SURGERY      MALIGNANT SKIN LESION EXCISION      UPPER GASTROINTESTINAL ENDOSCOPY  2016    [facility]     Medications Prior to Admission:   No current facility-administered medications on file prior to encounter.       Current Outpatient Medications on File Prior to Encounter   Medication Sig Dispense Refill    Fluticasone Furoate-Vilanterol (BREO ELLIPTA) 200-25 MCG/INH AEPB Inhale 1 puff into the lungs daily 3 each 1    Fluticasone Furoate-Vilanterol (BREO ELLIPTA) 200-25 MCG/INH AEPB Inhale 1 puff into the lungs daily 1 each 1    levothyroxine (SYNTHROID) 75 MCG tablet Take 1 tablet by mouth Daily 90 tablet 3    budesonide-formoterol (SYMBICORT) 160-4.5 MCG/ACT AERO Inhale 2 puffs into the lungs 2 times daily 1 Inhaler 0    Multiple Vitamins-Minerals (PRESERVISION AREDS 2+MULTI VIT PO) Take by mouth      simvastatin (ZOCOR) 20 MG tablet TAKE 1 TABLET EVERY DAY 90 tablet 3    amiodarone (CORDARONE) 200 MG tablet Take 1 tablet by mouth daily 90 tablet 3    memantine (NAMENDA) 10 MG tablet TAKE 1 TABLET TWICE DAILY 180 tablet 3    ipratropium (ATROVENT) 0.06 % nasal spray 2 sprays by Nasal route 4 times daily 1 Bottle 3    albuterol sulfate HFA (PROAIR HFA) 108 (90 Base) MCG/ACT inhaler Inhale 2 puffs into the lungs every 4 hours as needed for Wheezing or Shortness of Breath 3 Inhaler 1    furosemide (LASIX) 40 MG tablet Take 1 tablet by mouth daily 180 tablet 3    metoprolol succinate (TOPROL XL) 50 MG extended release tablet TAKE 1 TABLET BY MOUTH 2 TIMES DAILY 180 tablet 3    warfarin (COUMADIN) 5 MG tablet TAKE 2 TO 3 TABLETS EVERY DAY AS DIRECTED 270 tablet 3    latanoprost (XALATAN) 0.005 % ophthalmic solution Place 1 drop into both eyes nightly.  vitamin D (CHOLECALCIFEROL) 400 UNITS TABS tablet Take 400 Units by mouth daily.  calcium carbonate (OSCAL) 500 MG TABS tablet Take 500 mg by mouth daily. Allergies:  Ace inhibitors; Aricept [donepezil hydrochloride]; Benicar [olmesartan medoxomil]; Exelon [rivastigmine tartrate]; Pcn [penicillins]; Quinapril hcl; and Doxycycline    Social History:   TOBACCO:   reports that she quit smoking about 54 years ago. She has never used smokeless tobacco.  ETOH:   reports that she drinks alcohol. DRUGS:   reports that she does not use drugs. PHYSICAL EXAM:      Vital Signs: BP (!) 129/50   Pulse 105   Temp 99.1 °F (37.3 °C) (Oral)   Resp 26   Ht 5' (1.524 m)   Wt 107 lb 2.3 oz (48.6 kg)   SpO2 96%   BMI 20.93 kg/m²    Airway: No stridor or wheezing noted. Good air movement. Pulmonary: without wheezes.   Clear to auscultation  Cardiac:regular rate and rhythm without loud murmurs  Abdomen:soft, nontender,  Bowel sounds present    Pre-Procedure Assessment / Plan:  1) EGD    ASA Grade:  ASA 3 - Patient with moderate systemic disease with functional limitations  Mallampati

## 2019-05-30 NOTE — PROGRESS NOTES
[facility]   Daily Progress Note      Admit Date:  5/28/2019      Subjective:   Ms. Joana Steward is a 81yo female with a past medical history significant for COPD, severe mitral regurgitation, paroxysmal atrial fibrillation, chronic diastolic CHF, pulmonary hypertension and chronic venous thromboembolic disease. She also follows with Dr. Bridget Bee from Pulmonary. She was not considered a candidate for repair of her mitral valve due to her comorbidities including only having one functioning lung with concomitant COPD. She presented to the ED form Dr. Morena Payton office with dark stools. On presentation to the ED she was Hemoccult positive with a supratherapeutic INR and anemic to a hemoglobin of 7.9. Today, Cruz Echols is doing better. She is off Bipap. She remains in sinus rhythm but is tachy into the 110's. She is lethargic but answers questions appropriately.        Objective:     BP (!) 129/50   Pulse 105   Temp 99.1 °F (37.3 °C) (Oral)   Resp 26   Ht 5' (1.524 m)   Wt 107 lb 2.3 oz (48.6 kg)   SpO2 96%   BMI 20.93 kg/m²      Intake/Output Summary (Last 24 hours) at 5/30/2019 1401  Last data filed at 5/30/2019 1243  Gross per 24 hour   Intake 938 ml   Output 2110 ml   Net -1172 ml       Physical Exam:  General:  Awake, alert, NAD  Skin:  Warm and dry  Neck:  JVD<8, no carotid bruits  Chest:  Clear to auscultation, no wheezes/rhonchi/rales  Cardiovascular:  Regular but tachy, normal S1/S2, 2/6 hs murmur  Abdomen:  Soft, nontender, +bowel sounds  Extremities:  No edema  Pulses: 2+ bilat carotid    2+ bilat radial    2+ bilat femoral        Medications:    metoprolol succinate  12.5 mg Oral BID    ipratropium-albuterol  1 ampule Inhalation Q4H WA    amiodarone  200 mg Oral Daily    mometasone-formoterol  2 puff Inhalation BID    latanoprost  1 drop Both Eyes Nightly    levothyroxine  75 mcg Oral Daily    memantine  10 mg Oral BID    sodium chloride flush  10 mL Intravenous 2 times per day      nitroGLYCERIN Stopped (05/30/19 0857)    pantoprozole (PROTONIX) infusion 8 mg/hr (05/30/19 0201)       Lab Data:  CBC:   Recent Labs     05/28/19  1807 05/29/19  0450  05/29/19  1748 05/29/19  2353 05/30/19  0430   WBC 8.2 9.0  --   --   --  13.8*   HGB 7.9* 9.4*   < > 9.6* 8.9* 9.4*    146  --   --   --  140    < > = values in this interval not displayed. BMP:    Recent Labs     05/29/19  0450 05/29/19  1748 05/30/19  0430    146* 145   K 3.7 3.3* 4.2   CO2 23 24 27   BUN 33* 33* 32*   CREATININE 1.2 1.3* 1.4*     LIVR:   Recent Labs     05/28/19 1807   AST 27   ALT 19     PT/INR:   Recent Labs     05/28/19  1807 05/29/19  0450 05/29/19  1124 05/30/19  0430   PROT 7.3  --   --   --    INR 4.33* 3.68* 1.84* 1.43*     APTT:   Recent Labs     05/30/19  0430   APTT 35.0     Recent Labs     05/28/19 1807 05/29/19  0946   TROPONINI <0.01 <0.01     FASTING LIPID PANEL:  Lab Results   Component Value Date    CHOL 151 08/02/2018    HDL 68 08/02/2018    HDL 78 04/27/2012    TRIG 81 08/02/2018     Right Heart Catheterization 9/20/17:  1.  Mild nonobstructive coronary artery disease with a 40% ostial LAD  lesion and 25% mid-RCA disease.  This is a right-dominant coronary  arterial system. 2.  Normal left ventricular systolic function with LV ejection  fraction of 65%. 3.  Mildly elevated left ventricular end-diastolic pressure of 15  mmHg. 4.  Moderate to severe mitral regurgitation with moderate dilatation  of the left atrium on the left ventriculogram.  5.  No gradient across the aortic valve on pullback to suggest aortic  stenosis. 6.  Evidence of mild volume overload with a pulmonary capillary wedge  pressure of 22 and a left ventricular end-diastolic pressure on repeat  of 15 to 20.  7.  Evidence of pulmonary hypertension, possibly pulmonary arterial  hypertension, arteriovenous malformation and prior pneumonectomy.    Instantaneous pulmonary artery pressure was 68/19 for a mean pulmonary  arterial pressure of 37 mmHg. 8.  Oxygen step-up in the right pulmonary artery to 90%, pulmonary  artery main was 62% with a right atrial pressure of 52%.  Inferior  vena cava was 57%. 9.  Pulmonary angiogram showing minimal pulmonary arterial flow to the  right lung with _____ flow in the right upper lobe but no flow into  the right lower middle lobe.  In fact, there appears to be a possible  arteriovenous malformation with backflow of blood towards the pigtail  catheter into the right pulmonary artery and an oxygen saturation of  90% in the right lower lobe.     Echo 8/31/17:  Normal left ventricle size, wall thickness, and systolic function with an   estimated ejection fraction of 60%. No regional wall motion abnormalities   are seen.   Normal right ventricular size with mildly reduced systolic function.   Severely dilated left atrium.   Severe eccentric mitral regurgitation.   Severe tricuspid regurgitation.   Estimated pulmonary artery systolic pressure is severely elevated at 81 mmHg   assuming a right atrial pressure of 3 mmHg.        Assessment:  1. Anemia secondary to chronic GI blood loss  2. Paroxysmal Atrial fibrillation  3. Severe mitral regurgitation  4. Pulmonary Venous Hypertension  5. COPD        Plan:   Mikal Felty has improved from a respiratory standpoint. I would restart her Toprol XL at 12.5mg bid now. She may need a bit more diuresis but this is difficult to assess right now. Hold diuretics for now given her hypernatremia.         Signed:  Yani Avila MD

## 2019-05-30 NOTE — PLAN OF CARE
Problem: Risk for Impaired Skin Integrity  Goal: Tissue integrity - skin and mucous membranes  Description  Structural intactness and normal physiological function of skin and  mucous membranes. Outcome: Ongoing  Note:   Continuing to monitor and implement tissue perfusion promotion. Patient's tissue perfusion remains adequate to all fields. Vital signs and labs stable. Problem: Falls - Risk of:  Goal: Will remain free from falls  Description  Will remain free from falls  Outcome: Ongoing  Note:   Pt is at risk for falls. Bed wheels locked and bed in lowest position. Pt reminded to call before getting out of bed. Call light within reach. Falling star system in place. Goal: Absence of physical injury  Description  Absence of physical injury  Outcome: Ongoing  Note:   Patient remains free from physical injuries of any kind acquired in the ICU at [facility].       Problem: Discharge Planning:  Goal: Discharged to appropriate level of care  Description  Discharged to appropriate level of care  Outcome: Ongoing  Note:   Patient verbalizes understanding of care plan and discharge instructions. Denies anticipated discharge needs. Problem:  Bowel Function - Altered:  Goal: Bowel elimination is within specified parameters  Description  Bowel elimination is within specified parameters  Outcome: Ongoing  Note:   Patient has eliminated 1 time this shift     Problem: Fluid Volume - Imbalance:  Goal: Absence of imbalanced fluid volume signs and symptoms  Description  Absence of imbalanced fluid volume signs and symptoms  Outcome: Ongoing  Note:   Patient showed signs of fluid overload earlier in day, however, patient VSS on bipap  Goal: Will show no signs and symptoms of excessive bleeding  Description  Will show no signs and symptoms of excessive bleeding  Outcome: Ongoing  Note:     Electronically signed by Mitesh Lew RN on 5/30/2019 at 12:46 AM   .

## 2019-05-30 NOTE — ANESTHESIA PRE PROCEDURE
[facility] Department of Anesthesiology  Pre-Anesthesia Evaluation/Consultation       Name:  Daisy Soto  : 1935  Age:  80 y. o.                                            MRN:  [id number]  Date: 2019           Surgeon: Surgeon(s):  Marcello Connors MD    Procedure: Procedure(s):  ESOPHAGOGASTRODUODENOSCOPY     Allergies   Allergen Reactions    Ace Inhibitors      coughing    Aricept [Donepezil Hydrochloride]      Can not recall    Benicar [Olmesartan Medoxomil]      Can not recall      Exelon [Rivastigmine Tartrate]      Can not recall      Pcn [Penicillins] Hives and Swelling    Quinapril Hcl      Can not recall      Doxycycline Nausea And Vomiting     Patient Active Problem List   Diagnosis    Disorder of bone and cartilage    Edema    Essential hypertension, benign    Other and unspecified hyperlipidemia    Hypothyroidism due to medication    Paroxysmal atrial fibrillation (HCC)    Anemia    Vitamin D deficiency    Fatigue    Osteopenia    Chronic diastolic congestive heart failure (HCC)    Non-rheumatic mitral regurgitation    Pulmonary hypertension    Weight loss    Abnormal chest x-ray    S/P right heart catheterization    Chronic atrial fibrillation (HCC)    Dyspnea    Hx of venous thromboembolic disease    CKD (chronic kidney disease) stage 3, GFR 30-59 ml/min (HCC)    UGI bleed    Supratherapeutic INR    Acute blood loss anemia    Coagulopathy (HCC)    Acute pulmonary edema (HCC)    Acute respiratory failure with hypoxia and hypercapnia (HCC)     Past Medical History:   Diagnosis Date    Anemia     Anticoagulant long-term use     Atrial fibrillation ([facility] [address] 2011    Angel Pickard CHF (congestive heart failure) (HCC)     CKD (chronic kidney disease) stage 3, GFR 30-59 ml/min ([facility]) 2019    Clostridium difficile diarrhea 2016    Depression     Humerus fracture     Hyperlipidemia     Hypertension     Hypothyroidism     Mitral Breath 3 Inhaler 1    furosemide (LASIX) 40 MG tablet Take 1 tablet by mouth daily 180 tablet 3    metoprolol succinate (TOPROL XL) 50 MG extended release tablet TAKE 1 TABLET BY MOUTH 2 TIMES DAILY 180 tablet 3    warfarin (COUMADIN) 5 MG tablet TAKE 2 TO 3 TABLETS EVERY DAY AS DIRECTED 270 tablet 3    latanoprost (XALATAN) 0.005 % ophthalmic solution Place 1 drop into both eyes nightly.  vitamin D (CHOLECALCIFEROL) 400 UNITS TABS tablet Take 400 Units by mouth daily.  calcium carbonate (OSCAL) 500 MG TABS tablet Take 500 mg by mouth daily.          Current Facility-Administered Medications   Medication Dose Route Frequency Provider Last Rate Last Dose    metoprolol succinate (TOPROL XL) extended release tablet 12.5 mg  12.5 mg Oral BID Jonnie Griffin MD   12.5 mg at 05/30/19 1248    ipratropium-albuterol (DUONEB) nebulizer solution 1 ampule  1 ampule Inhalation Q4H WA Killian Hook, DO   1 ampule at 05/30/19 1134    nitroGLYCERIN 50 mg in dextrose 5% 250 mL infusion  20 mcg/min Intravenous Continuous Jonnie Griffin MD   Stopped at 05/30/19 0857    albuterol sulfate  (90 Base) MCG/ACT inhaler 2 puff  2 puff Inhalation Q4H PRN Tip Crouch MD   2 puff at 05/29/19 0926    amiodarone (CORDARONE) tablet 200 mg  200 mg Oral Daily Tip Crouch MD   200 mg at 05/30/19 0857    mometasone-formoterol (DULERA) 200-5 MCG/ACT inhaler 2 puff  2 puff Inhalation BID Tip Crouch MD   2 puff at 05/30/19 0832    latanoprost (XALATAN) 0.005 % ophthalmic solution 1 drop  1 drop Both Eyes Nightly Tip Crouch MD   1 drop at 05/29/19 2030    levothyroxine (SYNTHROID) tablet 75 mcg  75 mcg Oral Daily Tip Crouch MD   75 mcg at 05/30/19 0531    memantine (NAMENDA) tablet 10 mg  10 mg Oral BID Tip Crouch MD   10 mg at 05/30/19 0857    sodium chloride flush 0.9 % injection 10 mL  10 mL Intravenous 2 times per day Tip Crouch MD   10 mL at 05/30/19 0858    sodium chloride flush 0.9 % injection 10 mL  10 mL Intravenous PRN Abril Blandon MD        acetaminophen (TYLENOL) tablet 650 mg  650 mg Oral Q4H PRN Abril Blandon MD        ondansetron [facility]) injection 4 mg  4 mg Intravenous Q6H PRN Abril Blandon MD        pantoprazole (PROTONIX) 80 mg in sodium chloride 0.9 % 100 mL infusion  8 mg/hr Intravenous Continuous Abril Blandon MD 10 mL/hr at 19 0201 8 mg/hr at 19 0201    melatonin tablet 3 mg  3 mg Oral Nightly PRN Abril Blandon MD   3 mg at 19 2339     Vital Signs (Current)   Vitals:    19 1245   BP: (!) 129/50   Pulse: 105   Resp: 26   Temp: 99.1 °F (37.3 °C)   SpO2: 96%     Vital Signs Statistics (for past 48 hrs)     Temp  Av.2 °F (36.8 °C)  Min: 97.3 °F (36.3 °C)   Min taken time: 19 0345  Max: 99.1 °F (37.3 °C)   Max taken time: 19 1245  Pulse  Av  Min: 46   Min taken time: 19 0302  Max: 127   Max taken time: 19 0900  Resp  Av.6  Min: 15   Min taken time: 19 1606  Max: 28   Max taken time: 19 1014  BP  Min: 101/42   Min taken time: 19 1800  Max: 187/71   Max taken time: 19 1014  SpO2  Av.2 %  Min: 88 %   Min taken time: 19 2347  Max: 100 %   Max taken time: 19 1100    BP Readings from Last 3 Encounters:   19 (!) 129/50   19 (!) 160/64   19 (!) 151/64     BMI  Body mass index is 20.93 kg/m². Estimated body mass index is 20.93 kg/m² as calculated from the following:    Height as of this encounter: 5' (1.524 m). Weight as of this encounter: 107 lb 2.3 oz (48.6 kg).     CBC   Lab Results   Component Value Date    WBC 13.8 2019    RBC 3.18 2019    HGB 9.4 2019    HCT 28.2 2019    MCV 88.8 2019    RDW 15.2 2019     2019     CMP    Lab Results   Component Value Date     2019    K 4.2 2019    K 3.7 2019     2019    CO2 27 2019    BUN 32 2019    CREATININE 1.4 2019    GFRAA 43 05/30/2019    GFRAA >60 04/26/2013    AGRATIO 1.0 05/28/2019    LABGLOM 36 05/30/2019    GLUCOSE 94 05/30/2019    PROT 7.3 05/28/2019    PROT 6.8 10/26/2012    CALCIUM 8.9 05/30/2019    BILITOT 0.4 05/28/2019    ALKPHOS 95 05/28/2019    AST 27 05/28/2019    ALT 19 05/28/2019     BMP    Lab Results   Component Value Date     05/30/2019    K 4.2 05/30/2019    K 3.7 05/29/2019     05/30/2019    CO2 27 05/30/2019    BUN 32 05/30/2019    CREATININE 1.4 05/30/2019    CALCIUM 8.9 05/30/2019    GFRAA 43 05/30/2019    GFRAA >60 04/26/2013    LABGLOM 36 05/30/2019    GLUCOSE 94 05/30/2019     POCGlucose  Recent Labs     05/29/19  0450 05/29/19  1748 05/30/19  0430   GLUCOSE 88 132* 94      Coags    Lab Results   Component Value Date    PROTIME 16.3 05/30/2019    PROTIME 14.5 06/11/2011    INR 1.43 05/30/2019    APTT 35.0 29/28/8202     HCG (If Applicable) No results found for: PREGTESTUR, PREGSERUM, HCG, HCGQUANT   ABGs   Lab Results   Component Value Date    PHART 7.436 05/29/2019    PO2ART 135.0 05/29/2019    BOE3QEY 38.3 05/29/2019    RVN0OSO 25.3 05/29/2019    BEART 1.6 05/29/2019    S3KKFKBX 98.9 05/29/2019      Type & Screen (If Applicable)  No results found for: LABABO, LABRH                         BMI: Wt Readings from Last 3 Encounters:       NPO Status:  >8h                          Anesthesia Evaluation  Patient summary reviewed no history of anesthetic complications:   Airway: Mallampati: II  TM distance: >3 FB   Neck ROM: full  Mouth opening: > = 3 FB Dental:          Pulmonary:   (+) shortness of breath:  decreased breath sounds,      (-) COPD and asthma                           Cardiovascular:  Exercise tolerance: poor (<4 METS),   (+) hypertension:, CHF:,     (-) pacemaker, past MI and  angina      Rhythm: regular  Rate: normal                    Neuro/Psych:   (+) psychiatric history:   (-) seizures, TIA and CVA           GI/Hepatic/Renal:   (+) PUD,           Endo/Other:    (+) hypothyroidism, blood dyscrasia: anticoagulation therapy, arthritis:., .                 Abdominal:           Vascular:     - DVT and PE. Anesthesia Plan      MAC and TIVA     ASA 3       Induction: intravenous. Anesthetic plan and risks discussed with patient. Plan discussed with CRNA. This pre-anesthesia assessment may be used as a history and physical.    DOS STAFF ADDENDUM:    Pt seen and examined, chart reviewed (including anesthesia, drug and allergy history). No interval changes to history and physical examination. Anesthetic plan, risks, benefits, alternatives, and personnel involved discussed with patient. Patient verbalized an understanding and agrees to proceed.       Neha Roman MD  May 30, 2019  1:39 PM

## 2019-05-30 NOTE — ANESTHESIA POSTPROCEDURE EVALUATION
Department of Anesthesiology  Postprocedure Note    Patient: Aron Walker  MRN: [id number]  YOB: 1935  Date of evaluation: 5/30/2019  Time:  5:38 PM     Procedure Summary     Date:  05/30/19 [facility]    Anesthesia Start:  6033 Anesthesia Stop:  7840    Procedure:  EGD POLYP ABLATION/OTHER (N/A ) Diagnosis:  (UPPER GI BLEED)    Surgeon:  Barb Tabares MD Responsible Provider:  Jennifer Langston MD    Anesthesia Type:  MAC, TIVA ASA Status:  3          Anesthesia Type: MAC, TIVA    Balta Phase I:      Balta Phase II:      Last vitals: Reviewed and per EMR flowsheets.        Anesthesia Post Evaluation    Patient location during evaluation: PACU  Patient participation: complete - patient participated  Level of consciousness: awake and alert  Pain score: 0  Airway patency: patent  Nausea & Vomiting: no nausea and no vomiting  Complications: no  Cardiovascular status: blood pressure returned to baseline  Respiratory status: acceptable  Hydration status: euvolemic

## 2019-05-30 NOTE — PROGRESS NOTES
8 mg/hr (19 0201)       PRN Meds:  albuterol sulfate HFA, sodium chloride flush, acetaminophen, ondansetron, melatonin    Labs:  CBC:   Recent Labs     19  1807 19  0450  19  1748 19  2353 19  0430   WBC 8.2 9.0  --   --   --  13.8*   HGB 7.9* 9.4*   < > 9.6* 8.9* 9.4*   HCT 24.0* 28.4*   < > 28.8* 26.9* 28.2*   MCV 88.4 89.8  --   --   --  88.8    146  --   --   --  140    < > = values in this interval not displayed. BMP:   Recent Labs     19  0450 19  1748 19  0430    146* 145   K 3.7 3.3* 4.2    106 107   CO2 23 24 27   PHOS 2.8  --   --    BUN 33* 33* 32*   CREATININE 1.2 1.3* 1.4*     LIVER PROFILE:   Recent Labs     19  1807   AST 27   ALT 19   BILITOT 0.4   ALKPHOS 95     PT/INR:   Recent Labs     19  0450 19  1124 19  0430   PROTIME 41.9* 21.0* 16.3*   INR 3.68* 1.84* 1.43*     APTT:   Recent Labs     19   APTT 35.0     UA:No results for input(s): NITRITE, COLORU, PHUR, LABCAST, WBCUA, RBCUA, MUCUS, TRICHOMONAS, YEAST, BACTERIA, CLARITYU, SPECGRAV, LEUKOCYTESUR, UROBILINOGEN, BILIRUBINUR, BLOODU, GLUCOSEU, AMORPHOUS in the last 72 hours. Invalid input(s): Nolan Mcdonough  No results for input(s): PH, PCO2, PO2 in the last 72 hours.         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 Radha Ochoa, DO  [facility]

## 2019-05-30 NOTE — PROGRESS NOTES
0800- GI by to see patient, plan for bedside EGD around 1300  0815- Nursing assessment completed, vitals stable, Bipap removed and patient placed on 3L NC, alert and oriented  0900- Rounds done and Dr. Juan Jarrett by to see patient, updated. Nitro drip turned off.  1120- patient placed on bedpan  1130- no output on bedpan, patient repositioned and needs met  1215- Dr. Rik Carlson by to see patient, updated, see new order. Patient somewhat short of breath, RR and HR increased, sats stable. 1345- endo at bedside setting up for EGD  1410- procedure completed, report received. Dr. Janette Lizarraga for colonoscopy tomorrow. [address] [place], [place] w/ GI by to talk w/ patient , see new orders. 1600- patient placed on bedpan, cleaned of small loose stool, zinc cream placed to redness on buttock/perineum. 1630- patient request to go back on bedpan, states feels when she has to pass gas that she will have BM. Cleaned of smear of BM, repositioned. Son visiting at bedside. Patient became very short of breath w/ movement, sats borderline, RR increased. Discussed use of Bipap, patient would like to visit w/ her son for a bit first.  [id number] 0982- patient placed on Bipap for ongoing shortness of breath  1835- Patient taken off Bipap, placed on 4L NC. Golytely prep started at this time, patient tolerating. 1940- handoff done, patient placed on bedpan, Lety Hou RN to assume care. Patient very short of breath, son visiting and updated.     Electronically signed by Darrel Garcia RN on 5/30/2019 at 7:45 PM

## 2019-05-30 NOTE — PROGRESS NOTES
Report of procedure given to [facility]

## 2019-05-30 NOTE — PROGRESS NOTES
452 ms    P Axis 48 degrees    R Axis 55 degrees    T Axis 52 degrees    Diagnosis       Sinus rhythm with prolonged AV conduction with occasional Premature ventricular complexesNonspecific ST and T wave abnormalityAbnormal ECGWhen compared with ECG of 28-MAY-2019 18:01,Premature ventricular complexes are now PresentPR interval has increasedST no longer depressed in Inferior leadsST less depressed in Anterior leadsConfirmed by Bethany REHMAN ([id number]) on 5/29/2019 4:09:26 PM   Blood Gas, Arterial    Collection Time: 05/29/19 11:17 AM   Result Value Ref Range    pH, Arterial 7.182 (LL) 7.350 - 7.450    pCO2, Arterial 70.8 (HH) 35.0 - 45.0 mmHg    pO2, Arterial 82.7 75.0 - 108.0 mmHg    HCO3, Arterial 25.5 21.0 - 29.0 mmol/L    Base Excess, Arterial -3.3 (L) -3.0 - 3.0 mmol/L    Hemoglobin, Art, Extended 10.3 (L) 12.0 - 16.0 g/dL    O2 Sat, Arterial 94.2 >92 %    Carboxyhgb, Arterial 0.8 0.0 - 1.5 %    Methemoglobin, Arterial 1.0 <1.5 %    TCO2, Arterial 27.7 Not Established mmol/L    O2 Content, Arterial 14 Not Established mL/dL    O2 Therapy Unknown    Protime-INR    Collection Time: 05/29/19 11:24 AM   Result Value Ref Range    Protime 21.0 (H) 9.8 - 13.0 sec    INR 1.84 (H) 0.86 - 1.14   Hemoglobin and Hematocrit, Blood    Collection Time: 05/29/19 11:24 AM   Result Value Ref Range    Hemoglobin 10.2 (L) 12.0 - 16.0 g/dL    Hematocrit 31.7 (L) 36.0 - 48.0 %   Blood Gas, Arterial    Collection Time: 05/29/19  2:40 PM   Result Value Ref Range    pH, Arterial 7.436 7.350 - 7.450    pCO2, Arterial 38.3 35.0 - 45.0 mmHg    pO2, Arterial 135.0 (H) 75.0 - 108.0 mmHg    HCO3, Arterial 25.3 21.0 - 29.0 mmol/L    Base Excess, Arterial 1.6 -3.0 - 3.0 mmol/L    Hemoglobin, Art, Extended 8.9 (L) 12.0 - 16.0 g/dL    O2 Sat, Arterial 98.9 >92 %    Carboxyhgb, Arterial 1.1 0.0 - 1.5 %    Methemoglobin, Arterial 1.0 <1.5 %    TCO2, Arterial 26.5 Not Established mmol/L    O2 Content, Arterial 12 Not Established mL/dL    O2 Therapy 2.40 mg/dL   Phosphorus    Collection Time: 05/30/19  4:30 AM   Result Value Ref Range    Phosphorus 3.2 2.5 - 4.9 mg/dL       ASSESSMENT/PLAN:      Principal Problem:    UGI bleed-hemoglobin has stabilized, esophagogastroduodenoscopy planned for today. INR has been corrected. Active Problems:    Acute respiratory failure with hypoxia and hypercapnia, due to volume overload from mitral regurgitation-  patient has improved with diuresis and nitroglycerin. Off of BiPAP now, now on 2 L of oxygen. Continue to monitor in the ICU. Discussed with Dr. Andrade Coleman, plan to give more Lasix after esophagogastroduodenoscopy today. Paroxysmal atrial fibrillation (HCC)-plan for re-anticoagulation  Will be based on findings on esophagogastroduodenoscopy. Chronic diastolic congestive heart failure ([address]- had been stable up until fluid volume challenge at admission. Plan to continue Lasix to maintain euvolemia after esophagogastroduodenoscopy    Supratherapeutic INR- this has been corrected with FFP and vitamin K    Essential hypertension, benign-continue to monitor closely in the ICU setting. Hypothyroidism due to medication-TSH was therapeutic 3 months ago. Continue current supplementation. CKD (chronic kidney disease) stage 3, GFR 30-59 ml/min ([facility])-continue to monitor her creatinine. Acute blood loss anemia-exacerbated by coagulopathy from Coumadin. Await finding of the esophagogastroduodenoscopy.       Time > 35 minutes reviewing chart and patient data, examining and interviewing patient, and discussing with nursing staff, family, etc.       Saad Park MD, FACP  9:02 AM  5/30/2019

## 2019-05-30 NOTE — OP NOTE
Endoscopy Note    Patient: Jackie Ramsey  : 1935  Acct#:     Procedure: Esophagogastroduodenoscopy with argon plasma coagulation                         Date:  2019     Surgeon:   Arabella Goodwin MD    Referring Physician:  Roxana Russell MD    Indications: The patient is a 80 y.o. female  who presents for EGD due to melena and acute blood loss anemia. Postoperative Diagnosis:    1. Punctate non-bleeding APC in the second portion of the duodenum, obliterated with APC  2. 3 cm sliding hiatal hernia. 3. No source of GI blood loss identified. Anesthesia:  MAC    Consent:  The patient or their legal guardian has signed an informed consent, and is aware of the potential risks, benefits, alternatives, and potential complications of this procedure. These include, but are not limited to hemorrhage, bleeding, post procedural pain, perforation, phlebitis, aspiration, hypotension, hypoxia, cardiovascular events such as arryhthmia, and possibly death. Description of Procedure: The patient was then taken to the endoscopy suite, placed in the left lateral decubitus position and the above IV sedation was administrered. The Olympus video endoscope was placed through the patient's oropharynx without difficulty to the extent of the 2nd portion of the duodenum. Both forward and retroflexed views of the stomach were obtained. Findings:    Esophagus: The esophagus was notable for a 1 cm diverticulum located in the mid-esophgus, 29 cm from the incisors. The esophagus otherwise appeared normal without evidence of Omalley's esophagus or reflux esophagitis. Stomach: The stomach was viewed on forward and retroflexed views. A 3 cm sliding hiatal hernia was present without erythema or erosion. The stomach was otherwise normal without erythema, erosion, old blood or active bleeding.   Duodenum: A punctate non-bleeding APC in the second portion of the duodenum, approximately 2 mm in size. This was completely obliterated with APC. The first, 2nd, and 3rd portions of the duodenum otherwise appeared normal with normal villous pattern    The scope was then withdrawn back into the stomach, it was decompressed, and the scope was completely withdrawn. The patient tolerated the procedure well and was taken to the post anesthesia care unit in good condition. Estimated Blood Loss: Minimal     Impression:   1) See post procedure diagnoses    Recommendations:   1) Clear liquid diet. NPO at midnight  2) Colonoscopy +/- EGD with video capsule endoscopy 5/31/19 to evaluate for source of melena.   3) Monitor H&H, transfuse to Hgb >7.    NICK Duff [address] and [address] [facility]

## 2019-05-31 ENCOUNTER — ANESTHESIA (OUTPATIENT)
Dept: ENDOSCOPY | Age: 84
DRG: 377 | End: 2019-05-31
Payer: MEDICARE

## 2019-05-31 ENCOUNTER — ANESTHESIA EVENT (OUTPATIENT)
Dept: ENDOSCOPY | Age: 84
DRG: 377 | End: 2019-05-31
Payer: MEDICARE

## 2019-05-31 VITALS
DIASTOLIC BLOOD PRESSURE: 52 MMHG | OXYGEN SATURATION: 100 % | RESPIRATION RATE: 23 BRPM | SYSTOLIC BLOOD PRESSURE: 94 MMHG

## 2019-05-31 PROBLEM — R42 LIGHTHEADED: Status: ACTIVE | Noted: 2019-05-31

## 2019-05-31 LAB
ANION GAP SERPL CALCULATED.3IONS-SCNC: 14 MMOL/L (ref 3–16)
BASOPHILS ABSOLUTE: 0.1 K/UL (ref 0–0.2)
BASOPHILS RELATIVE PERCENT: 0.4 %
BUN BLDV-MCNC: 20 MG/DL (ref 7–20)
CALCIUM SERPL-MCNC: 8.6 MG/DL (ref 8.3–10.6)
CHLORIDE BLD-SCNC: 106 MMOL/L (ref 99–110)
CO2: 28 MMOL/L (ref 21–32)
CREAT SERPL-MCNC: 1.1 MG/DL (ref 0.6–1.2)
EOSINOPHILS ABSOLUTE: 0 K/UL (ref 0–0.6)
EOSINOPHILS RELATIVE PERCENT: 0.1 %
GFR AFRICAN AMERICAN: 57
GFR NON-AFRICAN AMERICAN: 47
GLUCOSE BLD-MCNC: 85 MG/DL (ref 70–99)
HCT VFR BLD CALC: 27.9 % (ref 36–48)
HEMOGLOBIN: 9.1 G/DL (ref 12–16)
LYMPHOCYTES ABSOLUTE: 0.6 K/UL (ref 1–5.1)
LYMPHOCYTES RELATIVE PERCENT: 4.8 %
MAGNESIUM: 1.8 MG/DL (ref 1.8–2.4)
MCH RBC QN AUTO: 29.8 PG (ref 26–34)
MCHC RBC AUTO-ENTMCNC: 32.7 G/DL (ref 31–36)
MCV RBC AUTO: 90.9 FL (ref 80–100)
MONOCYTES ABSOLUTE: 0.6 K/UL (ref 0–1.3)
MONOCYTES RELATIVE PERCENT: 4.2 %
NEUTROPHILS ABSOLUTE: 12.2 K/UL (ref 1.7–7.7)
NEUTROPHILS RELATIVE PERCENT: 90.5 %
PDW BLD-RTO: 15.1 % (ref 12.4–15.4)
PHOSPHORUS: 2 MG/DL (ref 2.5–4.9)
PLATELET # BLD: 128 K/UL (ref 135–450)
PMV BLD AUTO: 8.9 FL (ref 5–10.5)
POTASSIUM SERPL-SCNC: 2.8 MMOL/L (ref 3.5–5.1)
RBC # BLD: 3.07 M/UL (ref 4–5.2)
SODIUM BLD-SCNC: 148 MMOL/L (ref 136–145)
TROPONIN: 0.02 NG/ML
WBC # BLD: 13.5 K/UL (ref 4–11)

## 2019-05-31 PROCEDURE — 2500000003 HC RX 250 WO HCPCS: Performed by: NURSE ANESTHETIST, CERTIFIED REGISTERED

## 2019-05-31 PROCEDURE — 6360000002 HC RX W HCPCS: Performed by: NURSE PRACTITIONER

## 2019-05-31 PROCEDURE — C9113 INJ PANTOPRAZOLE SODIUM, VIA: HCPCS | Performed by: INTERNAL MEDICINE

## 2019-05-31 PROCEDURE — 6370000000 HC RX 637 (ALT 250 FOR IP): Performed by: INTERNAL MEDICINE

## 2019-05-31 PROCEDURE — 99233 SBSQ HOSP IP/OBS HIGH 50: CPT | Performed by: INTERNAL MEDICINE

## 2019-05-31 PROCEDURE — 94660 CPAP INITIATION&MGMT: CPT

## 2019-05-31 PROCEDURE — 94640 AIRWAY INHALATION TREATMENT: CPT

## 2019-05-31 PROCEDURE — 94761 N-INVAS EAR/PLS OXIMETRY MLT: CPT

## 2019-05-31 PROCEDURE — 2720000010 HC SURG SUPPLY STERILE: Performed by: INTERNAL MEDICINE

## 2019-05-31 PROCEDURE — 2000000000 HC ICU R&B

## 2019-05-31 PROCEDURE — 2500000003 HC RX 250 WO HCPCS: Performed by: INTERNAL MEDICINE

## 2019-05-31 PROCEDURE — 80048 BASIC METABOLIC PNL TOTAL CA: CPT

## 2019-05-31 PROCEDURE — 3609009500 HC COLONOSCOPY DIAGNOSTIC OR SCREENING: Performed by: INTERNAL MEDICINE

## 2019-05-31 PROCEDURE — 84484 ASSAY OF TROPONIN QUANT: CPT

## 2019-05-31 PROCEDURE — 2580000003 HC RX 258: Performed by: NURSE ANESTHETIST, CERTIFIED REGISTERED

## 2019-05-31 PROCEDURE — 3700000000 HC ANESTHESIA ATTENDED CARE: Performed by: INTERNAL MEDICINE

## 2019-05-31 PROCEDURE — 99232 SBSQ HOSP IP/OBS MODERATE 35: CPT | Performed by: NURSE PRACTITIONER

## 2019-05-31 PROCEDURE — 84100 ASSAY OF PHOSPHORUS: CPT

## 2019-05-31 PROCEDURE — 2709999900 HC NON-CHARGEABLE SUPPLY: Performed by: INTERNAL MEDICINE

## 2019-05-31 PROCEDURE — 2700000000 HC OXYGEN THERAPY PER DAY

## 2019-05-31 PROCEDURE — 2580000003 HC RX 258: Performed by: INTERNAL MEDICINE

## 2019-05-31 PROCEDURE — 6370000000 HC RX 637 (ALT 250 FOR IP): Performed by: NURSE PRACTITIONER

## 2019-05-31 PROCEDURE — APPNB15 APP NON BILLABLE TIME 0-15 MINS: Performed by: NURSE PRACTITIONER

## 2019-05-31 PROCEDURE — 3700000001 HC ADD 15 MINUTES (ANESTHESIA): Performed by: INTERNAL MEDICINE

## 2019-05-31 PROCEDURE — 85025 COMPLETE CBC W/AUTO DIFF WBC: CPT

## 2019-05-31 PROCEDURE — 6360000002 HC RX W HCPCS: Performed by: NURSE ANESTHETIST, CERTIFIED REGISTERED

## 2019-05-31 PROCEDURE — 36415 COLL VENOUS BLD VENIPUNCTURE: CPT

## 2019-05-31 PROCEDURE — 6360000002 HC RX W HCPCS: Performed by: INTERNAL MEDICINE

## 2019-05-31 PROCEDURE — 3609012800 HC EGD DIAGNOSTIC ONLY: Performed by: INTERNAL MEDICINE

## 2019-05-31 PROCEDURE — 0DJ08ZZ INSPECTION OF UPPER INTESTINAL TRACT, VIA NATURAL OR ARTIFICIAL OPENING ENDOSCOPIC: ICD-10-PCS | Performed by: INTERNAL MEDICINE

## 2019-05-31 PROCEDURE — 83735 ASSAY OF MAGNESIUM: CPT

## 2019-05-31 PROCEDURE — 3609019000 HC EGD CAPSULE ENDOSCOPY: Performed by: INTERNAL MEDICINE

## 2019-05-31 RX ORDER — PROPOFOL 10 MG/ML
INJECTION, EMULSION INTRAVENOUS PRN
Status: DISCONTINUED | OUTPATIENT
Start: 2019-05-31 | End: 2019-05-31 | Stop reason: SDUPTHER

## 2019-05-31 RX ORDER — MAGNESIUM SULFATE IN WATER 40 MG/ML
2 INJECTION, SOLUTION INTRAVENOUS ONCE
Status: COMPLETED | OUTPATIENT
Start: 2019-05-31 | End: 2019-05-31

## 2019-05-31 RX ORDER — POTASSIUM CHLORIDE 7.45 MG/ML
10 INJECTION INTRAVENOUS ONCE
Status: COMPLETED | OUTPATIENT
Start: 2019-05-31 | End: 2019-05-31

## 2019-05-31 RX ORDER — LIDOCAINE HYDROCHLORIDE 20 MG/ML
INJECTION, SOLUTION EPIDURAL; INFILTRATION; INTRACAUDAL; PERINEURAL PRN
Status: DISCONTINUED | OUTPATIENT
Start: 2019-05-31 | End: 2019-05-31 | Stop reason: SDUPTHER

## 2019-05-31 RX ORDER — FENTANYL CITRATE 50 UG/ML
25 INJECTION, SOLUTION INTRAMUSCULAR; INTRAVENOUS EVERY 5 MIN PRN
Status: DISCONTINUED | OUTPATIENT
Start: 2019-05-31 | End: 2019-06-02

## 2019-05-31 RX ORDER — FUROSEMIDE 10 MG/ML
40 INJECTION INTRAMUSCULAR; INTRAVENOUS ONCE
Status: COMPLETED | OUTPATIENT
Start: 2019-05-31 | End: 2019-05-31

## 2019-05-31 RX ORDER — METOPROLOL SUCCINATE 25 MG/1
25 TABLET, EXTENDED RELEASE ORAL 2 TIMES DAILY
Status: DISCONTINUED | OUTPATIENT
Start: 2019-05-31 | End: 2019-06-02

## 2019-05-31 RX ORDER — ONDANSETRON 2 MG/ML
4 INJECTION INTRAMUSCULAR; INTRAVENOUS
Status: ACTIVE | OUTPATIENT
Start: 2019-05-31 | End: 2019-05-31

## 2019-05-31 RX ORDER — SODIUM CHLORIDE 9 MG/ML
INJECTION, SOLUTION INTRAVENOUS CONTINUOUS PRN
Status: DISCONTINUED | OUTPATIENT
Start: 2019-05-31 | End: 2019-05-31 | Stop reason: SDUPTHER

## 2019-05-31 RX ORDER — FENTANYL CITRATE 50 UG/ML
50 INJECTION, SOLUTION INTRAMUSCULAR; INTRAVENOUS EVERY 5 MIN PRN
Status: DISCONTINUED | OUTPATIENT
Start: 2019-05-31 | End: 2019-06-02

## 2019-05-31 RX ADMIN — LIDOCAINE HYDROCHLORIDE 100 MG: 20 INJECTION, SOLUTION EPIDURAL; INFILTRATION; INTRACAUDAL; PERINEURAL at 09:26

## 2019-05-31 RX ADMIN — LEVOTHYROXINE SODIUM 75 MCG: 75 TABLET ORAL at 12:09

## 2019-05-31 RX ADMIN — LATANOPROST 1 DROP: 50 SOLUTION OPHTHALMIC at 20:30

## 2019-05-31 RX ADMIN — MAGNESIUM SULFATE HEPTAHYDRATE 2 G: 40 INJECTION, SOLUTION INTRAVENOUS at 11:18

## 2019-05-31 RX ADMIN — NITROGLYCERIN 1 INCH: 20 OINTMENT TOPICAL at 10:25

## 2019-05-31 RX ADMIN — PROPOFOL 30 MG: 10 INJECTION, EMULSION INTRAVENOUS at 09:32

## 2019-05-31 RX ADMIN — IPRATROPIUM BROMIDE AND ALBUTEROL SULFATE 1 AMPULE: .5; 3 SOLUTION RESPIRATORY (INHALATION) at 08:25

## 2019-05-31 RX ADMIN — POTASSIUM PHOSPHATE, MONOBASIC AND POTASSIUM PHOSPHATE, DIBASIC 15 MMOL: 224; 236 INJECTION, SOLUTION INTRAVENOUS at 10:14

## 2019-05-31 RX ADMIN — MOMETASONE FUROATE AND FORMOTEROL FUMARATE DIHYDRATE 2 PUFF: 200; 5 AEROSOL RESPIRATORY (INHALATION) at 08:25

## 2019-05-31 RX ADMIN — POTASSIUM CHLORIDE 10 MEQ: 7.46 INJECTION, SOLUTION INTRAVENOUS at 08:58

## 2019-05-31 RX ADMIN — POTASSIUM CHLORIDE 10 MEQ: 7.46 INJECTION, SOLUTION INTRAVENOUS at 12:10

## 2019-05-31 RX ADMIN — POTASSIUM CHLORIDE 10 MEQ: 7.46 INJECTION, SOLUTION INTRAVENOUS at 06:50

## 2019-05-31 RX ADMIN — POTASSIUM CHLORIDE 10 MEQ: 7.46 INJECTION, SOLUTION INTRAVENOUS at 10:13

## 2019-05-31 RX ADMIN — SODIUM CHLORIDE: 9 INJECTION, SOLUTION INTRAVENOUS at 09:22

## 2019-05-31 RX ADMIN — IPRATROPIUM BROMIDE AND ALBUTEROL SULFATE 1 AMPULE: .5; 3 SOLUTION RESPIRATORY (INHALATION) at 21:19

## 2019-05-31 RX ADMIN — POTASSIUM CHLORIDE 10 MEQ: 7.46 INJECTION, SOLUTION INTRAVENOUS at 11:18

## 2019-05-31 RX ADMIN — Medication 10 ML: at 10:22

## 2019-05-31 RX ADMIN — POTASSIUM CHLORIDE 10 MEQ: 7.46 INJECTION, SOLUTION INTRAVENOUS at 07:54

## 2019-05-31 RX ADMIN — FUROSEMIDE 40 MG: 10 INJECTION, SOLUTION INTRAMUSCULAR; INTRAVENOUS at 15:45

## 2019-05-31 RX ADMIN — METOPROLOL SUCCINATE 12.5 MG: 25 TABLET, EXTENDED RELEASE ORAL at 12:08

## 2019-05-31 RX ADMIN — PHENYLEPHRINE HYDROCHLORIDE 200 MCG: 10 INJECTION INTRAVENOUS at 09:43

## 2019-05-31 RX ADMIN — PROPOFOL 30 MG: 10 INJECTION, EMULSION INTRAVENOUS at 09:43

## 2019-05-31 RX ADMIN — PROPOFOL 20 MG: 10 INJECTION, EMULSION INTRAVENOUS at 09:26

## 2019-05-31 RX ADMIN — MEMANTINE HYDROCHLORIDE 10 MG: 5 TABLET, FILM COATED ORAL at 20:27

## 2019-05-31 RX ADMIN — SODIUM CHLORIDE 8 MG/HR: 9 INJECTION, SOLUTION INTRAVENOUS at 00:49

## 2019-05-31 RX ADMIN — AMIODARONE HYDROCHLORIDE 200 MG: 200 TABLET ORAL at 12:08

## 2019-05-31 RX ADMIN — PROPOFOL 30 MG: 10 INJECTION, EMULSION INTRAVENOUS at 09:49

## 2019-05-31 RX ADMIN — IPRATROPIUM BROMIDE AND ALBUTEROL SULFATE 1 AMPULE: .5; 3 SOLUTION RESPIRATORY (INHALATION) at 15:22

## 2019-05-31 RX ADMIN — MEMANTINE HYDROCHLORIDE 10 MG: 5 TABLET, FILM COATED ORAL at 12:08

## 2019-05-31 RX ADMIN — METOPROLOL SUCCINATE 25 MG: 25 TABLET, EXTENDED RELEASE ORAL at 20:27

## 2019-05-31 RX ADMIN — MOMETASONE FUROATE AND FORMOTEROL FUMARATE DIHYDRATE 2 PUFF: 200; 5 AEROSOL RESPIRATORY (INHALATION) at 21:19

## 2019-05-31 RX ADMIN — SODIUM CHLORIDE 8 MG/HR: 9 INJECTION, SOLUTION INTRAVENOUS at 11:21

## 2019-05-31 RX ADMIN — IPRATROPIUM BROMIDE AND ALBUTEROL SULFATE 1 AMPULE: .5; 3 SOLUTION RESPIRATORY (INHALATION) at 12:34

## 2019-05-31 RX ADMIN — SODIUM CHLORIDE 8 MG/HR: 9 INJECTION, SOLUTION INTRAVENOUS at 23:13

## 2019-05-31 RX ADMIN — Medication 10 ML: at 20:28

## 2019-05-31 ASSESSMENT — PAIN SCALES - GENERAL
PAINLEVEL_OUTOF10: 0

## 2019-05-31 ASSESSMENT — ENCOUNTER SYMPTOMS: SHORTNESS OF BREATH: 1

## 2019-05-31 NOTE — PROGRESS NOTES
1900: Patient handoff with [facility], [address]: Bedside handoff and 4 eyes skin assessment complete. Patient placed on bedpan. Patient tachypneic, tachycardic, BP elevated. Patient drinking golytley to prepare of colonoscopy in AM. NPO at midnight. Patient aware. A/O x4.   1945: Patient's son Bassem Hernandez updated. 2000: Patient refusing to get off bedpan, stating that she felt \"comfortable\". Patient educated on risks of pressure injuries and high risk of an injury if she continues to sit on the bedpan for an extended period of time. Patient told RN she would allow bedpan to be taken out from under patient at 2030.   2023: Call to Dr. Fatuma Castle office about concern for fluid overload with lung sounds and elevated vital signs. Patient requesting to be placed on Bipap. Patient tolerating well. Patient vital signs improving. 2027: Dr. Poole Overall on phone, order for 40 IV lasix. Patient continuing to drink golytely. Tolerating well. Patient's bed pad changes are Q15, and the pads are completely saturated at that time  2325: Patient lung sounds improving. Patient diuresed >1000mL after lasix. Pt VSS with Bipap.   0000: Patient made NPO. Patient had 240 mL of golytely remaining that was not drank. Patient stools are a watery brown color. 0027: Patient continuing to have large, yellow/brown bowel movements. Patient bed pad changed, gown changed. Pad full of stool to the point of stool pooling in the middle of pad. SCD's changed at this time due to saturation. 8907: Patient turned at this time, maddy area cleansed. Bowel movement sizes decreasing. Patient offered bath at this time. Patient refused. 1932: Call Dr. Susan Fox regarding patient's potassium level. Order for 60 MEQ PIV. Call to pharmacy for ok to hold synthroid until after colonoscopy. Ok given by Miriam Pharmacist.   0700: Handoff given to Ramo Herbert RN. Bedside handoff and 4 eyes skin assessment complete. Protonix gtt and potassium infusing. VSS.     Electronically signed by Branden Schumacher RN on 5/31/2019 at 7:12 AM  .

## 2019-05-31 NOTE — PLAN OF CARE
Problem: Risk for Impaired Skin Integrity  Goal: Tissue integrity - skin and mucous membranes  Description  Structural intactness and normal physiological function of skin and  mucous membranes. Outcome: Ongoing  Note:   Continuing to monitor and implement tissue perfusion promotion. Patient's tissue perfusion remains adequate to all fields. Vital signs elevated and labs stable. Problem: Falls - Risk of:  Goal: Will remain free from falls  Description  Will remain free from falls  Outcome: Ongoing  Note:   Pt is at risk for falls. Bed wheels locked and bed in lowest position. Pt reminded to call before getting out of bed. Call light within reach. Falling star system in place.     Goal: Absence of physical injury  Description  Absence of physical injury  Outcome: Ongoing   Electronically signed by Belkys Andrew RN on 5/30/2019 at 11:13 PM

## 2019-05-31 NOTE — PROGRESS NOTES
4 Eyes Skin Assessment     The patient is being assess for  Shift Handoff    I agree that 2 RN's have performed a thorough Head to Toe Skin Assessment on the patient. ALL assessment sites listed below have been assessed. Areas assessed by both nurses: carlos/kate  [x]   Head, Face, and Ears   [x]   Shoulders, Back, and Chest  [x]   Arms, Elbows, and Hands   [x]   Coccyx, Sacrum, and IschIum  [x]   Legs, Feet, and Heels        Does the Patient have Skin Breakdown?   No, PHAN Fuchs Prevention initiated:  Yes   Wound Care Orders initiated:  NA      [facility] nurse consulted for Pressure Injury (Stage 3,4, Unstageable, DTI, NWPT, and Complex wounds), New and Established Ostomies:  NA      Nurse 1 eSignature: Electronically signed by Kristin Goodwin RN on 5/31/19 at 7:10 AM    **SHARE this note so that the co-signing nurse is able to place an eSignature**    Nurse 2 eSignature: Electronically signed by Winston Arnett RN on 5/31/19 at 7:33 AM

## 2019-05-31 NOTE — PROGRESS NOTES
Pulmonary Progress Note    CC:  Follow up GI bleeding, Respiratory Failure    Subjective:  EGD negative yesterday  Back on BIPAP  Did get lasix yesterday  Hb is stable  Having chest pressure today    ROS  SOB  Chest pressure      Intake/Output Summary (Last 24 hours) at 5/31/2019 0834  Last data filed at 5/31/2019 0643  Gross per 24 hour   Intake 3168.4 ml   Output 2235 ml   Net 933.4 ml         PHYSICAL EXAM:  Blood pressure (!) 107/54, pulse 99, temperature 99.2 °F (37.3 °C), temperature source Oral, resp. rate 20, height 5' (1.524 m), weight 108 lb 0.4 oz (49 kg), SpO2 100 %, not currently breastfeeding.'  Gen: Lethargic. On bipap  Eyes: PERRL. No sclera icterus. No conjunctival injection. ENT: No discharge. Pharynx clear. External appearance of ears and nose normal.  Neck: Trachea midline. No obvious mass. Resp: Crackles (improved)  CV: Regular rate. Regular rhythm. No murmur or rub. GI: Non-tender. Non-distended. No hernia. Skin: Warm, dry, normal texture and turgor. No nodule on exposed extremities. Lymph: No cervical LAD. No supraclavicular LAD. M/S: No cyanosis. No clubbing. No joint deformity. Neuro: Moves all four extremities. CN 2-12 tested, no defect noted.   Ext:   no edema    Medications:    Scheduled Meds:   potassium chloride  10 mEq Intravenous Once    potassium chloride  10 mEq Intravenous Once    potassium chloride  10 mEq Intravenous Once    potassium chloride  10 mEq Intravenous Once    potassium phosphate IVPB  15 mmol Intravenous Once    magnesium sulfate  2 g Intravenous Once    metoprolol succinate  12.5 mg Oral BID    ipratropium-albuterol  1 ampule Inhalation Q4H WA    amiodarone  200 mg Oral Daily    mometasone-formoterol  2 puff Inhalation BID    latanoprost  1 drop Both Eyes Nightly    levothyroxine  75 mcg Oral Daily    memantine  10 mg Oral BID    sodium chloride flush  10 mL Intravenous 2 times per day       Continuous Infusions:   nitroGLYCERIN Stopped (19 0857)    pantoprozole (PROTONIX) infusion 8 mg/hr (19 0049)       PRN Meds:  albuterol sulfate HFA, sodium chloride flush, acetaminophen, ondansetron, melatonin    Labs:  CBC:   Recent Labs     19  0450  19  2353 19  0430 19  0508   WBC 9.0  --   --  13.8* 13.5*   HGB 9.4*   < > 8.9* 9.4* 9.1*   HCT 28.4*   < > 26.9* 28.2* 27.9*   MCV 89.8  --   --  88.8 90.9     --   --  140 128*    < > = values in this interval not displayed. BMP:   Recent Labs     19  0450 19  1748 19  0430 19  0508    146* 145 148*   K 3.7 3.3* 4.2 2.8*    106 107 106   CO2 23 24 27 28   PHOS 2.8  --  3.2 2.0*   BUN 33* 33* 32* 20   CREATININE 1.2 1.3* 1.4* 1.1     LIVER PROFILE:   Recent Labs     19  1807   AST 27   ALT 19   BILITOT 0.4   ALKPHOS 95     PT/INR:   Recent Labs     19  0450 19  1124 19  0430   PROTIME 41.9* 21.0* 16.3*   INR 3.68* 1.84* 1.43*     APTT:   Recent Labs     19   APTT 35.0     UA:No results for input(s): NITRITE, COLORU, PHUR, LABCAST, WBCUA, RBCUA, MUCUS, TRICHOMONAS, YEAST, BACTERIA, CLARITYU, SPECGRAV, LEUKOCYTESUR, UROBILINOGEN, BILIRUBINUR, BLOODU, GLUCOSEU, AMORPHOUS in the last 72 hours. Invalid input(s): Kin Sale  No results for input(s): PH, PCO2, PO2 in the last 72 hours.         Films:  Chest imaging reports were reviewed and imaging was reviewed by me and showed pulmonary edema    AB.44/38/135    Cultures:  None    I reviewed the labs and images listed above    Assessment:   · Acute Hypoxic/Hypercapnic Respiratory Failure with saturations less than 90% on room air and pH less than 7.35  · Acute Pulmonary Edema due to exogenous IV fluids, blood products etc  · Acute GI Bleeding  · Acute Blood loss anemia  · Coumadin Coagulopathy   · Pulmonary Hypertension:  Due to lung disease, heart disease and obstruction of PA in the right lung (noted on previous V/Q scan

## 2019-05-31 NOTE — OP NOTE
Endoscopy Note    Patient: Elsie Bellamy  : 1935  Acct#:     Procedure: Esophagogastroduodenoscopy with placement of video capsule                       Colonoscopy     Date:  2019     Surgeon:   Kathlen Cranker, MD    Referring Physician:  Briana Magallanes MD    Indications: The patient is a 80 y.o. female  who presents for colonoscopy +/- EGD with VCE due to melena and acute blood loss anemia, with EGD 19 without active bleeding or source of bleeding other than 2 mm non-bleeding angioectasia in the second portion of the duodenum. Postoperative Diagnosis:    1. 2 mm distal transverse colon polyp, not resected. 2. Scattered small mouth sigmoid diverticulosis without stigmata of bleeding. 3. Successful placement of video capsule into duodenal bulb  4. No evidence of active bleeding or source of bleeding idetified    Anesthesia:  MAC    Consent:  The patient or their legal guardian has signed a consent, and is aware of the potential risks, benefits, alternatives, and potential complications of this procedure. These include, but are not limited to hemorrhage, bleeding, post procedural pain, perforation, phlebitis, aspiration, hypotension, hypoxia, cardiovascular events such as arryhthmia, and possibly death. Additionally, the possibility of missed colonic polyps and interval colon cancer was discussed in the consent. Description of Procedure: The patient was then taken to the endoscopy suite, placed in the left lateral decubitus position and the above IV sedation was administrered. A digital rectal examination was performed and revealed negative without mass, lesions or tenderness. The Olympus video colonoscope was placed in the patient's rectum under digital direction and advanced to the cecum. The cecum was identified by characteristic anatomy and ballottment. The prep was fair, with brown stool that was not melenic or bloody seen in the colon.   The ileocecal transfuse to Hgb >7. No evidence of active bleeding at this time. 3) Ok to resume clear liquid diet today at 14:00, and regular diet 18:00 today. 4) Further recommendations regarding anticoagulation to follow result of video capsule endoscopy. No contraindication to low dose heparin gtt without bolus if necessary. 5) Can consider repeat colonoscopy for polypectomy of transverse colon polyp in 6 months as an outpatient, however given miniscule size of polyp and age/co-morbidities, further discussion about colorectal cancer screening can occur as an outpatient.     Normie Brittle, R Carne [address] [facility]

## 2019-05-31 NOTE — ANESTHESIA POSTPROCEDURE EVALUATION
Department of Anesthesiology  Postprocedure Note    Patient: Josephine Gutiérrez  MRN: [id number]  YOB: 1935  Date of evaluation: 5/31/2019  Time:  2:03 PM     Procedure Summary     Date:  05/31/19 Room / Location:  [facility] ENDOS[facility]    Anesthesia Start:  3109 Anesthesia Stop:  1913    Procedures:       COLONOSCOPY (N/A )      ESOPHAGOGASTRODUODENOSCOPY WITH CAPSULE PLACEMENT (N/A )      BOWEL SMALL CAPSULE ENDOSCOPY DEPLOYED VIA EGD Diagnosis:  (ANEMIA)    Surgeon:  Aneta Chau MD Responsible Provider:  Joyce Ng MD    Anesthesia Type:  MAC, TIVA ASA Status:  3          Anesthesia Type: MAC, TIVA    Balta Phase I:      Balta Phase II:      Last vitals: Reviewed and per EMR flowsheets.        Anesthesia Post Evaluation    Patient location during evaluation: PACU  Patient participation: complete - patient participated  Level of consciousness: awake and alert  Pain score: 1  Airway patency: patent  Nausea & Vomiting: no nausea and no vomiting  Complications: no  Cardiovascular status: blood pressure returned to baseline  Respiratory status: acceptable  Hydration status: euvolemic

## 2019-05-31 NOTE — PROGRESS NOTES
[address] [facility] Internal Medicine Note      Chief Complaint: The patient is sleeping soundly on bipap today    Subjective/Interval History:    Patient sleeping soundly on BiPAP today. Discussed with nursing, became more short of breath with GoLYTELY prep overnight. Responded well to IV Lasix. Potassium and phosphorus low today, being replaced. Patient not awake during exam today. Esophagogastroduodenoscopy unrevealing yesterday, colonoscopy planned for today. No other new problems overnight. Hemoglobin fairly stable. Review of systems not obtained this morning due to the patient sleeping. PMH, PSH, FH/SH reviewed and unchanged as documented in the H&P documented by me 19    Medication list reviewed    Objective:    BP (!) 107/54   Pulse 99   Temp 99.2 °F (37.3 °C) (Oral)   Resp 21   Ht 5' (1.524 m)   Wt 108 lb 0.4 oz (49 kg)   SpO2 94%   BMI 21.10 kg/m²   Temp  Av.1 °F (37.3 °C)  Min: 98.4 °F (36.9 °C)  Max: 99.7 °F (37.6 °C)    CV-irregularly irregular, rate uncontrolled today, 110-120 during this exam   Chest-respirations easy, on 2 L of oxygen, breath sounds clear throughout anteriorly/superiourly,   Abd-bowel sounds positive, soft, nontender, nondistended. Ext- no edema noted.     The Following Labs Were Reviewed Today:    Recent Results (from the past 24 hour(s))   Basic Metabolic Panel    Collection Time: 19  5:08 AM   Result Value Ref Range    Sodium 148 (H) 136 - 145 mmol/L    Potassium 2.8 (LL) 3.5 - 5.1 mmol/L    Chloride 106 99 - 110 mmol/L    CO2 28 21 - 32 mmol/L    Anion Gap 14 3 - 16    Glucose 85 70 - 99 mg/dL    BUN 20 7 - 20 mg/dL    CREATININE 1.1 0.6 - 1.2 mg/dL    GFR Non- 47 (A) >60    GFR  57 (A) >60    Calcium 8.6 8.3 - 10.6 mg/dL   CBC Auto Differential    Collection Time: 19  5:08 AM   Result Value Ref Range    WBC 13.5 (H) 4.0 - 11.0 K/uL    RBC 3.07 (L) 4.00 - 5.20 M/uL    Hemoglobin 9.1 (L) 12.0 - 16.0 g/dL Hematocrit 27.9 (L) 36.0 - 48.0 %    MCV 90.9 80.0 - 100.0 fL    MCH 29.8 26.0 - 34.0 pg    MCHC 32.7 31.0 - 36.0 g/dL    RDW 15.1 12.4 - 15.4 %    Platelets 597 (L) 603 - 450 K/uL    MPV 8.9 5.0 - 10.5 fL    Neutrophils % 90.5 %    Lymphocytes % 4.8 %    Monocytes % 4.2 %    Eosinophils % 0.1 %    Basophils % 0.4 %    Neutrophils # 12.2 (H) 1.7 - 7.7 K/uL    Lymphocytes # 0.6 (L) 1.0 - 5.1 K/uL    Monocytes # 0.6 0.0 - 1.3 K/uL    Eosinophils # 0.0 0.0 - 0.6 K/uL    Basophils # 0.1 0.0 - 0.2 K/uL   Magnesium    Collection Time: 05/31/19  5:08 AM   Result Value Ref Range    Magnesium 1.80 1.80 - 2.40 mg/dL   Phosphorus    Collection Time: 05/31/19  5:08 AM   Result Value Ref Range    Phosphorus 2.0 (L) 2.5 - 4.9 mg/dL       ASSESSMENT/PLAN:      Principal Problem:    UGI bleed- hemoglobin remains fairly stable. Patient with unrevealing esophagogastroduodenoscopy yesterday, colonoscopy for today. Active Problems:    Acute respiratory failure with hypoxia and hypercapnia, due to volume overload from mitral regurgitation- continue to diuresis with Lasix as needed. Continue support with BiPAP. Paroxysmal atrial fibrillation (HCC)-rapid ventricular response today. Continue to monitor. Anticoagulation decision still on hold. Chronic diastolic congestive heart failure ([facility] [address]- had been stable up until fluid volume challenge at admission. Plan to continue Lasix to maintain euvolemia. Severe mitral valve regurgitation will be a challenge to manage. Essential hypertension, benign-continue to monitor closely in the ICU setting. Hypothyroidism due to medication-TSH was therapeutic 3 months ago. Continue current supplementation. CKD (chronic kidney disease) stage 3, GFR 30-59 ml/min ([facility])-continue to monitor her creatinine. Acute blood loss anemia-exacerbated by coagulopathy from Coumadin.  Await finding of the colonoscopy      Time > 35 minutes reviewing chart and patient data, examining and interviewing patient, and discussing with nursing staff, family, etc.       Abhay Mcintosh MD, [address]  7:42 AM  5/31/2019

## 2019-05-31 NOTE — H&P
Pre-operative History and Physical    Patient: Gracie Henry  : 1935  Acct#:     Intended Procedure:  Colonoscopy + EGD with video capsule    HISTORY OF PRESENT ILLNESS:  The patient is a 80 y.o. female  who presents for colonoscopy +/- EGD with VCE due to melena and acute blood loss anemia, with EGD 19 without active bleeding or source of bleeding other than 2 mm non-bleeding angioectasia in the second portion of the duodenum. Past Medical History:        Diagnosis Date    Anemia     Anticoagulant long-term use     Atrial fibrillation ([address] 2011    Michael Damon CHF (congestive heart failure) [facility])     CKD (chronic kidney disease) stage 3, GFR 30-59 ml/min ([facility]) 2019    Clostridium difficile diarrhea 2016    Depression     Humerus fracture     Hyperlipidemia     Hypertension     Hypothyroidism     Mitral regurgitation     Optic neuritis     Osteopenia     Fosamax stopped 2014, had been treated at least since     Polymyalgia rheumatica ([address]     Pulmonary embolus ([address] 2011    Right pulmonary obstruction suspected to be possible chronic pulmonary embolus    Thyroid disease     Vitamin D deficiency      Past Surgical History:        Procedure Laterality Date    [facility]COPY  2016    [facility]    LUNG SURGERY      MALIGNANT SKIN LESION EXCISION      UPPER GASTROINTESTINAL ENDOSCOPY  2016    [facility]    UPPER GASTROINTESTINAL ENDOSCOPY N/A 2019    EGD POLYP ABLATION/OTHER performed by Chelsea Babin MD at [facility]     Medications Prior to Admission:   No current facility-administered medications on file prior to encounter.       Current Outpatient Medications on File Prior to Encounter   Medication Sig Dispense Refill    Fluticasone Furoate-Vilanterol (BREO ELLIPTA) 200-25 MCG/INH AEPB Inhale 1 puff into the lungs daily 3 each 1    Fluticasone Furoate-Vilanterol (BREO ELLIPTA) 200-25 MCG/INH AEPB Inhale 1 puff into the lungs daily 1 each 1    levothyroxine (SYNTHROID) 75 MCG tablet Take 1 tablet by mouth Daily 90 tablet 3    budesonide-formoterol (SYMBICORT) 160-4.5 MCG/ACT AERO Inhale 2 puffs into the lungs 2 times daily 1 Inhaler 0    Multiple Vitamins-Minerals (PRESERVISION AREDS 2+MULTI VIT PO) Take by mouth      simvastatin (ZOCOR) 20 MG tablet TAKE 1 TABLET EVERY DAY 90 tablet 3    amiodarone (CORDARONE) 200 MG tablet Take 1 tablet by mouth daily 90 tablet 3    memantine (NAMENDA) 10 MG tablet TAKE 1 TABLET TWICE DAILY 180 tablet 3    ipratropium (ATROVENT) 0.06 % nasal spray 2 sprays by Nasal route 4 times daily 1 Bottle 3    albuterol sulfate HFA (PROAIR HFA) 108 (90 Base) MCG/ACT inhaler Inhale 2 puffs into the lungs every 4 hours as needed for Wheezing or Shortness of Breath 3 Inhaler 1    furosemide (LASIX) 40 MG tablet Take 1 tablet by mouth daily 180 tablet 3    metoprolol succinate (TOPROL XL) 50 MG extended release tablet TAKE 1 TABLET BY MOUTH 2 TIMES DAILY 180 tablet 3    warfarin (COUMADIN) 5 MG tablet TAKE 2 TO 3 TABLETS EVERY DAY AS DIRECTED 270 tablet 3    latanoprost (XALATAN) 0.005 % ophthalmic solution Place 1 drop into both eyes nightly.  vitamin D (CHOLECALCIFEROL) 400 UNITS TABS tablet Take 400 Units by mouth daily.  calcium carbonate (OSCAL) 500 MG TABS tablet Take 500 mg by mouth daily. Allergies:  Ace inhibitors; Aricept [donepezil hydrochloride]; Benicar [olmesartan medoxomil]; Exelon [rivastigmine tartrate]; Pcn [penicillins]; Quinapril hcl; and Doxycycline    Social History:   TOBACCO:   reports that she quit smoking about 54 years ago. She has never used smokeless tobacco.  ETOH:   reports that she drinks alcohol. DRUGS:   reports that she does not use drugs.     PHYSICAL EXAM:      Vital Signs: BP (!) 107/54   Pulse 99   Temp 99.2 °F (37.3 °C) (Oral)   Resp 20   Ht 5' (1.524 m)   Wt 108 lb 0.4 oz (49 kg)   SpO2 100%   BMI 21.10 kg/m²    Airway: No stridor or wheezing noted.  Good air movement  Pulmonary: without wheezes. Clear to auscultation  Cardiac:regular rate and rhythm without loud murmurs  Abdomen:soft, nontender,  Bowel sounds present    Pre-Procedure Assessment / Plan:  1) Colonoscopy + EGD with VCE    ASA Grade:  ASA 3 - Patient with moderate systemic disease with functional limitations  Mallampati Classification:  Class III    Level of Sedation Plan:MAC    Post Procedure plan: Return to same level of care    I assessed the patient and find that the patient is in satisfactory condition to proceed with the planned procedure and sedation plan. I have explained the risk, benefits, and alternatives to the procedure; the patient understands and agrees to proceed.        Gregory Persaud MD  5/31/2019

## 2019-05-31 NOTE — ANESTHESIA PRE PROCEDURE
[facility] Department of Anesthesiology  Pre-Anesthesia Evaluation/Consultation       Name:  Earney Hammans  : 1935  Age:  80 y. o.                                            MRN:  [id number]  Date: 2019           Surgeon: Surgeon(s):  Robin Sosa MD    Procedure: Procedure(s):  COLONOSCOPY WITH POSSIBLE ESOPHAGOGASTRODUODENOSCOPY     Allergies   Allergen Reactions    Ace Inhibitors      coughing    Aricept [Donepezil Hydrochloride]      Can not recall    Benicar [Olmesartan Medoxomil]      Can not recall      Exelon [Rivastigmine Tartrate]      Can not recall      Pcn [Penicillins] Hives and Swelling    Quinapril Hcl      Can not recall      Doxycycline Nausea And Vomiting     Patient Active Problem List   Diagnosis    Disorder of bone and cartilage    Edema    Essential hypertension, benign    Other and unspecified hyperlipidemia    Hypothyroidism due to medication    Paroxysmal atrial fibrillation (HCC)    Anemia    Vitamin D deficiency    Fatigue    Osteopenia    Chronic diastolic congestive heart failure (HCC)    Non-rheumatic mitral regurgitation    Pulmonary hypertension    Weight loss    Abnormal chest x-ray    S/P right heart catheterization    Chronic atrial fibrillation (HCC)    Dyspnea    Hx of venous thromboembolic disease    CKD (chronic kidney disease) stage 3, GFR 30-59 ml/min (HCC)    UGI bleed    Supratherapeutic INR    Acute blood loss anemia    Coagulopathy (HCC)    Acute pulmonary edema (HCC)    Acute respiratory failure with hypoxia and hypercapnia (HCC)     Past Medical History:   Diagnosis Date    Anemia     Anticoagulant long-term use     Atrial fibrillation ([facility] 2011    Hawa Blakely CHF (congestive heart failure) (HCC)     CKD (chronic kidney disease) stage 3, GFR 30-59 ml/min (HCC) 2019    Clostridium difficile diarrhea 2016    Depression     Humerus fracture     Hyperlipidemia     Hypertension     Hypothyroidism     Mitral regurgitation     Optic neuritis     Osteopenia     Fosamax stopped 2014, had been treated at least since     [facility]a [facility]     Pulmonary embolus ([facility] 2011    Right pulmonary obstruction suspected to be possible chronic pulmonary embolus    Thyroid disease     Vitamin D deficiency      Past Surgical History:   Procedure Laterality Date    [facility]    LUNG SURGERY      MALIGNANT SKIN LESION EXCISION      UPPER GASTROINTESTINAL ENDOSCOPY  2016    [facility]    UPPER GASTROINTESTINAL ENDOSCOPY N/A 2019    EGD POLYP ABLATION/OTHER performed by Martir Jacobson MD at [address] [place] [address] History     Tobacco Use    Smoking status: Former Smoker     Last attempt to quit: 1965     Years since quittin.4    Smokeless tobacco: Never Used   Substance Use Topics    Alcohol use: Yes     Comment: occ    Drug use: No     Medications  Current Facility-Administered Medications on File Prior to Visit   Medication Dose Route Frequency Provider Last Rate Last Dose    potassium chloride 10 mEq/100 mL IVPB (Peripheral Line)  10 mEq Intravenous Once Justice Records, MD        potassium chloride 10 mEq/100 mL IVPB (Peripheral Line)  10 mEq Intravenous Once Justice Records, MD        potassium chloride 10 mEq/100 mL IVPB (Peripheral Line)  10 mEq Intravenous Once Justice Records, MD        potassium chloride 10 mEq/100 mL IVPB (Peripheral Line)  10 mEq Intravenous Once Justice Records, MD        potassium phosphate 15 mmol in dextrose 5 % 250 mL IVPB  15 mmol Intravenous Once Jayme Lopez MD        magnesium sulfate 2 g in 50 mL IVPB premix  2 g Intravenous Once Carmel Sheffield, APRN - CNP        metoprolol succinate (TOPROL XL) extended release tablet 12.5 mg  12.5 mg Oral BID Marley Jerome MD   12.5 mg at 19    ipratropium-albuterol (DUONEB) nebulizer solution 1 ampule  1 ampule Inhalation Q4H RENU Finn Loretta Garsia, DO   1 ampule at 05/31/19 0825    albuterol sulfate  (90 Base) MCG/ACT inhaler 2 puff  2 puff Inhalation Q4H PRN Vi Zhou MD   2 puff at 05/29/19 0926    amiodarone (CORDARONE) tablet 200 mg  200 mg Oral Daily Vi Zhou MD   200 mg at 05/30/19 0857    mometasone-formoterol (DULERA) 200-5 MCG/ACT inhaler 2 puff  2 puff Inhalation BID Vi Zhou MD   2 puff at 05/31/19 0825    latanoprost (XALATAN) 0.005 % ophthalmic solution 1 drop  1 drop Both Eyes Nightly Vi Zhou MD   1 drop at 05/30/19 2048    levothyroxine (SYNTHROID) tablet 75 mcg  75 mcg Oral Daily Vi Zhou MD   Stopped at 05/31/19 0658    memantine (NAMENDA) tablet 10 mg  10 mg Oral BID Vi Zhou MD   10 mg at 05/30/19 2045    sodium chloride flush 0.9 % injection 10 mL  10 mL Intravenous 2 times per day Vi Zhou MD   10 mL at 05/30/19 2115    sodium chloride flush 0.9 % injection 10 mL  10 mL Intravenous PRN Vi Zhou MD        acetaminophen (TYLENOL) tablet 650 mg  650 mg Oral Q4H PRN Vi Zhou MD        ondansetron [facility]) injection 4 mg  4 mg Intravenous Q6H PRN Vi Zhou MD        pantoprazole (PROTONIX) 80 mg in sodium chloride 0.9 % 100 mL infusion  8 mg/hr Intravenous Continuous Vi Zhou MD 10 mL/hr at 05/31/19 0049 8 mg/hr at 05/31/19 0049    melatonin tablet 3 mg  3 mg Oral Nightly PRN Vi Zhou MD   3 mg at 05/28/19 2339     Current Outpatient Medications on File Prior to Visit   Medication Sig Dispense Refill    Fluticasone Furoate-Vilanterol (BREO ELLIPTA) 200-25 MCG/INH AEPB Inhale 1 puff into the lungs daily 3 each 1    Fluticasone Furoate-Vilanterol (BREO ELLIPTA) 200-25 MCG/INH AEPB Inhale 1 puff into the lungs daily 1 each 1    levothyroxine (SYNTHROID) 75 MCG tablet Take 1 tablet by mouth Daily 90 tablet 3    budesonide-formoterol (SYMBICORT) 160-4.5 MCG/ACT AERO Inhale 2 puffs into the lungs 2 times daily 1 Inhaler 0    Multiple Vitamins-Minerals (PRESERVISION AREDS 2+MULTI VIT PO) Take by mouth      simvastatin (ZOCOR) 20 MG tablet TAKE 1 TABLET EVERY DAY 90 tablet 3    amiodarone (CORDARONE) 200 MG tablet Take 1 tablet by mouth daily 90 tablet 3    memantine (NAMENDA) 10 MG tablet TAKE 1 TABLET TWICE DAILY 180 tablet 3    ipratropium (ATROVENT) 0.06 % nasal spray 2 sprays by Nasal route 4 times daily 1 Bottle 3    albuterol sulfate HFA (PROAIR HFA) 108 (90 Base) MCG/ACT inhaler Inhale 2 puffs into the lungs every 4 hours as needed for Wheezing or Shortness of Breath 3 Inhaler 1    furosemide (LASIX) 40 MG tablet Take 1 tablet by mouth daily 180 tablet 3    metoprolol succinate (TOPROL XL) 50 MG extended release tablet TAKE 1 TABLET BY MOUTH 2 TIMES DAILY 180 tablet 3    warfarin (COUMADIN) 5 MG tablet TAKE 2 TO 3 TABLETS EVERY DAY AS DIRECTED 270 tablet 3    latanoprost (XALATAN) 0.005 % ophthalmic solution Place 1 drop into both eyes nightly.  vitamin D (CHOLECALCIFEROL) 400 UNITS TABS tablet Take 400 Units by mouth daily.  calcium carbonate (OSCAL) 500 MG TABS tablet Take 500 mg by mouth daily. No current facility-administered medications for this visit. No current outpatient medications on file.      Facility-Administered Medications Ordered in Other Visits   Medication Dose Route Frequency Provider Last Rate Last Dose    potassium chloride 10 mEq/100 mL IVPB (Peripheral Line)  10 mEq Intravenous Once Elayne Myers MD        potassium chloride 10 mEq/100 mL IVPB (Peripheral Line)  10 mEq Intravenous Once Elayne Myers MD        potassium chloride 10 mEq/100 mL IVPB (Peripheral Line)  10 mEq Intravenous Once Elayne Myers MD        potassium chloride 10 mEq/100 mL IVPB (Peripheral Line)  10 mEq Intravenous Once Elayne Myers MD        potassium phosphate 15 mmol in dextrose 5 % 250 mL IVPB  15 mmol Intravenous Once Delorse Bence, MD        magnesium sulfate 2 g in 50 mL IVPB premix  2 g Intravenous Once Aries Luo, APRN - CNP  metoprolol succinate (TOPROL XL) extended release tablet 12.5 mg  12.5 mg Oral BID Elias Mc MD   12.5 mg at 19    ipratropium-albuterol (DUONEB) nebulizer solution 1 ampule  1 ampule Inhalation Q4H RENU Green DO   1 ampule at 19 0825    albuterol sulfate  (90 Base) MCG/ACT inhaler 2 puff  2 puff Inhalation Q4H PRN Esha Rothman MD   2 puff at 19 0926    amiodarone (CORDARONE) tablet 200 mg  200 mg Oral Daily Esha Rothman MD   200 mg at 19 0857    mometasone-formoterol (DULERA) 200-5 MCG/ACT inhaler 2 puff  2 puff Inhalation BID Esha Rothman MD   2 puff at 19 0825    latanoprost (XALATAN) 0.005 % ophthalmic solution 1 drop  1 drop Both Eyes Nightly Esha Rothman MD   1 drop at 19    levothyroxine (SYNTHROID) tablet 75 mcg  75 mcg Oral Daily Esha Rothman MD   Stopped at 19 0658    memantine (NAMENDA) tablet 10 mg  10 mg Oral BID Esha Rothman MD   10 mg at 19    sodium chloride flush 0.9 % injection 10 mL  10 mL Intravenous 2 times per day Esha Rothman MD   10 mL at 19    sodium chloride flush 0.9 % injection 10 mL  10 mL Intravenous PRN Esha Rothman MD        acetaminophen (TYLENOL) tablet 650 mg  650 mg Oral Q4H PRN Esha Rothman MD        ondansetron [facility]) injection 4 mg  4 mg Intravenous Q6H PRN Esha Rothman MD        pantoprazole (PROTONIX) 80 mg in sodium chloride 0.9 % 100 mL infusion  8 mg/hr Intravenous Continuous Esha Rothman MD 10 mL/hr at 19 0049 8 mg/hr at 19 0049    melatonin tablet 3 mg  3 mg Oral Nightly PRN Esha Rothman MD   3 mg at 19 2339     Vital Signs (Current)   There were no vitals filed for this visit.   Vital Signs Statistics (for past 48 hrs)     Temp  Av.6 °F (37 °C)  Min: 97.6 °F (36.4 °C)   Min taken time: 19 1700  Max: 99.7 °F (37.6 °C)   Max taken time: 19  Pulse  Av.2  Min: 61   Min taken time: 19 0202  Max: 127   Max taken time: 19 0900  Resp  Av.1  Min: 15   Min taken time: 19 2202  Max: 40   Max taken time: 19 2101  BP  Min: 83/34   Min taken time: 19 0300  Max: 187/71   Max taken time: 19 1014  SpO2  Av.5 %  Min: 91 %   Min taken time: 19 2300  Max: 100 %   Max taken time: 19 0828    BP Readings from Last 3 Encounters:   19 (!) 107/54   19 (!) 108/51   19 (!) 160/64     BMI  There is no height or weight on file to calculate BMI. Estimated body mass index is 21.1 kg/m² as calculated from the following:    Height as of 19: 5' (1.524 m). Weight as of an earlier encounter on 19: 108 lb 0.4 oz (49 kg).     CBC   Lab Results   Component Value Date    WBC 13.5 2019    RBC 3.07 2019    HGB 9.1 2019    HCT 27.9 2019    MCV 90.9 2019    RDW 15.1 2019     2019     CMP    Lab Results   Component Value Date     2019    K 2.8 2019    K 3.7 2019     2019    CO2 28 2019    BUN 20 2019    CREATININE 1.1 2019    GFRAA 57 2019    GFRAA >60 2013    AGRATIO 1.0 2019    LABGLOM 47 2019    GLUCOSE 85 2019    PROT 7.3 2019    PROT 6.8 10/26/2012    CALCIUM 8.6 2019    BILITOT 0.4 2019    ALKPHOS 95 2019    AST 27 2019    ALT 19 2019     BMP    Lab Results   Component Value Date     2019    K 2.8 2019    K 3.7 2019     2019    CO2 28 2019    BUN 20 2019    CREATININE 1.1 2019    CALCIUM 8.6 2019    GFRAA 57 2019    GFRAA >60 2013    LABGLOM 47 2019    GLUCOSE 85 2019     POCGlucose  Recent Labs     19  1748 19  0430 19  0508   GLUCOSE 132* 94 85      Coags    Lab Results   Component Value Date    PROTIME 16.3 2019    PROTIME 14.5 2011    INR 1.43 2019    APTT 35.0 2019     HCG (If Applicable) No results found for: PREGTESTUR, PREGSERUM, HCG, HCGQUANT   ABGs   Lab Results   Component Value Date    PHART 7.436 05/29/2019    PO2ART 135.0 05/29/2019    LKZ8YVB 38.3 05/29/2019    USX0AQU 25.3 05/29/2019    BEART 1.6 05/29/2019    K1YKISWA 98.9 05/29/2019      Type & Screen (If Applicable)  No results found for: LABABO, LABRH                         BMI: Wt Readings from Last 3 Encounters:       NPO Status:  >8h                          Anesthesia Evaluation  Patient summary reviewed no history of anesthetic complications:   Airway: Mallampati: II  TM distance: >3 FB   Neck ROM: full  Mouth opening: > = 3 FB Dental:          Pulmonary:   (+) shortness of breath:  decreased breath sounds,      (-) COPD and asthma                           Cardiovascular:  Exercise tolerance: poor (<4 METS),   (+) hypertension:, CHF:,     (-) pacemaker, past MI and  angina      Rhythm: regular  Rate: normal                    Neuro/Psych:   (+) psychiatric history:   (-) seizures, TIA and CVA           GI/Hepatic/Renal:   (+) PUD,           Endo/Other:    (+) hypothyroidism, blood dyscrasia: anticoagulation therapy, arthritis:., .                 Abdominal:           Vascular:     - DVT and PE. Anesthesia Plan      MAC and TIVA     ASA 3       Induction: intravenous. Anesthetic plan and risks discussed with patient. Plan discussed with CRNA. This pre-anesthesia assessment may be used as a history and physical.    DOS STAFF ADDENDUM:    Pt seen and examined, chart reviewed (including anesthesia, drug and allergy history). No interval changes to history and physical examination. Anesthetic plan, risks, benefits, alternatives, and personnel involved discussed with patient. Patient verbalized an understanding and agrees to proceed.       Karson Jackson MD  May 31, 2019  8:36 AM

## 2019-05-31 NOTE — CARE COORDINATION
Met w/pt to address barriers to dc. HOME: Pt lives alone in a [address] MILDR[address], 3 story condo with elevator. Pt was independent PTA w/o AD. Pt drives. Pt has a cleaning lady (not through COA)    DME: [facility]    ACTIVE SERVICES: none    Discussed COA-pt doesn't have a lifeline and may need help at dc.  She is agreeable to a referral.  I sent one online    TRANSPORTATION: son (here from [place])    [address] [place] order denies difficulty obtaining/taking meds    PCP: Toby Vo: verified address [facility] [address]/phone number as correct    INSURANCE:  Jennifer JAVIER    HD/PD/O2: no    THERAPY RECS: not ordered but pt would benefit from evals, just off bipap today-sticky note to md    PLAN: home pending progress, COA referral made    Evan Brock, RN  Case management  [phone]

## 2019-06-01 LAB
ANION GAP SERPL CALCULATED.3IONS-SCNC: 10 MMOL/L (ref 3–16)
ANION GAP SERPL CALCULATED.3IONS-SCNC: 10 MMOL/L (ref 3–16)
BASOPHILS ABSOLUTE: 0 K/UL (ref 0–0.2)
BASOPHILS ABSOLUTE: 0 K/UL (ref 0–0.2)
BASOPHILS RELATIVE PERCENT: 0.3 %
BASOPHILS RELATIVE PERCENT: 0.3 %
BUN BLDV-MCNC: 21 MG/DL (ref 7–20)
BUN BLDV-MCNC: 22 MG/DL (ref 7–20)
CALCIUM SERPL-MCNC: 8.2 MG/DL (ref 8.3–10.6)
CALCIUM SERPL-MCNC: 8.3 MG/DL (ref 8.3–10.6)
CHLORIDE BLD-SCNC: 104 MMOL/L (ref 99–110)
CHLORIDE BLD-SCNC: 104 MMOL/L (ref 99–110)
CO2: 28 MMOL/L (ref 21–32)
CO2: 29 MMOL/L (ref 21–32)
CREAT SERPL-MCNC: 1.2 MG/DL (ref 0.6–1.2)
CREAT SERPL-MCNC: 1.2 MG/DL (ref 0.6–1.2)
EOSINOPHILS ABSOLUTE: 0.1 K/UL (ref 0–0.6)
EOSINOPHILS ABSOLUTE: 0.1 K/UL (ref 0–0.6)
EOSINOPHILS RELATIVE PERCENT: 0.9 %
EOSINOPHILS RELATIVE PERCENT: 0.9 %
GFR AFRICAN AMERICAN: 52
GFR AFRICAN AMERICAN: 52
GFR NON-AFRICAN AMERICAN: 43
GFR NON-AFRICAN AMERICAN: 43
GLUCOSE BLD-MCNC: 107 MG/DL (ref 70–99)
GLUCOSE BLD-MCNC: 108 MG/DL (ref 70–99)
HCT VFR BLD CALC: 26.2 % (ref 36–48)
HCT VFR BLD CALC: 26.9 % (ref 36–48)
HEMOGLOBIN: 8.7 G/DL (ref 12–16)
HEMOGLOBIN: 8.8 G/DL (ref 12–16)
LYMPHOCYTES ABSOLUTE: 0.7 K/UL (ref 1–5.1)
LYMPHOCYTES ABSOLUTE: 0.7 K/UL (ref 1–5.1)
LYMPHOCYTES RELATIVE PERCENT: 5.4 %
LYMPHOCYTES RELATIVE PERCENT: 5.6 %
MAGNESIUM: 2 MG/DL (ref 1.8–2.4)
MCH RBC QN AUTO: 29 PG (ref 26–34)
MCH RBC QN AUTO: 29.6 PG (ref 26–34)
MCHC RBC AUTO-ENTMCNC: 32.6 G/DL (ref 31–36)
MCHC RBC AUTO-ENTMCNC: 33.2 G/DL (ref 31–36)
MCV RBC AUTO: 89.2 FL (ref 80–100)
MCV RBC AUTO: 89.2 FL (ref 80–100)
MONOCYTES ABSOLUTE: 0.5 K/UL (ref 0–1.3)
MONOCYTES ABSOLUTE: 0.6 K/UL (ref 0–1.3)
MONOCYTES RELATIVE PERCENT: 3.9 %
MONOCYTES RELATIVE PERCENT: 4.4 %
NEUTROPHILS ABSOLUTE: 11.8 K/UL (ref 1.7–7.7)
NEUTROPHILS ABSOLUTE: 11.9 K/UL (ref 1.7–7.7)
NEUTROPHILS RELATIVE PERCENT: 89 %
NEUTROPHILS RELATIVE PERCENT: 89.3 %
PDW BLD-RTO: 15.3 % (ref 12.4–15.4)
PDW BLD-RTO: 15.5 % (ref 12.4–15.4)
PHOSPHORUS: 2.2 MG/DL (ref 2.5–4.9)
PLATELET # BLD: 146 K/UL (ref 135–450)
PLATELET # BLD: 147 K/UL (ref 135–450)
PMV BLD AUTO: 8.8 FL (ref 5–10.5)
PMV BLD AUTO: 8.9 FL (ref 5–10.5)
POTASSIUM SERPL-SCNC: 3.4 MMOL/L (ref 3.5–5.1)
POTASSIUM SERPL-SCNC: 3.4 MMOL/L (ref 3.5–5.1)
RBC # BLD: 2.94 M/UL (ref 4–5.2)
RBC # BLD: 3.02 M/UL (ref 4–5.2)
SODIUM BLD-SCNC: 142 MMOL/L (ref 136–145)
SODIUM BLD-SCNC: 143 MMOL/L (ref 136–145)
WBC # BLD: 13.2 K/UL (ref 4–11)
WBC # BLD: 13.4 K/UL (ref 4–11)

## 2019-06-01 PROCEDURE — 97166 OT EVAL MOD COMPLEX 45 MIN: CPT

## 2019-06-01 PROCEDURE — 83735 ASSAY OF MAGNESIUM: CPT

## 2019-06-01 PROCEDURE — 2500000003 HC RX 250 WO HCPCS: Performed by: NURSE PRACTITIONER

## 2019-06-01 PROCEDURE — 2580000003 HC RX 258: Performed by: INTERNAL MEDICINE

## 2019-06-01 PROCEDURE — 6360000002 HC RX W HCPCS: Performed by: NURSE PRACTITIONER

## 2019-06-01 PROCEDURE — 99233 SBSQ HOSP IP/OBS HIGH 50: CPT | Performed by: INTERNAL MEDICINE

## 2019-06-01 PROCEDURE — 2700000000 HC OXYGEN THERAPY PER DAY

## 2019-06-01 PROCEDURE — 97116 GAIT TRAINING THERAPY: CPT

## 2019-06-01 PROCEDURE — 6370000000 HC RX 637 (ALT 250 FOR IP): Performed by: INTERNAL MEDICINE

## 2019-06-01 PROCEDURE — 94640 AIRWAY INHALATION TREATMENT: CPT

## 2019-06-01 PROCEDURE — 97162 PT EVAL MOD COMPLEX 30 MIN: CPT

## 2019-06-01 PROCEDURE — 2000000000 HC ICU R&B

## 2019-06-01 PROCEDURE — 2500000003 HC RX 250 WO HCPCS: Performed by: INTERNAL MEDICINE

## 2019-06-01 PROCEDURE — 6370000000 HC RX 637 (ALT 250 FOR IP): Performed by: NURSE PRACTITIONER

## 2019-06-01 PROCEDURE — 84100 ASSAY OF PHOSPHORUS: CPT

## 2019-06-01 PROCEDURE — APPNB15 APP NON BILLABLE TIME 0-15 MINS: Performed by: NURSE PRACTITIONER

## 2019-06-01 PROCEDURE — 85025 COMPLETE CBC W/AUTO DIFF WBC: CPT

## 2019-06-01 PROCEDURE — 80048 BASIC METABOLIC PNL TOTAL CA: CPT

## 2019-06-01 PROCEDURE — C9113 INJ PANTOPRAZOLE SODIUM, VIA: HCPCS | Performed by: INTERNAL MEDICINE

## 2019-06-01 PROCEDURE — 99222 1ST HOSP IP/OBS MODERATE 55: CPT | Performed by: NURSE PRACTITIONER

## 2019-06-01 PROCEDURE — 94762 N-INVAS EAR/PLS OXIMTRY CONT: CPT

## 2019-06-01 PROCEDURE — 94760 N-INVAS EAR/PLS OXIMETRY 1: CPT

## 2019-06-01 PROCEDURE — 36415 COLL VENOUS BLD VENIPUNCTURE: CPT

## 2019-06-01 PROCEDURE — 6360000002 HC RX W HCPCS: Performed by: INTERNAL MEDICINE

## 2019-06-01 PROCEDURE — 2580000003 HC RX 258: Performed by: NURSE PRACTITIONER

## 2019-06-01 PROCEDURE — 97535 SELF CARE MNGMENT TRAINING: CPT

## 2019-06-01 PROCEDURE — 97530 THERAPEUTIC ACTIVITIES: CPT

## 2019-06-01 RX ORDER — PANTOPRAZOLE SODIUM 40 MG/1
40 TABLET, DELAYED RELEASE ORAL
Status: DISCONTINUED | OUTPATIENT
Start: 2019-06-02 | End: 2019-06-06 | Stop reason: HOSPADM

## 2019-06-01 RX ORDER — POTASSIUM CHLORIDE 20 MEQ/1
40 TABLET, EXTENDED RELEASE ORAL ONCE
Status: DISCONTINUED | OUTPATIENT
Start: 2019-06-02 | End: 2019-06-01

## 2019-06-01 RX ORDER — FUROSEMIDE 10 MG/ML
40 INJECTION INTRAMUSCULAR; INTRAVENOUS 2 TIMES DAILY
Status: DISCONTINUED | OUTPATIENT
Start: 2019-06-01 | End: 2019-06-02

## 2019-06-01 RX ADMIN — MOMETASONE FUROATE AND FORMOTEROL FUMARATE DIHYDRATE 2 PUFF: 200; 5 AEROSOL RESPIRATORY (INHALATION) at 07:38

## 2019-06-01 RX ADMIN — AMIODARONE HYDROCHLORIDE 200 MG: 200 TABLET ORAL at 07:51

## 2019-06-01 RX ADMIN — FUROSEMIDE 40 MG: 10 INJECTION, SOLUTION INTRAMUSCULAR; INTRAVENOUS at 13:06

## 2019-06-01 RX ADMIN — IPRATROPIUM BROMIDE AND ALBUTEROL SULFATE 1 AMPULE: .5; 3 SOLUTION RESPIRATORY (INHALATION) at 19:36

## 2019-06-01 RX ADMIN — MEMANTINE HYDROCHLORIDE 10 MG: 5 TABLET, FILM COATED ORAL at 21:08

## 2019-06-01 RX ADMIN — POTASSIUM PHOSPHATE, MONOBASIC AND POTASSIUM PHOSPHATE, DIBASIC 20 MMOL: 224; 236 INJECTION, SOLUTION, CONCENTRATE INTRAVENOUS at 09:42

## 2019-06-01 RX ADMIN — MOMETASONE FUROATE AND FORMOTEROL FUMARATE DIHYDRATE 2 PUFF: 200; 5 AEROSOL RESPIRATORY (INHALATION) at 19:36

## 2019-06-01 RX ADMIN — MEMANTINE HYDROCHLORIDE 10 MG: 5 TABLET, FILM COATED ORAL at 07:51

## 2019-06-01 RX ADMIN — SODIUM CHLORIDE 8 MG/HR: 9 INJECTION, SOLUTION INTRAVENOUS at 09:42

## 2019-06-01 RX ADMIN — POTASSIUM PHOSPHATE, MONOBASIC AND POTASSIUM PHOSPHATE, DIBASIC 20 MMOL: 224; 236 INJECTION, SOLUTION, CONCENTRATE INTRAVENOUS at 21:05

## 2019-06-01 RX ADMIN — IPRATROPIUM BROMIDE AND ALBUTEROL SULFATE 1 AMPULE: .5; 3 SOLUTION RESPIRATORY (INHALATION) at 11:40

## 2019-06-01 RX ADMIN — Medication 10 ML: at 21:04

## 2019-06-01 RX ADMIN — METOPROLOL SUCCINATE 25 MG: 25 TABLET, EXTENDED RELEASE ORAL at 07:50

## 2019-06-01 RX ADMIN — IPRATROPIUM BROMIDE AND ALBUTEROL SULFATE 1 AMPULE: .5; 3 SOLUTION RESPIRATORY (INHALATION) at 07:37

## 2019-06-01 RX ADMIN — LATANOPROST 1 DROP: 50 SOLUTION OPHTHALMIC at 21:09

## 2019-06-01 RX ADMIN — IPRATROPIUM BROMIDE AND ALBUTEROL SULFATE 1 AMPULE: .5; 3 SOLUTION RESPIRATORY (INHALATION) at 15:34

## 2019-06-01 RX ADMIN — Medication 10 ML: at 09:00

## 2019-06-01 RX ADMIN — FUROSEMIDE 40 MG: 10 INJECTION, SOLUTION INTRAMUSCULAR; INTRAVENOUS at 18:36

## 2019-06-01 RX ADMIN — LEVOTHYROXINE SODIUM 75 MCG: 75 TABLET ORAL at 06:00

## 2019-06-01 ASSESSMENT — PAIN SCALES - GENERAL: PAINLEVEL_OUTOF10: 0

## 2019-06-01 NOTE — PROGRESS NOTES
2010: Pt alert and oriented x4. Pt taken off BIPAP and placed on oxygen at 2L/m. Pt ate her dinner. Pt's son at bed side. Updated with plan of care. 2130: Pt put back on BIPAP. 2355: Pt taken off BIPAP per pt request and placed on oxygen 2L/m per NC.     0600: Pt awake; am meds given and back to sleep.     0716: Shift hand off report given oncoming Sheila Wolfe RN.

## 2019-06-01 NOTE — PROGRESS NOTES
INPATIENT CONSULTATION:    IDENTIFYING DATA/REASON FOR CONSULTATION   PATIENT:  Anderson Mccormick  MRN:  [id number]  ADMIT DATE: 5/28/2019  TIME OF EVALUATION: 6/1/2019 11:20 AM  HOSPITAL STAY:   LOS: 4 days   CONSULTING PHYSICIAN: Joao Salcedo, *   REASON FOR CONSULTATION: Melena    Subjective:    Patient seen and examined in follow-up. Patient reports no further episodes of bleeding. Tolerated colonoscopy + EGD with video capsule placement. Feels fatigued. Off Bipap, remains in ICU awaiting transfer to the floor. Tolerating PO.    MEDICATIONS   SCHEDULED:    potassium phosphate IVPB 20 mmol Once   potassium phosphate IVPB 20 mmol Once   metoprolol succinate 25 mg BID   ipratropium-albuterol 1 ampule Q4H WA   amiodarone 200 mg Daily   mometasone-formoterol 2 puff BID   latanoprost 1 drop Nightly   levothyroxine 75 mcg Daily   memantine 10 mg BID   sodium chloride flush 10 mL 2 times per day     FLUIDS/DRIPS:     pantoprozole (PROTONIX) infusion 8 mg/hr (06/01/19 0942)     PRNs:   fentanNYL 25 mcg Q5 Min PRN   fentanNYL 50 mcg Q5 Min PRN   albuterol sulfate HFA 2 puff Q4H PRN   sodium chloride flush 10 mL PRN   acetaminophen 650 mg Q4H PRN   ondansetron 4 mg Q6H PRN   melatonin 3 mg Nightly PRN     ALLERGIES:    Allergies   Allergen Reactions    Ace Inhibitors      coughing    Aricept [Donepezil Hydrochloride]      Can not recall    Benicar [Olmesartan Medoxomil]      Can not recall      Exelon [Rivastigmine Tartrate]      Can not recall      Pcn [Penicillins] Hives and Swelling    Quinapril Hcl      Can not recall      Doxycycline Nausea And Vomiting         PHYSICAL EXAM     Vitals:    06/01/19 0739 06/01/19 0800 06/01/19 1000 06/01/19 1100   BP:  (!) 110/53 (!) 96/43 (!) 106/57   Pulse:  125 102 112   Resp: 28 (!) 32 25 25   Temp:  98.6 °F (37 °C)     TempSrc:  Oral     SpO2: 100% 96%     Weight:       Height:           I/O last 3 completed shifts: In: 1971 [P.O.:600;  I.V.:1371]  Out: 1615 Monocytes # 0.6 0.0 - 1.3 K/uL    Eosinophils # 0.1 0.0 - 0.6 K/uL    Basophils # 0.0 0.0 - 0.2 K/uL   Magnesium    Collection Time: 19  5:02 AM   Result Value Ref Range    Magnesium 2.00 1.80 - 2.40 mg/dL   Phosphorus    Collection Time: 19  5:02 AM   Result Value Ref Range    Phosphorus 2.2 (L) 2.5 - 4.9 mg/dL   CBC Auto Differential    Collection Time: 19  5:02 AM   Result Value Ref Range    WBC 13.2 (H) 4.0 - 11.0 K/uL    RBC 2.94 (L) 4.00 - 5.20 M/uL    Hemoglobin 8.7 (L) 12.0 - 16.0 g/dL    Hematocrit 26.2 (L) 36.0 - 48.0 %    MCV 89.2 80.0 - 100.0 fL    MCH 29.6 26.0 - 34.0 pg    MCHC 33.2 31.0 - 36.0 g/dL    RDW 15.3 12.4 - 15.4 %    Platelets 690 441 - 913 K/uL    MPV 8.8 5.0 - 10.5 fL    Neutrophils % 89.3 %    Lymphocytes % 5.6 %    Monocytes % 3.9 %    Eosinophils % 0.9 %    Basophils % 0.3 %    Neutrophils # 11.8 (H) 1.7 - 7.7 K/uL    Lymphocytes # 0.7 (L) 1.0 - 5.1 K/uL    Monocytes # 0.5 0.0 - 1.3 K/uL    Eosinophils # 0.1 0.0 - 0.6 K/uL    Basophils # 0.0 0.0 - 0.2 K/uL     Other Labs    Imaging    ASSESSMENT AND RECOMMENDATIONS   Abhay Coelman is a 80 y.o. female who has a past medical history of Anemia, Anticoagulant long-term use, Atrial fibrillation ([address] CHF (congestive heart failure) ([address] CKD (chronic kidney disease) stage 3, GFR 30-59 ml/min ([facility]), Clostridium difficile diarrhea, Depression, Humerus fracture, Hyperlipidemia, Hypertension, Hypothyroidism, Mitral regurgitation, Optic neuritis, Osteopenia, Polymyalgia rheumatica ([address] Pulmonary embolus ([address] Thyroid disease, and Vitamin D deficiency. She presents with the followin. Melena, suspect 2/2 AVM versus resolved diverticular bleed. Awaiting result of video capsule. 2. Acute blood loss anemia, 2/2 #1: Stable without evidence of ongoing bleeding. 3. A. Fib: Previously on coumadin.  CHADSVAC score 4, HASBLED score 4.  4. Hypercapnic respiratory failure: 2/2 volume overload, improving on nasal jeb. 5. Colon polyps: Can consider repeat colonoscopy for polypectomy of transverse colon polyp in 6 months as an outpatient, however given miniscule size of polyp and age/co-morbidities, further discussion about colorectal cancer screening can occur as an outpatient. 6. Hypokalemia  7. CKD    RECOMMENDATIONS:    - Resume regular diet. - Monitor H&H  - Transfer to the floor  - Recommendations regarding anticoagulation pending result of VCE. If you have any questions or need any further information, please feel free to contact anyone on our consult team.  Thank you for allowing us to participate in the care of Janine Beal. Yaquelin Corrigan.  [address]

## 2019-06-01 NOTE — PLAN OF CARE
Problem: Risk for Impaired Skin Integrity  Goal: Tissue integrity - skin and mucous membranes  Description  Structural intactness and normal physiological function of skin and  mucous membranes. Outcome: Ongoing  Problem: Falls - Risk of:  Goal: Will remain free from falls  Description  Will remain free from falls  Outcome: Ongoing     Problem: Bowel Function - Altered:  Goal: Bowel elimination is within specified parameters  Description  Bowel elimination is within specified parameters  Outcome: Ongoing     Problem: Fluid Volume - Imbalance:  Goal: Absence of imbalanced fluid volume signs and symptoms  Description  Absence of imbalanced fluid volume signs and symptoms  Outcome: Ongoing  Comments: Replaced electrolytes. Problem: HEMODYNAMIC STATUS  Goal: Hemodynamic status to baseline/discharge criteria met  Outcome: Ongoing     Problem: OXYGENATION/RESPIRATORY FUNCTION  Goal: Patient will maintain patent airway  Outcome: Ongoing   Comments: Pt remain on BIPAP/oxyen 2L/m when awake. Problem: ACTIVITY INTOLERANCE/IMPAIRED MOBILITY  Goal: Mobility/activity is maintained at optimum level for patient  Outcome: Ongoing   Comments: Pt noted with BEE. Educated on optimal breathing techniques.

## 2019-06-01 NOTE — PROGRESS NOTES
Physical Therapy    Facility/Department: [facility] 3K ICU  Initial Assessment    NAME: Edna Venegas  : 1935  MRN: [id number]    Date of Service: 2019    Discharge Recommendations:  Continue to assess pending progress   PT Equipment Recommendations  Other: Will monitor for potential equipt needs. Edna Venegas scored a 15/24 on the AM-PAC short mobility form. Current research shows that an AM-PAC score of 17 or less is typically not associated with a discharge to the patient's home setting. Based on the patients AM-PAC score and their current functional mobility deficits, it is recommended that the patient have 5-7 sessions per week of Physical Therapy at d/c to increase the patients independence. Assessment   Body structures, Functions, Activity limitations: Decreased functional mobility ; Decreased strength;Decreased endurance  Assessment: 79 y/o female admit 19 with GI Bleed, Respiratory Failure. 19 S/P EGD with Argon Plasms Coag.  19 S/P EGD with Placement Video Capsule. PMH includes Mitral Regurg, A-Fib, CHF, CKD, PE, Lung Surg, Humerus Fx, Osteopenia, Polymyalgia Rheumatica. PTA pt living alone in [facility] with elevator access. Pt  has 2 sons both live OOT. PTA pt very independent with daily care and functional mobility (without assist device). Recommend In-Pt Rehab consult at this time. Will monitor pt's progress. Prognosis: Good  Decision Making: Medium Complexity  History: 79 y/o female admit 19 with GI Bleed, Respiratory Failure. 19 S/P EGD with Argon Plasms Coag.  19 S/P EGD with Placement Video Capsule. PMH includes Mitral Regurg, A-Fib, CHF, CKD, PE, Lung Surg, Humerus Fx, Osteopenia, Polymyalgia Rheumatica. Exam: See above. Clinical Presentation: See above. Patient Education: Role of PT, POC, Need to call for assist.   Barriers to Learning: None.   REQUIRES PT FOLLOW UP: Yes  Activity Tolerance  Activity Tolerance: Patient limited by fatigue;Patient limited by endurance       Patient Diagnosis(es): The primary encounter diagnosis was UGI bleed. Diagnoses of Anemia, unspecified type and Supratherapeutic INR were also pertinent to this visit. has a past medical history of Anemia, Anticoagulant long-term use, Atrial fibrillation (HCC), CHF (congestive heart failure) ([address] CKD (chronic kidney disease) stage 3, GFR 30-59 ml/min (HCC), Clostridium difficile diarrhea, Depression, Humerus fracture, Hyperlipidemia, Hypertension, Hypothyroidism, Mitral regurgitation, Optic neuritis, Osteopenia, Polymyalgia rheumatica ([address] Pulmonary embolus ([address] Thyroid disease, and Vitamin D deficiency. has a past surgical history that includes Lung surgery; malignant skin lesion excision; Colonoscopy (09/14/2016); Upper gastrointestinal endoscopy (09/14/2016); Upper gastrointestinal endoscopy (N/A, 5/30/2019); Colonoscopy (N/A, 5/31/2019); Upper gastrointestinal endoscopy (N/A, 5/31/2019); and Endoscopy, small bowel with ileum (5/31/2019). Restrictions  Restrictions/Precautions  Restrictions/Precautions: Fall Risk  Position Activity Restriction  Other position/activity restrictions: O2 1L via NC. Vision/Hearing  Vision: Impaired  Vision Exceptions: Wears glasses at all times(Pt reports she has glaucoma and just starting with macular degeneration.)  Hearing: Within functional limits     Subjective  General  Chart Reviewed: Yes  Patient assessed for rehabilitation services?: Yes  Additional Pertinent Hx: 81 y/o female admit 5/28/19 with GI Bleed, Respiratory Failure. 5/30/19 S/P EGD with Argon Plasms Coag.  5/31/19 S/P EGD with Placement Video Capsule. PMH includes Mitral Regurg, A-Fib, CHF, CKD, PE, Lung Surg, Humerus Fx, Osteopenia, Polymyalgia Rheumatica. Family / Caregiver Present: Yes(Son Corinne Phillips). )  Referring Practitioner: Arya Osorio NP. Diagnosis: GI Bleed, Respiratory Failure.     Follows Commands: Within Functional Limits  Subjective  Subjective: Pt/family agreeable to PT Eval/Rx. Pt denies any specific pain. Pain Screening  Patient Currently in Pain: Yes          Orientation  Orientation  Overall Orientation Status: Within Functional Limits  Social/Functional History  Social/Functional History  Lives With: Alone  Type of Home: (3rd floor condo, laundry in [facility])  Home Layout: One level  Home Access: Elevator, Level entry  Bathroom Shower/Tub: Walk-in shower  Bathroom Toilet: Standard(Sink nearby. )  Bathroom Equipment: Grab bars in shower  Bathroom Accessibility: Walker accessible  Home Equipment: (No DME. )  ADL Assistance: Independent  Homemaking Assistance: Independent(cleaning lady every 3 weeks, pt independent grocery shopping, laundry, cooking)  Ambulation Assistance: Independent(Without assist device PTA. )  Transfer Assistance: Independent  Active : Yes  Occupation: Retired  Type of occupation: . Additional Comments: Pt denies recent falls. Has 2 sons, both live OOT. Cognition   Cognition  Overall Cognitive Status: WFL    Objective          AROM RLE (degrees)  RLE AROM: WFL  AROM LLE (degrees)  LLE AROM : WFL  AROM RUE (degrees)  RUE AROM : WFL  AROM LUE (degrees)  LUE AROM : WFL  Strength RLE  Comment: Hip 4- 4/5; Knee Ext 4/5, Knee Flex 4-/5; Ankle 4/5. Strength LLE  Comment: Hip 4- 4/5; Knee Ext 4/5, Knee Flex 4-/5; Ankle 4/5. Strength RUE  Comment: Grossly 4- 4/5. Strength LUE  Comment: Grossly 4- 4/5. Sensation  Overall Sensation Status: Impaired(Pt reports numbness in toes. Pt  denies numb/tingling  in B UE's)  Bed mobility  Supine to Sit: Moderate assistance(HOB elevated. Use of bedrail. )  Transfers  Sit to Stand: Minimal Assistance(With Walker. )  Stand to sit: Minimal Assistance(With Walker. )  Comment: Mod assist pivot transfer bed to chair without assist device.    Ambulation  Ambulation?: Yes  Ambulation 1  Surface: level tile  Distance: 4-5 steps forward/back x 2 with Walker Min assist.  Slight flexed posture, diminished step length/clearance. Limited endurance. Balance  Sitting - Static: Good;-  Sitting - Dynamic: Fair;+  Standing - Static: Fair;+  Standing - Dynamic: [address]  Times per week: 3-5x week while in acute care setting. Current Treatment Recommendations: Strengthening, Functional Mobility Training, Transfer Training, Gait Training, Safety Education & Training, Patient/Caregiver Education & Training  Safety Devices  Type of devices: Call light within reach, Chair alarm in place, Left in chair, Nurse notified    G-Code       OutComes Score                                                  AM-PAC Score  AM-PAC Inpatient Mobility Raw Score : 15  AM-PAC Inpatient T-Scale Score : 39.45  Mobility Inpatient CMS 0-100% Score: 57.7  Mobility Inpatient CMS G-Code Modifier : CK          Goals  Short term goals  Time Frame for Short term goals: Upon d/c acute care setting. Short term goal 1: Bed Mob Min assist.   Short term goal 2: Transfers with/without assist device SBA. Short term goal 3: Amb with/without assist device 100' SBA/CGA. Patient Goals   Patient goals : Get stronger and be able to return home.         Therapy Time   Individual Concurrent Group Co-treatment   Time In           Time Out           Minutes  [address] [facility] Danielle Suazo

## 2019-06-01 NOTE — PROGRESS NOTES
recommendations  Barriers to Learning: vision   REQUIRES OT FOLLOW UP: Yes  Activity Tolerance  Activity Tolerance: Patient limited by fatigue  Safety Devices  Safety Devices in place: Yes  Type of devices: Call light within reach; Chair alarm in place; Patient at risk for falls; Left in chair;Nurse notified;Gait belt(RN Delgado Morales notified)           Patient Diagnosis(es): The primary encounter diagnosis was UGI bleed. Diagnoses of Anemia, unspecified type and Supratherapeutic INR were also pertinent to this visit. has a past medical history of Anemia, Anticoagulant long-term use, Atrial fibrillation (HCC), CHF (congestive heart failure) ([address] CKD (chronic kidney disease) stage 3, GFR 30-59 ml/min (HCC), Clostridium difficile diarrhea, Depression, Humerus fracture, Hyperlipidemia, Hypertension, Hypothyroidism, Mitral regurgitation, Optic neuritis, Osteopenia, Polymyalgia rheumatica ([address]monary embolus ([facility] [address] [facility] Thyroid disease, and Vitamin D deficiency. has a past surgical history that includes Lung surgery; malignant skin lesion excision; Colonoscopy (09/14/2016); Upper gastrointestinal endoscopy (09/14/2016); Upper gastrointestinal endoscopy (N/A, 5/30/2019); Colonoscopy (N/A, 5/31/2019); Upper gastrointestinal endoscopy (N/A, 5/31/2019); and Endoscopy, small bowel with ileum (5/31/2019). Restrictions  Restrictions/Precautions  Restrictions/Precautions: Fall Risk  Position Activity Restriction  Other position/activity restrictions: 1 L O2    Subjective   General  Chart Reviewed: Yes  Additional Pertinent Hx: Pt is a 80 y.o. female presenting to ED from PCP office with c/o bloody/dark stools, fatigue, and dizziness. In the ER she was found to have an elevated INR and her hemoglobin was down to 7.9. Pt admitted with GI bleed. Pt also developed Acute respiratory failure with hypoxia and hypercapnia due to volume overload from mitral regurgitation.  Pt s/p EGD with argon plasma coagulation 5/30 and Colonoscopy + EGD with VCE 5/31. Family / Caregiver Present: Yes(son)  Referring Practitioner: ANNA Bob CNP  Diagnosis: GI bleeding, acute blood loss anemia, Acute Hypoxic/Hypercapnic Respiratory Failure, acute pulmonary adema  Subjective  Subjective: Pt met b/s for OT eval/tx with PT. Pt in bed eating breakfast on arrival, agreeable to participate in therapy. Pt denies pain, but c/o weakness and fatigue. Social/Functional History  Social/Functional History  Lives With: Alone  Type of Home: (3rd floor condo, laundry in [facility])  Home Layout: One level  Home Access: Elevator, Level entry  Bathroom Shower/Tub: Walk-in shower  Bathroom Toilet: Standard(sink nearby)  Bathroom Equipment: Grab bars in shower  Bathroom Accessibility: [facility] accessible  Home Equipment: (no DME)  ADL Assistance: Independent  Homemaking Assistance: Independent(cleaning lady every 3 weeks, pt independent grocery shopping, laundry, cooking)  Ambulation Assistance: Independent(with no AD)  Transfer Assistance: Independent  Active : Yes  Occupation: Retired  Type of occupation:   Additional Comments: Pt denies recent falls. Objective   Vision: Impaired  Vision Exceptions: Wears glasses at all times(pt reports she has glaucoma and just starting with macular degeneration)  Hearing: Within functional limits      Orientation  Overall Orientation Status: Within Functional Limits  Orientation Level: Oriented to place;Oriented to time;Oriented to person;Oriented to situation     Balance  Sitting Balance: Contact guard assistance(seated EOB)  Standing Balance: Moderate assistance(mod A with no AD, min A at walker)  Functional Mobility  Functional - Mobility Device: Rolling Walker  Assist Level: Moderate assistance  Functional Mobility Comments: Pt completed stand pivot transfer ~2-3 steps bed chair with no AD and Mod A. Pt then took 2 steps fowards/backwards with RW and Min A.      ADL  Feeding: Setup  Grooming: Stand by assistance(to comb hair)  LE Dressing: (pt demo'd ability to reach down to reach goes, but effortful, also with decreased standing balance therefore anticipate overall mod A for LB dressing)  Toileting: Dependent/Total(catheter, BM smear noted on bedsheet during transfer, total A for hygiene in standing stance)  Additional Comments: Anticipate pt is min A UB dressing/bathing, max A LB bathing based on ROM/strength, balance, endurance. Tone RUE  RUE Tone: Normotonic  Tone LUE  LUE Tone: Normotonic  Coordination  Movements Are Fluid And Coordinated: Yes     Bed mobility  Supine to Sit: Moderate assistance(HOB elevated, use of rail)  Sit to Supine: Unable to assess(pt seated in recliner at end of session )  Scooting: Maximal assistance(to scoot to EOB, back into chair)     Transfers  Stand Pivot Transfers:  Moderate assistance  Sit to stand: Minimal assistance  Stand to sit: Minimal assistance     Vision - Basic Assessment  Prior Vision: Wears glasses all the time  Visual History: Macular degeneration;Glaucoma  Vision Comments: pt reports she has glaucoma and just starting with macular degeneration     Cognition  Overall Cognitive Status: WFL     Sensation  Overall Sensation Status: Impaired(pt reports numbness in toes, denies N/T in B UE's)       LUE AROM (degrees)  LUE AROM : WFL  RUE AROM (degrees)  RUE AROM : WFL     LUE Strength  Gross LUE Strength: Exceptions to [facility] [place]  LUE Strength Comment: grossly 4-/5 throughout  RUE Strength  Gross RUE Strength: Exceptions to [facility]  RUE Strength Comment: grossly 4-/5 throughout                   Plan   Plan  Times per week: 3-5  Times per day: Daily  Current Treatment Recommendations: Strengthening, Balance Training, Functional Mobility Training, Endurance Training, Safety Education & Training, Self-Care / ADL    OutComes Score    How much help for putting on and taking off regular lower body clothing?: A Lot  How much help for Bathing?: A Lot  How much help for Toileting?: Total  How much help for putting on and taking off regular upper body clothing?: A Little  How much help for taking care of personal grooming?: A Little  How much help for eating meals?: None  AM-PAC Inpatient Daily Activity Raw Score: 15  AM-PAC Inpatient ADL T-Scale Score : 34.69  ADL Inpatient CMS 0-100% Score: 56.46  ADL Inpatient CMS G-Code Modifier : CK                                               AM-PAC Score        AM-PAC Inpatient Daily Activity Raw Score: 15  AM-PAC Inpatient ADL T-Scale Score : 34.69  ADL Inpatient CMS 0-100% Score: 56.46  ADL Inpatient CMS G-Code Modifier : CK    Goals  Short term goals  Time Frame for Short term goals: Prior to d/c:  Short term goal 1: Pt will bathe with min A. Short term goal 2: Pt will dress with min A. Short term goal 3: Pt will complete standing grooming at sink SBA/supervision. Short term goal 4: Pt will toilet with Min A. Short term goal 5: Pt will complete fxl mobility and fxl transfers to/from ADL surfaces with SBA using AD. Short term goal 6: Pt will increase activity tolerance to achieve the above goals. Long term goals  Time Frame for Long term goals : STG=LTG   Patient Goals   Patient goals : \"to be able to do everything I was doing before. \"       Therapy Time   Individual Concurrent Group Co-treatment   Time In 1060         Time Out 0915         Minutes 40         Timed Code Treatment Minutes: 25 Minutes     This note to serve as OT d/c summary if pt is d/c-ed prior to next therapy session.     Emily Culp, OTR/L [id number]

## 2019-06-01 NOTE — PROGRESS NOTES
Pulmonary Progress Note    CC:  Follow up GI bleeding, Respiratory Failure    Subjective:  Chest pressure resolved with improvement in her heart rate  C-scope was negative for bleeding as well  Capsule placed  On 2 liters of oxygen  Hb is essentially stable  Very tired    ROS  Less SOB  Tired      Intake/Output Summary (Last 24 hours) at 6/1/2019 0738  Last data filed at 6/1/2019 0600  Gross per 24 hour   Intake 1971 ml   Output 1615 ml   Net 356 ml         PHYSICAL EXAM:  Blood pressure (!) 95/49, pulse 83, temperature 98.5 °F (36.9 °C), temperature source Oral, resp. rate 30, height 5' (1.524 m), weight 111 lb 15.9 oz (50.8 kg), SpO2 100 %, not currently breastfeeding.'  Gen: Lethargic. On NC  Eyes: PERRL. No sclera icterus. No conjunctival injection. ENT: No discharge. Pharynx clear. External appearance of ears and nose normal.  Neck: Trachea midline. No obvious mass. Resp: Crackles  CV: Tachycardia. No murmur or rub. GI: Non-tender. Non-distended. No hernia. Skin: Warm, dry, normal texture and turgor. No nodule on exposed extremities. Lymph: No cervical LAD. No supraclavicular LAD. M/S: No cyanosis. No clubbing. No joint deformity. Neuro: Moves all four extremities. CN 2-12 tested, no defect noted.   Ext:   no edema    Medications:    Scheduled Meds:   metoprolol succinate  25 mg Oral BID    ipratropium-albuterol  1 ampule Inhalation Q4H WA    amiodarone  200 mg Oral Daily    mometasone-formoterol  2 puff Inhalation BID    latanoprost  1 drop Both Eyes Nightly    levothyroxine  75 mcg Oral Daily    memantine  10 mg Oral BID    sodium chloride flush  10 mL Intravenous 2 times per day       Continuous Infusions:   pantoprozole (PROTONIX) infusion 8 mg/hr (05/31/19 2313)       PRN Meds:  fentanNYL, fentanNYL, albuterol sulfate HFA, sodium chloride flush, acetaminophen, ondansetron, melatonin    Labs:  CBC:   Recent Labs     05/30/19  0430 05/31/19  0508 06/01/19  0502   WBC 13.8* 13.5* Rajinder Molina, DO  [facility]monary

## 2019-06-01 NOTE — PROGRESS NOTES
05/28/2019   No results displayed because visit has over 200 results. Assessment & Plan:   Principal Problem:    Acute respiratory failure with hypoxia and hypercapnia (HCC)--improving slowly--will incr activity --consider Rehab in sev days if remains stable   Active Problems:    Paroxysmal atrial fibrillation (HCC)--stable --Continue current therapy      Chronic diastolic congestive heart failure (HCC)    Essential hypertension, benign    Hypothyroidism due to medication--Continue current therapy      Non-rheumatic mitral regurgitation    Pulmonary hypertension    Chronic atrial fibrillation (HCC)--no anti-coagulation at this point     Dyspnea    CKD (chronic kidney disease) stage 3, GFR 30-59 ml/min (HCC)    UGI bleed    Supratherapeutic INR    Acute blood loss anemia--hgb--stable     Coagulopathy (HCC)    Acute pulmonary edema (HCC)  Resolved Problems:    * No resolved hospital problems.  *  Will incr activity--see how she tolerates it --get rehab consult --transfer in 1-2 days if stable --not ready yet--still too weak  dw son Madi Dasilva at bedside   Please note that over 35 minutes was spent in evaluating the patient, review of records and results, discussion with staff/family, etc.    Taylor Ratliff MD

## 2019-06-01 NOTE — PROGRESS NOTES
[address]   Daily Progress Note      Admit Date:  5/28/2019    CC: SOB    HPI:   Javier Cooley is a 80 y.o. female with PMH chronic DHF, severe MR (non surgical candidate with co morbidities), PAF-on coumadin, COPD, pulm HTN, chronic venous thromboembolic disease, PE. Patient was admitted to [facility] after presenting to Dr. Zhanna Beaulieu office w/ dark stools. In ED, was found to have heme + stools and supratherapeutic INR, hgb 7.9. She was given 2u PRBC and 3L of fluid and developed acute resp failure requiring bipap from volume overload. GI consulted, held warfarin and started PPI gtt. EGD did not reveal source of bleeding . Planned for colonoscopy today - did not reveal source of bleeding. She was tachycardic and toprol was restarted. She has been getting lasix for diuresis-40mg IV yesterday. Today, she remains SOB with talking in chair. She states she is chronically SOB but it is worse than normal. It is associated with generalized weakness. It worsens with activity. SOB is better today. She states Dr. Gagan Glynn manages her coumadin/ INR. She has not had any issues with INR in the past. She denies any recent changes that may have contributed to elevated INR such as diet change, illnesses, medications. Review of Systems:   General: Denies fever, chills, +fatigue, +weakness  Skin: Denies skin changes, rash, itching, lesions.   HEENT: Denies headache, dizziness, vision changes, nosebleeds, sore throat, nasal drainage  RESP: Denies cough, sputum,wheeze, snoring  CARD: Denies palpitations,  Murmur, chest pain  GI:Denies nausea, vomiting, heartburn, loss of appetite, change in bowels  : Denies frequency, pain, incontinence, polyuria  VASC: Denies claudication, leg cramps, clots  MUSC/SKEL: Denies pain, stiffness, arthritis  PSYCH: Denies anxiety, depression, stress  NEURO: Denies numbness, tingling, weakness,change in mood or memory  HEME: Denies abn bruising, bleeding, anemia  ENDO: Denies intolerance to heat, cold, excessive thirst or hunger, hx thyroid disease    Objective:   BP (!) 106/57   Pulse 112   Temp 98.6 °F (37 °C) (Oral)   Resp 25   Ht 5' (1.524 m)   Wt 111 lb 15.9 oz (50.8 kg)   SpO2 96%   BMI 21.87 kg/m²      Intake/Output Summary (Last 24 hours) at 6/1/2019 1128  Last data filed at 6/1/2019 0600  Gross per 24 hour   Intake 1721 ml   Output 1440 ml   Net 281 ml     I/O since adm: +4247    WEIGHT:Admit Weight: 116 lb 13.5 oz (53 kg)         Today  Weight: 111 lb 15.9 oz (50.8 kg)   DRY WEIGHT:  Wt Readings from Last 3 Encounters:   06/01/19 111 lb 15.9 oz (50.8 kg)   05/28/19 107 lb 6.4 oz (48.7 kg)   03/26/19 110 lb (49.9 kg)     Physical Exam:  GEN: Appears frail, no acute distress  SKIN: Pink, warm, dry. Nails without clubbing. HEENT: PERRLA. Normocephalic, atraumatic. Neck supple. No adenopathy. LUNG: AP diameter normal. Crackles 1/2 up bilateral. Respiratory effort normal.  HEART: S1S2 A/R. No JVD. No carotid bruit. No murmur, rub or gallop. ABD: Soft, nontender. +BS X 4 quads. No hepatomegaly. EXT: Radial and pedal pulses 2+ and symmetric. Without varicosities. No edema. MUSCSKEL: Good ROM X4 extremities. No deformity. NEURO: A/O X3. Calm and cooperative.      Telemetry:NSR HR 85 , intermittent runs of PAF ()     Medications:    potassium phosphate IVPB  20 mmol Intravenous Once    potassium phosphate IVPB  20 mmol Intravenous Once    metoprolol succinate  25 mg Oral BID    ipratropium-albuterol  1 ampule Inhalation Q4H WA    amiodarone  200 mg Oral Daily    mometasone-formoterol  2 puff Inhalation BID    latanoprost  1 drop Both Eyes Nightly    levothyroxine  75 mcg Oral Daily    memantine  10 mg Oral BID    sodium chloride flush  10 mL Intravenous 2 times per day      pantoprozole (PROTONIX) infusion 8 mg/hr (06/01/19 7408)     fentanNYL, fentanNYL, albuterol sulfate HFA, sodium chloride flush, acetaminophen, ondansetron, melatonin    Lab Data: I have reviewed all labs below today.    CBC:   Recent Labs     05/30/19  0430 05/31/19  0508 06/01/19  0502   WBC 13.8* 13.5* 13.2*  13.4*   HGB 9.4* 9.1* 8.7*  8.8*   HCT 28.2* 27.9* 26.2*  26.9*   MCV 88.8 90.9 89.2  89.2    128* 147  146     BMP:   Recent Labs     05/30/19  0430 05/31/19  0508 06/01/19  0501 06/01/19  0502    148* 143 142   K 4.2 2.8* 3.4* 3.4*    106 104 104   CO2 27 28 29 28   PHOS 3.2 2.0*  --  2.2*   BUN 32* 20 22* 21*   CREATININE 1.4* 1.1 1.2 1.2     GLUCOSE:   Recent Labs     05/31/19  0508 06/01/19  0501 06/01/19  0502   GLUCOSE 85 108* 107*     LIVER PROFILE:   Lab Results   Component Value Date    AST 27 05/28/2019    ALT 19 05/28/2019    LABALBU 3.6 05/28/2019    BILITOT 0.4 05/28/2019    ALKPHOS 95 05/28/2019     PT/INR:   Lab Results   Component Value Date    PROTIME 16.3 05/30/2019    PROTIME 14.5 06/11/2011    INR 1.43 05/30/2019     APTT:   Lab Results   Component Value Date    APTT 35.0 05/30/2019     Pro-BNP:    Lab Results   Component Value Date    PROBNP 4,601 07/14/2014    PROBNP 6,366 07/10/2014     ENZYMES:  Lab Results   Component Value Date    TROPONINI 0.02 05/31/2019     FASTING LIPID PANEL:  Lab Results   Component Value Date    CHOL 151 08/02/2018    HDL 68 08/02/2018    HDL 78 04/27/2012    LDLCALC 67 08/02/2018    TRIG 81 08/02/2018       Diagnostics:    EKG: Sinus rhythm with prolonged AV conduction with occasional Premature ventricular complexesNonspecific ST and T wave abnormalityAbnormal ECGWhen compared with ECG of 28-MAY-2019 18:01,Premature ventricular complexes are now PresentPR interval has increasedST no longer depressed in Inferior leadsST less depressed in Anterior leadsConfirmed by Irwin [address] [place] [address] ([id number]) on 5/29/2019 4:09:26 PM    Cardiac Angiography:   Right Heart Catheterization 9/20/17:  1.  Mild nonobstructive coronary artery disease with a 40% ostial LAD  lesion and 25% mid-RCA disease.  This is a right-dominant coronary  arterial system. 2.  Normal left ventricular systolic function with LV ejection  fraction of 65%. 3.  Mildly elevated left ventricular end-diastolic pressure of 15  mmHg. 4.  Moderate to severe mitral regurgitation with moderate dilatation  of the left atrium on the left ventriculogram.  5.  No gradient across the aortic valve on pullback to suggest aortic  stenosis. 6.  Evidence of mild volume overload with a pulmonary capillary wedge  pressure of 22 and a left ventricular end-diastolic pressure on repeat  of 15 to 20.  7.  Evidence of pulmonary hypertension, possibly pulmonary arterial  hypertension, arteriovenous malformation and prior pneumonectomy. Instantaneous pulmonary artery pressure was 68/19 for a mean pulmonary  arterial pressure of 37 mmHg. 8.  Oxygen step-up in the right pulmonary artery to 90%, pulmonary  artery main was 62% with a right atrial pressure of 52%.  Inferior  vena cava was 57%. 9.  Pulmonary angiogram showing minimal pulmonary arterial flow to the  right lung with _____ flow in the right upper lobe but no flow into  the right lower middle lobe.  In fact, there appears to be a possible  arteriovenous malformation with backflow of blood towards the pigtail  catheter into the right pulmonary artery and an oxygen saturation of  90% in the right lower lobe.     Echo 8/31/17:  Normal left ventricle size, wall thickness, and systolic function with an   estimated ejection fraction of 60%. No regional wall motion abnormalities   are seen.   Normal right ventricular size with mildly reduced systolic function.   Severely dilated left atrium.   Severe eccentric mitral regurgitation.   Severe tricuspid regurgitation.   Estimated pulmonary artery systolic pressure is severely elevated at 81 mmHg   assuming a right atrial pressure of 3 mmHg.     FLAVIO 9/20/2017   Summary   Color flow/continuous and pulse wave Doppler used to assess valvular   function.   Normal left ventricle size, wall thickness and systolic function with an   estimated ejection fraction of 65%. No regional wall motion abnormalities   are seen.   Normal right ventricular size and function.   Moderately dilated left atrium. Aneurysmal atrial septum with bowing from   the left to the right.   The mitral valve leaflets are thickened.   There is prolapse of the P2 segment of the posterior leaflet resulting in   moderately severe mitral regurgitation. There is no stenosis.   Moderate tricuspid regurgitation.   No shunt at the atrial level by agitated saline contrast study.   No thrombus visualized in the left atrium, left atrial appendage or left   ventricle. EGD 5/30/2019   Postoperative Diagnosis:    1. Punctate non-bleeding APC in the second portion of the duodenum, obliterated with APC  2. 3 cm sliding hiatal hernia. 3. No source of GI blood loss identified. Recommendations:   1) Clear liquid diet. NPO at midnight  2) Colonoscopy +/- EGD with video capsule endoscopy 5/31/19 to evaluate for source of melena. 3) Monitor H&H, transfuse to Hgb >7.    5/31/19   Procedure: Esophagogastroduodenoscopy with placement of video capsule                       Colonoscopy   Postoperative Diagnosis:    1. 2 mm distal transverse colon polyp, not resected. 2. Scattered small mouth sigmoid diverticulosis without stigmata of bleeding. 3. Successful placement of video capsule into duodenal bulb  4. No evidence of active bleeding or source of bleeding idetified  Recommendations:   1) Will follow-up with video capsule results once completed and read. 2) Continue to monitor H&H, and transfuse to Hgb >7. No evidence of active bleeding at this time. 3) Ok to resume clear liquid diet today at 14:00, and regular diet 18:00 today. 4) Further recommendations regarding anticoagulation to follow result of video capsule endoscopy. No contraindication to low dose heparin gtt without bolus if necessary.   5) Can consider repeat colonoscopy for polypectomy of transverse colon polyp in 6 months as an outpatient, however given miniscule size of polyp and age/co-morbidities, further discussion about colorectal cancer screening can occur as an outpatient. Assessment/Plan:  1.) Acute resp failure D/T volume overload, complicated by MR and COPD: Remains significantly SOB. On 2L NC. Crackles 1/2 up bilateral. +4.4 L. Creatinine stable today.  -lasix 40mg IV BID    2.) Severe MR: Worsened by hypervolemia. Remains SOB.   -Lasix as above    3.) Anemia 2/2 GIB: H/H now stable. No current GIB per EGD/ colonoscopy. Video capsule placed and results pending. 4.) Pulm HTN: Symptoms worse with volume overload.  -Lasix as above    5.) Paroxysmal AF: Intermittent throughout the day with HR up to 130s. Most likely worsened by hypervolemia and resp distress. CHADSVASC-6     HASBLED- atleast 3  Thromboembolic risk reduction: Plan pending video capsule result per GI rec.    Rate Control: Toprol XL 12.5 mg BID > increased to 25mg BID (on 50mg BID PTA)  Rhythm Control: Amiodarone 200mg daily      Electronically signed by ANNA Bone CNP on 6/1/2019 at 11:28 AM

## 2019-06-02 LAB
ANION GAP SERPL CALCULATED.3IONS-SCNC: 14 MMOL/L (ref 3–16)
BASOPHILS ABSOLUTE: 0.1 K/UL (ref 0–0.2)
BASOPHILS RELATIVE PERCENT: 0.6 %
BUN BLDV-MCNC: 22 MG/DL (ref 7–20)
CALCIUM SERPL-MCNC: 8.3 MG/DL (ref 8.3–10.6)
CHLORIDE BLD-SCNC: 97 MMOL/L (ref 99–110)
CO2: 29 MMOL/L (ref 21–32)
CREAT SERPL-MCNC: 1.4 MG/DL (ref 0.6–1.2)
EOSINOPHILS ABSOLUTE: 0.7 K/UL (ref 0–0.6)
EOSINOPHILS RELATIVE PERCENT: 7 %
GFR AFRICAN AMERICAN: 43
GFR NON-AFRICAN AMERICAN: 36
GLUCOSE BLD-MCNC: 97 MG/DL (ref 70–99)
HCT VFR BLD CALC: 27 % (ref 36–48)
HEMOGLOBIN: 9.1 G/DL (ref 12–16)
LYMPHOCYTES ABSOLUTE: 0.7 K/UL (ref 1–5.1)
LYMPHOCYTES RELATIVE PERCENT: 6.7 %
MAGNESIUM: 1.8 MG/DL (ref 1.8–2.4)
MCH RBC QN AUTO: 30.2 PG (ref 26–34)
MCHC RBC AUTO-ENTMCNC: 33.6 G/DL (ref 31–36)
MCV RBC AUTO: 89.8 FL (ref 80–100)
MONOCYTES ABSOLUTE: 0.6 K/UL (ref 0–1.3)
MONOCYTES RELATIVE PERCENT: 5.8 %
NEUTROPHILS ABSOLUTE: 8.5 K/UL (ref 1.7–7.7)
NEUTROPHILS RELATIVE PERCENT: 79.9 %
PDW BLD-RTO: 15.3 % (ref 12.4–15.4)
PHOSPHORUS: 4.3 MG/DL (ref 2.5–4.9)
PLATELET # BLD: 175 K/UL (ref 135–450)
PMV BLD AUTO: 9 FL (ref 5–10.5)
POTASSIUM SERPL-SCNC: 3.6 MMOL/L (ref 3.5–5.1)
RBC # BLD: 3.01 M/UL (ref 4–5.2)
SODIUM BLD-SCNC: 140 MMOL/L (ref 136–145)
WBC # BLD: 10.6 K/UL (ref 4–11)

## 2019-06-02 PROCEDURE — 97530 THERAPEUTIC ACTIVITIES: CPT

## 2019-06-02 PROCEDURE — 6370000000 HC RX 637 (ALT 250 FOR IP): Performed by: INTERNAL MEDICINE

## 2019-06-02 PROCEDURE — 94762 N-INVAS EAR/PLS OXIMTRY CONT: CPT

## 2019-06-02 PROCEDURE — 6370000000 HC RX 637 (ALT 250 FOR IP): Performed by: NURSE PRACTITIONER

## 2019-06-02 PROCEDURE — 94640 AIRWAY INHALATION TREATMENT: CPT

## 2019-06-02 PROCEDURE — 83735 ASSAY OF MAGNESIUM: CPT

## 2019-06-02 PROCEDURE — 36415 COLL VENOUS BLD VENIPUNCTURE: CPT

## 2019-06-02 PROCEDURE — 2000000000 HC ICU R&B

## 2019-06-02 PROCEDURE — 97116 GAIT TRAINING THERAPY: CPT

## 2019-06-02 PROCEDURE — 99232 SBSQ HOSP IP/OBS MODERATE 35: CPT | Performed by: INTERNAL MEDICINE

## 2019-06-02 PROCEDURE — 80048 BASIC METABOLIC PNL TOTAL CA: CPT

## 2019-06-02 PROCEDURE — 6360000002 HC RX W HCPCS: Performed by: NURSE PRACTITIONER

## 2019-06-02 PROCEDURE — 2580000003 HC RX 258: Performed by: NURSE PRACTITIONER

## 2019-06-02 PROCEDURE — 94660 CPAP INITIATION&MGMT: CPT

## 2019-06-02 PROCEDURE — 85025 COMPLETE CBC W/AUTO DIFF WBC: CPT

## 2019-06-02 PROCEDURE — 99232 SBSQ HOSP IP/OBS MODERATE 35: CPT | Performed by: NURSE PRACTITIONER

## 2019-06-02 PROCEDURE — 2580000003 HC RX 258: Performed by: INTERNAL MEDICINE

## 2019-06-02 PROCEDURE — 99233 SBSQ HOSP IP/OBS HIGH 50: CPT | Performed by: INTERNAL MEDICINE

## 2019-06-02 PROCEDURE — 2700000000 HC OXYGEN THERAPY PER DAY

## 2019-06-02 PROCEDURE — 84100 ASSAY OF PHOSPHORUS: CPT

## 2019-06-02 RX ORDER — FUROSEMIDE 10 MG/ML
40 INJECTION INTRAMUSCULAR; INTRAVENOUS DAILY
Status: DISCONTINUED | OUTPATIENT
Start: 2019-06-03 | End: 2019-06-05

## 2019-06-02 RX ORDER — METOPROLOL SUCCINATE 50 MG/1
50 TABLET, EXTENDED RELEASE ORAL 2 TIMES DAILY
Status: DISCONTINUED | OUTPATIENT
Start: 2019-06-02 | End: 2019-06-06 | Stop reason: HOSPADM

## 2019-06-02 RX ADMIN — Medication 10 ML: at 08:50

## 2019-06-02 RX ADMIN — IPRATROPIUM BROMIDE AND ALBUTEROL SULFATE 1 AMPULE: .5; 3 SOLUTION RESPIRATORY (INHALATION) at 11:43

## 2019-06-02 RX ADMIN — IPRATROPIUM BROMIDE AND ALBUTEROL SULFATE 1 AMPULE: .5; 3 SOLUTION RESPIRATORY (INHALATION) at 15:23

## 2019-06-02 RX ADMIN — AMIODARONE HYDROCHLORIDE 150 MG: 50 INJECTION, SOLUTION INTRAVENOUS at 11:18

## 2019-06-02 RX ADMIN — IPRATROPIUM BROMIDE AND ALBUTEROL SULFATE 1 AMPULE: .5; 3 SOLUTION RESPIRATORY (INHALATION) at 19:47

## 2019-06-02 RX ADMIN — LEVOTHYROXINE SODIUM 75 MCG: 75 TABLET ORAL at 06:14

## 2019-06-02 RX ADMIN — MOMETASONE FUROATE AND FORMOTEROL FUMARATE DIHYDRATE 2 PUFF: 200; 5 AEROSOL RESPIRATORY (INHALATION) at 19:47

## 2019-06-02 RX ADMIN — IPRATROPIUM BROMIDE AND ALBUTEROL SULFATE 1 AMPULE: .5; 3 SOLUTION RESPIRATORY (INHALATION) at 07:55

## 2019-06-02 RX ADMIN — AMIODARONE HYDROCHLORIDE 200 MG: 200 TABLET ORAL at 08:49

## 2019-06-02 RX ADMIN — AMIODARONE HYDROCHLORIDE 0.5 MG/MIN: 50 INJECTION, SOLUTION INTRAVENOUS at 18:03

## 2019-06-02 RX ADMIN — Medication 3 MG: at 23:42

## 2019-06-02 RX ADMIN — PANTOPRAZOLE SODIUM 40 MG: 40 TABLET, DELAYED RELEASE ORAL at 06:19

## 2019-06-02 RX ADMIN — MEMANTINE HYDROCHLORIDE 10 MG: 5 TABLET, FILM COATED ORAL at 20:03

## 2019-06-02 RX ADMIN — FUROSEMIDE 40 MG: 10 INJECTION, SOLUTION INTRAMUSCULAR; INTRAVENOUS at 08:49

## 2019-06-02 RX ADMIN — MOMETASONE FUROATE AND FORMOTEROL FUMARATE DIHYDRATE 2 PUFF: 200; 5 AEROSOL RESPIRATORY (INHALATION) at 07:55

## 2019-06-02 RX ADMIN — AMIODARONE HYDROCHLORIDE 1 MG/MIN: 50 INJECTION, SOLUTION INTRAVENOUS at 11:37

## 2019-06-02 RX ADMIN — Medication 10 ML: at 20:23

## 2019-06-02 RX ADMIN — METOPROLOL SUCCINATE 50 MG: 50 TABLET, FILM COATED, EXTENDED RELEASE ORAL at 20:03

## 2019-06-02 RX ADMIN — LATANOPROST 1 DROP: 50 SOLUTION OPHTHALMIC at 20:06

## 2019-06-02 RX ADMIN — METOPROLOL SUCCINATE 50 MG: 50 TABLET, FILM COATED, EXTENDED RELEASE ORAL at 08:49

## 2019-06-02 RX ADMIN — MEMANTINE HYDROCHLORIDE 10 MG: 5 TABLET, FILM COATED ORAL at 08:49

## 2019-06-02 ASSESSMENT — PAIN SCALES - GENERAL
PAINLEVEL_OUTOF10: 0

## 2019-06-02 NOTE — PROGRESS NOTES
2110: Pt alert and oriented. Resting in bed. Remain on oxygen at 1L/m per NC with O2 saturation low 90's. + Lee . Generalized weakness. Held HS dose of metoprolol as B/P 94/44. Updated with pt's son who is at bed side. Phos replacement per order. Repositioned in bed. 2288: RT placed BIPAP on patient. 0325: Pt taken off BIPAP and put back on NC.     0610: Non eventful night. AM meds given. 0710: Shift hand off report given to oncoming Benjie Leroy RN.

## 2019-06-02 NOTE — OP NOTE
Capsule Endoscopy Procedure Note    Patient: Omari Chisholm  : 1935  Acct#:     Procedure: Wireless Capsule Endoscopy of the small bowel                         Date: Placed 19, read  2019     Surgeon:   Derick Sandoval MD    Referring Physician:  Fatemeh Auguste MD    Indications: This is a 80y.o. year old female with acute blood loss anemia and melena with a supra-therapeutic INR, who underwent EGD and colonoscopy for evaluation of melena, and findings were notable for small angioectasia without bleeding and sigmoid diverticulosis. Impression/Postoperative Diagnosis:    1. Prominent red spot of clot, likely reflecting recent bleeding from angioectasia seen in the proximal small bowel, approximately 29:00 into the small bowel. No active hemorrhage seen. 2. Multiple scattered small angioectasias throughout the small bowel, none as prominent as lesion at 29:00  3. Video capsule did not complete transit through the small bowel. Consent:  The patient or their legal guardian has signed an informed consent, and is aware of the potential risks, benefits, alternatives, and potential complications of this procedure. Description of Procedure: The patient swallowed the Given wireless capsule without complication. Appropriate monitoring devices were utilized to record the findings of the capsule. The monitoring device was collected and processed after 8 hours of recording time. The patient tolerated the procedure well. Findings:  Study quality was adequate     Small bowel: Visualization was adequate. Visualization of the small bowel was notable for a prominent red spot of clot, likely reflecting recent bleeding from angioectasia seen in the proximal small bowel, approximately 29:00 into the small bowel. No active hemorrhage seen. Multiple scattered small angioectasias were seen throughout the small bowel, none as prominent as lesion at 29:00.  The video capsule did not complete transit through the small bowel. Impression:  See above. Plan:  - Melena is likely secondary to blood loss from angioectasia, with culprit lesion likely located in proximal small bowel, 29:00 from pylorus.  - Multiple AVM's throughout the small bowel pose risk for further blood loss, especially if further anticoagulation is necessary.  - Will ask cardiology to evaluate patient's need for anticoagulation, and if necessary, will plan for push enteroscopy 6/3/19. MD Americo Alfaro  [address]

## 2019-06-02 NOTE — PROGRESS NOTES
26.9* 27.0*   MCV 90.9 89.2  89.2 89.8   * 147  146 175     BMP:   Recent Labs     19  0508 19  0501 19  0502 19  0452   * 143 142 140   K 2.8* 3.4* 3.4* 3.6    104 104 97*   CO2 28 29 28 29   PHOS 2.0*  --  2.2*  --    BUN 20 22* 21* 22*   CREATININE 1.1 1.2 1.2 1.4*     LIVER PROFILE:   No results for input(s): AST, ALT, LIPASE, BILIDIR, BILITOT, ALKPHOS in the last 72 hours. Invalid input(s): AMYLASE,  ALB  PT/INR:   No results for input(s): PROTIME, INR in the last 72 hours. APTT:   No results for input(s): APTT in the last 72 hours. UA:No results for input(s): NITRITE, COLORU, PHUR, LABCAST, WBCUA, RBCUA, MUCUS, TRICHOMONAS, YEAST, BACTERIA, CLARITYU, SPECGRAV, LEUKOCYTESUR, UROBILINOGEN, BILIRUBINUR, BLOODU, GLUCOSEU, AMORPHOUS in the last 72 hours. Invalid input(s): Leoncio Mack  No results for input(s): PH, PCO2, PO2 in the last 72 hours. 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   She does not like this anymore   · Goal Hb is >7  · Duonebs q 4 hours  · Dulera q 12 hours  · DVT prophylaxis with SCDS  · Continue to hold anti-coagulation due to AVM      She does not have COPD    Rajinder Molina DO  [facility]

## 2019-06-02 NOTE — PROGRESS NOTES
INPATIENT CONSULTATION:    IDENTIFYING DATA/REASON FOR CONSULTATION   PATIENT:  Aron Walker  MRN:  [id number]  ADMIT DATE: 5/28/2019  TIME OF EVALUATION: 6/2/2019 5:01 PM  HOSPITAL STAY:   LOS: 5 days   CONSULTING PHYSICIAN: Sandy Salcedo, *   REASON FOR CONSULTATION: Melena    Subjective:    Patient seen and examined in follow-up. Patient reports no further episodes of bleeding. Feels fatigued. Remains off Bipap, remains in ICU on NC Tolerating PO.    MEDICATIONS   SCHEDULED:      metoprolol succinate 50 mg BID   [START ON 6/3/2019] furosemide 40 mg Daily   pantoprazole 40 mg QAM AC   ipratropium-albuterol 1 ampule Q4H WA   mometasone-formoterol 2 puff BID   latanoprost 1 drop Nightly   levothyroxine 75 mcg Daily   memantine 10 mg BID   sodium chloride flush 10 mL 2 times per day     FLUIDS/DRIPS:     amiodarone 1 mg/min (06/02/19 1137)    Followed by   Taryn Russell amiodarone       PRNs:     albuterol sulfate HFA 2 puff Q4H PRN   sodium chloride flush 10 mL PRN   acetaminophen 650 mg Q4H PRN   ondansetron 4 mg Q6H PRN   melatonin 3 mg Nightly PRN     ALLERGIES:    Allergies   Allergen Reactions    Ace Inhibitors      coughing    Aricept [Donepezil Hydrochloride]      Can not recall    Benicar [Olmesartan Medoxomil]      Can not recall      Exelon [Rivastigmine Tartrate]      Can not recall      Pcn [Penicillins] Hives and Swelling    Quinapril Hcl      Can not recall      Doxycycline Nausea And Vomiting         PHYSICAL EXAM     Vitals:    06/02/19 1143 06/02/19 1200 06/02/19 1300 06/02/19 1524   BP:  (!) 105/54 112/69    Pulse:  104 123    Resp: 26 29 24    Temp:  98.4 °F (36.9 °C)     TempSrc:  Oral     SpO2: 98% 95% 97% 96%   Weight:       Height:           I/O last 3 completed shifts: In: 740 [P.O.:480; I.V.:260]  Out: 2100 [Urine:2100]    Physical Exam:  Gen: Resting in chair, NAD   HEENT: normocephalic, atraumatic. No scleral icterus.    CV: Tachycardic, irregularly irregular, + murmur, no RG Pul: CTAB, normal WOB, no crackles. Abd: Good bowel sounds throughout, soft, NT/ND, no masses, no HSM   Ext: No edema, 2+ LE pulses. Neuro: No gross deficits, follows commands.   Skin: No jaundice, spider angiomas, vu erythema     LABS AND IMAGING     Recent Results (from the past 24 hour(s))   Basic Metabolic Panel    Collection Time: 06/02/19  4:52 AM   Result Value Ref Range    Sodium 140 136 - 145 mmol/L    Potassium 3.6 3.5 - 5.1 mmol/L    Chloride 97 (L) 99 - 110 mmol/L    CO2 29 21 - 32 mmol/L    Anion Gap 14 3 - 16    Glucose 97 70 - 99 mg/dL    BUN 22 (H) 7 - 20 mg/dL    CREATININE 1.4 (H) 0.6 - 1.2 mg/dL    GFR Non- 36 (A) >60    GFR  43 (A) >60    Calcium 8.3 8.3 - 10.6 mg/dL   CBC Auto Differential    Collection Time: 06/02/19  4:52 AM   Result Value Ref Range    WBC 10.6 4.0 - 11.0 K/uL    RBC 3.01 (L) 4.00 - 5.20 M/uL    Hemoglobin 9.1 (L) 12.0 - 16.0 g/dL    Hematocrit 27.0 (L) 36.0 - 48.0 %    MCV 89.8 80.0 - 100.0 fL    MCH 30.2 26.0 - 34.0 pg    MCHC 33.6 31.0 - 36.0 g/dL    RDW 15.3 12.4 - 15.4 %    Platelets 598 127 - 191 K/uL    MPV 9.0 5.0 - 10.5 fL    Neutrophils % 79.9 %    Lymphocytes % 6.7 %    Monocytes % 5.8 %    Eosinophils % 7.0 %    Basophils % 0.6 %    Neutrophils # 8.5 (H) 1.7 - 7.7 K/uL    Lymphocytes # 0.7 (L) 1.0 - 5.1 K/uL    Monocytes # 0.6 0.0 - 1.3 K/uL    Eosinophils # 0.7 (H) 0.0 - 0.6 K/uL    Basophils # 0.1 0.0 - 0.2 K/uL   Magnesium    Collection Time: 06/02/19  4:52 AM   Result Value Ref Range    Magnesium 1.80 1.80 - 2.40 mg/dL   Phosphorus    Collection Time: 06/02/19  4:52 AM   Result Value Ref Range    Phosphorus 4.3 2.5 - 4.9 mg/dL     Other Labs    Imaging    ASSESSMENT AND RECOMMENDATIONS   Selam Nugent is a 80 y.o. female who has a past medical history of Anemia, Anticoagulant long-term use, Atrial fibrillation ([facility] [address] CHF (congestive heart failure) ([facility] [address] CKD (chronic kidney disease) stage 3, GFR 30-59 ml/min ([facility] [address] Clostridium difficile diarrhea, Depression, Humerus fracture, Hyperlipidemia, Hypertension, Hypothyroidism, Mitral regurgitation, Optic neuritis, Osteopenia, Polymyalgia rheumatica ([address] Pulmonary embolus ([address] Thyroid disease, and Vitamin D deficiency. She presents with the followin. Melena, suspect 2/2 AVM based upon results of video capsule endoscopy which show a lesion with overlying clot in the proximal small bowel. 2. Acute blood loss anemia, 2/2 #1: Stable without evidence of ongoing bleeding. 3. A. Fib: Previously on coumadin. CHADSVAC score 4, HASBLED score 4.  4. Hypercapnic respiratory failure: 2/2 volume overload, improving on nasal canula. 5. Colon polyps: Can consider repeat colonoscopy for polypectomy of transverse colon polyp in 6 months as an outpatient, however given miniscule size of polyp and age/co-morbidities, further discussion about colorectal cancer screening can occur as an outpatient. 6. Hypokalemia  7. CKD    RECOMMENDATIONS:    - Resume regular diet.  - NPO at midnight for push enteroscopy with ablation of AVM in the duodenum thought to be culprit lesion.  - Monitor H&H  - Further anticoagulation recommendations to follow investigation/therapy of small bowel source on push enteroscopy. If you have any questions or need any further information, please feel free to contact anyone on our consult team.  Thank you for allowing us to participate in the care of Rebecca Schwartz. Corinn Severs.  [address] [facility]

## 2019-06-02 NOTE — PROGRESS NOTES
admit 5/28/19 with GI Bleed, Respiratory Failure. 5/30/19 S/P EGD with Argon Plasma Coag.  5/31/19 S/P EGD with Placement Video Capsule. PMH includes Mitral Regurg, A-Fib, CHF, CKD, PE, Lung Surg, Humerus Fx, Osteopenia, Polymyalgia Rheumatica. Family / Caregiver Present: Yes  Referring Practitioner: Luz Esquivel NP. Subjective  Subjective: Pt/family agreeable to cont PT Rx. Pt denies any specific pain. Feeling weak. Orientation  Orientation  Overall Orientation Status: Within Functional Limits  Cognition      Objective   Bed mobility  Supine to Sit: Moderate assistance(HOB elevated. Use of Bedrail. Pt assist LEs towards EOB, weakly. )  Transfers  Sit to Stand: Minimal Assistance(With Walker. )  Stand to sit: Minimal Assistance(With Walker. )  Comment: Additional Transfer from Recliner x 2 for LE Exs and Repositioning. Ambulation  Ambulation?: Yes  Ambulation 1  Surface: level tile  Distance: 4-5 steps bed to chair, additional 4-5 steps forward/back x 2 with Rolling Walker Min assist.  Slight flexed posture, diminished step length/clearance. Limited endurance. Tendency post lean at times. Exercises  Hip Flexion: 10x2 B LEs. Knee Long Arc Quad: 10x2 B LEs. Ankle Pumps: 10x2 B LEs. Assessment   Body structures, Functions, Activity limitations: Decreased functional mobility ; Decreased strength;Decreased endurance  Assessment: 79 y/o female admit 5/28/19 with GI Bleed, Respiratory Failure. 5/30/19 S/P EGD with Argon Plasms Coag.  5/31/19 S/P EGD with Placement Video Capsule. PMH includes Mitral Regurg, A-Fib, CHF, CKD, PE, Lung Surg, Humerus Fx, Osteopenia, Polymyalgia Rheumatica. PTA pt living alone in [facility] with elevator access. Pt  has 2 sons both live OOT. PTA pt very independent with daily care and functional mobility (without assist device). Recommend In-Pt Rehab consult at this time. Will monitor pt's progress.     Prognosis: Good  Patient Education:

## 2019-06-02 NOTE — PROGRESS NOTES
[facility]   Daily Progress Note      Admit Date:  5/28/2019    CC: SOB    HPI:   Gracie Henry is a 80 y.o. female with PMH chronic DHF, severe MR (non surgical candidate with co morbidities), PAF-on coumadin, COPD, pulm HTN, chronic venous thromboembolic disease, PE. Patient was admitted to [facility] after presenting to Dr. Martinez Bread office w/ dark stools. In ED, was found to have heme + stools and supratherapeutic INR, hgb 7.9. She was given 2u PRBC and 3L of fluid and developed acute resp failure requiring bipap from volume overload. GI consulted, held warfarin and started PPI gtt. EGD did not reveal source of bleeding. Colonoscopy  - did not reveal source of bleeding. She was tachycardic and toprol was restarted. She has been getting lasix for diuresis-increased to 40mg BID yesterday. According to RN, Dr. 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 Planning on push endoscopy tomorrow with possible intervention if needed. Today, SOB improved and lungs sound a little better. She states she is chronically SOB but it is worse than normal. It is associated with generalized weakness. It worsens with activity. She is sitting up in a chair. She did diurese well from increased lasix. BP is labile and she remains slightly tachycardic in AF. She states Dr. Papito Sykes manages her coumadin/ INR. She has not had any issues with INR in the past. She denies any recent changes that may have contributed to elevated INR such as diet change, illnesses, medications. Review of Systems:   General: Denies fever, chills, +fatigue, +weakness  Skin: Denies skin changes, rash, itching, lesions.   HEENT: Denies headache, dizziness, vision changes, nosebleeds, sore throat, nasal drainage  RESP: Denies cough, sputum,wheeze, snoring  CARD: Denies palpitations,  Murmur, chest pain  GI:Denies nausea, vomiting, heartburn, loss of appetite, change in bowels  : Denies frequency, pain, incontinence, polyuria  VASC: Denies claudication, leg cramps, clots  MUSC/SKEL: Denies pain, stiffness, arthritis  PSYCH: Denies anxiety, depression, stress  NEURO: Denies numbness, tingling, weakness,change in mood or memory  HEME: Denies abn bruising, bleeding, anemia  ENDO: Denies intolerance to heat, cold, excessive thirst or hunger, hx thyroid disease    Objective:   BP (!) 116/56   Pulse 120   Temp 98.4 °F (36.9 °C) (Oral)   Resp 21   Ht 5' (1.524 m)   Wt 110 lb 10.7 oz (50.2 kg)   SpO2 96%   BMI 21.61 kg/m²      Intake/Output Summary (Last 24 hours) at 6/2/2019 0811  Last data filed at 6/2/2019 0603  Gross per 24 hour   Intake 620 ml   Output 1100 ml   Net -480 ml     I/O since adm: +4247 yesterday >+3767 today    WEIGHT:Admit Weight: 116 lb 13.5 oz (53 kg)         Today  Weight: 110 lb 10.7 oz (50.2 kg)   DRY WEIGHT:  Wt Readings from Last 3 Encounters:   06/02/19 110 lb 10.7 oz (50.2 kg)   05/28/19 107 lb 6.4 oz (48.7 kg)   03/26/19 110 lb (49.9 kg)     Physical Exam:  GEN: Appears frail, no acute distress  SKIN: Pink, warm, dry. Nails without clubbing. HEENT: PERRLA. Normocephalic, atraumatic. Neck supple. No adenopathy. LUNG: AP diameter normal. Crackles bilateral bases. Respiratory effort normal.  HEART: S1S2 A/R. No JVD. No carotid bruit. No murmur, rub or gallop. ABD: Soft, nontender. +BS X 4 quads. No hepatomegaly. EXT: Radial and pedal pulses 2+ and symmetric. Without varicosities. No edema. MUSCSKEL: Good ROM X4 extremities. No deformity. NEURO: A/O X3. Calm and cooperative.      Telemetry:NSR HR 85 , intermittent runs of PAF ()     Medications:    furosemide  40 mg Intravenous BID    pantoprazole  40 mg Oral QAM AC    metoprolol succinate  25 mg Oral BID    ipratropium-albuterol  1 ampule Inhalation Q4H WA    amiodarone  200 mg Oral Daily    mometasone-formoterol  2 puff Inhalation BID    latanoprost  1 drop Both pulmonary artery systolic pressure is severely elevated at 81 mmHg   assuming a right atrial pressure of 3 mmHg. FLAVIO 9/20/2017   Summary   Color flow/continuous and pulse wave Doppler used to assess valvular   function.   Normal left ventricle size, wall thickness and systolic function with an   estimated ejection fraction of 65%. No regional wall motion abnormalities   are seen.   Normal right ventricular size and function.   Moderately dilated left atrium. Aneurysmal atrial septum with bowing from   the left to the right.   The mitral valve leaflets are thickened.   There is prolapse of the P2 segment of the posterior leaflet resulting in   moderately severe mitral regurgitation. There is no stenosis.   Moderate tricuspid regurgitation.   No shunt at the atrial level by agitated saline contrast study.   No thrombus visualized in the left atrium, left atrial appendage or left   ventricle. EGD 5/30/2019   Postoperative Diagnosis:    1. Punctate non-bleeding APC in the second portion of the duodenum, obliterated with APC  2. 3 cm sliding hiatal hernia. 3. No source of GI blood loss identified. Recommendations:   1) Clear liquid diet. NPO at midnight  2) Colonoscopy +/- EGD with video capsule endoscopy 5/31/19 to evaluate for source of melena. 3) Monitor H&H, transfuse to Hgb >7.    5/31/19   Procedure: Esophagogastroduodenoscopy with placement of video capsule                       Colonoscopy   Postoperative Diagnosis:    1. 2 mm distal transverse colon polyp, not resected. 2. Scattered small mouth sigmoid diverticulosis without stigmata of bleeding. 3. Successful placement of video capsule into duodenal bulb  4. No evidence of active bleeding or source of bleeding idetified  Recommendations:   1) Will follow-up with video capsule results once completed and read. 2) Continue to monitor H&H, and transfuse to Hgb >7. No evidence of active bleeding at this time.   3) Ok to resume clear liquid diet today at 14:00, and regular diet 18:00 today. 4) Further recommendations regarding anticoagulation to follow result of video capsule endoscopy. No contraindication to low dose heparin gtt without bolus if necessary. 5) Can consider repeat colonoscopy for polypectomy of transverse colon polyp in 6 months as an outpatient, however given miniscule size of polyp and age/co-morbidities, further discussion about colorectal cancer screening can occur as an outpatient. Assessment/Plan:  1.) Acute resp failure D/T volume overload, complicated by MR and COPD: Remains SOB but imporved. On 2L NC. Crackles bilateral bases. +3.7 L. Creatinine slightly elevated today.  -lasix 40mg IV BID > will change to daily    2.) Severe MR: Worsened by hypervolemia. Remains SOB.   -Lasix as above    3.) Anemia 2/2 GIB: H/H now stable. No  GIB per EGD/ colonoscopy. Video capsule revealed AVM with most likely recent bleed. Planning on push endoscopy tomorrow    4.) Pulm HTN: Symptoms worse with volume overload.  -Lasix as above    5.) Paroxysmal AFMost likely has missed some doses with being NPO recently. : Intermittent throughout the day with HR up to 130s. Most likely worsened by hypervolemia and resp distress. Most likely has missed some doses with being NPO recently and may not be therapeutic. Now mostly AF with less NSR. CHADSVASC-6     HASBLED- atleast 3  Thromboembolic risk reduction: Restart when cleared per GI. Would change to eliquis or xarelto for less chance of bleeding. Rate Control: Toprol XL 12.5 mg BID > increased to 25mg BID >increased to 50mg po BID today (on 50mg BID PTA)  Rhythm Control: Amio bolus and gtt for 24 hrs then resume po.     Electronically signed by AMY Bambi Halsted, APRN - CNP on 6/2/2019 at 8:11 AM

## 2019-06-02 NOTE — PROGRESS NOTES
appropriate    Admission on 05/28/2019   No results displayed because visit has over 200 results. Assessment & Plan:   Principal Problem:    Chronic diastolic congestive heart failure (HCC)--stable   Active Problems:    Paroxysmal atrial fibrillation (HCC)--off anti-coagilant sec to gi bleeding     Essential hypertension, benign--Continue current therapy      Hypothyroidism due to medication    Non-rheumatic mitral regurgitation    Pulmonary hypertension    Chronic atrial fibrillation (HCC)    Dyspnea    CKD (chronic kidney disease) stage 3, GFR 30-59 ml/min (HCC)    UGI bleed    Supratherapeutic INR    Acute blood loss anemia    Coagulopathy (HCC)    Acute pulmonary edema (HCC)    Acute respiratory failure with hypoxia and hypercapnia (HCC)  Resolved Problems:    * No resolved hospital problems.  *  Will not transfer out---dw GI--planning push endoscopy tomorrw to get to potential source of bleeding --await rehab consult--cardiol to see re risk of no oral anticoagulants ---but cannot give with active bleeding --  Please note that over 35 minutes was spent in evaluating the patient, review of records and results, discussion with staff/family, etc.    Franklin Hoyt MD

## 2019-06-03 ENCOUNTER — ANESTHESIA EVENT (OUTPATIENT)
Dept: ENDOSCOPY | Age: 84
DRG: 377 | End: 2019-06-03
Payer: MEDICARE

## 2019-06-03 ENCOUNTER — ANESTHESIA (OUTPATIENT)
Dept: ENDOSCOPY | Age: 84
DRG: 377 | End: 2019-06-03
Payer: MEDICARE

## 2019-06-03 VITALS
SYSTOLIC BLOOD PRESSURE: 115 MMHG | OXYGEN SATURATION: 94 % | DIASTOLIC BLOOD PRESSURE: 68 MMHG | RESPIRATION RATE: 13 BRPM

## 2019-06-03 LAB
ANION GAP SERPL CALCULATED.3IONS-SCNC: 11 MMOL/L (ref 3–16)
BASOPHILS ABSOLUTE: 0.1 K/UL (ref 0–0.2)
BASOPHILS RELATIVE PERCENT: 0.6 %
BUN BLDV-MCNC: 22 MG/DL (ref 7–20)
CALCIUM SERPL-MCNC: 8.9 MG/DL (ref 8.3–10.6)
CHLORIDE BLD-SCNC: 98 MMOL/L (ref 99–110)
CO2: 30 MMOL/L (ref 21–32)
CREAT SERPL-MCNC: 1.3 MG/DL (ref 0.6–1.2)
EOSINOPHILS ABSOLUTE: 0.9 K/UL (ref 0–0.6)
EOSINOPHILS RELATIVE PERCENT: 9.8 %
GFR AFRICAN AMERICAN: 47
GFR NON-AFRICAN AMERICAN: 39
GLUCOSE BLD-MCNC: 97 MG/DL (ref 70–99)
HCT VFR BLD CALC: 27.6 % (ref 36–48)
HEMOGLOBIN: 9.4 G/DL (ref 12–16)
LYMPHOCYTES ABSOLUTE: 0.7 K/UL (ref 1–5.1)
LYMPHOCYTES RELATIVE PERCENT: 7.4 %
MAGNESIUM: 2 MG/DL (ref 1.8–2.4)
MCH RBC QN AUTO: 30.6 PG (ref 26–34)
MCHC RBC AUTO-ENTMCNC: 34.1 G/DL (ref 31–36)
MCV RBC AUTO: 89.9 FL (ref 80–100)
MONOCYTES ABSOLUTE: 0.6 K/UL (ref 0–1.3)
MONOCYTES RELATIVE PERCENT: 6.5 %
NEUTROPHILS ABSOLUTE: 7.1 K/UL (ref 1.7–7.7)
NEUTROPHILS RELATIVE PERCENT: 75.7 %
PDW BLD-RTO: 15.5 % (ref 12.4–15.4)
PHOSPHORUS: 3.4 MG/DL (ref 2.5–4.9)
PLATELET # BLD: 207 K/UL (ref 135–450)
PMV BLD AUTO: 8.7 FL (ref 5–10.5)
POTASSIUM SERPL-SCNC: 3.4 MMOL/L (ref 3.5–5.1)
RBC # BLD: 3.07 M/UL (ref 4–5.2)
SODIUM BLD-SCNC: 139 MMOL/L (ref 136–145)
WBC # BLD: 9.3 K/UL (ref 4–11)

## 2019-06-03 PROCEDURE — 6360000002 HC RX W HCPCS: Performed by: NURSE PRACTITIONER

## 2019-06-03 PROCEDURE — 6360000002 HC RX W HCPCS: Performed by: INTERNAL MEDICINE

## 2019-06-03 PROCEDURE — 84100 ASSAY OF PHOSPHORUS: CPT

## 2019-06-03 PROCEDURE — 6370000000 HC RX 637 (ALT 250 FOR IP): Performed by: INTERNAL MEDICINE

## 2019-06-03 PROCEDURE — 97530 THERAPEUTIC ACTIVITIES: CPT

## 2019-06-03 PROCEDURE — 36415 COLL VENOUS BLD VENIPUNCTURE: CPT

## 2019-06-03 PROCEDURE — 83735 ASSAY OF MAGNESIUM: CPT

## 2019-06-03 PROCEDURE — APPNB15 APP NON BILLABLE TIME 0-15 MINS: Performed by: NURSE PRACTITIONER

## 2019-06-03 PROCEDURE — 2000000000 HC ICU R&B

## 2019-06-03 PROCEDURE — 2580000003 HC RX 258: Performed by: NURSE ANESTHETIST, CERTIFIED REGISTERED

## 2019-06-03 PROCEDURE — 97110 THERAPEUTIC EXERCISES: CPT

## 2019-06-03 PROCEDURE — 3700000000 HC ANESTHESIA ATTENDED CARE: Performed by: INTERNAL MEDICINE

## 2019-06-03 PROCEDURE — 0DJD8ZZ INSPECTION OF LOWER INTESTINAL TRACT, VIA NATURAL OR ARTIFICIAL OPENING ENDOSCOPIC: ICD-10-PCS | Performed by: INTERNAL MEDICINE

## 2019-06-03 PROCEDURE — 2580000003 HC RX 258: Performed by: NURSE PRACTITIONER

## 2019-06-03 PROCEDURE — 80048 BASIC METABOLIC PNL TOTAL CA: CPT

## 2019-06-03 PROCEDURE — 2580000003 HC RX 258: Performed by: INTERNAL MEDICINE

## 2019-06-03 PROCEDURE — 94761 N-INVAS EAR/PLS OXIMETRY MLT: CPT

## 2019-06-03 PROCEDURE — 99232 SBSQ HOSP IP/OBS MODERATE 35: CPT | Performed by: INTERNAL MEDICINE

## 2019-06-03 PROCEDURE — 85025 COMPLETE CBC W/AUTO DIFF WBC: CPT

## 2019-06-03 PROCEDURE — 6360000002 HC RX W HCPCS: Performed by: NURSE ANESTHETIST, CERTIFIED REGISTERED

## 2019-06-03 PROCEDURE — 94640 AIRWAY INHALATION TREATMENT: CPT

## 2019-06-03 PROCEDURE — 99233 SBSQ HOSP IP/OBS HIGH 50: CPT | Performed by: INTERNAL MEDICINE

## 2019-06-03 PROCEDURE — 2500000003 HC RX 250 WO HCPCS: Performed by: NURSE ANESTHETIST, CERTIFIED REGISTERED

## 2019-06-03 PROCEDURE — 3700000001 HC ADD 15 MINUTES (ANESTHESIA): Performed by: INTERNAL MEDICINE

## 2019-06-03 PROCEDURE — 2709999900 HC NON-CHARGEABLE SUPPLY: Performed by: INTERNAL MEDICINE

## 2019-06-03 PROCEDURE — 3609013900 HC ENTEROSCOPY: Performed by: INTERNAL MEDICINE

## 2019-06-03 PROCEDURE — 2700000000 HC OXYGEN THERAPY PER DAY

## 2019-06-03 RX ORDER — PROPOFOL 10 MG/ML
INJECTION, EMULSION INTRAVENOUS PRN
Status: DISCONTINUED | OUTPATIENT
Start: 2019-06-03 | End: 2019-06-03 | Stop reason: SDUPTHER

## 2019-06-03 RX ORDER — SODIUM CHLORIDE 9 MG/ML
INJECTION, SOLUTION INTRAVENOUS CONTINUOUS PRN
Status: DISCONTINUED | OUTPATIENT
Start: 2019-06-03 | End: 2019-06-03 | Stop reason: SDUPTHER

## 2019-06-03 RX ORDER — PROPOFOL 10 MG/ML
INJECTION, EMULSION INTRAVENOUS CONTINUOUS PRN
Status: DISCONTINUED | OUTPATIENT
Start: 2019-06-03 | End: 2019-06-03 | Stop reason: SDUPTHER

## 2019-06-03 RX ORDER — ONDANSETRON 2 MG/ML
4 INJECTION INTRAMUSCULAR; INTRAVENOUS
Status: DISCONTINUED | OUTPATIENT
Start: 2019-06-03 | End: 2019-06-03

## 2019-06-03 RX ORDER — POTASSIUM CHLORIDE 7.45 MG/ML
10 INJECTION INTRAVENOUS ONCE
Status: COMPLETED | OUTPATIENT
Start: 2019-06-03 | End: 2019-06-03

## 2019-06-03 RX ORDER — AMIODARONE HYDROCHLORIDE 200 MG/1
200 TABLET ORAL 2 TIMES DAILY
Status: DISCONTINUED | OUTPATIENT
Start: 2019-06-03 | End: 2019-06-06 | Stop reason: HOSPADM

## 2019-06-03 RX ORDER — LIDOCAINE HYDROCHLORIDE 20 MG/ML
INJECTION, SOLUTION EPIDURAL; INFILTRATION; INTRACAUDAL; PERINEURAL PRN
Status: DISCONTINUED | OUTPATIENT
Start: 2019-06-03 | End: 2019-06-03 | Stop reason: SDUPTHER

## 2019-06-03 RX ADMIN — PROPOFOL 20 MG: 10 INJECTION, EMULSION INTRAVENOUS at 14:28

## 2019-06-03 RX ADMIN — MEMANTINE HYDROCHLORIDE 10 MG: 5 TABLET, FILM COATED ORAL at 20:44

## 2019-06-03 RX ADMIN — PANTOPRAZOLE SODIUM 40 MG: 40 TABLET, DELAYED RELEASE ORAL at 06:05

## 2019-06-03 RX ADMIN — LIDOCAINE HYDROCHLORIDE 60 MG: 20 INJECTION, SOLUTION EPIDURAL; INFILTRATION; INTRACAUDAL; PERINEURAL at 14:16

## 2019-06-03 RX ADMIN — AMIODARONE HYDROCHLORIDE 0.5 MG/MIN: 50 INJECTION, SOLUTION INTRAVENOUS at 08:44

## 2019-06-03 RX ADMIN — IPRATROPIUM BROMIDE AND ALBUTEROL SULFATE 1 AMPULE: .5; 3 SOLUTION RESPIRATORY (INHALATION) at 12:03

## 2019-06-03 RX ADMIN — Medication 10 ML: at 20:44

## 2019-06-03 RX ADMIN — METOPROLOL SUCCINATE 50 MG: 50 TABLET, FILM COATED, EXTENDED RELEASE ORAL at 20:44

## 2019-06-03 RX ADMIN — MOMETASONE FUROATE AND FORMOTEROL FUMARATE DIHYDRATE 2 PUFF: 200; 5 AEROSOL RESPIRATORY (INHALATION) at 07:36

## 2019-06-03 RX ADMIN — MOMETASONE FUROATE AND FORMOTEROL FUMARATE DIHYDRATE 2 PUFF: 200; 5 AEROSOL RESPIRATORY (INHALATION) at 21:17

## 2019-06-03 RX ADMIN — IPRATROPIUM BROMIDE AND ALBUTEROL SULFATE 1 AMPULE: .5; 3 SOLUTION RESPIRATORY (INHALATION) at 21:17

## 2019-06-03 RX ADMIN — LATANOPROST 1 DROP: 50 SOLUTION OPHTHALMIC at 20:48

## 2019-06-03 RX ADMIN — POTASSIUM CHLORIDE 10 MEQ: 7.46 INJECTION, SOLUTION INTRAVENOUS at 08:34

## 2019-06-03 RX ADMIN — IPRATROPIUM BROMIDE AND ALBUTEROL SULFATE 1 AMPULE: .5; 3 SOLUTION RESPIRATORY (INHALATION) at 07:36

## 2019-06-03 RX ADMIN — LEVOTHYROXINE SODIUM 75 MCG: 75 TABLET ORAL at 06:05

## 2019-06-03 RX ADMIN — Medication 10 ML: at 09:00

## 2019-06-03 RX ADMIN — SODIUM CHLORIDE: 9 INJECTION, SOLUTION INTRAVENOUS at 13:42

## 2019-06-03 RX ADMIN — PROPOFOL 20 MG: 10 INJECTION, EMULSION INTRAVENOUS at 14:24

## 2019-06-03 RX ADMIN — PROPOFOL 25 MCG/KG/MIN: 10 INJECTION, EMULSION INTRAVENOUS at 14:32

## 2019-06-03 RX ADMIN — AMIODARONE HYDROCHLORIDE 200 MG: 200 TABLET ORAL at 18:20

## 2019-06-03 RX ADMIN — FUROSEMIDE 40 MG: 10 INJECTION, SOLUTION INTRAMUSCULAR; INTRAVENOUS at 08:34

## 2019-06-03 RX ADMIN — IPRATROPIUM BROMIDE AND ALBUTEROL SULFATE 1 AMPULE: .5; 3 SOLUTION RESPIRATORY (INHALATION) at 17:07

## 2019-06-03 ASSESSMENT — PULMONARY FUNCTION TESTS
PIF_VALUE: 0

## 2019-06-03 ASSESSMENT — PAIN SCALES - GENERAL
PAINLEVEL_OUTOF10: 0

## 2019-06-03 ASSESSMENT — ENCOUNTER SYMPTOMS: SHORTNESS OF BREATH: 1

## 2019-06-03 ASSESSMENT — PAIN - FUNCTIONAL ASSESSMENT: PAIN_FUNCTIONAL_ASSESSMENT: 0-10

## 2019-06-03 NOTE — CONSULTS
Consult Note  Physical Medicine and Rehabilitation    Patient: Janelle Anderson  [id number]  Date: 6/3/2019      Chief Complaint: fatigue    History of Present Illness/Hospital Course:  Ms. Davonte Houston is an 79 yo F with pmh chronic dCHF, severe MR, Afib, HTN, COPD, pulmonary hypertension, and chronic venous thromboembolic disease who initially presented 5/28/2019 with dark stools and progressive weakness/fatigue. Found to have anemia due to upper GI bleed in the setting of supratherapeutic INR. Plan for push enteroscopy for treatment of duodenal AVM (suspected source of bleeding). Course complicated by acute hypoxic respiratory failure, pulmonary edema, and afib with RVR. Currently, patient reports severe fatigue and generalized weakness.  She denies any abdominal pain, nausea/vomiting, shortness of breath at rest.     Prior Level of Function:  Independent for mobility, ADLs, and IADLs    Current Level of Function:  Min-mod assist    Pertinent Social History:  Support: Lives alone  Home set-up: condo with elevator access    Past Medical History:   Diagnosis Date    Anemia     Anticoagulant long-term use     Atrial fibrillation ([address] 6/2011    Angelita Bennett CHF (congestive heart failure) ([address]     CKD (chronic kidney disease) stage 3, GFR 30-59 ml/min ([address] 2/11/2019    Clostridium difficile diarrhea 09/14/2016    Depression     Humerus fracture     Hyperlipidemia     Hypertension     Hypothyroidism     Mitral regurgitation     Optic neuritis     Osteopenia     Fosamax stopped 2/2014, had been treated at least since 2004    Polymyalgia rheumatica ([address]     Pulmonary embolus ([address] 6/2011    Right pulmonary obstruction suspected to be possible chronic pulmonary embolus    Thyroid disease     Vitamin D deficiency        Past Surgical History:   Procedure Laterality Date    COLONOSCOPY  09/14/2016    [facility]    COLONOSCOPY N/A 5/31/2019    [facility]ON[facility] performed [facility] Zan Hutchins [facility] at [facility] ENDOSCOPY    ENDOSCOPY, SMALL BOWEL  2019    BOWEL SMALL CAPSULE ENDOSCOPY DEPLOYED VIA EGD performed by Donald Best MD at [address] MALIGNANT SKIN LESION EXCISION      UPPER GASTROINTESTINAL ENDOSCOPY  2016    [facility]a    UPPER GASTROINTESTINAL ENDOSCOPY N/A 2019    EGD POLYP ABLATION/OTHER performed by Donald Best MD at [address] N/A 2019    ESOPHAGOGASTRODUODENOSCOPY WITH CAPSULE PLACEMENT performed by Donald Best MD at [address] History   Problem Relation Age of Onset    Cancer Mother         unknown primary    Other Father         Tuberculosis    Lung Cancer Sister     Tuberculosis Brother     Diabetes Brother        Social History     Socioeconomic History    Marital status:       Spouse name: None    Number of children: 2    Years of education: None    Highest education level: None   Occupational History    None   Social Needs    Financial resource strain: None    Food insecurity:     Worry: None     Inability: None    Transportation needs:     Medical: None     Non-medical: None   Tobacco Use    Smoking status: Former Smoker     Last attempt to quit: 1965     Years since quittin.4    Smokeless tobacco: Never Used   Substance and Sexual Activity    Alcohol use: Yes     Comment: occ    Drug use: No    Sexual activity: None   Lifestyle    Physical activity:     Days per week: None     Minutes per session: None    Stress: None   Relationships    Social connections:     Talks on phone: None     Gets together: None     Attends Confucianist service: None     Active member of club or organization: None     Attends meetings of clubs or organizations: None     Relationship status: None    Intimate partner violence:     Fear of current or ex partner: None     Emotionally abused: None     Physically abused: None     Forced sexual activity: None   Other Topics Concern    06/02/19 1947 -- -- -- -- 20 95 % --   06/02/19 1800 (!) 110/52 -- -- 114 23 97 % --   06/02/19 1700 (!) 110/49 -- -- 122 27 97 % --   06/02/19 1600 (!) 106/57 98.2 °F (36.8 °C) Oral 113 30 97 % --   06/02/19 1524 -- -- -- -- -- 96 % --   06/02/19 1300 112/69 -- -- 123 24 97 % --     Const: Alert. WDWN. No distress  Eyes: Conjunctiva noninjected, no icterus noted; pupils equal, round, and reactive to light. HENT: Atraumatic, normocephalic; Oral mucosa moist  Neck: Trachea midline, neck supple. No thyromegaly noted. CV: Irreg irreg rhtyhm, rate slightly tachycardic, no murmur rub or gallop noted  Resp: Lungs clear to auscultation bilaterally, no rales wheezes or ronchi, no retractions. Respirations unlabored. GI: Soft, nontender, nondistended. Normal bowel sounds. No palpable masses. Skin: Normal temperature and turgor. No rashes or breakdown noted. Ext: No significant edema appreciated. No varicosities. MSK: No joint tenderness, erythema, warmth noted. AROM intact. Neuro: Alert, oriented, appropriate. No cranial nerve deficits appreciated. Sensation intact to light touch. Motor examination reveals 4/5 strength in all four limbs diffusely.    Psych: Stable mood, normal judgement, normal affect     Lab Results   Component Value Date    WBC 9.3 06/03/2019    HGB 9.4 (L) 06/03/2019    HCT 27.6 (L) 06/03/2019    MCV 89.9 06/03/2019     06/03/2019     Lab Results   Component Value Date    INR 1.43 (H) 05/30/2019    INR 1.84 (H) 05/29/2019    INR 3.68 (H) 05/29/2019    PROTIME 16.3 (H) 05/30/2019    PROTIME 21.0 (H) 05/29/2019    PROTIME 41.9 (H) 05/29/2019     Lab Results   Component Value Date    CREATININE 1.3 (H) 06/03/2019    BUN 22 (H) 06/03/2019     06/03/2019    K 3.4 (L) 06/03/2019    CL 98 (L) 06/03/2019    CO2 30 06/03/2019     Lab Results   Component Value Date    ALT 19 05/28/2019    AST 27 05/28/2019    ALKPHOS 95 05/28/2019    BILITOT 0.4 05/28/2019       Most recent echocardiogram revealed:  Color flow/continuous and pulse wave Doppler used to assess valvular   function.   Normal left ventricle size, wall thickness and systolic function with an   estimated ejection fraction of 65%. No regional wall motion abnormalities   are seen.   Normal right ventricular size and function.   Moderately dilated left atrium. Aneurysmal atrial septum with bowing from   the left to the right.   The mitral valve leaflets are thickened.   There is prolapse of the P2 segment of the posterior leaflet resulting in   moderately severe mitral regurgitation. There is no stenosis.   Moderate tricuspid regurgitation.   No shunt at the atrial level by agitated saline contrast study.   No thrombus visualized in the left atrium, left atrial appendage or left   ventricle. Most recent EKG revealed:  Sinus rhythm with prolonged AV conduction with occasional Premature ventricular complexesNonspecific ST and T wave abnormalityAbnormal ECGWhen compared with ECG of 28-MAY-2019 18:01,Premature ventricular complexes are now PresentPR interval has increasedST no longer depressed in Inferior leadsST less depressed in Anterior leadsConfirmed by Irwin, [address] [place] [address] ([facility]) on 5/29/2019 4:09:26 PM    On my review, CXR displays cardiomegaly, pulmonary vascular congestion, pulmonary edema, right pleural effusion. Assessment:  1. Debility  2. Upper GI bleed  3. Acute blood loss anemia  4. Acute hypoxic respiratory failure  5. Pulmonary edema  6. Chronic dCHF, severe MR  7. Afib with RVR    Recommendations:    Patient is a potential candidate for ARU. Will need to see how she progresses given ongoing active medical issues and need for procedure today. We will follow. Thank you for this consult. Please contact me with any questions or concerns. Reta Waller.  Roz Oneal MD 6/3/2019, 12:04 PM

## 2019-06-03 NOTE — PROGRESS NOTES
[phone] Departs to Endo via bed with endoscopy RN x2 at bedside. Son, Ezio De Dios at bedside for departure. VSS on departure. Amio infusing. 1500 Return to 2123 following procedure. NGT now in place to left nares. Patient awakes easily, A&O, denies pain or other complaints. 1700 Cardiology at bedside, order to transition Amio to PO.     1800 Per GI, ok to begin diet at this time. Tube feed initiated per RD recommendations. PO diet also restored and patient able to swallow without reported or observed difficulty.      Electronically signed by Liana Rao RN on 6/3/2019 at 6:58 PM

## 2019-06-03 NOTE — H&P
Patient seen and examined by me prior to the procedure. The patient is stable for sedation. There have been no significant changes since the last progress note.   Please reference this note for full details    ASA class-3  [address], DO

## 2019-06-03 NOTE — PLAN OF CARE
Nutrition Problem: Inadequate oral intake  Intervention: Food and/or Nutrient Delivery: Continue NPO(refer to Nursing for TF recs if started)  Nutritional Goals: tolerate most appropriate form of nutrition

## 2019-06-03 NOTE — OP NOTE
Endoscopy Note    Patient: Rose Mary Steinberg  : 1935  Acct#:     Procedure: Push Enteroscopy                         Date:  6/3/2019     Surgeon:   Soren Mauro DO    Referring Physician:  Pattie Breen MD    Indications: This is a 80y.o. year old female who presents today with acute blood loss anemia in setting of anticoagulation use for Atrial fibrillation    Postoperative Diagnosis:   1) The esophagus was normal without evidence of esophagitis, Omalley's esophagus, strictures, rings, furrowing, masses, or nodules. 2) A 3-4 cm sliding hiatal hernia was noted. 3)  There was no other abnormalities noted in the stomach. 4) The proximal and distal duodenum and the proximal jejunum were normal.  65 cm post pylorus were visualized. Scope advancement was limited by looping of the pediatric coloscope in the stomach. No blood was noted. 5) Successful placement of a nasogastric feeding tube was placed into the left nares and into the distal stomach. Placement was endoscopically verified. Anesthesia:  The patient was administered TIVA per anesthesiology team.  Please see their operative records for full details of medications administered. Consent:  The patient or their legal guardian has signed an informed consent, and is aware of the potential risks, benefits, alternatives, and potential complications of this procedure. These include, but are not limited to hemorrhage, bleeding, post procedural pain, perforation, phlebitis, aspiration, hypotension, hypoxia, cardiovascular events such as arryhthmia, and possibly death. Description of Procedure: The patient was then taken to the endoscopy suite, placed in the supine position and the above IV sedation was administrered. The Olympus video pediatric colonoscope was placed through the patient's oropharynx without difficulty to the extent of the proximal jejunum.   Both forward and retroflexed views of the stomach were obtained. Findings:    1) The esophagus was normal without evidence of esophagitis, Omalley's esophagus, strictures, rings, furrowing, masses, or nodules. 2) A 3-4 cm sliding hiatal hernia was noted. 3)  There was no other abnormalities noted in the stomach. 4) The proximal and distal duodenum and the proximal jejunum were normal.  65 cm post pylorus were visualized. Scope advancement was limited by looping of the pediatric coloscope in the stomach. No blood was noted. The scope was then withdrawn back into the stomach, it was decompressed, and the scope was completely withdrawn. A nasogastric feeding tube was placed into the left nare and into the distal stomach. Placement was endoscopically verified. The patient tolerated the procedure well and was taken to the post anesthesia care unit in good condition. Estimated blood loss: None    Impression:   1) See post procedure diagnoses    Recommendations:   1) NPO for 2 hours  2) OK to start tube feedings after two hours. 3) Convert medications to liquid if possible and infuse through NG. If no liquid alternative, then crush and mix in 20-30 cc of water and infuse through NG. 4)  Keep head of bed elevated during tube feeds. 5) Flush NG with 20mL of water tid and before and after each use. 6) Would advise for holding anticoagulation and monitoring hemoglobin. High risk for re-bleeding if anticoagulation resumed  7) If further bleeding, patient will need to be transferred to a center with capabilities of deep balloon enteroscopy if family desires further interventions.      Vicky Funk, DO  [address] [facility]

## 2019-06-03 NOTE — CARE COORDINATION
Chart reviewed. Therapy rec ARU. PM&R consulted and pending. Pt currently in endo so unable to offer choice, noted pt has NG TF. [facility] to [facility], who will get back to me about NG TF or if a permanent nutritional route (po vs peg) will need to be established prior to admit. Will f/u tomorrow once pt alert and available.     Nadia Cohn, RN  Case management  [phone]

## 2019-06-03 NOTE — PROGRESS NOTES
Nutrition Assessment (Enteral Nutrition)    Type and Reason for Visit: Reassess    Nutrition Recommendations:   Monitor for diet advancement  Monitor for start of TF    Nutrition Assessment: Pt remains at risk for nutrition compromise r/t increased needs, altered GI, altered labs r/t GI bleed & need for limited diet orders, renal dysfunction. Pt npo again for push enteroscopy with ablation of AVMs, this date. Nursing reported that pt taking little or no po. Acceptance of supplementation at bites & sips per feedback. Nursing requested TF recs with plan to consult MD for possible start of alternate nutrition. Pt agreeable to temporary TF per Nursing. Malnutrition Assessment:  · Malnutrition Status: At risk for malnutrition  · Context: Acute illness or injury  · Findings of the 6 clinical characteristics of malnutrition (Minimum of 2 out of 6 clinical characteristics is required to make the diagnosis of moderate or severe Protein Calorie Malnutrition based on AND/ASPEN Guidelines):  1. Energy Intake-Less than or equal to 50% of estimated energy requirement, Greater than or equal to 5 days    2. Weight Loss-No significant weight loss,    3. Fat Loss-Unable to assess,    4. Muscle Loss-Unable to assess,    5. Fluid Accumulation-No significant fluid accumulation,    6.  Strength-Not measured    Nutrition Risk Level: Moderate    Nutrition Needs:  · Estimated Daily Total Kcal: 3686-8700 (25-30 x ABW of 49 kg)  · Estimated Daily Protein (g): 39-59 (.8-1.2 x ABW (adj for CKD)  · Estimated Daily Fluid (ml/day): 1 ml per kcal    Nutrition Diagnosis:   · Problem: Inadequate oral intake  · Etiology: related to Alteration in GI function     Signs and symptoms:  as evidenced by NPO status due to medical condition    Objective Information:  · Nutrition-Focused Physical Findings: BM 6/2.  Pt +2.3 liters   · Wound Type: None  · Current Nutrition Therapies:  · Oral Diet Orders: NPO   · Oral Diet intake: NPO  · Oral Nutrition Supplement (ONS) Orders: None  · ONS intake: NPO  · Tube Feeding (TF) If TF started, recommend 2 josue HN (r/t CHF) to advance as tolerated, monitoring for refeeding to goal rate of 30 ml per hour. Rate adjusted for routine Nursing care (~20 hour run). Flush per MD. Regimen offers; 600 ml / 1200 kcals / 50 gms pro / 420 ml free water per day.    · Anthropometric Measures:  · Ht: 5' (152.4 cm)   · Current Body Wt: 110 lb (49.9 kg)  · Admission Body Wt: 109 lb (49.4 kg)  · Weight Change: Pt looks to have wt loss trend but not at significant rate   · Ideal Body Wt: 100 lb (45.4 kg), % Ideal Body    · BMI Classification: BMI 18.5 - 24.9 Normal Weight    Nutrition Interventions:   Continue NPO(refer to Nursing for TF recs if started)  Continued Inpatient Monitoring, Education Not Indicated    Nutrition Evaluation:   · Evaluation: Progress towards goals declining   · Goals: tolerate most appropriate form of nutrition   · Monitoring: Nutrition Progression, Skin Integrity, Weight, Pertinent Labs, Diarrhea      Electronically signed by Daysi Bal RD, LD on 6/3/19 at 11:33 AM    Contact Number: [phone]

## 2019-06-03 NOTE — ANESTHESIA PRE PROCEDURE
[facility] Department of Anesthesiology  Pre-Anesthesia Evaluation/Consultation       Name:  Jones Toscano  : 1935  Age:  80 y. o.                                            MRN:  [id number]  Date: 6/3/2019           Surgeon: Surgeon(s):  Parrish Amor,     Procedure: Procedure(s):  COLONOSCOPY WITH POSSIBLE ESOPHAGOGASTRODUODENOSCOPY     Allergies   Allergen Reactions    Ace Inhibitors      coughing    Aricept [Donepezil Hydrochloride]      Can not recall    Benicar [Olmesartan Medoxomil]      Can not recall      Exelon [Rivastigmine Tartrate]      Can not recall      Pcn [Penicillins] Hives and Swelling    Quinapril Hcl      Can not recall      Doxycycline Nausea And Vomiting     Patient Active Problem List   Diagnosis    Disorder of bone and cartilage    Edema    Essential hypertension, benign    Other and unspecified hyperlipidemia    Hypothyroidism due to medication    Paroxysmal atrial fibrillation (HCC)    Anemia    Vitamin D deficiency    Fatigue    Osteopenia    Chronic diastolic congestive heart failure (HCC)    Non-rheumatic mitral regurgitation    Pulmonary hypertension    Weight loss    Abnormal chest x-ray    S/P right heart catheterization    Chronic atrial fibrillation (HCC)    Dyspnea    Hx of venous thromboembolic disease    CKD (chronic kidney disease) stage 3, GFR 30-59 ml/min (HCC)    UGI bleed    Supratherapeutic INR    Acute blood loss anemia    Coagulopathy (HCC)    Acute pulmonary edema (HCC)    Acute respiratory failure with hypoxia and hypercapnia (HCC)    Lightheaded     Past Medical History:   Diagnosis Date    Anemia     Anticoagulant long-term use     Atrial fibrillation ([facility] [address] 2011    Juliana Tao CHF (congestive heart failure) (HCC)     CKD (chronic kidney disease) stage 3, GFR 30-59 ml/min (HCC) 2019    Clostridium difficile diarrhea 2016    Depression     Humerus fracture     Hyperlipidemia     Hypertension     Hypothyroidism     Mitral regurgitation     Optic neuritis     Osteopenia     Fosamax stopped 2014, had been treated at least since     Polymyalgia rheumatica ([facility] [address]     Pulmonary embolus ([facility] [address] 2011    Right pulmonary obstruction suspected to be possible chronic pulmonary embolus    Thyroid disease     Vitamin D deficiency      Past Surgical History:   Procedure Laterality Date    COLONOSCOPY  2016    [facility]    COLONOSCOPY N/A 2019    COLONOSCOPY performed by Betsy Rey MD at [facility]  2019    BOWEL SMALL CAPSULE ENDOSCOPY DEPLOYED VIA EGD performed by Betsy Rey MD at [facility]      UPPER GASTROINTESTINAL ENDOSCOPY  2016    [facility]    UPPER GASTROINTESTINAL ENDOSCOPY N/A 2019    EGD POLYP ABLATION/OTHER performed by Betsy Rey MD at [facility] N/A 2019    ESOPHAGOGASTRODUODENOSCOPY WITH CAPSULE PLACEMENT performed by Betsy Rey MD at [facility] History     Tobacco Use    Smoking status: Former Smoker     Last attempt to quit: 1965     Years since quittin.4    Smokeless tobacco: Never Used   Substance Use Topics    Alcohol use: Yes     Comment: occ    Drug use: No     Medications  No current facility-administered medications on file prior to encounter.       Current Outpatient Medications on File Prior to Encounter   Medication Sig Dispense Refill    Fluticasone Furoate-Vilanterol (BREO ELLIPTA) 200-25 MCG/INH AEPB Inhale 1 puff into the lungs daily 3 each 1    Fluticasone Furoate-Vilanterol (BREO ELLIPTA) 200-25 MCG/INH AEPB Inhale 1 puff into the lungs daily 1 each 1    levothyroxine (SYNTHROID) 75 MCG tablet Take 1 tablet by mouth Daily 90 tablet 3    budesonide-formoterol (SYMBICORT) 160-4.5 MCG/ACT AERO Inhale 2 puffs into the lungs 2 times daily 1 Inhaler 0    Multiple MCG/ACT inhaler 2 puff  2 puff Inhalation Q4H PRN Sharri Parsons MD   2 puff at 19 0926    mometasone-formoterol (DULERA) 200-5 MCG/ACT inhaler 2 puff  2 puff Inhalation BID Sharri Parsons MD   2 puff at 19 0736    latanoprost (XALATAN) 0.005 % ophthalmic solution 1 drop  1 drop Both Eyes Nightly Sharri Parsons MD   1 drop at 19    levothyroxine (SYNTHROID) tablet 75 mcg  75 mcg Oral Daily Sharri Parsons MD   75 mcg at 19 06    memantine (NAMENDA) tablet 10 mg  10 mg Oral BID Sharri Parosns MD   10 mg at 19    sodium chloride flush 0.9 % injection 10 mL  10 mL Intravenous 2 times per day Sharri Parsons MD   10 mL at 19    sodium chloride flush 0.9 % injection 10 mL  10 mL Intravenous PRN Sharri Parsons MD        acetaminophen (TYLENOL) tablet 650 mg  650 mg Oral Q4H PRN Sharri Parsons MD        ondansetron [facility]) injection 4 mg  4 mg Intravenous Q6H PRN Sharri Parsons MD        melatonin tablet 3 mg  3 mg Oral Nightly PRN Sharri Parsons MD   3 mg at 19 2342     Vital Signs (Current)   Vitals:    19 1100   BP: 117/61   Pulse: 96   Resp: 25   Temp:    SpO2: 94%     Vital Signs Statistics (for past 48 hrs)     Temp  Av.2 °F (36.8 °C)  Min: 98 °F (36.7 °C)   Min taken time: 19 0400  Max: 98.4 °F (36.9 °C)   Max taken time: 19 1200  Pulse  Av.2  Min: 80   Min taken time: 19 0700  Max: 126   Max taken time: 19 0000  Resp  Av.7  Min: 25   Min taken time: 19 0600  Max: 40   Max taken time: 19 0201  BP  Min: 80/48   Min taken time: 19 2200  Max: 139/66   Max taken time: 19 0800  SpO2  Av %  Min: 92 %   Min taken time: 19 2000  Max: 99 %   Max taken time: 19 0400    BP Readings from Last 3 Encounters:   19 117/61   19 (!) 94/52   19 (!) 108/51     BMI  Body mass index is 21.57 kg/m².   Estimated body mass index is 21.57 kg/m² as calculated from the following:    Height as of this ROM: full  Mouth opening: > = 3 FB Dental:      Comment: No loose teeth    Pulmonary:   (+) shortness of breath:  decreased breath sounds,      (-) COPD and asthma                          ROS comment: States her breathing is feeling better     Cardiovascular:  Exercise tolerance: poor (<4 METS),   (+) hypertension:, valvular problems/murmurs: MR, dysrhythmias: atrial fibrillation, CHF:, pulmonary hypertension:,     (-) pacemaker, past MI and  angina    ECG reviewed  Rhythm: regular  Rate: normal  Echocardiogram reviewed         Beta Blocker:  Dose within 24 Hrs         Neuro/Psych:   (+) psychiatric history:   (-) seizures, TIA and CVA           GI/Hepatic/Renal:   (+) PUD, renal disease:,           Endo/Other:    (+) hypothyroidism, blood dyscrasia: anticoagulation therapy and anemia, arthritis:., .                 Abdominal:           Vascular:     - DVT and PE. Anesthesia Plan      TIVA     ASA 3       Induction: intravenous. Anesthetic plan and risks discussed with patient. Plan discussed with CRNA. This pre-anesthesia assessment may be used as a history and physical.    DOS STAFF ADDENDUM:    Pt seen and examined, chart reviewed (including anesthesia, drug and allergy history). No interval changes to history and physical examination. Anesthetic plan, risks, benefits, alternatives, and personnel involved discussed with patient. Patient verbalized an understanding and agrees to proceed.       Cristela Lindsey MD  Elizabeth 3, 2019  1:07 PM

## 2019-06-03 NOTE — CARE COORDINATION
Received referral and reviewed records. Noted plans for procedure today. Will follow and discuss case with PM&R physician. Will reply to case management.

## 2019-06-03 NOTE — PROGRESS NOTES
Occupational Therapy  Facility/Department: [address] ICU  Daily Treatment Note  NAME: Richa Walls  : 1935  MRN: [id number]    Date of Service: 6/3/2019    Discharge Recommendations:  Patient would benefit from continued therapy after discharge, 5-7 sessions per week  OT Equipment Recommendations  Other: TBD by next [facility] scored a 15/24 on the AM-PAC ADL Inpatient form. Current research shows that an AM-PAC score of 17 or less is typically not associated with a discharge to the patient's home setting. Based on the patients AM-PAC score and their current ADL deficits, it is recommended that the patient have 5-7 sessions per week of Occupational Therapy at d/c to increase the patients independence. Assessment   Performance deficits / Impairments: Decreased functional mobility ; Decreased ADL status; Decreased strength;Decreased endurance;Decreased high-level IADLs;Decreased balance  Assessment: Pt tolerated session poorly this date, declining OOB activity despite max encouragement and education on importance of OOB. Pt toileted using bedpan in bed, requiring total A for hygiene. Pt performed bed mobility (rolling only) with min A. Pt declined ADLs this date, even in bed, \"maybe tomorrow\". Pt completed B UE exercises and tolerated fair, with encouragement to complete as much as able. Cont per OT POC  Prognosis: Good  Assistance / Modification: max encouragement, total A-min A  Patient Education: Pt educated on the role of OT, POC, importance of OOB  REQUIRES OT FOLLOW UP: Yes  Activity Tolerance  Activity Tolerance: Patient limited by fatigue;Treatment limited secondary to agitation  Activity Tolerance: self-limiting behavior  Safety Devices  Type of devices: Nurse notified; Patient at risk for falls; Bed alarm in place; Left in bed;Call light within reach(KENN Cruz)         Patient Diagnosis(es): The primary encounter diagnosis was UGI bleed.  Diagnoses of Anemia, unspecified type and Supratherapeutic INR were also pertinent to this visit. has a past medical history of Anemia, Anticoagulant long-term use, Atrial fibrillation (HCC), CHF (congestive heart failure) ([address] CKD (chronic kidney disease) stage 3, GFR 30-59 ml/min (HCC), Clostridium difficile diarrhea, Depression, Humerus fracture, Hyperlipidemia, Hypertension, Hypothyroidism, Mitral regurgitation, Optic neuritis, Osteopenia, Polymyalgia rheumatica ([address] Pulmonary embolus ([address] Thyroid disease, and Vitamin D deficiency. has a past surgical history that includes Lung surgery; malignant skin lesion excision; Colonoscopy (09/14/2016); Upper gastrointestinal endoscopy (09/14/2016); Upper gastrointestinal endoscopy (N/A, 5/30/2019); Colonoscopy (N/A, 5/31/2019); Upper gastrointestinal endoscopy (N/A, 5/31/2019); and Endoscopy, small bowel with ileum (5/31/2019). Restrictions  Restrictions/Precautions  Restrictions/Precautions: Fall Risk  Position Activity Restriction  Other position/activity restrictions: O2 1L via NC. Subjective   General  Chart Reviewed: Yes  Patient assessed for rehabilitation services?: Yes  Additional Pertinent Hx: Pt is a 80 y.o. female presenting to ED from PCP office with c/o bloody/dark stools, fatigue, and dizziness. In the ER she was found to have an elevated INR and her hemoglobin was down to 7.9. Pt admitted with GI bleed. Pt also developed Acute respiratory failure with hypoxia and hypercapnia due to volume overload from mitral regurgitation. Pt s/p EGD with argon plasma coagulation 5/30 and Colonoscopy + EGD with VCE 5/31. Family / Caregiver Present: Yes(son)  Referring Practitioner: ANNA Chowdhury CNP  Diagnosis: GI bleeding, acute blood loss anemia, Acute Hypoxic/Hypercapnic Respiratory Failure, acute pulmonary adema  Subjective  Subjective: Pt met bedside for OT tx.  Pt supine in bed using bedpan upon OT arrival. Pt initially agreeable to therapy and OOB activity with max encouragement but later declined OOB reporting fatigue from using bedpan      Orientation     Objective    ADL  Grooming: (pt declined \"tomorrow\")  Toileting: Dependent/Total(benitez catheter. pt using bedpan upon arrival and was dependent in bed for hygiene)        Functional Mobility  Functional Mobility Comments: not assessed this date d/t pt declining OOB despite education and max encouragement  Bed mobility  Rolling to Left: Minimal assistance  Rolling to Right: Minimal assistance                          Cognition  Overall Cognitive Status: WFL  Cognition Comment: self-limiting behavior, requires max encouragement to participate and refused OOB activity this date                    Type of ROM/Therapeutic Exercise  Type of ROM/Therapeutic Exercise: AROM  Exercises  Shoulder Flexion: x10  Elbow Flexion: x10  Elbow Extension: x10  Wrist Flexion: x10  Wrist Extension: x10  Grasp/Release: x10  Other: pt required encouragement to complete full 10 reps and tolerated fair                    Plan   Plan  Times per week: 3-5  Times per day: Daily  Current Treatment Recommendations: Strengthening, Balance Training, Functional Mobility Training, Endurance Training, Safety Education & Training, Self-Care / ADL         AM-PAC Score        AM-[facility] Inpatient Daily Activity Raw Score: 15  AM-PAC Inpatient ADL T-Scale Score : 34.69  ADL Inpatient CMS 0-100% Score: 56.46  ADL Inpatient CMS G-Code Modifier : CK    Goals  Short term goals  Time Frame for Short term goals: Prior to d/c: - all goals ongoing as of 6/3/19  Short term goal 1: Pt will bathe with min A. Short term goal 2: Pt will dress with min A. Short term goal 3: Pt will complete standing grooming at sink SBA/supervision. Short term goal 4: Pt will toilet with Min A. Short term goal 5: Pt will complete fxl mobility and fxl transfers to/from ADL surfaces with SBA using AD. Short term goal 6: Pt will increase activity tolerance to achieve the above goals.    Long term goals  Time Frame for Long term goals : STG=LTG   Patient Goals   Patient goals : \"to be able to do everything I was doing before. \"       Therapy Time   Individual Concurrent Group Co-treatment   Time In 1050         Time Out 1113         Minutes 23         Timed Code Treatment Minutes: 23 Minutes      This note to serve as OT d/c summary if pt is d/c-ed prior to next therapy session.     Roddy Kearney, OTR/L [id number]

## 2019-06-03 NOTE — PROGRESS NOTES
[facility] Progress Note  6/3/2019 7:24 AM  Subjective:   Admit Date: 5/28/2019      Chief Complaint: Stable overnite     Interval History: For push enteroscopy today-to reach AVM in doudenum and ?? [place]   Remains comfortable altho very weak     Diet: Dietary Nutrition Supplements: Frozen Oral Supplement  Dietary Nutrition Supplements: Clear Liquid Oral Supplement  Diet NPO, After Midnight Exceptions are: Ice Chips  Medications:   Scheduled Meds:   metoprolol succinate  50 mg Oral BID    furosemide  40 mg Intravenous Daily    pantoprazole  40 mg Oral QAM AC    ipratropium-albuterol  1 ampule Inhalation Q4H WA    mometasone-formoterol  2 puff Inhalation BID    latanoprost  1 drop Both Eyes Nightly    levothyroxine  75 mcg Oral Daily    memantine  10 mg Oral BID    sodium chloride flush  10 mL Intravenous 2 times per day     Continuous Infusions:   amiodarone 0.5 mg/min (06/02/19 1803)       Review of Systems -   General ROS: afebrile  Respiratory ROS: positive for - shortness of breath  Cardiovascular ROS: no chest pain  Musculoskeletal ROS:positive for - :joint pain  Neurological ROS: no TIA or stroke symptoms    Objective:   Vitals: BP (!) 102/53   Pulse 102   Temp 98 °F (36.7 °C) (Oral)   Resp 24   Ht 5' (1.524 m)   Wt 110 lb 7.2 oz (50.1 kg)   SpO2 99%   BMI 21.57 kg/m²   General appearance: alert and cooperative with exam  HEENT: Head: Normocephalic, no lesions, without obvious abnormality. Neck: no adenopathy, no carotid bruit, no JVD, supple, symmetrical, trachea midline and thyroid not enlarged, symmetric, no tenderness/mass/nodules  Lungs: diminished breath sounds bibasilar  Heart: irregularly irregular rhythm  Abdomen: soft, non-tender; bowel sounds normal; no masses,  no organomegaly  Extremities: extremities normal, atraumatic, no cyanosis or edema  Neurologic: Mental status: weak but conversant     Admission on 05/28/2019   No results displayed because visit has over 200 results.

## 2019-06-03 NOTE — PROGRESS NOTES
reasonably  achievable. COMPARISON:  01/15/2019 and 08/19/2015    HISTORY:  ORDERING SYSTEM PROVIDED HISTORY: Chronic cough  TECHNOLOGIST PROVIDED HISTORY:  Reason for exam:->Chronic cough with upper lung field wheezes and crackles. Clear chest x-ray. Ordering Physician Provided Reason for Exam: Chronic cough with upper lung  field wheezes and crackles. Clear chest x-ray. Acuity: Acute  Type of Exam: Initial    FINDINGS:  Mediastinum: The heart is enlarged. Coronary artery calcifications are a  marker of atherosclerosis. No change in the calcifications along the  pericardium, likely from prior pericarditis. The main pulmonary artery is  dilated due to pulmonary hypertension. No change in the mildly enlarged right paratracheal lymph node measuring 1.1  x 1.4 cm. Calcified granulomatous disease is stable. Lungs/pleura: The tracheobronchial tree is patent. No change in the  bronchial wall thickening involving the right middle and lower lobes. There  is no pneumothorax or pleural effusion. No change in the right calcified  pleural plaques. No change in the nonspecific mosaic perfusion pattern  either due to small airway disease or vasculitis. There are new tree-in-bud opacities within the left upper lobe due to  bronchiolitis. In addition, there is a new separate 5 mm solid left lower  lobe pulmonary nodule. No change in the diffuse reticulonodular opacities associated volume loss and  atelectasis involving the right lung. There is more diffuse mild  interstitial thickening involving the right middle and lower lobes. Upper Abdomen: There is diffuse hyper attenuation throughout the liver either  due to Shantanu's disease, hemochromatosis or post chemotherapeutic response. Soft Tissues/Bones: Degenerative changes involve the thoracic spine. The  bones are demineralized. No change in the left breast nodule. Impression 1. Left upper lobe bronchiolitis.   2. New 5 mm left lower lobe pulmonary nodule. 3. Unchanged chronic right interstitial and airspace disease, likely sequela  infectious/inflammatory process. 4. Unchanged mediastinal adenopathy, either reactive for granulomatous  disease. RECOMMENDATION:  Fleischner Society guidelines for follow-up and management of incidentally  detected pulmonary nodules:    Single Solid Nodule:    Nodule size less than 6 mm  In a low-risk patient, no routine follow-up. In a high-risk patient, optional CT at 12 months. CTPA: No results found for this or any previous visit. CXR PA/LAT:   Results for orders placed during the hospital encounter of 01/15/19   XR CHEST STANDARD (2 VW)    Narrative EXAMINATION:  TWO VIEWS OF THE CHEST    1/15/2019 3:03 pm    COMPARISON:  08/31/2017    HISTORY:  ORDERING SYSTEM PROVIDED HISTORY: Chronic cough  TECHNOLOGIST PROVIDED HISTORY:  Reason for exam:->chronic cough for 4-6 weeks  Ordering Physician Provided Reason for Exam: cough voice hoarse  Acuity: Unknown  Type of Exam: Unknown    FINDINGS:  No change in the small right pleural effusion. No change in the bilateral  interstitial thickening and mild pulmonary vascular congestion. Cardiomegaly  is stable. There is no pneumothorax. Impression 1. No significant change. CXR portable:   Results for orders placed during the hospital encounter of 05/28/19   XR CHEST PORTABLE    Narrative EXAMINATION:  ONE XRAY VIEW OF THE CHEST    5/29/2019 12:09 pm    COMPARISON:  01/15/2019    HISTORY:  ORDERING SYSTEM PROVIDED HISTORY: CHF  TECHNOLOGIST PROVIDED HISTORY:  Reason for exam:->CHF  Ordering Physician Provided Reason for Exam: CHF  Acuity: Acute  Type of Exam: Initial    FINDINGS:  Cardiomegaly. Increased pulmonary vascular congestion and edema. Right-sided pleural effusion. No new pulmonary consolidation.       Impression Congestive heart failure with increased pulmonary vascular congestion and  edema            Access  Arterial       PICC

## 2019-06-03 NOTE — PROGRESS NOTES
Physical Therapy  Facility/Department: [address] ICU  Daily Treatment Note  NAME: Kamala Gustafson  : 1935  MRN: [id number]    Date of Service: 6/3/2019    Discharge Recommendations:  Continue to assess pending progress   PT Equipment Recommendations  Other: Will monitor for potential equipt needs. Kamala Gustafson scored a 15/24 on the AM-PAC short mobility form. Current research shows that an AM-PAC score of 17 or less is typically not associated with a discharge to the patient's home setting. Based on the patients AM-PAC score and their current functional mobility deficits, it is recommended that the patient have 5-7 sessions per week of Physical Therapy at d/c to increase the patients independence. Assessment   Body structures, Functions, Activity limitations: Decreased functional mobility ; Decreased strength;Decreased endurance  Assessment: 79 y/o female admit 19 with GI Bleed, Respiratory Failure. 19 S/P EGD with Argon Plasms Coag.  19 S/P EGD with Placement Video Capsule. 6/3/19 Scheduled for Push Enteroscopy with Ablation of AVM in Duodenum. PMH includes Mitral Regurg, A-Fib, CHF, CKD, PE, Lung Surg, Humerus Fx, Osteopenia, Polymyalgia Rheumatica. PTA pt living alone in [facility] with elevator access. Pt  has 2 sons both live OOT. PTA pt very independent with daily care and functional mobility (without assist device). Recommend In-Pt Rehab consult at this time. Will monitor pt's progress. Prognosis: Good  Decision Making: Medium Complexity  Patient Education: Role of PT, POC, Need to call for assist, LE Exs. REQUIRES PT FOLLOW UP: Yes  Activity Tolerance  Activity Tolerance: Patient limited by fatigue;Patient limited by endurance     Patient Diagnosis(es): The primary encounter diagnosis was UGI bleed. Diagnoses of Anemia, unspecified type and Supratherapeutic INR were also pertinent to this visit.      has a past medical history of Anemia, Anticoagulant long-term rolling R/L with Min assist for linens and repositioning. Transfers  Comment: Pt declined any EOB/OOB despite PT and Nursing encouragement. Reports just very tired, agreeable next Rx. Exercises  Quad Sets: 20x  Heelslides: 10x2 B LEs with slight assist/resist.   Gluteal Sets: 20x  Hip Abduction: 10x2 B LEs with slight assist.   Ankle Pumps: 20x                        G-Code     OutComes Score                                                     AM-PAC Score  AM-PAC Inpatient Mobility Raw Score : 15  AM-PAC Inpatient T-Scale Score : 39.45  Mobility Inpatient CMS 0-100% Score: 57.7  Mobility Inpatient CMS G-Code Modifier : CK          Goals  Short term goals  Time Frame for Short term goals: Upon d/c acute care setting. Short term goal 1: Bed Mob Min assist.   Short term goal 2: Transfers with/without assist device SBA. Short term goal 3: Amb with/without assist device 100' SBA/CGA. Patient Goals   Patient goals : Get stronger and be able to return home. Plan    Plan  Times per week: 3-5x week while in acute care setting.    Current Treatment Recommendations: Strengthening, Functional Mobility Training, Transfer Training, Gait Training, Safety Education & Training, Patient/Caregiver Education & Training  Safety Devices  Type of devices: Bed alarm in place, Call light within reach, Left in bed, Nurse notified     Therapy Time   Individual Concurrent Group Co-treatment   Time In 0830         Time Out [address]         Minutes [address] [place] [address], PT

## 2019-06-03 NOTE — PROGRESS NOTES
[address]   Daily Progress Note      Admit Date:  5/28/2019      Subjective:   Ms. Shantanu Dowell is a 79yo female with a past medical history significant for COPD, severe mitral regurgitation, paroxysmal atrial fibrillation, chronic diastolic CHF, pulmonary hypertension and chronic venous thromboembolic disease. She also follows with Dr. Анна Pickett from Pulmonary. She was not considered a candidate for repair of her mitral valve due to her comorbidities including only having one functioning lung with concomitant COPD. She presented to the ED form Dr. Jayna Ramos office with dark stools. On presentation to the ED she was Hemoccult positive with a supratherapeutic INR and anemic to a hemoglobin of 7.9. Today, Amy Blackwell is doing okay. Sh eis on the amiodarone drip for her rapid atrial fibrillation. She is still somewhat lethargic.        Objective:     BP (!) 115/57   Pulse 117   Temp 98 °F (36.7 °C) (Oral)   Resp 30   Ht 5' (1.524 m)   Wt 110 lb 7.2 oz (50.1 kg)   SpO2 90%   BMI 21.57 kg/m²      Intake/Output Summary (Last 24 hours) at 6/3/2019 1622  Last data filed at 6/3/2019 1455  Gross per 24 hour   Intake 1067.24 ml   Output 1500 ml   Net -432.76 ml       Physical Exam:  General:  Awake, alert, NAD  Skin:  Warm and dry  Neck:  JVD<8, no carotid bruits  Chest:  Clear to auscultation, no wheezes/rhonchi/rales  Cardiovascular:  Regular but tachy, normal S1/S2, 2/6 hs murmur  Abdomen:  Soft, nontender, +bowel sounds  Extremities:  No edema  Pulses: 2+ bilat carotid    2+ bilat radial    2+ bilat femoral        Medications:    metoprolol succinate  50 mg Oral BID    furosemide  40 mg Intravenous Daily    pantoprazole  40 mg Oral QAM AC    ipratropium-albuterol  1 ampule Inhalation Q4H WA    mometasone-formoterol  2 puff Inhalation BID    latanoprost  1 drop Both Eyes Nightly    levothyroxine  75 mcg Oral Daily    memantine  10 mg Oral BID    sodium chloride flush  10 mL Intravenous 2 times per day      amiodarone 0.5 mg/min (06/03/19 0844)       Lab Data:  CBC:   Recent Labs     06/01/19  0502 06/02/19  0452 06/03/19  0552   WBC 13.2*  13.4* 10.6 9.3   HGB 8.7*  8.8* 9.1* 9.4*     146 175 207     BMP:    Recent Labs     06/01/19  0502 06/02/19  0452 06/03/19  0552    140 139   K 3.4* 3.6 3.4*   CO2 28 29 30   BUN 21* 22* 22*   CREATININE 1.2 1.4* 1.3*     Lab Results   Component Value Date    CHOL 151 08/02/2018    HDL 68 08/02/2018    HDL 78 04/27/2012    TRIG 81 08/02/2018     Right Heart Catheterization 9/20/17:  1.  Mild nonobstructive coronary artery disease with a 40% ostial LAD  lesion and 25% mid-RCA disease.  This is a right-dominant coronary  arterial system. 2.  Normal left ventricular systolic function with LV ejection  fraction of 65%. 3.  Mildly elevated left ventricular end-diastolic pressure of 15  mmHg. 4.  Moderate to severe mitral regurgitation with moderate dilatation  of the left atrium on the left ventriculogram.  5.  No gradient across the aortic valve on pullback to suggest aortic  stenosis. 6.  Evidence of mild volume overload with a pulmonary capillary wedge  pressure of 22 and a left ventricular end-diastolic pressure on repeat  of 15 to 20.  7.  Evidence of pulmonary hypertension, possibly pulmonary arterial  hypertension, arteriovenous malformation and prior pneumonectomy. Instantaneous pulmonary artery pressure was 68/19 for a mean pulmonary  arterial pressure of 37 mmHg. 8.  Oxygen step-up in the right pulmonary artery to 90%, pulmonary  artery main was 62% with a right atrial pressure of 52%.  Inferior  vena cava was 57%.   9.  Pulmonary angiogram showing minimal pulmonary arterial flow to the  right lung with _____ flow in the right upper lobe but no flow into  the right lower middle lobe.  In fact, there appears to be a possible  arteriovenous malformation with backflow of blood towards the pigtail  catheter into the right pulmonary artery and an

## 2019-06-03 NOTE — PROGRESS NOTES
1100 Patient with decreasing energy levels over last week, despite improving respiratory and hemodynamic condition. Patient has had decreased appetite since prior to admission, with very minimal PO intake of meals or supplements. Discussed options to improve nutrition with patient and son, patient is agreeable to trying tube feedings through an NGT in hopes of improving energy levels and appetite. Discussed with Dr. Janae Matos, orders received to start TF per RD recs once ok from GI standpoint.      Electronically signed by Jenifer Oconnell RN on 6/3/2019 at 2:26 PM

## 2019-06-03 NOTE — ANESTHESIA POSTPROCEDURE EVALUATION
Department of Anesthesiology  Postprocedure Note    Patient: Gaviota Emanuel  MRN: [id number]  YOB: 1935  Date of evaluation: 6/3/2019  Time:  4:17 PM     Procedure Summary     Date:  06/03/19 Room / Location:  [facility] ENDO 04 / [facility] ENDOSCOPY    Anesthesia Start:  3163 Anesthesia Stop:  8611    Procedure:  ENTEROSCOPY PUSH DIAGNOSTIC (N/A ) Diagnosis:  (GI bleed)    Surgeon:  Holden Oliveros DO Responsible Provider:  Gladys Meigs, MD    Anesthesia Type:  TIVA ASA Status:  3          Anesthesia Type: TIVA    Balta Phase I:      Balta Phase II:      Last vitals: Reviewed and per EMR flowsheets.        Anesthesia Post Evaluation    Patient participation: complete - patient participated  Level of consciousness: awake and alert  Airway patency: patent  Nausea & Vomiting: no nausea and no vomiting  Complications: no  Cardiovascular status: hemodynamically stable  Respiratory status: acceptable  Hydration status: stable

## 2019-06-03 NOTE — PROGRESS NOTES
1L NC  Heart: irregular rate and rhythm  Abdomen: soft, non-distended, non-tender. Bowel sounds normal. No masses,  no organomegaly. Extremities: atraumatic, no cyanosis or edema  Skin: warm and dry, no jaundice  Neuro: Grossly intact, A&OX3    LABS AND IMAGING   Laboratory   Recent Labs     06/01/19  0502 06/02/19  0452   WBC 13.2*  13.4* 10.6   HGB 8.7*  8.8* 9.1*   HCT 26.2*  26.9* 27.0*   MCV 89.2  89.2 89.8     146 175     Recent Labs     06/01/19  0501 06/01/19  0502 06/02/19  0452    142 140   K 3.4* 3.4* 3.6    104 97*   CO2 29 28 29   PHOS  --  2.2* 4.3   BUN 22* 21* 22*   CREATININE 1.2 1.2 1.4*     No results for input(s): AST, ALT, ALB, BILIDIR, BILITOT, ALKPHOS in the last 72 hours. No results for input(s): LIPASE, AMYLASE in the last 72 hours. No results for input(s): PROTIME, INR in the last 72 hours. Imaging  XR CHEST PORTABLE   Final Result   Congestive heart failure with increased pulmonary vascular congestion and   edema             Endoscopy  Push Enteroscopy 6/3/19  1) The esophagus was normal without evidence of esophagitis, Omalley's esophagus, strictures, rings, furrowing, masses, or nodules. 2) A 3-4 cm sliding hiatal hernia was noted. 3)  There was no other abnormalities noted in the stomach. 4) The proximal and distal duodenum and the proximal jejunum were normal.  65 cm post pylorus were visualized. Scope advancement was limited by looping of the pediatric coloscope in the stomach. No blood was noted. 5) Successful placement of a nasogastric feeding tube was placed into the left nares and into the distal stomach. Placement was endoscopically verified. Wireless capsule endoscopy 6/2/19  1. Prominent red spot of clot, likely reflecting recent bleeding from angioectasia seen in the proximal small bowel, approximately 29:00 into the small bowel. No active hemorrhage seen.   2. Multiple scattered small angioectasias throughout the small bowel, none as prominent as lesion at 29:00  3. Video capsule did not complete transit through the small bowel. EGD 5/31/19  1. 2 mm distal transverse colon polyp, not resected. 2. Scattered small mouth sigmoid diverticulosis without stigmata of bleeding. 3. Successful placement of video capsule into duodenal bulb  4. No evidence of active bleeding or source of bleeding idetified    EGD with APC  1. Punctate non-bleeding APC in the second portion of the duodenum, obliterated with APC  2. 3 cm sliding hiatal hernia. 3. No source of GI blood loss identified. ASSESSMENT AND RECOMMENDATIONS   Selam Nugent is a 80 y.o. female with PMH of Afib, CHF, CKD, Cdiff, HLD, HTN, hypothyroidism, MR, polymyalgia rheumatica, PE who presents with:    1. Melena, suspect 2/2 AVM based upon results of video capsule endoscopy which show a lesion with overlying clot in the proximal small bowel. Lesion was unable to be reached with push enteroscopy yesterday. 2. Acute blood loss anemia, 2/2 #1: Stable without evidence of ongoing bleeding. 3. A. Fib: Previously on coumadin. CHADSVAC score 4, HASBLED score 4.  4. Hypercapnic respiratory failure: 2/2 volume overload, stable  5. Colon polyps: Can consider repeat colonoscopy for polypectomy of transverse colon polyp in 6 months as an outpatient, however given miniscule size of polyp and age/co-morbidities, further discussion about colorectal cancer screening can occur as an outpatient. 6. CKD          RECOMMENDATIONS:    Small bowel AVM unable to be reached with push enteroscopy. Likely high risk of rebleeding with [facility] therapy. Coumadin on hold. Hgb stable and no s/s of rebleeding. Will continue to monitor. Continue tfing as tolerated. If you have any questions or need any further information, please feel free to contact us [phone]. Thank you for allowing us to participate in the care of Selam Nugent.     Arnie MAYFIELD      Attending physician addendum:      I have

## 2019-06-04 PROBLEM — E43 SEVERE MALNUTRITION (HCC): Status: ACTIVE | Noted: 2019-06-04

## 2019-06-04 LAB
ANION GAP SERPL CALCULATED.3IONS-SCNC: 11 MMOL/L (ref 3–16)
BASOPHILS ABSOLUTE: 0 K/UL (ref 0–0.2)
BASOPHILS RELATIVE PERCENT: 0.5 %
BUN BLDV-MCNC: 24 MG/DL (ref 7–20)
CALCIUM SERPL-MCNC: 8.9 MG/DL (ref 8.3–10.6)
CHLORIDE BLD-SCNC: 95 MMOL/L (ref 99–110)
CO2: 32 MMOL/L (ref 21–32)
CREAT SERPL-MCNC: 1.2 MG/DL (ref 0.6–1.2)
EOSINOPHILS ABSOLUTE: 0.7 K/UL (ref 0–0.6)
EOSINOPHILS RELATIVE PERCENT: 8.4 %
GFR AFRICAN AMERICAN: 52
GFR NON-AFRICAN AMERICAN: 43
GLUCOSE BLD-MCNC: 143 MG/DL (ref 70–99)
HCT VFR BLD CALC: 29.2 % (ref 36–48)
HEMOGLOBIN: 9.8 G/DL (ref 12–16)
LYMPHOCYTES ABSOLUTE: 0.6 K/UL (ref 1–5.1)
LYMPHOCYTES RELATIVE PERCENT: 6.9 %
MAGNESIUM: 2.2 MG/DL (ref 1.8–2.4)
MCH RBC QN AUTO: 30.2 PG (ref 26–34)
MCHC RBC AUTO-ENTMCNC: 33.5 G/DL (ref 31–36)
MCV RBC AUTO: 90 FL (ref 80–100)
MONOCYTES ABSOLUTE: 0.5 K/UL (ref 0–1.3)
MONOCYTES RELATIVE PERCENT: 6.1 %
NEUTROPHILS ABSOLUTE: 6.6 K/UL (ref 1.7–7.7)
NEUTROPHILS RELATIVE PERCENT: 78.1 %
PDW BLD-RTO: 15.3 % (ref 12.4–15.4)
PHOSPHORUS: 2.9 MG/DL (ref 2.5–4.9)
PLATELET # BLD: 235 K/UL (ref 135–450)
PMV BLD AUTO: 8.4 FL (ref 5–10.5)
POTASSIUM SERPL-SCNC: 3.5 MMOL/L (ref 3.5–5.1)
RBC # BLD: 3.24 M/UL (ref 4–5.2)
SODIUM BLD-SCNC: 138 MMOL/L (ref 136–145)
WBC # BLD: 8.5 K/UL (ref 4–11)

## 2019-06-04 PROCEDURE — 94667 MNPJ CHEST WALL 1ST: CPT

## 2019-06-04 PROCEDURE — 6370000000 HC RX 637 (ALT 250 FOR IP): Performed by: INTERNAL MEDICINE

## 2019-06-04 PROCEDURE — 94760 N-INVAS EAR/PLS OXIMETRY 1: CPT

## 2019-06-04 PROCEDURE — 97110 THERAPEUTIC EXERCISES: CPT

## 2019-06-04 PROCEDURE — 2580000003 HC RX 258: Performed by: INTERNAL MEDICINE

## 2019-06-04 PROCEDURE — 97116 GAIT TRAINING THERAPY: CPT

## 2019-06-04 PROCEDURE — 80048 BASIC METABOLIC PNL TOTAL CA: CPT

## 2019-06-04 PROCEDURE — 2060000000 HC ICU INTERMEDIATE R&B

## 2019-06-04 PROCEDURE — 84100 ASSAY OF PHOSPHORUS: CPT

## 2019-06-04 PROCEDURE — 6360000002 HC RX W HCPCS: Performed by: INTERNAL MEDICINE

## 2019-06-04 PROCEDURE — 99233 SBSQ HOSP IP/OBS HIGH 50: CPT | Performed by: INTERNAL MEDICINE

## 2019-06-04 PROCEDURE — 97535 SELF CARE MNGMENT TRAINING: CPT

## 2019-06-04 PROCEDURE — 36415 COLL VENOUS BLD VENIPUNCTURE: CPT

## 2019-06-04 PROCEDURE — 85025 COMPLETE CBC W/AUTO DIFF WBC: CPT

## 2019-06-04 PROCEDURE — 94668 MNPJ CHEST WALL SBSQ: CPT

## 2019-06-04 PROCEDURE — 97530 THERAPEUTIC ACTIVITIES: CPT

## 2019-06-04 PROCEDURE — 83735 ASSAY OF MAGNESIUM: CPT

## 2019-06-04 PROCEDURE — 94640 AIRWAY INHALATION TREATMENT: CPT

## 2019-06-04 PROCEDURE — 2700000000 HC OXYGEN THERAPY PER DAY

## 2019-06-04 RX ADMIN — AMIODARONE HYDROCHLORIDE 200 MG: 200 TABLET ORAL at 09:57

## 2019-06-04 RX ADMIN — IPRATROPIUM BROMIDE AND ALBUTEROL SULFATE 1 AMPULE: .5; 3 SOLUTION RESPIRATORY (INHALATION) at 16:30

## 2019-06-04 RX ADMIN — PANTOPRAZOLE SODIUM 40 MG: 40 TABLET, DELAYED RELEASE ORAL at 06:57

## 2019-06-04 RX ADMIN — BENZOCAINE AND MENTHOL 1 LOZENGE: 15; 3.6 LOZENGE ORAL at 17:16

## 2019-06-04 RX ADMIN — MOMETASONE FUROATE AND FORMOTEROL FUMARATE DIHYDRATE 2 PUFF: 200; 5 AEROSOL RESPIRATORY (INHALATION) at 20:29

## 2019-06-04 RX ADMIN — FUROSEMIDE 40 MG: 10 INJECTION, SOLUTION INTRAMUSCULAR; INTRAVENOUS at 09:57

## 2019-06-04 RX ADMIN — IPRATROPIUM BROMIDE AND ALBUTEROL SULFATE 1 AMPULE: .5; 3 SOLUTION RESPIRATORY (INHALATION) at 08:43

## 2019-06-04 RX ADMIN — Medication 10 ML: at 21:13

## 2019-06-04 RX ADMIN — MEMANTINE HYDROCHLORIDE 10 MG: 5 TABLET, FILM COATED ORAL at 09:57

## 2019-06-04 RX ADMIN — METOPROLOL SUCCINATE 50 MG: 50 TABLET, FILM COATED, EXTENDED RELEASE ORAL at 21:12

## 2019-06-04 RX ADMIN — Medication 10 ML: at 09:57

## 2019-06-04 RX ADMIN — AMIODARONE HYDROCHLORIDE 200 MG: 200 TABLET ORAL at 21:12

## 2019-06-04 RX ADMIN — MEMANTINE HYDROCHLORIDE 10 MG: 5 TABLET, FILM COATED ORAL at 21:12

## 2019-06-04 RX ADMIN — IPRATROPIUM BROMIDE AND ALBUTEROL SULFATE 1 AMPULE: .5; 3 SOLUTION RESPIRATORY (INHALATION) at 20:29

## 2019-06-04 RX ADMIN — IPRATROPIUM BROMIDE AND ALBUTEROL SULFATE 1 AMPULE: .5; 3 SOLUTION RESPIRATORY (INHALATION) at 11:52

## 2019-06-04 RX ADMIN — LATANOPROST 1 DROP: 50 SOLUTION OPHTHALMIC at 21:34

## 2019-06-04 RX ADMIN — LEVOTHYROXINE SODIUM 75 MCG: 75 TABLET ORAL at 06:57

## 2019-06-04 RX ADMIN — METOPROLOL SUCCINATE 50 MG: 50 TABLET, FILM COATED, EXTENDED RELEASE ORAL at 09:57

## 2019-06-04 RX ADMIN — BENZOCAINE AND MENTHOL 1 LOZENGE: 15; 3.6 LOZENGE ORAL at 14:11

## 2019-06-04 RX ADMIN — MOMETASONE FUROATE AND FORMOTEROL FUMARATE DIHYDRATE 2 PUFF: 200; 5 AEROSOL RESPIRATORY (INHALATION) at 08:44

## 2019-06-04 ASSESSMENT — PAIN SCALES - GENERAL: PAINLEVEL_OUTOF10: 0

## 2019-06-04 NOTE — PROGRESS NOTES
Physical Therapy    Facility/Department: [address] ICU  Treatment Note. NAME: Earney Hammans  : 1935  MRN: [id number]    Date of Service: 2019    Discharge Recommendations:  Continue to assess pending progress   PT Equipment Recommendations  Other: Will monitor for potential equipt needs. Earney Hammans scored a 15/24 on the AM-PAC short mobility form. Current research shows that an AM-PAC score of 17 or less is typically not associated with a discharge to the patient's home setting. Based on the patients AM-PAC score and their current functional mobility deficits, it is recommended that the patient have 5-7 sessions per week of Physical Therapy at d/c to increase the patients independence. Assessment   Body structures, Functions, Activity limitations: Decreased functional mobility ; Decreased strength;Decreased endurance  Assessment: 81 y/o female admit 19 with GI Bleed, Respiratory Failure. 19 S/P EGD with Argon Plasms Coag.  19 S/P EGD with Placement Video Capsule. 6/3/19 Push Enteroscopy - no lesions. 6/3/19 Nasogastric Tube Feed placed. PMH includes Mitral Regurg, A-Fib, CHF, CKD, PE, Lung Surg, Humerus Fx, Osteopenia, Polymyalgia Rheumatica. PTA pt living alone in [facility] with elevator access. Pt  has 2 sons both live OOT. PTA pt very independent with daily care and functional mobility (without assist device). Pt cont weak/limited endurance although improving slowly. Pt kiersten ex/oob activities with very short distances (few reps) with Francyne Boots. Pt/family receptive to In-Pt Rehab setting and are aware of intensive setting. Decision Making: Medium Complexity  Patient Education: Role of PT, POC, Need to call for assist, LE Exs, Safe use of Walker for Transfers/Amb Activities.    REQUIRES PT FOLLOW UP: Yes  Activity Tolerance  Activity Tolerance: Patient limited by fatigue;Patient limited by endurance  Activity Tolerance: Pt required gentle encouragement initially for ex/oob although agreeable. Reinforced need to build up endurance/kiersten to ex and oob activities for kiersten to In-Pt Rehab setting. Patient Diagnosis(es): The primary encounter diagnosis was UGI bleed. Diagnoses of Anemia, unspecified type and Supratherapeutic INR were also pertinent to this visit. has a past medical history of Anemia, Anticoagulant long-term use, Atrial fibrillation (HCC), CHF (congestive heart failure) ([address] CKD (chronic kidney disease) stage 3, GFR 30-59 ml/min (HCC), Clostridium difficile diarrhea, Depression, Humerus fracture, Hyperlipidemia, Hypertension, Hypothyroidism, Mitral regurgitation, Optic neuritis, Osteopenia, Polymyalgia rheumatica ([address] Pulmonary embolus ([address] Thyroid disease, and Vitamin D deficiency. has a past surgical history that includes Lung surgery; malignant skin lesion excision; Colonoscopy (09/14/2016); Upper gastrointestinal endoscopy (09/14/2016); Upper gastrointestinal endoscopy (N/A, 5/30/2019); Colonoscopy (N/A, 5/31/2019); Upper gastrointestinal endoscopy (N/A, 5/31/2019); Endoscopy, small bowel with ileum (5/31/2019); and Enteroscopy (N/A, 6/3/2019). Restrictions  Restrictions/Precautions  Restrictions/Precautions: Fall Risk  Position Activity Restriction  Other position/activity restrictions: O2 1L via NC. Nasogastric  Tube Feed (placed 6/3/19). General Diet. Vision/Hearing  Vision: Impaired  Vision Exceptions: Wears glasses at all times(Pt reports she has glaucoma and just starting with macular degeneration.)  Hearing: Within functional limits     Subjective  General  Chart Reviewed: Yes  Patient assessed for rehabilitation services?: Yes  Additional Pertinent Hx: 79 y/o female admit 5/28/19 with GI Bleed, Respiratory Failure. 5/30/19 S/P EGD with Argon Plasma Coag.  5/31/19 S/P EGD with Placement Video Capsule. 6/3/19  Push Enteroscopy - no lesions. 6/3/19 Nasogastric Tube Feed placed.     PMH includes Mitral Regurg, A-Fib, 100' SBA/CGA. Patient Goals   Patient goals : Get stronger and be able to return home.         Therapy Time   Individual Concurrent Group Co-treatment   Time In 0740         Time Out 0820         Minutes [address], BRAULIO

## 2019-06-04 NOTE — PROGRESS NOTES
Occupational Therapy  Facility/Department: [facility] 7X ICU  Daily Treatment Note  NAME: Selam Nugent  : 1935  MRN: [id number]    Date of Service: 2019    Discharge Recommendations:  Patient would benefit from continued therapy after discharge, 5-7 sessions per week  OT Equipment Recommendations  Other: TBD by regina Cool 96 scored a 15/24 on the AM-PAC ADL Inpatient form. Current research shows that an AM-PAC score of 17 or less is typically not associated with a discharge to the patient's home setting. Based on the patients AM-PAC score and their current ADL deficits, it is recommended that the patient have 5-7 sessions per week of Occupational Therapy at d/c to increase the patients independence. Assessment   Performance deficits / Impairments: Decreased functional mobility ; Decreased ADL status; Decreased strength;Decreased endurance;Decreased high-level IADLs;Decreased balance  Assessment: Pt tolerated session better this date, requiring gentle encouragement to participate but much more motivated to complete tasks independently. Pt performed fxl transfers and fxl mobility in room with CGA/min A and RW. Pt performed seated grooming tasks with setup. Pt performed B UE exercises and tolerated well. Cont per OT POC  Prognosis: Good  Patient Education: Pt educated on the role of OT, POC, importance of OOB  REQUIRES OT FOLLOW UP: Yes  Activity Tolerance  Activity Tolerance: Patient limited by fatigue  Safety Devices  Type of devices: Nurse notified; Patient at risk for falls; Bed alarm in place; Left in bed;Call light within reach         Patient Diagnosis(es): The primary encounter diagnosis was UGI bleed. Diagnoses of Anemia, unspecified type and Supratherapeutic INR were also pertinent to this visit.       has a past medical history of Anemia, Anticoagulant long-term use, Atrial fibrillation ([address] CHF (congestive heart failure) ([address] CKD (chronic kidney disease) stage 3, GFR 30-59 ml/min ([address] Clostridium difficile diarrhea, Depression, Humerus fracture, Hyperlipidemia, Hypertension, Hypothyroidism, Mitral regurgitation, Optic neuritis, Osteopenia, Polymyalgia rheumatica ([address] Pulmonary embolus ([address] Thyroid disease, and Vitamin D deficiency. has a past surgical history that includes Lung surgery; malignant skin lesion excision; Colonoscopy (09/14/2016); Upper gastrointestinal endoscopy (09/14/2016); Upper gastrointestinal endoscopy (N/A, 5/30/2019); Colonoscopy (N/A, 5/31/2019); Upper gastrointestinal endoscopy (N/A, 5/31/2019); Endoscopy, small bowel with ileum (5/31/2019); and Enteroscopy (N/A, 6/3/2019). Restrictions  Restrictions/Precautions  Restrictions/Precautions: Fall Risk  Position Activity Restriction  Other position/activity restrictions: O2 1L via NC. Nasogastric  Tube Feed (placed 6/3/19). General Diet. Subjective   General  Chart Reviewed: Yes  Patient assessed for rehabilitation services?: Yes  Additional Pertinent Hx: Pt is a 80 y.o. female presenting to ED from PCP office with c/o bloody/dark stools, fatigue, and dizziness. In the ER she was found to have an elevated INR and her hemoglobin was down to 7.9. Pt admitted with GI bleed. Pt also developed Acute respiratory failure with hypoxia and hypercapnia due to volume overload from mitral regurgitation. Pt s/p EGD with argon plasma coagulation 5/30 and Colonoscopy + EGD with VCE 5/31. Family / Caregiver Present: No  Referring Practitioner: ANNA Cameron CNP  Diagnosis: GI bleeding, acute blood loss anemia, Acute Hypoxic/Hypercapnic Respiratory Failure, acute pulmonary adema  Subjective  Subjective: Pt met bedside for OT tx. Pt up in room working with PT, agreeable to OT.  Denies pain      Orientation  Orientation  Overall Orientation Status: Within Functional Limits  Orientation Level: Oriented to place;Oriented to time;Oriented to person;Oriented to situation  Objective    ADL  Grooming: Setup(seated goal 6: Pt will increase activity tolerance to achieve the above goals. Long term goals  Time Frame for Long term goals : STG=LTG   Patient Goals   Patient goals : \"to be able to do everything I was doing before. \"       Therapy Time   Individual Concurrent Group Co-treatment   Time In 0815     0815   Time Out 0845     0820   Minutes 30     5   Timed Code Treatment Minutes: 30 Minutes     This note to serve as OT d/c summary if pt is d/c-ed prior to next therapy session.     Corina Bryant, OTR/L [id number]

## 2019-06-04 NOTE — PROGRESS NOTES
[facility] Progress Note  6/4/2019 8:21 AM  Subjective:   Admit Date: 5/28/2019      Chief Complaint: Sl stronger     Interval History: Push enterosocopy yest advanced 65 cm into snall bowel--no lesions seen  Also placed Feeding tube and began feeding --kiersten so far   Remains very weak--    Diet: Diet Tube Feed Continuous/Cyclic w/ Diet  DIET GENERAL;  Medications:   Scheduled Meds:   amiodarone  200 mg Oral BID    metoprolol succinate  50 mg Oral BID    furosemide  40 mg Intravenous Daily    pantoprazole  40 mg Oral QAM AC    ipratropium-albuterol  1 ampule Inhalation Q4H WA    mometasone-formoterol  2 puff Inhalation BID    latanoprost  1 drop Both Eyes Nightly    levothyroxine  75 mcg Oral Daily    memantine  10 mg Oral BID    sodium chloride flush  10 mL Intravenous 2 times per day     Continuous Infusions:    Review of Systems -   General ROS: afebrile  Respiratory ROS: no cough, shortness of breath or wheezing  Cardiovascular ROS: no chest pain  Musculoskeletal ROS:positive for - :joint pain  Neurological ROS: positive for - weakness    Objective:   Vitals: BP (!) 99/42   Pulse 91   Temp 97.9 °F (36.6 °C) (Oral)   Resp 28   Ht 5' (1.524 m)   Wt 106 lb 0.7 oz (48.1 kg)   SpO2 94%   BMI 20.71 kg/m²   General appearance: alert and cooperative with exam  HEENT: Head: Normocephalic, no lesions, without obvious abnormality. Neck: no adenopathy, no carotid bruit, no JVD, supple, symmetrical, trachea midline and thyroid not enlarged, symmetric, no tenderness/mass/nodules  Lungs: diminished breath sounds bibasilar  Heart: regular rate and rhythm, S1, S2 normal, no murmur, click, rub or gallop  Abdomen: soft, non-tender; bowel sounds normal; no masses,  no organomegaly  Extremities: extremities normal, atraumatic, no cyanosis or edema  Neurologic: Mental status: weak but conversant     Admission on 05/28/2019   No results displayed because visit has over 200 results.             Assessment & Plan: Principal Problem:    Chronic diastolic congestive heart failure (HCC)--stable   Active Problems:    Paroxysmal atrial fibrillation (HCC)--no anti=coag     Essential hypertension, benign    Hypothyroidism due to medication--Continue current therapy      Non-rheumatic mitral regurgitation    Pulmonary hypertension    Chronic atrial fibrillation (HCC)    Dyspnea    CKD (chronic kidney disease) stage 3, GFR 30-59 ml/min (HCC)    UGI bleed    Supratherapeutic INR    Acute blood loss anemia    Coagulopathy (HCC)    Acute pulmonary edema (HCC)    Acute respiratory failure with hypoxia and hypercapnia (HCC)  Resolved Problems:    * No resolved hospital problems.  *  Will transfer to PCU--cont pt/ot--cont ng feeds --possibly to rehab next few days --needs close monitoring for resp failure   Please note that over 35 minutes was spent in evaluating the patient, review of records and results, discussion with staff/family, etc.    London Rey MD

## 2019-06-04 NOTE — CARE COORDINATION
Following for acute rehab. Will discuss with PM&R physician, Dr. Sunni Morales. Will reply to case management.

## 2019-06-04 NOTE — CONSULTS
Nutrition Assessment (Enteral Nutrition)    Type and Reason for Visit: Reassess, Consult(nocturnal TF)    Nutrition Recommendations:   Modify TF (2 josue HN formula), regimen to nocturnal run at 60 ml per hour x 10 hours (8 pm-6 am)  Flush per provider    Nutrition Assessment: Nutrition status improving with start of tube feeding, however, Pt remains at risk for nutrition compromise r/t GI bleed, increased needs, renal dysfunction. Diet advanced to general but po not adequate at this time. Decision made to start TF until appetite improves significantly. Pt tolerating TF, but MD requesting that regimen be transitioned to nocturnal feedings to allow for hopeful improvement in po. Malnutrition Assessment:  · Malnutrition Status: Meets the criteria for severe malnutrition  · Context: Acute illness or injury  · Findings of the 6 clinical characteristics of malnutrition (Minimum of 2 out of 6 clinical characteristics is required to make the diagnosis of moderate or severe Protein Calorie Malnutrition based on AND/ASPEN Guidelines):  1. Energy Intake-Less than or equal to 50% of estimated energy requirement, Greater than or equal to 5 days    2. Weight Loss-2% loss or greater, in 1 week  3. Fat Loss-Unable to assess,    4. Muscle Loss-Unable to assess,    5. Fluid Accumulation-No significant fluid accumulation,    6.  Strength-Not measured    Nutrition Risk Level: High    Nutrition Needs:  · Estimated Daily Total Kcal: 9829-9063 (25-30 x ABW of 49 kg)  · Estimated Daily Protein (g): 39-59 (.8-1.2 x ABW (adj for CKD)  · Estimated Daily Fluid (ml/day): 1 ml per kcal    Nutrition Diagnosis:   · Problem: Inadequate oral intake  · Etiology: related to Alteration in GI function     Signs and symptoms:  as evidenced by Diet history of poor intake, Weight loss    Objective Information:  · Nutrition-Focused Physical Findings: BM 6/4. Pt +1.7 liters.    · Wound Type: None  · Current Nutrition Therapies:  · Oral Diet Orders: General   · Oral Diet intake: 1-25%, %  · Oral Nutrition Supplement (ONS) Orders: None  · ONS intake: NPO  · Tube Feeding (TF) Orders:   · Feeding Route: Nasogastric  · Formula: Fluid Restricted(2 josue HN)  · Rate (ml/hr): Rate of 60 ml x 10 hours (8 pm-6 am)    · Volume (ml/day): 600  · Duration: Cyclic  · Water Flushes: per provider  · Current TF & Flush Orders Provides: 600 ml TV / 1200 kcals / 50 gms pro / 420 ml free water per day  · Anthropometric Measures:  · Ht: 5' (152.4 cm)   · Current Body Wt: 106 lb (48.1 kg)   · Adm wt: 109 lbs  · Weight Change: Pt has experienced a 2.75% loss in 1 week    · Ideal Body Wt: 100 lb (45.4 kg), % Ideal Body    · BMI Classification: BMI 18.5 - 24.9 Normal Weight    Nutrition Interventions:   Continue current diet, Modify current Tube Feeding  Continued Inpatient Monitoring, Education Not Indicated    Nutrition Evaluation:   · Evaluation: Progressing toward goals   · Goals: tolerate most appropriate form of nutrition   · Monitoring: Nutrition Progression, Meal Intake, TF Intake, TF Tolerance, Skin Integrity, Weight, Pertinent Labs, Nausea or Vomiting, Diarrhea, Constipation      Electronically signed by Antonino Mullen RD, LD on 6/4/19 at 2:43 PM    Contact Number: [phone]

## 2019-06-04 NOTE — CARE COORDINATION
Met w/pt to discuss therapy recs of 5-7 sessions/week. Provide pt ARU list.   Pt wants to stay here if possible. I asked the pt about the NG TF and the general diet. Pt states she has a bad sore throat from coughing (prior to NG) and just no appetite. Pt has a hoarse voice. I explained that the MD from ARU will need to evaluate her for acceptance into their program.    [place] [facility] [place] need documentation of nutritional plan and will follow up tomorrow. Sticky note left for MD and spoke to nurse Benjie Love. I asked pt if she had any family she wanted me to speak to and she said her son is here but on a conference call. I left my number in the room. DC PLAN: ARU consult pending    MD anticipates dc in next \"few days. \" NG TF may be a barrier to dc.     Juanis Cabral RN  Case management  [phone]

## 2019-06-04 NOTE — PLAN OF CARE
Problem: Risk for Impaired Skin Integrity  Goal: Tissue integrity - skin and mucous membranes  Description  Structural intactness and normal physiological function of skin and  mucous membranes. Outcome: Met This Shift     Problem: Falls - Risk of:  Goal: Will remain free from falls  Description  Will remain free from falls  Outcome: Met This Shift  Goal: Absence of physical injury  Description  Absence of physical injury  Outcome: Met This Shift     Problem: Discharge Planning:  Goal: Discharged to appropriate level of care  Description  Discharged to appropriate level of care  Outcome: Met This Shift  Note:   The plan is to discharge the patient to inpatient Rehab. The patient is aware and agreeable to this plan. Problem: Bowel Function - Altered:  Goal: Bowel elimination is within specified parameters  Description  Bowel elimination is within specified parameters  Outcome: Met This Shift  Note:   Patient has not had any bloody stools during my shift. Problem: Fluid Volume - Imbalance:  Goal: Absence of imbalanced fluid volume signs and symptoms  Description  Absence of imbalanced fluid volume signs and symptoms  Outcome: Met This Shift  Goal: Will show no signs and symptoms of excessive bleeding  Description  Will show no signs and symptoms of excessive bleeding  Outcome: Met This Shift     Problem: Nausea/Vomiting:  Goal: Absence of nausea/vomiting  Description  Absence of nausea/vomiting  Outcome: Met This Shift  Goal: Able to drink  Description  Able to drink  Outcome: Met This Shift  Goal: Able to eat  Description  Able to eat  Outcome: Met This Shift  Note:   Tube feeding started. Patient was able to eat a small baked sweet potato. Will continue to monitor.       Problem: HEMODYNAMIC STATUS  Goal: Hemodynamic status to baseline/discharge criteria met  Outcome: Met This Shift  Goal: Patient has stable vital signs and fluid balance  Outcome: Met This Shift     Problem: NUTRITION  Goal: Patient to baseline / improved nutrition  Outcome: Met This Shift     Problem: LAB & DIAGNOSTICS  Goal: Additional tests per physician orders  Outcome: Met This Shift     Problem: RESPIRATORY  Goal: SaO2 sat,airway patentcy, cough mechanism maintained  Outcome: Met This Shift     Problem: KNOWLEDGE DEFICIT,EDUCATION,DISCHARGE PLAN  Goal: Patient/S. O.verbalize understanding of procedure/DC instruct  Outcome: Met This Shift     Problem: PAIN MANAGEMENT  Goal: Patient return to pre procedure comfort  Outcome: Met This Shift     Problem: Nutrition  Goal: Optimal nutrition therapy  Outcome: Met This Shift     Problem: OXYGENATION/RESPIRATORY FUNCTION  Goal: Patient will maintain patent airway  Outcome: Met This Shift  Goal: Patient will achieve/maintain normal respiratory rate/effort  Description  Respiratory rate and effort will be within normal limits for the patient  Outcome: Met This Shift     Problem: ACTIVITY INTOLERANCE/IMPAIRED MOBILITY  Goal: Mobility/activity is maintained at optimum level for patient  Outcome: Met This Shift     Problem: Pain:  Goal: Pain level will decrease  Description  Pain level will decrease  Outcome: Met This Shift     Problem: SAFETY & PSYCHO SOCIAL  Goal: Patient returns to baseline activity/meets discharge criteria  Outcome: Ongoing     Problem: FLUID AND ELECTROLYTE IMBALANCE  Goal: Fluid and electrolyte balance are achieved/maintained  Outcome: Ongoing

## 2019-06-05 LAB
ANION GAP SERPL CALCULATED.3IONS-SCNC: 12 MMOL/L (ref 3–16)
BASOPHILS ABSOLUTE: 0.1 K/UL (ref 0–0.2)
BASOPHILS RELATIVE PERCENT: 0.7 %
BUN BLDV-MCNC: 24 MG/DL (ref 7–20)
CALCIUM SERPL-MCNC: 8.6 MG/DL (ref 8.3–10.6)
CHLORIDE BLD-SCNC: 96 MMOL/L (ref 99–110)
CO2: 32 MMOL/L (ref 21–32)
CREAT SERPL-MCNC: 1.2 MG/DL (ref 0.6–1.2)
EOSINOPHILS ABSOLUTE: 0.8 K/UL (ref 0–0.6)
EOSINOPHILS RELATIVE PERCENT: 8.7 %
GFR AFRICAN AMERICAN: 52
GFR NON-AFRICAN AMERICAN: 43
GLUCOSE BLD-MCNC: 93 MG/DL (ref 70–99)
HCT VFR BLD CALC: 29.4 % (ref 36–48)
HEMOGLOBIN: 9.8 G/DL (ref 12–16)
LYMPHOCYTES ABSOLUTE: 0.6 K/UL (ref 1–5.1)
LYMPHOCYTES RELATIVE PERCENT: 6.4 %
MAGNESIUM: 2 MG/DL (ref 1.8–2.4)
MCH RBC QN AUTO: 29.8 PG (ref 26–34)
MCHC RBC AUTO-ENTMCNC: 33.2 G/DL (ref 31–36)
MCV RBC AUTO: 89.9 FL (ref 80–100)
MONOCYTES ABSOLUTE: 0.5 K/UL (ref 0–1.3)
MONOCYTES RELATIVE PERCENT: 5.7 %
NEUTROPHILS ABSOLUTE: 7.2 K/UL (ref 1.7–7.7)
NEUTROPHILS RELATIVE PERCENT: 78.5 %
PDW BLD-RTO: 15.1 % (ref 12.4–15.4)
PHOSPHORUS: 2.9 MG/DL (ref 2.5–4.9)
PLATELET # BLD: 232 K/UL (ref 135–450)
PMV BLD AUTO: 8.4 FL (ref 5–10.5)
POTASSIUM SERPL-SCNC: 3.2 MMOL/L (ref 3.5–5.1)
RBC # BLD: 3.27 M/UL (ref 4–5.2)
SODIUM BLD-SCNC: 140 MMOL/L (ref 136–145)
WBC # BLD: 9.2 K/UL (ref 4–11)

## 2019-06-05 PROCEDURE — 2700000000 HC OXYGEN THERAPY PER DAY

## 2019-06-05 PROCEDURE — 6370000000 HC RX 637 (ALT 250 FOR IP): Performed by: INTERNAL MEDICINE

## 2019-06-05 PROCEDURE — 94640 AIRWAY INHALATION TREATMENT: CPT

## 2019-06-05 PROCEDURE — 2060000000 HC ICU INTERMEDIATE R&B

## 2019-06-05 PROCEDURE — 6360000002 HC RX W HCPCS: Performed by: INTERNAL MEDICINE

## 2019-06-05 PROCEDURE — 36415 COLL VENOUS BLD VENIPUNCTURE: CPT

## 2019-06-05 PROCEDURE — 94760 N-INVAS EAR/PLS OXIMETRY 1: CPT

## 2019-06-05 PROCEDURE — 99232 SBSQ HOSP IP/OBS MODERATE 35: CPT | Performed by: INTERNAL MEDICINE

## 2019-06-05 PROCEDURE — 94761 N-INVAS EAR/PLS OXIMETRY MLT: CPT

## 2019-06-05 PROCEDURE — 51798 US URINE CAPACITY MEASURE: CPT

## 2019-06-05 PROCEDURE — 94668 MNPJ CHEST WALL SBSQ: CPT

## 2019-06-05 PROCEDURE — 2580000003 HC RX 258: Performed by: INTERNAL MEDICINE

## 2019-06-05 PROCEDURE — 97110 THERAPEUTIC EXERCISES: CPT

## 2019-06-05 PROCEDURE — 99233 SBSQ HOSP IP/OBS HIGH 50: CPT | Performed by: INTERNAL MEDICINE

## 2019-06-05 PROCEDURE — 97530 THERAPEUTIC ACTIVITIES: CPT

## 2019-06-05 PROCEDURE — 83735 ASSAY OF MAGNESIUM: CPT

## 2019-06-05 PROCEDURE — 80048 BASIC METABOLIC PNL TOTAL CA: CPT

## 2019-06-05 PROCEDURE — 85025 COMPLETE CBC W/AUTO DIFF WBC: CPT

## 2019-06-05 PROCEDURE — 97535 SELF CARE MNGMENT TRAINING: CPT

## 2019-06-05 PROCEDURE — 84100 ASSAY OF PHOSPHORUS: CPT

## 2019-06-05 RX ORDER — POTASSIUM CHLORIDE 750 MG/1
20 TABLET, FILM COATED, EXTENDED RELEASE ORAL ONCE
Status: COMPLETED | OUTPATIENT
Start: 2019-06-05 | End: 2019-06-05

## 2019-06-05 RX ORDER — FUROSEMIDE 40 MG/1
40 TABLET ORAL DAILY
Status: DISCONTINUED | OUTPATIENT
Start: 2019-06-06 | End: 2019-06-06 | Stop reason: HOSPADM

## 2019-06-05 RX ORDER — POTASSIUM CHLORIDE 7.45 MG/ML
10 INJECTION INTRAVENOUS
Status: DISCONTINUED | OUTPATIENT
Start: 2019-06-05 | End: 2019-06-05

## 2019-06-05 RX ADMIN — IPRATROPIUM BROMIDE AND ALBUTEROL SULFATE 1 AMPULE: .5; 3 SOLUTION RESPIRATORY (INHALATION) at 08:44

## 2019-06-05 RX ADMIN — POTASSIUM CHLORIDE 10 MEQ: 7.46 INJECTION, SOLUTION INTRAVENOUS at 09:38

## 2019-06-05 RX ADMIN — MEMANTINE HYDROCHLORIDE 10 MG: 5 TABLET, FILM COATED ORAL at 09:36

## 2019-06-05 RX ADMIN — Medication 3 MG: at 21:12

## 2019-06-05 RX ADMIN — AMIODARONE HYDROCHLORIDE 200 MG: 200 TABLET ORAL at 09:36

## 2019-06-05 RX ADMIN — FUROSEMIDE 40 MG: 10 INJECTION, SOLUTION INTRAMUSCULAR; INTRAVENOUS at 09:37

## 2019-06-05 RX ADMIN — MOMETASONE FUROATE AND FORMOTEROL FUMARATE DIHYDRATE 2 PUFF: 200; 5 AEROSOL RESPIRATORY (INHALATION) at 08:49

## 2019-06-05 RX ADMIN — AMIODARONE HYDROCHLORIDE 200 MG: 200 TABLET ORAL at 21:12

## 2019-06-05 RX ADMIN — PANTOPRAZOLE SODIUM 40 MG: 40 TABLET, DELAYED RELEASE ORAL at 09:36

## 2019-06-05 RX ADMIN — METOPROLOL SUCCINATE 50 MG: 50 TABLET, FILM COATED, EXTENDED RELEASE ORAL at 09:36

## 2019-06-05 RX ADMIN — LATANOPROST 1 DROP: 50 SOLUTION OPHTHALMIC at 21:20

## 2019-06-05 RX ADMIN — IPRATROPIUM BROMIDE AND ALBUTEROL SULFATE 1 AMPULE: .5; 3 SOLUTION RESPIRATORY (INHALATION) at 16:41

## 2019-06-05 RX ADMIN — POTASSIUM CHLORIDE 20 MEQ: 750 TABLET, EXTENDED RELEASE ORAL at 11:25

## 2019-06-05 RX ADMIN — IPRATROPIUM BROMIDE AND ALBUTEROL SULFATE 1 AMPULE: .5; 3 SOLUTION RESPIRATORY (INHALATION) at 20:54

## 2019-06-05 RX ADMIN — LEVOTHYROXINE SODIUM 75 MCG: 75 TABLET ORAL at 09:36

## 2019-06-05 RX ADMIN — MOMETASONE FUROATE AND FORMOTEROL FUMARATE DIHYDRATE 2 PUFF: 200; 5 AEROSOL RESPIRATORY (INHALATION) at 20:55

## 2019-06-05 RX ADMIN — METOPROLOL SUCCINATE 50 MG: 50 TABLET, FILM COATED, EXTENDED RELEASE ORAL at 21:12

## 2019-06-05 RX ADMIN — MEMANTINE HYDROCHLORIDE 10 MG: 5 TABLET, FILM COATED ORAL at 21:12

## 2019-06-05 RX ADMIN — IPRATROPIUM BROMIDE AND ALBUTEROL SULFATE 1 AMPULE: .5; 3 SOLUTION RESPIRATORY (INHALATION) at 12:56

## 2019-06-05 RX ADMIN — Medication 10 ML: at 21:13

## 2019-06-05 ASSESSMENT — PAIN SCALES - GENERAL
PAINLEVEL_OUTOF10: 0

## 2019-06-05 NOTE — PROGRESS NOTES
4 Eyes Skin Assessment     The patient is being assess for  Admission    I agree that 2 RN's have performed a thorough Head to Toe Skin Assessment on the patient. ALL assessment sites listed below have been assessed. Areas assessed by both nurses:  [x]   Head, Face, and Ears   [x]   Shoulders, Back, and Chest  [x]   Arms, Elbows, and Hands   [x]   Coccyx, Sacrum, and IschIum  [x]   Legs, Feet, and Heels        Does the Patient have Skin Breakdown?   No         Jef Prevention initiated:  No   Wound Care Orders initiated:  No      [facility] nurse consulted for Pressure Injury (Stage 3,4, Unstageable, DTI, NWPT, and Complex wounds), New and Established Ostomies:  No      Nurse 1 eSignature: Electronically signed by Divina Ibrahim RN on 6/5/19 at 2:40 AM    **SHARE this note so that the co-signing nurse is able to place an eSignature**    Nurse 2 eSignature: Electronically signed by Mary Anne Edward RN on 6/5/19 at 2:41 AM

## 2019-06-05 NOTE — PROGRESS NOTES
history of Anemia, Anticoagulant long-term use, Atrial fibrillation (HCC), CHF (congestive heart failure) ([facility] [address] CKD (chronic kidney disease) stage 3, GFR 30-59 ml/min ([facility]), Clostridium difficile diarrhea, Depression, Humerus fracture, Hyperlipidemia, Hypertension, Hypothyroidism, Mitral regurgitation, Optic neuritis, Osteopenia, Polymyalgia rheumatica ([address] Pulmonary embolus ([facility] [address] Thyroid disease, and Vitamin D deficiency. has a past surgical history that includes Lung surgery; malignant skin lesion excision; Colonoscopy (09/14/2016); Upper gastrointestinal endoscopy (09/14/2016); Upper gastrointestinal endoscopy (N/A, 5/30/2019); Colonoscopy (N/A, 5/31/2019); Upper gastrointestinal endoscopy (N/A, 5/31/2019); Endoscopy, small bowel with ileum (5/31/2019); and Enteroscopy (N/A, 6/3/2019). Social/Functional History  Lives With: Alone  Type of Home: (3rd floor condo, laundry in [facility])  Home Layout: One level  Home Access: Elevator, Level entry  Bathroom Shower/Tub: Walk-in shower  Bathroom Toilet: Standard(sink nearby)  Bathroom Equipment: Grab bars in shower  Bathroom Accessibility: [address] accessible  Home Equipment: (no DME)  ADL Assistance: Independent  Homemaking Assistance: Independent(cleaning lady every 3 weeks, pt independent grocery shopping, laundry, cooking)  Ambulation Assistance: Independent(with no AD)  Transfer Assistance: Independent  Active : Yes  Occupation: Retired  Type of occupation:   Additional Comments: Pt denies recent falls. Restrictions  Restrictions/Precautions  Restrictions/Precautions: Fall Risk  Position Activity Restriction  Other position/activity restrictions: O2 1L via NC. General Diet. Subjective   General  Chart Reviewed: Yes  Patient assessed for rehabilitation services?: Yes  Additional Pertinent Hx: Pt is a 80 y.o. female presenting to ED from PCP office with c/o bloody/dark stools, fatigue, and dizziness.  In the ER she was found to have an stedy lift. Sit>stand of bed with Min A x1; off seat of quentin amandady with CG/Min a x1-2 and stand to sit onto recliner with Min A x2. Bed mobility  Supine to Sit: Maximum assistance(x1)  Sit to Supine: Unable to assess(up to chair)  Scooting: Maximal assistance(x1)        Cognition  Overall Cognitive Status: WFL         Type of ROM/Therapeutic Exercise  Type of ROM/Therapeutic Exercise: AROM  Exercises  Shoulder Flexion: x10  Horizontal ABduction: x10  Horizontal ADduction: x10  Elbow Flexion: x10  Elbow Extension: x10         Plan   Plan  Times per week: 3-5  Times per day: Daily  Current Treatment Recommendations: Strengthening, Balance Training, Functional Mobility Training, Endurance Training, Safety Education & Training, Self-Care / ADL         AM-PAC Score        AM-[facility] Inpatient Daily Activity Raw Score: 13 (06/05/19 1157)  AM-PAC Inpatient ADL T-Scale Score : 32.03 (06/05/19 1157)  ADL Inpatient CMS 0-100% Score: 63.03 (06/05/19 1157)  ADL Inpatient CMS G-Code Modifier : CL (06/05/19 1157)    Goals  Short term goals  Time Frame for Short term goals: status: goals ongoing  Short term goal 1: Pt will bathe with min A. Short term goal 2: Pt will dress with min A. Short term goal 3: Pt will complete standing grooming at sink SBA/supervision. Short term goal 4: Pt will toilet with Min A. Short term goal 5: Pt will complete fxl mobility and fxl transfers to/from ADL surfaces with SBA using AD. Short term goal 6: Pt will increase activity tolerance to achieve the above goals. Long term goals  Time Frame for Long term goals : STG=LTG   Patient Goals   Patient goals : \"to be able to do everything I was doing before. \"       Therapy Time   Individual Concurrent Group Co-treatment   Time In 1100         Time Out 1153         Minutes Laura DANIEL/L,515  This note to serve as discharge summary if pt d/c'd prior to next session

## 2019-06-05 NOTE — PROGRESS NOTES
[address]   Daily Progress Note      Admit Date:  5/28/2019      Subjective:   Ms. Clinton Peoples is a 79yo female with a past medical history significant for COPD, severe mitral regurgitation, paroxysmal atrial fibrillation, chronic diastolic CHF, pulmonary hypertension and chronic venous thromboembolic disease. She also follows with Dr. Chandrika Bryant from Pulmonary. She was not considered a candidate for repair of her mitral valve due to her comorbidities including only having one functioning lung with concomitant COPD. She presented to the ED form Dr. Randy Ladd office with dark stools. On presentation to the ED she was Hemoccult positive with a supratherapeutic INR and anemic to a hemoglobin of 7.9. Today, Toy Avelar is doing okay. Sh eis on the amiodarone drip for her rapid atrial fibrillation. She is still somewhat lethargic.        Objective:     BP (!) 101/55   Pulse 103   Temp 97.5 °F (36.4 °C) (Axillary)   Resp 16   Ht 5' (1.524 m)   Wt 108 lb 0.4 oz (49 kg)   SpO2 97%   BMI 21.10 kg/m²      Intake/Output Summary (Last 24 hours) at 6/5/2019 1647  Last data filed at 6/5/2019 1328  Gross per 24 hour   Intake 0 ml   Output 1700 ml   Net -1700 ml       Physical Exam:  General:  Awake, alert, NAD  Skin:  Warm and dry  Neck:  JVD<8, no carotid bruits  Chest:  Clear to auscultation on the left, right with diminished breath sounds, no wheezes/rhonchi/rales  Cardiovascular:  Irreg irreg, normal S1/S2, 2/6 hs murmur  Abdomen:  Soft, nontender, +bowel sounds  Extremities:  No edema  Pulses: 2+ bilat carotid    2+ bilat radial    2+ bilat femoral        Medications:    amiodarone  200 mg Oral BID    metoprolol succinate  50 mg Oral BID    furosemide  40 mg Intravenous Daily    pantoprazole  40 mg Oral QAM AC    ipratropium-albuterol  1 ampule Inhalation Q4H WA    mometasone-formoterol  2 puff Inhalation BID    latanoprost  1 drop Both Eyes Nightly    levothyroxine  75 mcg Oral Daily    memantine  10 mg Oral BID    sodium chloride flush  10 mL Intravenous 2 times per day         Lab Data:  CBC:   Recent Labs     06/03/19  0552 06/04/19  0538 06/05/19  0535   WBC 9.3 8.5 9.2   HGB 9.4* 9.8* 9.8*    235 232     BMP:    Recent Labs     06/03/19  0552 06/04/19  0538 06/05/19  0535    138 140   K 3.4* 3.5 3.2*   CO2 30 32 32   BUN 22* 24* 24*   CREATININE 1.3* 1.2 1.2     Lab Results   Component Value Date    CHOL 151 08/02/2018    HDL 68 08/02/2018    HDL 78 04/27/2012    TRIG 81 08/02/2018     Right Heart Catheterization 9/20/17:  1.  Mild nonobstructive coronary artery disease with a 40% ostial LAD  lesion and 25% mid-RCA disease.  This is a right-dominant coronary  arterial system. 2.  Normal left ventricular systolic function with LV ejection  fraction of 65%. 3.  Mildly elevated left ventricular end-diastolic pressure of 15  mmHg. 4.  Moderate to severe mitral regurgitation with moderate dilatation  of the left atrium on the left ventriculogram.  5.  No gradient across the aortic valve on pullback to suggest aortic  stenosis. 6.  Evidence of mild volume overload with a pulmonary capillary wedge  pressure of 22 and a left ventricular end-diastolic pressure on repeat  of 15 to 20.  7.  Evidence of pulmonary hypertension, possibly pulmonary arterial  hypertension, arteriovenous malformation and prior pneumonectomy. Instantaneous pulmonary artery pressure was 68/19 for a mean pulmonary  arterial pressure of 37 mmHg. 8.  Oxygen step-up in the right pulmonary artery to 90%, pulmonary  artery main was 62% with a right atrial pressure of 52%.  Inferior  vena cava was 57%.   9.  Pulmonary angiogram showing minimal pulmonary arterial flow to the  right lung with _____ flow in the right upper lobe but no flow into  the right lower middle lobe.  In fact, there appears to be a possible  arteriovenous malformation with backflow of blood towards the pigtail  catheter into the right pulmonary artery and an oxygen saturation of  90% in the right lower lobe.     Echo 8/31/17:  Normal left ventricle size, wall thickness, and systolic function with an   estimated ejection fraction of 60%. No regional wall motion abnormalities   are seen.   Normal right ventricular size with mildly reduced systolic function.   Severely dilated left atrium.   Severe eccentric mitral regurgitation.   Severe tricuspid regurgitation.   Estimated pulmonary artery systolic pressure is severely elevated at 81 mmHg   assuming a right atrial pressure of 3 mmHg.        Assessment:  1. Anemia secondary to chronic GI blood loss  2. Paroxysmal Atrial fibrillation  3. Severe mitral regurgitation  4. Pulmonary Venous Hypertension  5. COPD        Plan:   Henry Saini is stable and okay for discharge from my perspective. Continue oral amiodarone at this point with 200mg bid. Continue Toprol XL at 12.5mg bid. Diuretic therapy with furosemide at 40mg is appropriate. We can see her in follow-up in 2-3 weeks in the office. No anticoagulation for her a-fib secondary to GI bleeding.      Signed:  Douglas Trevino MD

## 2019-06-05 NOTE — PROGRESS NOTES
Gastroenterology Progress Note    Sally Yung is a 80 y.o. female patient. Hospitalization Day:8    SUBJECTIVE:  NG tube clogged overnight. Attempts at replacing unsuccessful. Still feels weak. Eating OK. ROS:  Gastrointestinal ROS: no abdominal pain, change in bowel habits, or black or bloody stools    Physical    VITALS:  BP (!) 101/55   Pulse 103   Temp 97.5 °F (36.4 °C) (Axillary)   Resp 16   Ht 5' (1.524 m)   Wt 108 lb 0.4 oz (49 kg)   SpO2 97%   BMI 21.10 kg/m²   TEMPERATURE:  Current - Temp: 97.5 °F (36.4 °C); Max - Temp  Av.7 °F (36.5 °C)  Min: 97.4 °F (36.3 °C)  Max: 98.2 °F (36.8 °C)    General appearance: ill appearing, alert, no distress,  Eyes: Anicteric  Head: Normocephalic, without obvious abnormality  Lungs: clear to auscultation bilaterally  Heart: irregular rate and rhythm  Abdomen: soft, non-distended, non-tender. Bowel sounds normal. No masses,  no organomegaly. Extremities: atraumatic, no cyanosis or edema  Skin: warm and dry, no jaundice  Neuro: Grossly intact, A&OX3        Data    Data Review:    Recent Labs     19  0552 19  0538 19  0535   WBC 9.3 8.5 9.2   HGB 9.4* 9.8* 9.8*   HCT 27.6* 29.2* 29.4*   MCV 89.9 90.0 89.9    235 232     Recent Labs     19  0552 19  0538 19  0535    138 140   K 3.4* 3.5 3.2*   CL 98* 95* 96*   CO2 30 32 32   PHOS 3.4 2.9 2.9   BUN 22* 24* 24*   CREATININE 1.3* 1.2 1.2     No results for input(s): AST, ALT, ALB, BILIDIR, BILITOT, ALKPHOS in the last 72 hours. No results for input(s): LIPASE, AMYLASE in the last 72 hours. No results for input(s): PROTIME, INR in the last 72 hours. Radiology Review:    XR CHEST PORTABLE   Final Result   Congestive heart failure with increased pulmonary vascular congestion and   edema               ASSESSMENT:Imp 80 y. o. female who has a past medical history of Anemia, Anticoagulant long-term use, Atrial fibrillation ([address] CHF (congestive heart failure) ([address] CKD (chronic kidney disease) stage 3, GFR 30-59 ml/min ([facility]), hx of Clostridium difficile diarrhea, Depression, Humerus fracture, Hyperlipidemia, Hypertension, Hypothyroidism, Mitral regurgitation, Optic neuritis, Osteopenia, Polymyalgia rheumatica ([address] Pulmonary embolus ([address] Thyroid disease, and Vitamin D deficiency.        1) Melena and acute blood loss anemia- stable. EGD 5/30/19 without active bleeding or source of bleeding other than 2 mm non-bleeding angioectasia in the second portion of the duodenum. 6/1 Colonoscopy with a small polyp and scattered pan-diverticulosis. Capsule with bleeding noted 30 minutes post pyloric. Push enteroscopy did not see this site. No further bleeding noted off of coumadin     1) Acute blood loss anemia- stable. 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 2) CKD- Cr 1.2  3) Failure to thrive- tube feedings started thru NG as a palliative measure. NG clogged and not replaced. Will monitor oral intake. 4) Patient is a DNR-CCA     Plan:  1) Would continue to advise holding anticoagulation indefinitely as she would be a high risk for re-bleeding if anticoagulation resumed  2) If further bleeding did occur, the patient would need to be transferred to a center with capabilities of deep balloon enteroscopy if family desires further interventions or consider palliative/hospice care  3) ASA therapy could be considered afte patient stabilizes  4) Continue oral feedings with supplements as tolerated.        Call with ? Will sign off for now. Thank you for allowing me to participate in the care of your patient. Please feel free to contact me with any concerns.   [address], DO

## 2019-06-05 NOTE — CARE COORDINATION
Following for acute rehab. Noted NG removed last night. Please advise what nutritional plan is. Once decided we could accept.

## 2019-06-05 NOTE — PROGRESS NOTES
Occupational Therapy  Pt approached for therapy and stated \"I can't do it right now\". Pt c/o \"burning\" in IV, d/t receiving potassium. Nurse made aware and in to see pt. Pt agreeable to OT re-attempting later this am. Will follow.   Kirk DANIEL/MOO,129

## 2019-06-05 NOTE — CARE COORDINATION
Reviewed records for update. Noted now plan is to leave NG out. Would like to follow to see if she is going to be able to eat.

## 2019-06-05 NOTE — PLAN OF CARE
Problem: Risk for Impaired Skin Integrity  Goal: Tissue integrity - skin and mucous membranes  Description  Structural intactness and normal physiological function of skin and  mucous membranes. Outcome: Ongoing     Problem: Falls - Risk of:  Goal: Will remain free from falls  Description  Will remain free from falls  Outcome: Ongoing     Problem: Discharge Planning:  Goal: Discharged to appropriate level of care  Description  Discharged to appropriate level of care  Outcome: Ongoing     Problem:  Bowel Function - Altered:  Goal: Bowel elimination is within specified parameters  Description  Bowel elimination is within specified parameters  Outcome: Ongoing     Problem: Nausea/Vomiting:  Goal: Absence of nausea/vomiting  Description  Absence of nausea/vomiting  Outcome: Ongoing     Problem: Nausea/Vomiting:  Goal: Able to drink  Description  Able to drink  Outcome: Ongoing     Problem: Nausea/Vomiting:  Goal: Able to eat  Description  Able to eat  Outcome: Ongoing     Problem: Nausea/Vomiting:  Goal: Ability to achieve adequate nutritional intake will improve  Description  Ability to achieve adequate nutritional intake will improve  Outcome: Ongoing     Problem: NUTRITION  Goal: Patient to baseline / improved nutrition  Outcome: Ongoing

## 2019-06-05 NOTE — PROGRESS NOTES
[facility] Progress Note  6/5/2019 8:40 AM  Subjective:   Admit Date: 5/28/2019      Chief Complaint: Now on PCU--      Interval History: Feeding tube clogged last nite--removed and attempted to re-insert w/o success--init placed in GI lab--patient and son are refusing replacement--so diet and orl supplements   potass sl low   Eating some breakfast this AM     Diet: DIET GENERAL;  Diet Tube Feed Continuous/Cyclic w/ Diet  Medications:   Scheduled Meds:   amiodarone  200 mg Oral BID    metoprolol succinate  50 mg Oral BID    furosemide  40 mg Intravenous Daily    pantoprazole  40 mg Oral QAM AC    ipratropium-albuterol  1 ampule Inhalation Q4H WA    mometasone-formoterol  2 puff Inhalation BID    latanoprost  1 drop Both Eyes Nightly    levothyroxine  75 mcg Oral Daily    memantine  10 mg Oral BID    sodium chloride flush  10 mL Intravenous 2 times per day     Continuous Infusions:    Review of Systems -   General ROS: afebrile  Respiratory ROS: positive for - cough and shortness of breath  Cardiovascular ROS: no chest pain  Musculoskeletal ROS:positive for - :joint pain  Neurological ROS: positive for - weakness    Objective:   Vitals: BP (!) 103/53   Pulse 102   Temp 97.7 °F (36.5 °C) (Oral)   Resp 16   Ht 5' (1.524 m)   Wt 108 lb 0.4 oz (49 kg)   SpO2 95%   BMI 21.10 kg/m²   General appearance: alert and cooperative with exam  HEENT: Head: Normocephalic, no lesions, without obvious abnormality.   Neck: no adenopathy, no carotid bruit, no JVD, supple, symmetrical, trachea midline and thyroid not enlarged, symmetric, no tenderness/mass/nodules  Lungs: diminished breath sounds bibasilar  Heart: regular rate and rhythm, S1, S2 normal, no murmur, click, rub or gallop  Abdomen: soft, non-tender; bowel sounds normal; no masses,  no organomegaly  Extremities: extremities normal, atraumatic, no cyanosis or edema  Neurologic: Mental status: weak but conversant     Admission on 05/28/2019   No results displayed because visit has over 200 results. Assessment & Plan:   Principal Problem:    Chronic diastolic congestive heart failure (HCC)--cont to improve   Active Problems:    Paroxysmal atrial fibrillation (HCC)-    Essential hypertension, benign    Hypothyroidism due to medication    Non-rheumatic mitral regurgitation    Pulmonary hypertension    Chronic atrial fibrillation (HCC)    Dyspnea    CKD (chronic kidney disease) stage 3, GFR 30-59 ml/min (HCC)    UGI bleed    Supratherapeutic INR    Acute blood loss anemia--hgb is stable     Coagulopathy (HCC)    Acute pulmonary edema (HCC)    Acute respiratory failure with hypoxia and hypercapnia (HCC)    Severe malnutrition (HCC)--refuses Feeding tube after unsuccessful attempts to replace --will cont oral and supplements   Supplement kcl--Ok for rehab   Resolved Problems:    * No resolved hospital problems.  *    Please note that over 35 minutes was spent in evaluating the patient, review of records and results, discussion with staff/family, etc.    Tamir Roth MD

## 2019-06-05 NOTE — PROGRESS NOTES
Pt NG tube clogged, not able to flush. NG removed. Attempted placement of another. Pt and pt son very upset about this. Pt did not tolerate attempted placement well, continues to scream \"Stop please stop,\" asking if it could be placed tomorrow. RN explained the importance of needing NG tube for feedings while pt appetite low. Pt states she understands but refusing placement of another. States she may allow another attempt at placement tomorrow. Pt took pills by mouth without difficulty.      Electronically signed by Eulalia Garcia RN on 6/5/2019 at 12:44 AM

## 2019-06-06 ENCOUNTER — HOSPITAL ENCOUNTER (INPATIENT)
Age: 84
LOS: 10 days | Discharge: ANOTHER HOSPITAL | DRG: 091 | End: 2019-06-16
Attending: PHYSICAL MEDICINE & REHABILITATION | Admitting: PHYSICAL MEDICINE & REHABILITATION
Payer: MEDICARE

## 2019-06-06 VITALS
WEIGHT: 107.36 LBS | TEMPERATURE: 97.8 F | DIASTOLIC BLOOD PRESSURE: 56 MMHG | SYSTOLIC BLOOD PRESSURE: 99 MMHG | BODY MASS INDEX: 21.08 KG/M2 | OXYGEN SATURATION: 95 % | HEART RATE: 107 BPM | RESPIRATION RATE: 16 BRPM | HEIGHT: 60 IN

## 2019-06-06 PROBLEM — M62.50 DISUSE MUSCLE ATROPHY: Status: ACTIVE | Noted: 2019-06-06

## 2019-06-06 LAB
ANION GAP SERPL CALCULATED.3IONS-SCNC: 11 MMOL/L (ref 3–16)
BASOPHILS ABSOLUTE: 0.1 K/UL (ref 0–0.2)
BASOPHILS RELATIVE PERCENT: 0.7 %
BUN BLDV-MCNC: 24 MG/DL (ref 7–20)
CALCIUM SERPL-MCNC: 8.7 MG/DL (ref 8.3–10.6)
CHLORIDE BLD-SCNC: 96 MMOL/L (ref 99–110)
CO2: 32 MMOL/L (ref 21–32)
CREAT SERPL-MCNC: 1.3 MG/DL (ref 0.6–1.2)
EOSINOPHILS ABSOLUTE: 0.7 K/UL (ref 0–0.6)
EOSINOPHILS RELATIVE PERCENT: 8 %
GFR AFRICAN AMERICAN: 47
GFR NON-AFRICAN AMERICAN: 39
GLUCOSE BLD-MCNC: 96 MG/DL (ref 70–99)
HCT VFR BLD CALC: 28.5 % (ref 36–48)
HEMOGLOBIN: 9.3 G/DL (ref 12–16)
LYMPHOCYTES ABSOLUTE: 0.6 K/UL (ref 1–5.1)
LYMPHOCYTES RELATIVE PERCENT: 7 %
MAGNESIUM: 2.2 MG/DL (ref 1.8–2.4)
MCH RBC QN AUTO: 29.4 PG (ref 26–34)
MCHC RBC AUTO-ENTMCNC: 32.7 G/DL (ref 31–36)
MCV RBC AUTO: 89.9 FL (ref 80–100)
MONOCYTES ABSOLUTE: 0.5 K/UL (ref 0–1.3)
MONOCYTES RELATIVE PERCENT: 5.6 %
NEUTROPHILS ABSOLUTE: 6.4 K/UL (ref 1.7–7.7)
NEUTROPHILS RELATIVE PERCENT: 78.7 %
PDW BLD-RTO: 15.5 % (ref 12.4–15.4)
PHOSPHORUS: 3.5 MG/DL (ref 2.5–4.9)
PLATELET # BLD: 245 K/UL (ref 135–450)
PMV BLD AUTO: 8.5 FL (ref 5–10.5)
POTASSIUM SERPL-SCNC: 3.9 MMOL/L (ref 3.5–5.1)
RBC # BLD: 3.17 M/UL (ref 4–5.2)
SODIUM BLD-SCNC: 139 MMOL/L (ref 136–145)
WBC # BLD: 8.2 K/UL (ref 4–11)

## 2019-06-06 PROCEDURE — 84100 ASSAY OF PHOSPHORUS: CPT

## 2019-06-06 PROCEDURE — 94640 AIRWAY INHALATION TREATMENT: CPT

## 2019-06-06 PROCEDURE — 97530 THERAPEUTIC ACTIVITIES: CPT | Performed by: PHYSICAL THERAPIST

## 2019-06-06 PROCEDURE — 97530 THERAPEUTIC ACTIVITIES: CPT

## 2019-06-06 PROCEDURE — 36415 COLL VENOUS BLD VENIPUNCTURE: CPT

## 2019-06-06 PROCEDURE — 2580000003 HC RX 258: Performed by: INTERNAL MEDICINE

## 2019-06-06 PROCEDURE — 94669 MECHANICAL CHEST WALL OSCILL: CPT

## 2019-06-06 PROCEDURE — 6370000000 HC RX 637 (ALT 250 FOR IP): Performed by: INTERNAL MEDICINE

## 2019-06-06 PROCEDURE — 83735 ASSAY OF MAGNESIUM: CPT

## 2019-06-06 PROCEDURE — 94761 N-INVAS EAR/PLS OXIMETRY MLT: CPT

## 2019-06-06 PROCEDURE — 1280000000 HC REHAB R&B

## 2019-06-06 PROCEDURE — 80048 BASIC METABOLIC PNL TOTAL CA: CPT

## 2019-06-06 PROCEDURE — 99239 HOSP IP/OBS DSCHRG MGMT >30: CPT | Performed by: INTERNAL MEDICINE

## 2019-06-06 PROCEDURE — 2700000000 HC OXYGEN THERAPY PER DAY

## 2019-06-06 PROCEDURE — 97110 THERAPEUTIC EXERCISES: CPT

## 2019-06-06 PROCEDURE — 85025 COMPLETE CBC W/AUTO DIFF WBC: CPT

## 2019-06-06 PROCEDURE — 97116 GAIT TRAINING THERAPY: CPT | Performed by: PHYSICAL THERAPIST

## 2019-06-06 RX ORDER — SENNA AND DOCUSATE SODIUM 50; 8.6 MG/1; MG/1
2 TABLET, FILM COATED ORAL 2 TIMES DAILY
Status: CANCELLED | OUTPATIENT
Start: 2019-06-06

## 2019-06-06 RX ORDER — SODIUM CHLORIDE 0.9 % (FLUSH) 0.9 %
10 SYRINGE (ML) INJECTION EVERY 12 HOURS SCHEDULED
Status: CANCELLED | OUTPATIENT
Start: 2019-06-06

## 2019-06-06 RX ORDER — LANOLIN ALCOHOL/MO/W.PET/CERES
3 CREAM (GRAM) TOPICAL NIGHTLY PRN
Status: DISCONTINUED | OUTPATIENT
Start: 2019-06-06 | End: 2019-06-16 | Stop reason: HOSPADM

## 2019-06-06 RX ORDER — LEVOTHYROXINE SODIUM 0.07 MG/1
75 TABLET ORAL DAILY
Status: DISCONTINUED | OUTPATIENT
Start: 2019-06-07 | End: 2019-06-16 | Stop reason: HOSPADM

## 2019-06-06 RX ORDER — AMIODARONE HYDROCHLORIDE 200 MG/1
200 TABLET ORAL 2 TIMES DAILY
Status: DISCONTINUED | OUTPATIENT
Start: 2019-06-06 | End: 2019-06-16 | Stop reason: HOSPADM

## 2019-06-06 RX ORDER — SODIUM CHLORIDE 0.9 % (FLUSH) 0.9 %
10 SYRINGE (ML) INJECTION PRN
Status: CANCELLED | OUTPATIENT
Start: 2019-06-06

## 2019-06-06 RX ORDER — FUROSEMIDE 40 MG/1
40 TABLET ORAL DAILY
Status: CANCELLED | OUTPATIENT
Start: 2019-06-07

## 2019-06-06 RX ORDER — LANOLIN ALCOHOL/MO/W.PET/CERES
3 CREAM (GRAM) TOPICAL NIGHTLY PRN
Status: CANCELLED | OUTPATIENT
Start: 2019-06-06

## 2019-06-06 RX ORDER — LEVOTHYROXINE SODIUM 0.07 MG/1
1 TABLET ORAL DAILY
Status: ON HOLD | COMMUNITY
End: 2019-06-18

## 2019-06-06 RX ORDER — METOPROLOL SUCCINATE 50 MG/1
50 TABLET, EXTENDED RELEASE ORAL 2 TIMES DAILY
Status: CANCELLED | OUTPATIENT
Start: 2019-06-06

## 2019-06-06 RX ORDER — MEMANTINE HYDROCHLORIDE 10 MG/1
10 TABLET ORAL 2 TIMES DAILY
Status: DISCONTINUED | OUTPATIENT
Start: 2019-06-06 | End: 2019-06-16 | Stop reason: HOSPADM

## 2019-06-06 RX ORDER — LATANOPROST 50 UG/ML
1 SOLUTION/ DROPS OPHTHALMIC NIGHTLY
Status: CANCELLED | OUTPATIENT
Start: 2019-06-06

## 2019-06-06 RX ORDER — ALBUTEROL SULFATE 90 UG/1
2 AEROSOL, METERED RESPIRATORY (INHALATION) EVERY 4 HOURS PRN
Status: CANCELLED | OUTPATIENT
Start: 2019-06-06

## 2019-06-06 RX ORDER — SENNA AND DOCUSATE SODIUM 50; 8.6 MG/1; MG/1
2 TABLET, FILM COATED ORAL 2 TIMES DAILY
Status: DISCONTINUED | OUTPATIENT
Start: 2019-06-06 | End: 2019-06-16

## 2019-06-06 RX ORDER — UREA 10 %
1 LOTION (ML) TOPICAL DAILY
Status: ON HOLD | COMMUNITY
End: 2019-06-21 | Stop reason: HOSPADM

## 2019-06-06 RX ORDER — ONDANSETRON 4 MG/1
4 TABLET, FILM COATED ORAL EVERY 8 HOURS PRN
Status: DISCONTINUED | OUTPATIENT
Start: 2019-06-06 | End: 2019-06-15

## 2019-06-06 RX ORDER — PANTOPRAZOLE SODIUM 40 MG/1
40 TABLET, DELAYED RELEASE ORAL
Status: CANCELLED | OUTPATIENT
Start: 2019-06-07

## 2019-06-06 RX ORDER — MEMANTINE HYDROCHLORIDE 5 MG/1
10 TABLET ORAL 2 TIMES DAILY
Status: CANCELLED | OUTPATIENT
Start: 2019-06-06

## 2019-06-06 RX ORDER — LATANOPROST 50 UG/ML
1 SOLUTION/ DROPS OPHTHALMIC NIGHTLY
Status: DISCONTINUED | OUTPATIENT
Start: 2019-06-06 | End: 2019-06-16 | Stop reason: HOSPADM

## 2019-06-06 RX ORDER — LEVOTHYROXINE SODIUM 0.07 MG/1
75 TABLET ORAL DAILY
Status: CANCELLED | OUTPATIENT
Start: 2019-06-07

## 2019-06-06 RX ORDER — ALBUTEROL SULFATE 90 UG/1
2 AEROSOL, METERED RESPIRATORY (INHALATION) EVERY 4 HOURS PRN
Status: DISCONTINUED | OUTPATIENT
Start: 2019-06-06 | End: 2019-06-16 | Stop reason: HOSPADM

## 2019-06-06 RX ORDER — METOPROLOL SUCCINATE 50 MG/1
50 TABLET, EXTENDED RELEASE ORAL 2 TIMES DAILY
Status: DISCONTINUED | OUTPATIENT
Start: 2019-06-06 | End: 2019-06-16

## 2019-06-06 RX ORDER — FUROSEMIDE 40 MG/1
40 TABLET ORAL DAILY
Status: DISCONTINUED | OUTPATIENT
Start: 2019-06-07 | End: 2019-06-09

## 2019-06-06 RX ORDER — AMIODARONE HYDROCHLORIDE 200 MG/1
200 TABLET ORAL 2 TIMES DAILY
Status: CANCELLED | OUTPATIENT
Start: 2019-06-06

## 2019-06-06 RX ORDER — ONDANSETRON 2 MG/ML
4 INJECTION INTRAMUSCULAR; INTRAVENOUS EVERY 6 HOURS PRN
Status: CANCELLED | OUTPATIENT
Start: 2019-06-06

## 2019-06-06 RX ORDER — ERGOCALCIFEROL (VITAMIN D2) 10 MCG
1 TABLET ORAL DAILY
Status: ON HOLD | COMMUNITY
End: 2019-06-21 | Stop reason: HOSPADM

## 2019-06-06 RX ORDER — ONDANSETRON 4 MG/1
4 TABLET, FILM COATED ORAL EVERY 8 HOURS PRN
Status: CANCELLED | OUTPATIENT
Start: 2019-06-06

## 2019-06-06 RX ORDER — ACETAMINOPHEN 325 MG/1
650 TABLET ORAL EVERY 4 HOURS PRN
Status: CANCELLED | OUTPATIENT
Start: 2019-06-06

## 2019-06-06 RX ORDER — METOPROLOL SUCCINATE 50 MG/1
1 TABLET, EXTENDED RELEASE ORAL DAILY
Status: ON HOLD | COMMUNITY
End: 2019-06-18

## 2019-06-06 RX ORDER — IPRATROPIUM BROMIDE AND ALBUTEROL SULFATE 2.5; .5 MG/3ML; MG/3ML
1 SOLUTION RESPIRATORY (INHALATION)
Status: CANCELLED | OUTPATIENT
Start: 2019-06-06

## 2019-06-06 RX ORDER — PANTOPRAZOLE SODIUM 40 MG/1
40 TABLET, DELAYED RELEASE ORAL
Status: DISCONTINUED | OUTPATIENT
Start: 2019-06-07 | End: 2019-06-15

## 2019-06-06 RX ORDER — ACETAMINOPHEN 325 MG/1
650 TABLET ORAL EVERY 4 HOURS PRN
Status: DISCONTINUED | OUTPATIENT
Start: 2019-06-06 | End: 2019-06-16 | Stop reason: HOSPADM

## 2019-06-06 RX ORDER — IPRATROPIUM BROMIDE AND ALBUTEROL SULFATE 2.5; .5 MG/3ML; MG/3ML
1 SOLUTION RESPIRATORY (INHALATION)
Status: DISCONTINUED | OUTPATIENT
Start: 2019-06-06 | End: 2019-06-16 | Stop reason: HOSPADM

## 2019-06-06 RX ADMIN — LEVOTHYROXINE SODIUM 75 MCG: 75 TABLET ORAL at 07:26

## 2019-06-06 RX ADMIN — Medication 10 ML: at 09:10

## 2019-06-06 RX ADMIN — IPRATROPIUM BROMIDE AND ALBUTEROL SULFATE 1 AMPULE: .5; 3 SOLUTION RESPIRATORY (INHALATION) at 12:28

## 2019-06-06 RX ADMIN — MOMETASONE FUROATE AND FORMOTEROL FUMARATE DIHYDRATE 2 PUFF: 200; 5 AEROSOL RESPIRATORY (INHALATION) at 08:11

## 2019-06-06 RX ADMIN — BENZOCAINE AND MENTHOL 1 LOZENGE: 15; 3.6 LOZENGE ORAL at 23:15

## 2019-06-06 RX ADMIN — AMIODARONE HYDROCHLORIDE 200 MG: 200 TABLET ORAL at 20:58

## 2019-06-06 RX ADMIN — AMIODARONE HYDROCHLORIDE 200 MG: 200 TABLET ORAL at 09:08

## 2019-06-06 RX ADMIN — LATANOPROST 1 DROP: 50 SOLUTION/ DROPS OPHTHALMIC at 21:01

## 2019-06-06 RX ADMIN — MOMETASONE FUROATE AND FORMOTEROL FUMARATE DIHYDRATE 2 PUFF: 200; 5 AEROSOL RESPIRATORY (INHALATION) at 19:38

## 2019-06-06 RX ADMIN — MEMANTINE HYDROCHLORIDE 10 MG: 5 TABLET, FILM COATED ORAL at 09:08

## 2019-06-06 RX ADMIN — FUROSEMIDE 40 MG: 40 TABLET ORAL at 09:08

## 2019-06-06 RX ADMIN — MEMANTINE HYDROCHLORIDE 10 MG: 10 TABLET ORAL at 20:58

## 2019-06-06 RX ADMIN — IPRATROPIUM BROMIDE AND ALBUTEROL SULFATE 1 AMPULE: .5; 3 SOLUTION RESPIRATORY (INHALATION) at 19:38

## 2019-06-06 RX ADMIN — PANTOPRAZOLE SODIUM 40 MG: 40 TABLET, DELAYED RELEASE ORAL at 07:26

## 2019-06-06 RX ADMIN — IPRATROPIUM BROMIDE AND ALBUTEROL SULFATE 1 AMPULE: .5; 3 SOLUTION RESPIRATORY (INHALATION) at 08:11

## 2019-06-06 ASSESSMENT — PAIN SCALES - GENERAL
PAINLEVEL_OUTOF10: 0

## 2019-06-06 NOTE — PROGRESS NOTES
Report given to Nguyen Watson- Pt will go to [id number]. [facility] notified of transfer to [phone]. Electronically signed by Domenico Marquez RN on 6/6/2019 at 4:16 PM

## 2019-06-06 NOTE — CARE COORDINATION
Inpt rehab has accepted patient. Plan to admit today.      Gib Gosselin, [address] [facility]  [phone]  6/6/19 at 3:59 PM

## 2019-06-06 NOTE — PROGRESS NOTES
Physical Therapy  Facility/Department: [facility]  Daily Treatment Note  NAME: Daisy Soto  : 1935  MRN: [id number]    Date of Service: 2019    Discharge Recommendations:  Patient would benefit from continued therapy after discharge, 5-7 sessions per week   PT Equipment Recommendations  Other: Will monitor for potential equipt needs. Daisy Soto scored a  on the AM-PAC short mobility form. Current research shows that an AM-PAC score of 17 or less is typically not associated with a discharge to the patient's home setting. Based on the patients AM-PAC score and their current functional mobility deficits, it is recommended that the patient have 5-7 sessions per week of Physical Therapy at d/c to increase the patients independence. Assessment   Body structures, Functions, Activity limitations: Decreased functional mobility ; Decreased strength;Decreased endurance  Assessment: 79 y/o female admit 19 with GI Bleed, Respiratory Failure. 19 S/P EGD with Argon Plasms Coag.  19 S/P EGD with Placement Video Capsule. 6/3/19 Push Enteroscopy - no lesions. 6/3/19 Nasogastric Tube Feed placed. PMH includes Mitral Regurg, A-Fib, CHF, CKD, PE, Lung Surg, Humerus Fx, Osteopenia, Polymyalgia Rheumatica. PTA pt living alone in [facility] with elevator access. Pt  has 2 sons both live OOT. PTA pt very independent with daily care and functional mobility (without assist device). Pt cont weak/limited endurance and well below her baseline of being Ind; pt wants to go to IP rehab however unsure how well she will tolerate agessive therapy; pt understands requirements of therapy and wishes to persue this  Prognosis: Good;Fair  REQUIRES PT FOLLOW UP: Yes  Activity Tolerance  Activity Tolerance: Patient limited by fatigue;Patient limited by endurance     Patient Diagnosis(es): The primary encounter diagnosis was UGI bleed.  Diagnoses of Anemia, unspecified type and Supratherapeutic INR were also pertinent to this visit. has a past medical history of Anemia, Anticoagulant long-term use, Atrial fibrillation (HCC), CHF (congestive heart failure) ([address] CKD (chronic kidney disease) stage 3, GFR 30-59 ml/min (HCC), Clostridium difficile diarrhea, Depression, Humerus fracture, Hyperlipidemia, Hypertension, Hypothyroidism, Mitral regurgitation, Optic neuritis, Osteopenia, Polymyalgia rheumatica ([address] Pulmonary embolus ([address] Thyroid disease, and Vitamin D deficiency. has a past surgical history that includes Lung surgery; malignant skin lesion excision; Colonoscopy (09/14/2016); Upper gastrointestinal endoscopy (09/14/2016); Upper gastrointestinal endoscopy (N/A, 5/30/2019); Colonoscopy (N/A, 5/31/2019); Upper gastrointestinal endoscopy (N/A, 5/31/2019); Endoscopy, small bowel with ileum (5/31/2019); and Enteroscopy (N/A, 6/3/2019). Restrictions  Restrictions/Precautions  Restrictions/Precautions: Fall Risk  Position Activity Restriction  Other position/activity restrictions: O2 1L via NC. General Diet. Subjective   General  Chart Reviewed: Yes  Additional Pertinent Hx: 81 y/o female admit 5/28/19 with GI Bleed, Respiratory Failure. 5/30/19 S/P EGD with Argon Plasma Coag.  5/31/19 S/P EGD with Placement Video Capsule. 6/3/19  Push Enteroscopy - no lesions. 6/3/19 Nasogastric Tube Feed placed. PMH includes Mitral Regurg, A-Fib, CHF, CKD, PE, Lung Surg, Humerus Fx, Osteopenia, Polymyalgia Rheumatica. Response To Previous Treatment: Patient with no complaints from previous session. Family / Caregiver Present: No  Referring Practitioner: Dang Morales NP.   Subjective  Subjective: Pt agreeable to getting up with therapy; reports feeling really weak          Orientation  Orientation  Overall Orientation Status: Within Functional Limits  Cognition   Cognition  Overall Cognitive Status: WFL  Objective   Bed mobility  Supine to Sit: Moderate assistance  Transfers  Sit to Stand: Minimal Assistance; Moderate Assistance;2 Person Assistance  Stand to sit: Minimal Assistance; Moderate Assistance;2 Person Assistance  Ambulation  Ambulation?: Yes  Ambulation 1  Surface: level tile  Device: Rolling Walker  Assistance: Minimal assistance; Moderate assistance;2 Person assistance  Quality of Gait: flexed posture with small shuffling steps; unsteady with early fatigue; needed assist for balance and to [employer] walker  Distance: 4-5 steps to BS chair     Balance  Sitting - Static: Fair  Sitting - Dynamic: Fair  Standing - Static: Fair;-  Standing - Dynamic: Fair;-  Comments: lists to R in sitting and standing            Comment: assisted with positioning in chair for comfort and pressure relief                AM-PAC Score  AM-PAC Inpatient Mobility Raw Score : 12 (06/06/19 1329)  AM-PAC Inpatient T-Scale Score : 35.33 (06/06/19 1329)  Mobility Inpatient CMS 0-100% Score: 68.66 (06/06/19 1329)  Mobility Inpatient CMS G-Code Modifier : CL (06/06/19 1329)          Goals  Short term goals  Time Frame for Short term goals: Upon d/c acute care setting.  - all goals ongoing 6-6  Short term goal 1: Bed Mob Min assist.   Short term goal 2: Transfers with/without assist device SBA. Short term goal 3: Amb with/without assist device 100' SBA/CGA. Patient Goals   Patient goals : Get stronger and be able to return home. Plan    Plan  Times per week: 3-5x week while in acute care setting.    Current Treatment Recommendations: Strengthening, Functional Mobility Training, Transfer Training, Gait Training, Safety Education & Training, Patient/Caregiver Education & Training  Safety Devices  Type of devices: Call light within reach, Chair alarm in place, Left in chair, Nurse notified     Therapy Time   Individual Concurrent Group Co-treatment   Time In 1300         Time Out 1324         Minutes 24              If patient is discharged prior to the next physical therapy visit;  Please see last written PT note for discharge status.    Radha Gomez, PT, [id number]   RADHA GOMEZ, PT

## 2019-06-06 NOTE — PROGRESS NOTES
Problem:    Chronic diastolic congestive heart failure (HCC)--imptoving slowly   Active Problems:    Paroxysmal atrial fibrillation (HCC)--NO oral anti-coaulants ---indefinitely per GI     Essential hypertension, benign--Continue current therapy      Hypothyroidism due to medication    Non-rheumatic mitral regurgitation    Pulmonary hypertension    Chronic atrial fibrillation (HCC)    Dyspnea    CKD (chronic kidney disease) stage 3, GFR 30-59 ml/min (HCC)    UGI bleed    Supratherapeutic INR    Acute blood loss anemia    Coagulopathy (HCC)    Acute pulmonary edema (HCC)    Acute respiratory failure with hypoxia and hypercapnia (HCC)    Severe malnutrition (HCC)--cont diet and supplements   Resolved Problems:    * No resolved hospital problems.  *  Cont to  incr activity--hopefully to rehab soon --dw rehab staff--dc and readmit to rehab today   DS dictated   Please note that over 35 minutes was spent in evaluating the patient, review of records and results, discussion with staff/family, etc.    Yash Stovall MD

## 2019-06-06 NOTE — PROGRESS NOTES
4 Eyes Skin Assessment     The patient is being assess for  Admission    I agree that 2 RN's have performed a thorough Head to Toe Skin Assessment on the patient. ALL assessment sites listed below have been assessed. Areas assessed by both nurses: Aj Bur  [x]   Head, Face, and Ears   [x]   Shoulders, Back, and Chest  [x]   Arms, Elbows, and Hands   [x]   Coccyx, Sacrum, and IschIum  [x]   Legs, Feet, and Heels        Does the Patient have Skin Breakdown?   No; scattered bruising; redness beneath right breast; moisture associated dermatitis to maddy area        Jef Prevention initiated:  Yes   Wound Care Orders initiated:  Yes      [id number] nurse consulted for Pressure Injury (Stage 3,4, Unstageable, DTI, NWPT, and Complex wounds), New and Established Ostomies:  NA      Nurse 1 eSignature: Electronically signed by Luis F Jonas RN on 6/6/19 at 5:58 PM    **SHARE this note so that the co-signing nurse is able to place an eSignature**    Nurse 2 eSignature: Electronically signed by Adriana Ryder RN on 6/6/19 at 6:22 PM

## 2019-06-06 NOTE — PROGRESS NOTES
Clinical Pharmacy Note  Renal Dose Adjustment    [place] Coil is ordered MOM. This medication is renally eliminated. Based on the patient's Estimated Creatinine Clearance: 23 mL/min (A) (based on SCr of 1.3 mg/dL (H)). and urine output, the order has been discontinued per protocol.      Marion Del Real, [address] [place] [address]  6/6/2019  5:55 PM

## 2019-06-06 NOTE — DISCHARGE INSTR - COC
Continuity of Care Form    Patient Name: Steph Arthur   :  1935  MRN:  [id number]    Admit date:  2019  Discharge date:  ***    Code Status Order: DNR-CCA   Advance Directives:   [facility] Documentation     Date/Time Healthcare Directive Type of Healthcare Directive Copy in [address] Agent's Name Healthcare Agent's Phone Number    19 1612  Yes, patient has an advance directive for healthcare treatment  Durable power of  for health care;Living will  -- copies in [address]  On File    19 1118  Yes, patient has an advance directive for healthcare treatment  Durable power of  for health care;Living will  No, copy requested from family left AD reminder in pt's room, consult complete  --  --  --    19 2314  Yes, patient has an advance directive for healthcare treatment  Durable power of  for health care; Health care treatment directive; Living will  No, copy requested from medical records  Adult [address] [place] [address]  --          Admitting Physician:  Enrique Jean MD  PCP: Abhay Mcintosh MD    Discharging Nurse: [facility] Unit/Room#: V0U-7801/5567-45  Discharging Unit Phone Number: ***    Emergency Contact:   Extended Emergency Contact Information  Primary Emergency Contact: Wynema Landau, [place] [address] Phone: [phone]  Mobile Phone: [phone]  Relation: Child  Secondary Emergency Contact: Kathy Live   [address] Phone: [phone]  Relation: Other    Past Surgical History:  Past Surgical History:   Procedure Laterality Date    COLONOSCOPY  2016    [place]    COLONOSCOPY N/A 2019    COLONOSCOPY performed by Ave Man MD at [facility] [place] Proc. Roman Kulwant 1  2019    BOWEL SMALL CAPSULE ENDOSCOPY DEPLOYED VIA EGD performed by Ave Man MD at [address] [place] [address]  ENTEROSCOPY N/A 6/3/2019    ENTEROSCOPY PUSH DIAGNOSTIC performed by Holden Oliveros DO at [address] MALIGNANT SKIN LESION EXCISION      UPPER GASTROINTESTINAL ENDOSCOPY  09/14/2016    Ilya    UPPER GASTROINTESTINAL ENDOSCOPY N/A 5/30/2019    EGD POLYP ABLATION/OTHER performed by Jorge Hatch MD at [address] [place] [address] N/A 5/31/2019    ESOPHAGOGASTRODUODENOSCOPY WITH CAPSULE PLACEMENT performed by Jorge Hatch MD at [facility] ENDOSCOPY       Immunization History:   Immunization History   Administered Date(s) Administered    Influenza Virus Vaccine 10/14/2011, 11/04/2014, 08/14/2015    Influenza Whole 10/23/2012    Influenza, High Dose (Fluzone 65 yrs and older) 10/03/2017, 10/09/2018    Influenza, Kermitt Plaster, 3 yrs and older, IM, PF (Fluzone 3 yrs and older or Afluria 5 yrs and older) 08/15/2016    Pneumococcal 13-valent Conjugate (Wpupckl62) 11/04/2014    Pneumococcal Polysaccharide (Mvzuttnum50) 07/27/2017       Active Problems:  Patient Active Problem List   Diagnosis Code    Disorder of bone and cartilage M89.9, M94.9    Edema R60.9    Essential hypertension, benign I10    Other and unspecified hyperlipidemia E78.5    Hypothyroidism due to medication E03.2    Paroxysmal atrial fibrillation (HCC) I48.0    Anemia D64.9    Vitamin D deficiency E55.9    Fatigue R53.83    Osteopenia M85.80    Chronic diastolic congestive heart failure (HCC) I50.32    Non-rheumatic mitral regurgitation I34.0    Pulmonary hypertension I27.20    Weight loss R63.4    Abnormal chest x-ray R93.89    S/P right heart catheterization Z98.890    Chronic atrial fibrillation (HCC) I48.2    Dyspnea R06.00    Hx of venous thromboembolic disease I42.952    CKD (chronic kidney disease) stage 3, GFR 30-59 ml/min (HCC) N18.3    UGI bleed K92.2    Supratherapeutic INR R79.1    Acute blood loss anemia D62    Coagulopathy (HCC) D68.9    Acute pulmonary AJTY:697553634}    Treatments at the Time of Hospital Discharge:   Respiratory Treatments: ***  Oxygen Therapy:  {Therapy; copd oxygen:74235}  Ventilator:    {[facility] Vent SOFQ:069259067}     Heart Failure Instructions:  Patient has diastolic heart failure. She will require the following:     Please weigh daily ( same scale, same time of day)  Please report weight gain of 3 pounds / one day or 5 pounds / one week to [facility] ([phone]). Please use hospital discharge weight as baseline reference. Please monitor for s/sx of worsening HF: sudden weight gain, SOB, LE edema, abdominal distention, inability to lie flat, intolerance to usual activity, cough (especially at night)  Please report these findings even if no increase is noted on scale. Please continue LOW SODIUM diet and LIMIT FLUIDS to 48-64 oz /day     Please call [facility] ([phone]) and / or [facility]  ([phone]) with any questions or concerns. Please see S for hospital follow up appointment details.       Rehab Therapies: {THERAPEUTIC INTERVENTION:0400746339}  Weight Bearing Status/Restrictions: [address]  Weight Bearin}  Other Medical Equipment (for information only, NOT a DME order):  {EQUIPMENT:161872370}  Other Treatments: ***    Patient's personal belongings (please select all that are sent with patient):  {CHP DME Belongings:523320031}    RN SIGNATURE:  {Esignature:894966122}    CASE MANAGEMENT/SOCIAL WORK SECTION    Inpatient Status Date: ***    Readmission Risk Assessment Score:  Readmission Risk              Risk of Unplanned Readmission:        20           Discharging to Facility/ Agency   · Name:   · Address:  · Phone:  · Fax:    Dialysis Facility (if applicable)   · Name:  · Address:  · Dialysis Schedule:  · Phone:  · Fax:    / signature: {Esignature:769734221}    PHYSICIAN SECTION    Prognosis: {Prognosis:0550843800}    Condition at Discharge: [address] Patient Condition:899704224}    Rehab Potential (if transferring to Rehab): {Prognosis:1759951232}    Recommended Labs or Other Treatments After Discharge: ***    Physician Certification: I certify the above information and transfer of Sohan London  is necessary for the continuing treatment of the diagnosis listed and that she requires {Admit to Appropriate Level of Care:16348} for {GREATER/LESS:289122522} 30 days.      Update Admission H&P: {CHP DME Changes in GCKLQ:709456465}    PHYSICIAN SIGNATURE:  {Esignature:188334585}

## 2019-06-06 NOTE — PLAN OF CARE
Problem: Risk for Impaired Skin Integrity  Goal: Tissue integrity - skin and mucous membranes  Description  Structural intactness and normal physiological function of skin and  mucous membranes. Outcome: Ongoing     Problem: Falls - Risk of:  Goal: Will remain free from falls  Description  Will remain free from falls  Outcome: Ongoing  Goal: Absence of physical injury  Description  Absence of physical injury  Outcome: Ongoing     Problem: Discharge Planning:  Goal: Discharged to appropriate level of care  Description  Discharged to appropriate level of care  Outcome: Ongoing     Problem:  Bowel Function - Altered:  Goal: Bowel elimination is within specified parameters  Description  Bowel elimination is within specified parameters  Outcome: Ongoing     Problem: Fluid Volume - Imbalance:  Goal: Absence of imbalanced fluid volume signs and symptoms  Description  Absence of imbalanced fluid volume signs and symptoms  Outcome: Ongoing  Goal: Will show no signs and symptoms of excessive bleeding  Description  Will show no signs and symptoms of excessive bleeding  Outcome: Ongoing     Problem: Nausea/Vomiting:  Goal: Absence of nausea/vomiting  Description  Absence of nausea/vomiting  Outcome: Ongoing  Goal: Able to drink  Description  Able to drink  Outcome: Ongoing  Goal: Able to eat  Description  Able to eat  Outcome: Ongoing  Goal: Ability to achieve adequate nutritional intake will improve  Description  Ability to achieve adequate nutritional intake will improve  Outcome: Ongoing     Problem: HEMODYNAMIC STATUS  Goal: Hemodynamic status to baseline/discharge criteria met  Outcome: Ongoing  Goal: Patient has stable vital signs and fluid balance  Outcome: Ongoing     Problem: SAFETY & PSYCHO SOCIAL  Goal: Patient returns to baseline activity/meets discharge criteria  Outcome: Ongoing     Problem: NUTRITION  Goal: Patient to baseline / improved nutrition  Outcome: Ongoing     Problem: LAB & DIAGNOSTICS  Goal: Additional tests per physician orders  Outcome: Ongoing     Problem: RESPIRATORY  Goal: SaO2 sat,airway patentcy, cough mechanism maintained  Outcome: Ongoing     Problem: KNOWLEDGE DEFICIT,EDUCATION,DISCHARGE PLAN  Goal: Patient/S. O.verbalize understanding of procedure/DC instruct  Outcome: Ongoing     Problem: PAIN MANAGEMENT  Goal: Patient return to pre procedure comfort  Outcome: Ongoing     Problem: Nutrition  Goal: Optimal nutrition therapy  Outcome: Ongoing     Problem: OXYGENATION/RESPIRATORY FUNCTION  Goal: Patient will maintain patent airway  Outcome: Ongoing  Goal: Patient will achieve/maintain normal respiratory rate/effort  Description  Respiratory rate and effort will be within normal limits for the patient  Outcome: Ongoing     Problem: FLUID AND ELECTROLYTE IMBALANCE  Goal: Fluid and electrolyte balance are achieved/maintained  Outcome: Ongoing     Problem: ACTIVITY INTOLERANCE/IMPAIRED MOBILITY  Goal: Mobility/activity is maintained at optimum level for patient  Outcome: Ongoing     Problem: Pain:  Goal: Pain level will decrease  Description  Pain level will decrease  Outcome: Ongoing  Goal: Control of acute pain  Description  Control of acute pain  Outcome: Ongoing  Goal: Control of chronic pain  Description  Control of chronic pain  Outcome: Ongoing

## 2019-06-06 NOTE — PLAN OF CARE
Problem: Nutrition  Goal: Optimal nutrition therapy  6/6/2019 1513 by Russell Ortega RD, LD  Outcome: Ongoing   Nutrition Problem: Inadequate oral intake  Intervention: Food and/or Nutrient Delivery: Continue current diet  Nutritional Goals: tolerate most appropriate form of nutrition

## 2019-06-06 NOTE — DISCHARGE SUMMARY
[address]                               DISCHARGE SUMMARY    PATIENT NAME: Robert Landaverde                 :        1935  MED REC NO:   [id number]                          ROOM:       1918  ACCOUNT NO:   [de-identified]                           ADMIT DATE: 2019  PROVIDER:     Jacques Avalos MD                  DISCHARGE DATE:    ATTENDING PHYSICIAN:  Janie Dickerson MD    DATE OF TRANSFER TO INPATIENT REHABILITATION AT [facility]:  2019    DIAGNOSIS ON ADMISSION:  Chronic diastolic congestive heart failure. DIAGNOSES ON DISCHARGE:  1. Chronic diastolic congestive heart failure. 2.  Paroxysmal atrial fibrillation. 3.  Nonrheumatic mitral regurgitation. 4.  Chronic atrial fibrillation. 5.  Chronic kidney disease. 6.  Upper GI bleed secondary to small bowel AVMs. 7.  Supratherapeutic INR. 8.  Acute blood loss anemia. 9.  Acute pulmonary edema. 10.  Acute respiratory failure with hypoxemia and hypercapnia. 11.  Severe malnutrition. HISTORY OF PRESENT ILLNESS:  The patient is an 51-year-old white female. She was admitted through the Emergency Room. She presented with bloody  stools. She had been no anticoagulation for chronic atrial  fibrillation. Evaluation in emergency showed a hemoglobin of 7.9, BUN  and creatinine of 40 and 1.3, INR 4.33, and she was admitted. She was  given IV fluids, several units of blood. She was initially placed in  the ICU with the hospitalist following then gradually got stronger. She  was followed by Dr. Camarillo Courser and with getting 2 units of blood cells the  night of admission and some vitamin K, her INR did improve from 4 down  to 3.68. She remained fairly weak and needed to be watched very  carefully, had a very tenuous respiratory status. She was placed on  BiPAP due to impending respiratory failure. Some of that was felt to be  due to overload. atrial  fibrillation, Lasix 40 mg oral daily, Synthroid 75 mcg daily, Namenda 10  mg b.i.d., metoprolol 50 mg b.i.d., Dulera 200/5 two puffs b.i.d.,  pantoprazole 40 mg daily, and DuoNeb nebulizer every four hours while  awake. She will be followed there by the medical staff and the rehab  staff.   Maya Anguiano MD    D: 06/06/2019 9:23:51       T: 06/06/2019 17:36:19     [email or web address]  Job#: [id number]     Doc#: [id number]    CC:  Shiva Tasi MD

## 2019-06-06 NOTE — PROGRESS NOTES
A complete drug regimen review was completed for this patient.      [x]  No clinically significant medication issue was identified    []   Yes, a clinically significant medication issue was identified     []  Adverse Drug Event:      []  Allergy:      []  Side Effect:      []  Ineffective Therapy:      []  Drug Interaction:     []  Duplicated Therapy:     []  Untreated Indication:      []  Non-adherence:     []  Other:      Nursing/Pharmacy contacted the physician:   Date:    Time:    Actions recommended by physician were completed:  Date :  Time:     Electronically signed by Jackie Peoples RN on 6/6/19 at 6:42 PM

## 2019-06-06 NOTE — CONSULTS
Department of [facility] Progress Note  6/6/2019  1:19 PM    Patient Name:   Rebecca Schwartz  YOB: 1935    Diagnosis: Chronic diastolic congestive heart failure [facility])      Subjective: Rehabilitation consult received. Chart reviewed. Patient discussed with our rehabilitation unit admission nurse. 80 y. o. female who has a past medical history of Anemia, Anticoagulant long-term use, Atrial fibrillation (HCC), CHF (congestive heart failure) ([address] CKD (chronic kidney disease) stage 3, GFR 30-59 ml/min (HCC), hx of Clostridium difficile diarrhea, Depression, Humerus fracture, Hyperlipidemia, Hypertension, Hypothyroidism, Mitral regurgitation, Optic neuritis, Osteopenia, Polymyalgia rheumatica ([facility] [address] Pulmonary embolus ([facility] [address] Thyroid disease, and Vitamin D deficiency. Patient came into our hospital with bloody stools. Patient reported dark stools today and some fatigue and dizziness. Patient denied abdominal pain. Patient was lightheaded. Patient evaluated by GI, and workup was done. EGD and colonoscopy revealed no evidence of active bleeding source. GI bleeding stopped after patient taken off Coumadin. H&H has remained stable. 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 GI is now sound off. Patient has been in bed for the past week. Patient started therapies, but is very weak and not safe to return home. Patient needs more therapy before discharged to home safely. Patient lives at home alone in a [address]        BP (!) 95/56   Pulse 86   Temp 98.2 °F (36.8 °C) (Oral)   Resp 16   Ht 5' (1.524 m)   Wt 107 lb 5.8 oz (48.7 kg)   SpO2 (!) 86%   BMI 20.97 kg/m²     Last 24 hour lab  Recent Results (from the past 24 hour(s))   Basic Metabolic Panel    Collection Time: 06/06/19  5:40 AM   Result Value Ref Range    Sodium 139 136 - 145 mmol/L    Potassium 3.9 3.5 - 5.1 mmol/L    Chloride 96 (L) 99 - 110 mmol/L    CO2 32 21 - 32 mmol/L    Anion Gap 11 3 - 16    Glucose 96 70 - 99 mg/dL    BUN 24 (H) 7 - 20 mg/dL    CREATININE 1.3 (H) 0.6 - 1.2 mg/dL    GFR Non-African American 39 (A) >60    GFR  47 (A) >60    Calcium 8.7 8.3 - 10.6 mg/dL   CBC Auto Differential    Collection Time: 06/06/19  5:40 AM   Result Value Ref Range    WBC 8.2 4.0 - 11.0 K/uL    RBC 3.17 (L) 4.00 - 5.20 M/uL    Hemoglobin 9.3 (L) 12.0 - 16.0 g/dL    Hematocrit 28.5 (L) 36.0 - 48.0 %    MCV 89.9 80.0 - 100.0 fL    MCH 29.4 26.0 - 34.0 pg    MCHC 32.7 31.0 - 36.0 g/dL    RDW 15.5 (H) 12.4 - 15.4 %    Platelets 630 062 - 458 K/uL    MPV 8.5 5.0 - 10.5 fL    Neutrophils % 78.7 %    Lymphocytes % 7.0 %    Monocytes % 5.6 %    Eosinophils % 8.0 %    Basophils % 0.7 %    Neutrophils # 6.4 1.7 - 7.7 K/uL    Lymphocytes # 0.6 (L) 1.0 - 5.1 K/uL    Monocytes # 0.5 0.0 - 1.3 K/uL    Eosinophils # 0.7 (H) 0.0 - 0.6 K/uL    Basophils # 0.1 0.0 - 0.2 K/uL   Magnesium    Collection Time: 06/06/19  5:40 AM   Result Value Ref Range    Magnesium 2.20 1.80 - 2.40 mg/dL   Phosphorus    Collection Time: 06/06/19  5:40 AM   Result Value Ref Range    Phosphorus 3.5 2.5 - 4.9 mg/dL         Plan: We'll plan to admit to our rehabilitation unit today.       Dr. Faiza Carty

## 2019-06-07 LAB
ANION GAP SERPL CALCULATED.3IONS-SCNC: 11 MMOL/L (ref 3–16)
BASOPHILS ABSOLUTE: 0 K/UL (ref 0–0.2)
BASOPHILS RELATIVE PERCENT: 0.7 %
BUN BLDV-MCNC: 22 MG/DL (ref 7–20)
CALCIUM SERPL-MCNC: 8.7 MG/DL (ref 8.3–10.6)
CHLORIDE BLD-SCNC: 95 MMOL/L (ref 99–110)
CO2: 32 MMOL/L (ref 21–32)
CREAT SERPL-MCNC: 1.2 MG/DL (ref 0.6–1.2)
EOSINOPHILS ABSOLUTE: 0.4 K/UL (ref 0–0.6)
EOSINOPHILS RELATIVE PERCENT: 6.4 %
GFR AFRICAN AMERICAN: 52
GFR NON-AFRICAN AMERICAN: 43
GLUCOSE BLD-MCNC: 83 MG/DL (ref 70–99)
HCT VFR BLD CALC: 28.5 % (ref 36–48)
HEMOGLOBIN: 9.5 G/DL (ref 12–16)
LYMPHOCYTES ABSOLUTE: 0.5 K/UL (ref 1–5.1)
LYMPHOCYTES RELATIVE PERCENT: 7.9 %
MAGNESIUM: 2.1 MG/DL (ref 1.8–2.4)
MCH RBC QN AUTO: 29.9 PG (ref 26–34)
MCHC RBC AUTO-ENTMCNC: 33.4 G/DL (ref 31–36)
MCV RBC AUTO: 89.6 FL (ref 80–100)
MONOCYTES ABSOLUTE: 0.4 K/UL (ref 0–1.3)
MONOCYTES RELATIVE PERCENT: 5.4 %
NEUTROPHILS ABSOLUTE: 5.4 K/UL (ref 1.7–7.7)
NEUTROPHILS RELATIVE PERCENT: 79.6 %
PDW BLD-RTO: 15.3 % (ref 12.4–15.4)
PHOSPHORUS: 3.3 MG/DL (ref 2.5–4.9)
PLATELET # BLD: 231 K/UL (ref 135–450)
PMV BLD AUTO: 8.5 FL (ref 5–10.5)
POTASSIUM REFLEX MAGNESIUM: 3.8 MMOL/L (ref 3.5–5.1)
RBC # BLD: 3.18 M/UL (ref 4–5.2)
SODIUM BLD-SCNC: 138 MMOL/L (ref 136–145)
WBC # BLD: 6.8 K/UL (ref 4–11)

## 2019-06-07 PROCEDURE — 97110 THERAPEUTIC EXERCISES: CPT | Performed by: PHYSICAL THERAPIST

## 2019-06-07 PROCEDURE — 36415 COLL VENOUS BLD VENIPUNCTURE: CPT

## 2019-06-07 PROCEDURE — 97116 GAIT TRAINING THERAPY: CPT | Performed by: PHYSICAL THERAPIST

## 2019-06-07 PROCEDURE — 2700000000 HC OXYGEN THERAPY PER DAY

## 2019-06-07 PROCEDURE — 6370000000 HC RX 637 (ALT 250 FOR IP): Performed by: INTERNAL MEDICINE

## 2019-06-07 PROCEDURE — 97535 SELF CARE MNGMENT TRAINING: CPT

## 2019-06-07 PROCEDURE — 99233 SBSQ HOSP IP/OBS HIGH 50: CPT | Performed by: INTERNAL MEDICINE

## 2019-06-07 PROCEDURE — 80048 BASIC METABOLIC PNL TOTAL CA: CPT

## 2019-06-07 PROCEDURE — 84100 ASSAY OF PHOSPHORUS: CPT

## 2019-06-07 PROCEDURE — 1280000000 HC REHAB R&B

## 2019-06-07 PROCEDURE — 94760 N-INVAS EAR/PLS OXIMETRY 1: CPT

## 2019-06-07 PROCEDURE — 6370000000 HC RX 637 (ALT 250 FOR IP): Performed by: PHYSICAL MEDICINE & REHABILITATION

## 2019-06-07 PROCEDURE — 85025 COMPLETE CBC W/AUTO DIFF WBC: CPT

## 2019-06-07 PROCEDURE — 94640 AIRWAY INHALATION TREATMENT: CPT

## 2019-06-07 PROCEDURE — 83735 ASSAY OF MAGNESIUM: CPT

## 2019-06-07 PROCEDURE — 94667 MNPJ CHEST WALL 1ST: CPT

## 2019-06-07 PROCEDURE — 97167 OT EVAL HIGH COMPLEX 60 MIN: CPT

## 2019-06-07 PROCEDURE — 94668 MNPJ CHEST WALL SBSQ: CPT

## 2019-06-07 PROCEDURE — 97162 PT EVAL MOD COMPLEX 30 MIN: CPT | Performed by: PHYSICAL THERAPIST

## 2019-06-07 PROCEDURE — 97530 THERAPEUTIC ACTIVITIES: CPT

## 2019-06-07 PROCEDURE — 97530 THERAPEUTIC ACTIVITIES: CPT | Performed by: PHYSICAL THERAPIST

## 2019-06-07 RX ADMIN — SENNOSIDES,DOCUSATE SODIUM 2 TABLET: 8.6; 5 TABLET, FILM COATED ORAL at 21:42

## 2019-06-07 RX ADMIN — MEMANTINE HYDROCHLORIDE 10 MG: 10 TABLET ORAL at 08:06

## 2019-06-07 RX ADMIN — PANTOPRAZOLE SODIUM 40 MG: 40 TABLET, DELAYED RELEASE ORAL at 06:09

## 2019-06-07 RX ADMIN — AMIODARONE HYDROCHLORIDE 200 MG: 200 TABLET ORAL at 21:39

## 2019-06-07 RX ADMIN — LATANOPROST 1 DROP: 50 SOLUTION/ DROPS OPHTHALMIC at 21:43

## 2019-06-07 RX ADMIN — AMIODARONE HYDROCHLORIDE 200 MG: 200 TABLET ORAL at 08:06

## 2019-06-07 RX ADMIN — MOMETASONE FUROATE AND FORMOTEROL FUMARATE DIHYDRATE 2 PUFF: 200; 5 AEROSOL RESPIRATORY (INHALATION) at 08:08

## 2019-06-07 RX ADMIN — LEVOTHYROXINE SODIUM 75 MCG: 75 TABLET ORAL at 06:09

## 2019-06-07 RX ADMIN — IPRATROPIUM BROMIDE AND ALBUTEROL SULFATE 1 AMPULE: .5; 3 SOLUTION RESPIRATORY (INHALATION) at 11:51

## 2019-06-07 RX ADMIN — MEMANTINE HYDROCHLORIDE 10 MG: 10 TABLET ORAL at 21:39

## 2019-06-07 RX ADMIN — IPRATROPIUM BROMIDE AND ALBUTEROL SULFATE 1 AMPULE: .5; 3 SOLUTION RESPIRATORY (INHALATION) at 21:08

## 2019-06-07 RX ADMIN — METOPROLOL SUCCINATE 50 MG: 50 TABLET, EXTENDED RELEASE ORAL at 08:06

## 2019-06-07 RX ADMIN — IPRATROPIUM BROMIDE AND ALBUTEROL SULFATE 1 AMPULE: .5; 3 SOLUTION RESPIRATORY (INHALATION) at 08:08

## 2019-06-07 RX ADMIN — FUROSEMIDE 40 MG: 40 TABLET ORAL at 08:06

## 2019-06-07 RX ADMIN — IPRATROPIUM BROMIDE AND ALBUTEROL SULFATE 1 AMPULE: .5; 3 SOLUTION RESPIRATORY (INHALATION) at 17:02

## 2019-06-07 RX ADMIN — MOMETASONE FUROATE AND FORMOTEROL FUMARATE DIHYDRATE 2 PUFF: 200; 5 AEROSOL RESPIRATORY (INHALATION) at 21:09

## 2019-06-07 ASSESSMENT — PAIN SCALES - GENERAL
PAINLEVEL_OUTOF10: 0

## 2019-06-07 NOTE — PROGRESS NOTES
Nutrition Assessment    Type and Reason for Visit: Consult    Nutrition Recommendations:   Continue on general diet  Consider Na restriction r/t CHF as intake increases  Monitor meal and supplement intake  Continue Ensure Enlive with ice cream    Nutrition Assessment: Pt admitted to Rehab s/p ICU stay r/t A Fib. Pt also has CHF, CKD and HTN. Pt has been on TF and is now on general diet with Ensure with ice cream.  Pt is at nutritional risk as intake remains somewhat limited. Will continue to monitor    Malnutrition Assessment:  · Malnutrition Status: Meets the criteria for severe malnutrition  · Context: Acute illness or injury  · Findings of the 6 clinical characteristics of malnutrition (Minimum of 2 out of 6 clinical characteristics is required to make the diagnosis of moderate or severe Protein Calorie Malnutrition based on AND/ASPEN Guidelines):  1. Energy Intake-Less than or equal to 50% of estimated energy requirement, Greater than or equal to 5 days    2. Weight Loss-2% loss or greater, in 1 week  3. Fat Loss-Unable to assess,    4. Muscle Loss-Unable to assess,    5. Fluid Accumulation-No significant fluid accumulation,    6.  Strength-Not measured    Nutrition Risk Level: High    Nutrient Needs:  · Estimated Daily Total Kcal: 7602-5824 caloreis based on 25-30 calories/kgm  · Estimated Daily Protein (g): 48-58 gm based on 1-1.2 gm/ kgm  · Estimated Daily Total Fluid (ml/day): 1 ml/kcal    Nutrition Diagnosis:   · Problem: Inadequate oral intake  · Etiology: related to Alteration in GI function     Signs and symptoms:  as evidenced by Diet history of poor intake, Weight loss    Objective Information:  · Nutrition-Focused Physical Findings: NG tube clogged a few nights ago, attemps to replace unsuccessful, pt is eating faily well.   · Wound Type: None  · Current Nutrition Therapies:  · Oral Diet Orders: General   · Oral Diet intake: %  · Oral Nutrition Supplement (ONS) Orders: Frozen Oral

## 2019-06-07 NOTE — PROGRESS NOTES
Pt.admitted to rehab for Disuse Muscle Atrophy. Alert,oriented x 4. On oxygen at 1L/nc. O2 sats 95%. Takes pills whole with water. Metoprolol held due to low bp 94/57. Stedy x 1 for transfer. Slight dyspnea noted on exertion. Lungs clear. Protective cream applied to maddy area. Silver impregnated textile placed on right underbreast area. Incontinent of bladder per depends. Pericare provided. Turned and repositioned . Call light in reach. Will continue to monitor.

## 2019-06-07 NOTE — PROGRESS NOTES
pain.  Patient was lightheaded. Patient evaluated by GI, and workup was done. EGD and colonoscopy revealed no evidence of active bleeding source. GI bleeding stopped after patient taken off Coumadin. H&H has remained stable. 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 GI is now sound off. Patient has been in bed for the past week. Patient started therapies, but is very weak and not safe to return home. Patient needs more therapy before discharged to home safely. Patient lives at home alone in a condo. .(copied per note Dr Vi Stiles 6/7/19)  Exam: bathing, dressing, grooming, transfers, mobility, UE, cognition, activity tolerance  Assistance / Modification: max assist bathing, dressing, transfers min/mod assist, walker. Declined shower  Patient Education: role of OT, goals, safety  REQUIRES OT FOLLOW UP: Yes  Activity Tolerance  Activity Tolerance: Patient limited by fatigue  Safety Devices  Safety Devices in place: Yes  Type of devices: Left in chair;Call light within reach; Chair alarm in place;Gait belt;Patient at risk for falls           Patient Diagnosis(es): disuse myopathy     has a past medical history of Anemia, Anticoagulant long-term use, Atrial fibrillation ([address] CHF (congestive heart failure) ([address] CKD (chronic kidney disease) stage 3, GFR 30-59 ml/min ([facility]), Clostridium difficile diarrhea, Depression, Humerus fracture, Hyperlipidemia, Hypertension, Hypothyroidism, Mitral regurgitation, Optic neuritis, Osteopenia, Polymyalgia rheumatica ([address] Pulmonary embolus ([address] Thyroid disease, and Vitamin D deficiency. has a past surgical history that includes Lung surgery; malignant skin lesion excision; Colonoscopy (09/14/2016); Upper gastrointestinal endoscopy (09/14/2016); Upper gastrointestinal endoscopy (N/A, 5/30/2019); Colonoscopy (N/A, 5/31/2019); Upper gastrointestinal endoscopy (N/A, 5/31/2019); Endoscopy, small bowel with ileum (5/31/2019); and Enteroscopy (N/A, 6/3/2019).     Treatment Diagnosis: decreased ADl, IADL, balance, activity tolerance      Restrictions  Restrictions/Precautions  Restrictions/Precautions: Fall Risk  Position Activity Restriction  Other position/activity restrictions: O2 1L via NC. General Diet. Patient reports does not have O2 at home. Subjective   General  Additional Pertinent Hx: 80 y. o. female who has a past medical history of Anemia, Anticoagulant long-term use, Atrial fibrillation (HCC), CHF (congestive heart failure) ([address] CKD (chronic kidney disease) stage 3, GFR 30-59 ml/min (HCC), hx of Clostridium difficile diarrhea, Depression, Humerus fracture, Hyperlipidemia, Hypertension, Hypothyroidism, Mitral regurgitation, Optic neuritis, Osteopenia, Polymyalgia rheumatica ([address] Pulmonary embolus ([address] Thyroid disease, and Vitamin D deficiency. Patient came into our hospital with bloody stools.  Patient reported dark stools today and some fatigue and dizziness.  Patient denied abdominal pain.  Patient was lightheaded. Patient evaluated by GI, and workup was done. EGD and colonoscopy revealed no evidence of active bleeding source. GI bleeding stopped after patient taken off Coumadin. H&H has remained stable. 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 GI is now sound off. Patient has been in bed for the past week. Patient started therapies, but is very weak and not safe to return home. Patient needs more therapy before discharged to home safely.  Patient lives at home alone in a condo. .(copied per note Dr Gerri Quintero 6/7/19)  Family / Caregiver Present: No  Referring Practitioner: Jigar  Diagnosis: disuse myopathy  (GI bleeding, acute blood loss anemia, acute gypoxic repiratory failure, acute pulmonary edema  Subjective  Subjective: Pt met bedside, agreeable to OT, no complaint of pain  Oxygen Therapy  SpO2: 93 %  O2 Device: Nasal cannula  Social/Functional History  Social/Functional History  Lives With: Alone(2 children who live in [place])  Type of Home: (3rd floor [facility], [facility] in [address])  Home Layout: One level  Home Access: Elevator, Level entry  Bathroom Shower/Tub: Walk-in shower  Bathroom Toilet: Standard(sink nearby)  Bathroom Equipment: Grab bars in shower  Bathroom Accessibility: Bj Torres accessible  Home Equipment: (no DME)  ADL Assistance: Independent  Homemaking Assistance: Independent(cleaning lady every 3 weeks, pt independent grocery shopping, laundry, cooking)  Homemaking Responsibilities: Yes  Ambulation Assistance: Independent(with no AD)  Transfer Assistance: Independent  Active : Yes  Mode of Transportation: Car  Occupation: Retired  Type of occupation:   Leisure & Hobbies: reading, out to eat with friends - they pick her up  Additional Comments: Pt denies recent falls. Objective   Vision: Impaired  Vision Exceptions: Wears glasses at all times(Pt reports she has glaucoma and just starting with macular degeneration.)  Hearing: Within functional limits    Orientation  Overall Orientation Status: Within Functional Limits     Balance  Sitting Balance: Supervision  Standing Balance: Contact guard assistance  Functional Mobility  Functional - Mobility Device: Rolling Walker  Activity: To/from bathroom  Assist Level: Minimal assistance  Functional Mobility Comments: 6 ft from doorway of bathroom  Toilet Transfers  Toilet - Technique: Ambulating  Equipment Used: Standard toilet  Toilet Transfer: Moderate assistance  Toilet Transfers Comments: min stand to sit, mod sit to stand  Shower Transfers  Shower Transfers: Not tested  [employer] Transfers Comments: declined today, stated she felt too tired  Wheelchair Bed Transfers  Wheelchair/Bed - Technique: Ambulating  Equipment Used: Bed  Level of Asssistance: Moderate assistance;Minimal assistance  Wheelchair Transfers Comments: min stand to sit, mod sit to stand Fim 2  ADL  Grooming: Setup(seated in wheelchiar at sink)  UE Bathing: Setup;Verbal cueing; Increased time to complete  LE Bathing: Maximum assistance(assisted with all parts except she completed maddy area with min assist for stance)  UE Dressing: Maximum assistance(assisted with bra, shirt, - she placed on front of bra and was able to pull shirt over trunk)  LE Dressing: Dependent/Total(assisted with all parts due to poor activity tolerance)  Toileting: Maximum assistance(hygiene per self after urination, assisted to manage pants)  Additional Comments: pt completed sponge bath in wheelchair at sink. Declined shower this date due to fatigue. She does shower at home  Tone RUE  RUE Tone: Normotonic  Tone LUE  LUE Tone: Normotonic  Coordination  Movements Are Fluid And Coordinated: Yes              Cognition  Overall Cognitive Status: WFL - continue to evaluate  Cognition Comment: states she has minor STM loss, difficulty with names sometimes. Quiet this AM  Did not recall AM session in PM.  Will need to continue to eval for safety and memory  Perception  Overall Perceptual Status: WFL     Sensation  Overall Sensation Status: WFL(UE)        LUE AROM (degrees)  LUE AROM : WFL  LUE General AROM: except shoulder flex lacking end range - functional  RUE AROM (degrees)  RUE AROM : WFL  LUE Strength  Gross LUE Strength: Exceptions to [facility]  LUE Strength Comment: grossly 4/5 throughout  RUE Strength  Gross RUE Strength: Exceptions to [facility]  RUE Strength Comment: grossly 4/5 throughout     Car Transfers  Car Transfers: Not tested  Car Transfers: too fatigued             Plan   Plan  Times per week: 5-6 days per week  Times per day: Twice a day  Plan weeks: 2-3 weeks  Current Treatment Recommendations: Strengthening, Balance Training, Functional Mobility Training, Endurance Training, Safety Education & Training, Self-Care / ADL, Patient/Caregiver Education & Training, Equipment Evaluation, Education, & procurement, Home Management Training                 Goals  Short term goals  Time Frame for Short term goals: 1 week pt will. Vane Diane term goal 1: bathe with mod assist and AD  Short term goal 2: dress UB with min assist, LB with mod assist and AD as needed  Short term goal 3: toilet with mod assist and AD  Short term goal 4: transfer with CGA and AD  Short term goal 5: functional mobility and simple meal prep with min assist and AD  Short term goal 6: increase activity tolerance to stand 3 min for ADL/IADL skills  Long term goals  Time Frame for Long term goals : 2-3 weeks pt will.   Long term goal 1: bathe with modified independence  Long term goal 2: dress with modified independence  Long term goal 3: toilet with modified independence  Long term goal 4: transfer with modified independence  Long term goal 5: functional mobility and simple meal prep with rolling walker, modified independence  Patient Goals   Patient goals : \"I want to be able to do everything\"  with further questioning, she agreed she wanted to be off the oxygen, walk around by herself, drive, do her own self care       Therapy Time   Individual Concurrent Group Co-treatment   Time In 0930         Time Out 1030         Minutes 60         Timed Code Treatment Minutes: 45 Minutes(+15 min eval)     Therapy Time     Individual Co-treatment   Time In 1515     Time Out 1545     Minutes HEIDI Greenr/L 770

## 2019-06-07 NOTE — PLAN OF CARE
Problem: Risk for Impaired Skin Integrity  Goal: Tissue integrity - skin and mucous membranes  Description  Structural intactness and normal physiological function of skin and  mucous membranes.   Outcome: Ongoing     Problem: Falls - Risk of:  Goal: Will remain free from falls  Description  Will remain free from falls  Outcome: Ongoing  Goal: Absence of physical injury  Description  Absence of physical injury  Outcome: Ongoing     Problem: IP BOWEL ELIMINATION  Goal: LTG - patient will utilize adaptive techniques/equipment to complete bowel elimination  Outcome: Ongoing  Goal: LTG - patient will have regular and routine bowel evacuation  Outcome: Ongoing  Goal: STG - patient will be accident free  Outcome: Ongoing     Problem: IP BLADDER/VOIDING  Goal: LTG - patient will complete bladder elimination  Outcome: Ongoing  Goal: LTG - Patient will utilize adaptive techniques/equipment to complete bladder elimination  Outcome: Ongoing  Goal: LTG - patient will achieve acceptable level of continence  Outcome: Ongoing

## 2019-06-07 NOTE — PROGRESS NOTES
Physical Therapy    Facility/Department: [address] REHAB  Initial Assessment and PM    NAME: Jones Toscano  : 1935  MRN: [id number]    Date of Service: 2019    Discharge Recommendations:  Patient would benefit from continued therapy after discharge, 5-7 sessions per week, Continue to assess pending progress   PT Equipment Recommendations  Equipment Needed: Yes  Mobility Devices: Reymundo Rashaun: Rolling  Other: may need wheeled walker    Assessment   Body structures, Functions, Activity limitations: Decreased functional mobility ; Decreased strength;Decreased endurance  Assessment:  Patient  is zainab 81 y/o female admit  to [facility] 19 with GI Bleed, Respiratory Failure. 19 S/P EGD with Argon Plasms Coag.  19 S/P EGD with Placement Video Capsule. 6/3/19 Push Enteroscopy - no lesions. 6/3/19 Nasogastric Tube Feed placed. PMH includes Mitral Regurg, A-Fib, CHF, CKD, PE, Lung Surg, Humerus Fx, Osteopenia, Polymyalgia Rheumatica. PTA pt living alone in [facility] with elevator access. Pt  has 2 sons both live in [place]. PTA pt very independent with daily care and functional mobility (without assist device). Patient admitted to ARU with dx of paroxymal atrial fibrillation and disuse myopathy. Patient required moderate assist for transfers, ambulate 13' with wheeled walker with min/mod assist, and moderate assist for bed mobility. Pt limited by decreased endurance, weakness  and limited balance. Patient would benefit from ARU to improve overall mobility to independence in order to return home alone. .  Prognosis: Good;Fair  Decision Making: Medium Complexity  History: 81 y/o female admit 19 with GI Bleed, Respiratory Failure. 19 S/P EGD with Argon Plasms Coag.  19 S/P EGD with Placement Video Capsule. PMH includes Mitral Regurg, A-Fib, CHF, CKD, PE, Lung Surg, Humerus Fx, Osteopenia, Polymyalgia Rheumatica. Exam: See above.   Clinical Presentation: evolving  Patient Education: rehab expectations, goals , safety with transfers  REQUIRES PT FOLLOW UP: Yes  Activity Tolerance  Activity Tolerance: Patient limited by fatigue;Patient limited by endurance       Patient Diagnosis(es): There were no encounter diagnoses. has a past medical history of Anemia, Anticoagulant long-term use, Atrial fibrillation (HCC), CHF (congestive heart failure) ([address] CKD (chronic kidney disease) stage 3, GFR 30-59 ml/min (HCC), Clostridium difficile diarrhea, Depression, Humerus fracture, Hyperlipidemia, Hypertension, Hypothyroidism, Mitral regurgitation, Optic neuritis, Osteopenia, Polymyalgia rheumatica ([address] Pulmonary embolus ([address] Thyroid disease, and Vitamin D deficiency. has a past surgical history that includes Lung surgery; malignant skin lesion excision; Colonoscopy (09/14/2016); Upper gastrointestinal endoscopy (09/14/2016); Upper gastrointestinal endoscopy (N/A, 5/30/2019); Colonoscopy (N/A, 5/31/2019); Upper gastrointestinal endoscopy (N/A, 5/31/2019); Endoscopy, small bowel with ileum (5/31/2019); and Enteroscopy (N/A, 6/3/2019). Restrictions  Restrictions/Precautions  Restrictions/Precautions: Fall Risk  Position Activity Restriction  Other position/activity restrictions: O2 1L via NC. General Diet. Patient reports does not have O2 at home. Vision/Hearing  Vision: Impaired  Vision Exceptions: Wears glasses at all times(Pt reports she has glaucoma and just starting with macular degeneration.)  Hearing: Within functional limits     Subjective  General  Chart Reviewed: Yes  Additional Pertinent Hx: 81 y/o female admit 5/28/19 with GI Bleed, Respiratory Failure. 5/30/19 S/P EGD with Argon Plasma Coag.  5/31/19 S/P EGD with Placement Video Capsule. 6/3/19  Push Enteroscopy - no lesions. 6/3/19 Nasogastric Tube Feed placed. PMH includes Mitral Regurg, A-Fib, CHF, CKD, PE, Lung Surg, Humerus Fx, Osteopenia, Polymyalgia Rheumatica.     Response To Previous Treatment: Not applicable  Family / Caregiver Present: No  Referring Practitioner: Dr. Judy Carey  Referral Date : 06/06/19  Diagnosis: Paroxysmal atrial fibrillation, on acute GI bleed and respiratory failure  Follows Commands: Within Functional Limits  Subjective  Subjective: Patient agreeable to therapy with no pain at present. Pain Screening  Patient Currently in Pain: Denies          Orientation  Orientation  Overall Orientation Status: Within Functional Limits  Social/Functional History  Social/Functional History  Lives With: Alone(2 children who live in [place])  Type of Home: (3rd floor condo, laundry in [facility])  Home Layout: One level  Home Access: Elevator, Level entry  Bathroom Shower/Tub: Walk-in shower  Bathroom Toilet: Standard(sink nearby)  Bathroom Equipment: Grab bars in shower  Bathroom Accessibility: Souleymane Marc accessible  Home Equipment: (no DME)  ADL Assistance: Independent  Homemaking Assistance: Independent(cleaning lady every 3 weeks, pt independent grocery shopping, laundry, cooking)  Homemaking Responsibilities: Yes  Ambulation Assistance: Independent(with no AD)  Transfer Assistance: Independent  Active : Yes  Mode of Transportation: Car  Occupation: Retired  Type of occupation:   Leisure & Hobbies: reading, out to eat with friends - they pick her up  Additional Comments: Pt denies recent falls.    Cognition        Objective     Observation/Palpation  Observation: O2 nasal cannula    AROM RLE (degrees)  RLE AROM: WFL  AROM LLE (degrees)  LLE AROM : WFL  Strength RLE  Strength RLE: Exception  R Hip Flexion: 3+/5  R Knee Flexion: 4-/5  R Knee Extension: 4-/5  R Ankle Dorsiflexion: 4/5  Strength LLE  Strength LLE: Exception  L Hip Flexion: 3+/5  L Hip ABduction: 3+/5  L Knee Flexion: 4-/5  L Knee Extension: 4-/5  L Ankle Dorsiflexion: 4/5  Tone RLE  RLE Tone: Normotonic  Tone LLE  LLE Tone: Normotonic  Motor Control  Gross Motor?: WFL  Sensation  Overall Sensation Status: Impaired(Pt reports numbness in toes. Pt  denies numb/tingling  in B UE's)  Bed mobility  Rolling to Left: Minimal assistance(with rail)  Rolling to Right: Minimal assistance(with rail)  Supine to Sit: Moderate assistance  Sit to Supine: Minimal assistance  Scooting: Moderate assistance  Comment: Patient used rail, flat affect  Transfers  Sit to Stand: Moderate Assistance  Stand to sit: Moderate Assistance;Minimal Assistance  Bed to Chair: Moderate assistance  Ambulation  Ambulation?: Yes  Ambulation 1  Surface: level tile  Device: Rolling Walker  Assistance: Minimal assistance; Moderate assistance  Quality of Gait: flexed posture with small shuffling steps; unsteady with early fatigue; needed assist for balance and to [employer] walker  Distance: 15'     Balance  Posture: Poor(thoracic kyphosis, forward posture)  Sitting - Static: Fair  Sitting - Dynamic: Fair  Standing - Static: Fair;-  Standing - Dynamic: Fair;-  Comments: Patient min/CGA for static leans on bed posterior to maintain balance      Second session  Patient to department with O2 on 1L  94% O2 sats HR 92  Plan   Plan  Times per week: 5-6x   Times per day: Twice a day  Plan weeks: 2-3 weeks  Current Treatment Recommendations: Strengthening, Balance Training, Functional Mobility Training, Transfer Training, ADL/Self-care Training, Endurance Training, Gait Training, Stair training, Home Exercise Program, Safety Education & Training, Patient/Caregiver Education & Training, Equipment Evaluation, Education, & procurement, Positioning  Safety Devices  Type of devices: Call light within reach, Chair alarm in place, Left in chair  Restraints  Initially in place: No     Second session  Patient to department with O2 on 1L  94% O2 sats HR 92  S/ Patient states fatigued but agreeable to therapy.   O/ Patient performed sitting exercises- LAQ x 15, AP x 20, marches x 20  Sit to stand moderate assist to stand with flexed posture, cues to stand erect, ambulated 15' with wheeled walker with minimal assist with w/c follow. Patient to OT session    Goals  Short term goals  Time Frame for Short term goals: 1 week  Short term goal 1: Bed Mob Min assist.   Short term goal 2: Transfers with/without assist device minimal assist  Short term goal 3: Amb with/without assist device 100' SBA/CGA. Short term goal 4: ascend /descend curb steps with mod/min assist  Long term goals  Time Frame for Long term goals : 2-3 weeks  Long term goal 1: Bed Mob MI  Long term goal 2: Transfers with/without assist device MI  Long term goal 3: Amb with/without assist device 150' MI   Long term goal 4: ascend /descend curb steps with SBA  Long term goal 5: 4 steps with B rails with SBA/CGA  Patient Goals   Patient goals : Get stronger and be able to return home.         Therapy Time   Individual Concurrent Group Co-treatment   Time In 0815         Time Out 0900         Minutes 45         Timed Code Treatment Minutes: 30 Minutes     Second Session Therapy Time     Individual Co-treatment   Time In 1430     Time Out [id number],Leroy C, PT # [id number]

## 2019-06-07 NOTE — PROGRESS NOTES
80 yr old female admitted to acute inpatient rehab with diagnosis of disuse muscle atrophy. Alert and oriented x 4; denies c/o pain. Tolerated taking her medication whole with thin liquids. ADL's with therapy this am.  Transfers x 1 assist of stedy and tolerating well. Remains on 1lpm oxygen per n/c with saturation noted at 95 during assessment. Lungs clear during auscultation with dyspnea noted upon exertion. Call light in reach as well as needed items.

## 2019-06-07 NOTE — PLAN OF CARE
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 Follow bowel movements. Enema or suppository if needed.      Bladder: Check PVR x 3. [address] if PVR > 200ml or if any volume is > 500 ml.      Pain:  Tylenol is ordered prn.              ADMIT DATE:2019    Patient Goals: To return home. Admitting Impairments: Decreased functional mobility ; Decreased ADL status; Decreased strength;Decreased endurance;Decreased high-level IADLs;Decreased balance    Barriers: lives alone, weakness, poor activity tolerance, medical co-morbidities     Participation: patient lives alone, is independent and an active , and enjoys reading, eating out with friends       CARE PLAN     NURSING:  Kamala Gustafson while on this unit will:     [x] Be continent of bowel and bladder     [x] Have an adequate number of bowel movements  [x] Urinate with no urinary retention >300ml in bladder  [] Complete bladder protocol with benitez removal  [x] Maintain O2 SATs at 92%  [x] Have pain managed while on ARU       [] Be pain free by discharge   [x] Have no skin breakdown while on ARU  [] Have improved skin integrity via wound measurements  [] Have no signs/symptoms of infection at the wound site  [x] Be free from injury during hospitalization   [x] Complete education with patient/family with understanding demonstrated for:  [x] Adjustment [x] Other: CHF   Nursing interventions may include bowel/bladder training, education for medical assistive devices, medication education, O2 saturation management, energy conservation, stress management techniques, fall prevention, alarms protocol, seating and positioning, skin/wound care, pressure relief instruction,dressing changes,  infection protection, DVT prophylaxis, and/or assistance with in room safety with transfers to bed, toilet, wheelchair, shower as well as bathroom activities and hygiene. Patient/caregiver education for:   [x] Disease/sustained injury/management      [x] Medication Use   [] Surgical intervention   [x] Safety   [x] Body mechanics and or joint protection   [x] Health maintenance         PHYSICAL THERAPY:  Goals:                  Short term goals  Time Frame for Short term goals: 1 week  Short term goal 1: Bed Mob Min assist.   Short term goal 2: Transfers with/without assist device minimal assist  Short term goal 3: Amb with/without assist device 100' SBA/CGA. Short term goal 4: ascend /descend curb steps with mod/min assist            Long term goals  Time Frame for Long term goals : 2-3 weeks  Long term goal 1: Bed Mob MI  Long term goal 2: Transfers with/without assist device MI  Long term goal 3: Amb with/without assist device 150' MI   Long term goal 4: ascend /descend curb steps with SBA  Long term goal 5: 4 steps with B rails with SBA/CGA  These goals were reviewed with this patient at the time of assessment and Mya Starr is in agreement. Plan of Care: Pt to be seen 90   mins per day for 5-6 day/week 2-3 weeks.                    Current Treatment Recommendations: Strengthening, Balance Training, Functional Mobility Training, Transfer Training, ADL/Self-care Training, Endurance Training, Gait Training, Stair training, Home Exercise Program, Safety Education & Training, Patient/Caregiver Education & Training, Equipment Evaluation, Education, & procurement, Positioning      OCCUPATIONAL THERAPY:  Goals:             Short term goals  Time Frame for Short term goals: 1 week pt will. Favian Bouquet term goal 1: bathe with mod assist and AD  Short term goal 2: dress UB with min assist, LB with mod assist and AD as needed  Short term goal 3: toilet with mod assist and AD  Short term goal 4: transfer with CGA and AD  Short term goal 5: functional mobility and simple meal prep with min assist and AD  Short term goal 6: increase activity tolerance to stand 3 min for ADL/IADL skills :  Long term goals  Time Frame for Long term goals : 2-3 weeks pt will. Long term goal 1: bathe with modified independence  Long term goal 2: dress with modified independence  Long term goal 3: toilet with modified independence  Long term goal 4: transfer with modified independence  Long term goal 5: functional mobility and simple meal prep with rolling walker, modified independence : These goals were reviewed with this patient at the time of assessment and Janine Beal is in agreement    Plan of Care:  Pt to be seen 90   mins per day for 5-6 day/week 2-3 weeks. Patient Education: role of OT, goals, safety      SPEECH THERAPY: Goals will be left blank if speech is not following this patient. Goals:                                                  CASE MANAGEMENT:  Goals:   Assist patient/family with discharge planning, patient/family counseling,   and coordination with insurance during ARU stay.       Admission Period/Goal FIM SCORES  FIM Admit/Goal Score   Eating/Swallowing 5/7   Grooming 5/7   Bathing 2/6   Upper Body Dressing 2/7   Lower Body Dressing 1/6   Toileting 1/6   Bladder Baldev, Accidents = FIM 1/6   Bowel  Baldev, Accidents = FIM 1/6   Bed/Chair Transfer 1/6   Toilet Transfer 2/6   Tub/Shower Transfer  0/6   Locomotion:  Ambulation (Walk) / Wheelchair:  W = walk , w/c = wheelchair  Distance:   1= 0-49 ft , 2=  ft, 3= 150+ ft Distance: 1   Level of Assist: 1   Mode: w   FIM: 1/6 Stairs 0/2   Comprehension 5/6   Expression 5/6   Social Interaction 5/7   Problem Solving 4/6   Memory 4/6        Kamala Gustafson will be seen a minimum of 3 hours of therapy per day, a minimum of 5 out of 7 days per week. [] In this rare instance due to the nature of this patient's medical involvement, this patient will be seen 15 hours per week (900 minutes within a 7 day period). Treatments may include therapeutic exercises, gait training, neuromuscular re-ed, transfer training, community reintegration, bed mobility, self care, home mgmt, cognitive training, energy conservation,and patient/family education. In addition, dietician/nutritionist may monitor calorie count as well as intake and collaboratively work with SLP on dietary upgrades. Neuropsychology/Psychology may evaluate and provide necessary support. Medical issues being managed closely and that require 24 hour availability of a physician:   [] Swallowing Precautions  [] Bowel/Bladder Fx  [] Weight bearing precautions   [] Wound Care    [x] Pain Mgmt   [x] Infection Protection   [x] DVT Prophylaxis   [x] Fall Precautions  [x] Fluid/Electrolyte/Nutrition Balance   [] Voice Protection   [] Respiratory  [] Other:    Medical Prognosis: [] Good  [x] Fair    [] Guarded   Total expected IRF days 14-21  Anticipated discharge destination:    [] Home Independently   [x] Home Modified Independent  [] Home with supervision    []SNF     [] Other                                           Physician anticipated functional outcomes:  Improve gait, transfers, self care to modified independent to allow safe return home     IPOC brief synthesis: 80 y. o. female who has a past medical history of Anemia, Anticoagulant long-term use, Atrial fibrillation ([facility]), CHF (congestive heart failure) ([facility] [address] CKD (chronic kidney disease) stage 3, GFR 30-59 ml/min ([facility]), hx of Clostridium difficile diarrhea, Depression, Humerus fracture, Hyperlipidemia, Hypertension, Hypothyroidism, Mitral regurgitation, Optic neuritis, Osteopenia, Polymyalgia rheumatica ([address] Pulmonary embolus ([address] Thyroid disease, and Vitamin D deficiency. Patient came into our hospital with bloody stools.  Patient reported dark stools today and some fatigue and dizziness.  Patient denied abdominal pain.  Patient was lightheaded. Patient evaluated by GI, and workup was done. EGD and colonoscopy revealed no evidence of active bleeding source. GI bleeding stopped after patient taken off Coumadin. H&H has remained stable. 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 GI is now sound off. Patient has been in bed for the past week. Patient started therapies, but is very weak and not safe to return home. Patient needs more therapy before discharged to home safely.  Patient lives at home alone in a condo.         I have reviewed this initial plan of care and agree with its contents:    Title   Name    Date    Time    Physician: Dr. Rafaela Hess, 6/8/19, 9 AM      Case Mgmt:  Roselyn Higgins, [facility]     Case Management   [phone]    6/7/2019  6:05 PM      OT: Navarro Arnold OTR/L  #[id number] 6/7/19 3:52 PM      PT:Electronically signed by Luis Davis PT [id number] on 6/7/2019 at 4:43 PM      RN: Sabina Alfred RN, CRRN 06/09/2019 8:19 pm    ST:    : JIM Templeton      Other:

## 2019-06-07 NOTE — PLAN OF CARE
Problem: Nutrition  Goal: Optimal nutrition therapy  Outcome: Ongoing   Nutrition Problem: Inadequate oral intake  Intervention: Food and/or Nutrient Delivery: Continue current diet  Nutritional Goals: >50% of meals and supplement consumed

## 2019-06-07 NOTE — H&P
Department of [address] [address] Sherell Rhody History & Physical      Patient Identification:  Steph Arthur  : 1935  Admit date: 2019  Dictation date: 19   Attending provider: Isaac Butcher MD        Primary care provider: Abhay Mcintosh MD       Chief Complaint:   Patient Active Problem List   Diagnosis    Disorder of bone and cartilage    Edema    Essential hypertension, benign    Other and unspecified hyperlipidemia    Hypothyroidism due to medication    Paroxysmal atrial fibrillation (HCC)    Anemia    Vitamin D deficiency    Fatigue    Osteopenia    Chronic diastolic congestive heart failure ([address]    Non-rheumatic mitral regurgitation    Pulmonary hypertension    Weight loss    Abnormal chest x-ray    S/P right heart catheterization    Chronic atrial fibrillation (HCC)    Dyspnea    Hx of venous thromboembolic disease    CKD (chronic kidney disease) stage 3, GFR 30-59 ml/min (HCC)    UGI bleed    Supratherapeutic INR    Acute blood loss anemia    Coagulopathy (HCC)    Acute pulmonary edema (HCC)    Acute respiratory failure with hypoxia and hypercapnia (HCC)    Lightheaded    Severe malnutrition ([address]    Disuse muscle atrophy       History of Present Illness/Hospital Course:  80 y. o. female who has a past medical history of Anemia, Anticoagulant long-term use, Atrial fibrillation (HCC), CHF (congestive heart failure) ([address] CKD (chronic kidney disease) stage 3, GFR 30-59 ml/min (HCC), hx of Clostridium difficile diarrhea, Depression, Humerus fracture, Hyperlipidemia, Hypertension, Hypothyroidism, Mitral regurgitation, Optic neuritis, Osteopenia, Polymyalgia rheumatica ([address] Pulmonary embolus ([address] Thyroid disease, and Vitamin D deficiency. Patient came into our hospital with bloody stools.  Patient reported dark stools today and some fatigue and dizziness.  Patient denied abdominal pain.  Patient was lightheaded.  Patient evaluated by GI, and workup was done. EGD and colonoscopy revealed no evidence of active bleeding source. GI bleeding stopped after patient taken off Coumadin. H&H has remained stable. 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 GI is now sound off. Patient has been in bed for the past week. Patient started therapies, but is very weak and not safe to return home. Patient needs more therapy before discharged to home safely. Patient lives at home alone in a [address]. 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 Prior Level of Function:  Independent in self care and ADLs prior to admission. Current Level of Function:  PT:  mbulation  Ambulation?: Yes  Ambulation 1  Surface: level tile  Device: Rolling Walker  Assistance: Minimal assistance; Moderate assistance;2 Person assistance  Quality of Gait: flexed posture with small shuffling steps; unsteady with early fatigue; needed assist for balance and to [employer] walker  Distance: 4-5 steps to BS chair    OT:   ADL  Grooming: Setup;Contact guard assistance(seated at EOB and initial CGA for balance, to wash face with prepared clothe)  UE Bathing: Setup; Moderate assistance;Maximum assistance  LE Bathing: Dependent/Total(for LE's to feet while seated at EOB, and assist for balance.  Stance on quentin stedy for post/maddy areas)  UE Dressing: Maximum assistance(hospital gown changed)  LE Dressing: Dependent/Total(for footies-donned while seated at EOB, and pt increased fatigued, requiring assist for sitting balance)       has a past medical history of Anemia, Anticoagulant long-term use, Atrial fibrillation ([address] CHF (congestive heart failure) ([address] CKD (chronic kidney disease) stage 3, GFR 30-59 ml/min ([address] Clostridium difficile diarrhea, Depression, Humerus fracture, Hyperlipidemia, Hypertension, Hypothyroidism, Mitral regurgitation, Optic neuritis, Osteopenia, Polymyalgia rheumatica ([address] Pulmonary embolus ([address] Thyroid disease, and Vitamin D deficiency. reports that she quit smoking about 54 years ago. She has never used smokeless tobacco. She reports that she drinks alcohol. She reports that she does not use drugs. family history includes Cancer in her mother; Diabetes in her brother; Rasheed Jabs in her sister; Other in her father; Tuberculosis in her brother. REVIEW OF SYSTEMS:   CONSTITUTIONAL: negative for fevers, chills, diaphoresis, activity change, appetite change, fatigue, night sweats and unexpected weight change. EYES: negative for blurred vision, eye discharge, visual disturbance and icterus. HEENT: negative for hearing loss, tinnitus, ear drainage, sinus pressure, nasal congestion, epistaxis and snoring. RESPIRATORY: Negative for hemoptysis, cough, sputum production. CARDIOVASCULAR: negative for chest pain, palpitations, exertional chest pressure/discomfort, edema, syncope. + A. fib, CHF  GASTROINTESTINAL: negative for nausea, vomiting, diarrhea, constipation, blood in stool and abdominal pain. GENITOURINARY: negative for frequency, dysuria, urinary incontinence, decreased urine volume, and hematuria. + CKD  HEMATOLOGIC/LYMPHATIC: negative for easy bruising, bleeding and lymphadenopathy. ALLERGIC/IMMUNOLOGIC: negative for recurrent infections, angioedema, anaphylaxis and drug reactions. ENDOCRINE: negative for weight changes and diabetic symptoms including polyuria, polydipsia and polyphagia. MUSCULOSKELETAL: negative for pain, joint swelling, decreased range of motion and muscle weakness. NEUROLOGICAL: negative for headaches, slurred speech, unilateral weakness. PSYCHIATRIC/BEHAVIORAL: negative for hallucinations, behavioral problems, confusion and agitation. + depression    All pertinent positives are noted in the HPI.     Physical Examination:  Vitals:   Patient Vitals for the past 24 hrs:   BP Temp Temp src Pulse Resp SpO2 Height Weight   06/07/19 0628 (!) 93/54 97.3 °F (36.3 °C) Axillary 109 14 98 % -- --   06/07/19 0624 -- -- -- -- -- -- -- 81 lb 9.1 oz (37 kg)   06/07/19 0257 (!) 96/56 98.7 °F (37.1 °C) Oral 105 16 95 % -- --   06/06/19 2045 (!) 94/57 -- -- 105 -- -- -- --   06/06/19 1938 -- -- -- -- 18 -- -- --   06/06/19 1700 (!) 94/58 98.6 °F (37 °C) Oral 99 18 93 % 5' (1.524 m) 105 lb 13.1 oz (48 kg)     Psych: Stable mood, normal judgement, normal affect   Const: No distress  Eyes: Conjunctiva noninjected, no icterus noted; pupils equal, round, and reactive to light. HENT: Atraumatic, normocephalic; Oral mucosa moist  Neck: Trachea midline, neck supple. No thyromegaly noted. CV: IRRegular rate and rhythm, no murmur rub or gallop noted  Resp: Lungs clear to auscultation at apexes, rales noted at bases,  no retractions. Respirations unlabored. GI: Soft, nontender, nondistended. Normal bowel sounds. No palpable masses. Neuro: Alert, oriented, appropriate. No cranial nerve deficits appreciated. Motor examination reveals 4+/5 strength in all four limbs diffusely. Skin: Normal temperature and turgor  MSK: No joint abnormalities noted. Ext: No significant edema appreciated. No varicosities.     Lab Results   Component Value Date    WBC 8.2 06/06/2019    HGB 9.3 (L) 06/06/2019    HCT 28.5 (L) 06/06/2019    MCV 89.9 06/06/2019     06/06/2019     Lab Results   Component Value Date    INR 1.43 (H) 05/30/2019    PROTIME 16.3 (H) 05/30/2019     Lab Results   Component Value Date    CREATININE 1.3 (H) 06/06/2019    BUN 24 (H) 06/06/2019     06/06/2019    K 3.9 06/06/2019    CL 96 (L) 06/06/2019    CO2 32 06/06/2019     Lab Results   Component Value Date    ALT 19 05/28/2019    AST 27 05/28/2019    ALKPHOS 95 05/28/2019    BILITOT 0.4 05/28/2019       Xr Chest Portable    Result Date: 5/29/2019  EXAMINATION: ONE XRAY VIEW OF THE CHEST 5/29/2019 12:09 pm COMPARISON: 01/15/2019 HISTORY: ORDERING SYSTEM PROVIDED HISTORY: CHF TECHNOLOGIST PROVIDED HISTORY: Reason for exam:->CHF Ordering Physician Provided Reason for Exam: CHF Acuity: Acute Type of Exam: Initial FINDINGS: Cardiomegaly. Increased pulmonary vascular congestion and edema. Right-sided pleural effusion. No new pulmonary consolidation. Congestive heart failure with increased pulmonary vascular congestion and edema         The above labs and diagnostic studies have been reviewed by myself upon admission to inpatient rehabilitation. Barriers to Discharge: Patient  has a past medical history of Anemia, Anticoagulant long-term use, Atrial fibrillation ([address] CHF (congestive heart failure) ([address] CKD (chronic kidney disease) stage 3, GFR 30-59 ml/min ([facility]), Clostridium difficile diarrhea, Depression, Humerus fracture, Hyperlipidemia, Hypertension, Hypothyroidism, Mitral regurgitation, Optic neuritis, Osteopenia, Polymyalgia rheumatica ([address] Pulmonary embolus ([address] Thyroid disease, and Vitamin D deficiency. Pt lives alone in a condo. POST ADMISSION PHYSICIAN EVALUATION  The patient has agreed to being admitted to our comprehensive inpatient  rehabilitation facility consisting of at least 180 minutes of therapy a day,  5 out of 7 days a week. The patient/family has a good understanding of our discharge process. The  patient has potential to make improvement and is in need of at least two of  the following multidisciplinary therapies including but not limited to  physical, occupational, respiratory, and speech, nutritional services, wound care,   and prosthetics and orthotics. Given the patients complex condition  and risk of further medical complications, rehabilitation services cannot be  safely provided at a lower level of care such as a skilled nursing facility.   I have compared the patients medical and functional status at the time of the  preadmission screening and the same on this date, and there are no significant changes. By signing this document, I acknowledge that I have personally performed a  full physical examination on this patient within 24 hours of admission to  this inpatient rehabilitation facility and have determined the patient to be  able to tolerate the above course of treatment at an intensive level for a  reasonable period of time. I will be completing a detailed individualized  Plan of Care for this patient by day four of the patients stay based upon the  Preadmission Screen, this Post-Admission Evaluation, and the therapy  evaluations. Assessment and Plan:    Disuse myopathy-bedrest, comorbidities    Upper GI bleed secondary to small bowel AVMs- monitor, hold AC, off coumadin    Chronic diastolic congestive heart failure, Acute pulmonary edema- Lasix, DuoNeb Dulera    Paroxysmal atrial fibrillation- off coumadin    Chronic atrial fibrillation- amiodarone, Toprol    Chronic kidney disease - monitor     Severe malnutrition- Nutritional supplements    Bowels: Schedule Miralax + Senna S. Follow bowel movements. Enema or suppository if needed. Bladder: Check PVR x 3. [facility] if PVR > 200ml or if any volume is > 500 ml. Pain:  Tylenol is ordered prn.        Angelita Acosta MD, 6/7/2019, 7:11 AM

## 2019-06-07 NOTE — CARE COORDINATION
LSW reviewed chart. LSW met with patient to introduce  role, initiate discussion regarding DC planning and to inform of weekly review of her progress. SOCIAL WORK ASSESSMENT      GOAL:   To return home with assist from family and friends. HOME SITUATION:  Patient lives alone, condo without any steps. She was totally independent prior to admit with all personal care, driving and home maintenance. She is retired. She is active with Dr Lory Barnett for PCP needs. Her son, Nicolle Rowell, will be arriving in [address] [place] [address] from [place] next week to see her and be of assistance; she is unclear as to how long. PRIOR LEVEL OF FUNCTIONING:       PERSONAL CARE:   indepn                                                                        DRIVES:  yes                                                                     FINANCES:  indep                                                                   MEALS:     indep                             GROCERY SHOPS:   indept      DME CURRENTLY AT HOME:   None. CURRENT HOME CARE/SERVICES:  LSW informed of possible post acute services such as home care or outpatient services to continue her therapy. PREFERRED HOME CARE:  LSW provided a list of [facility] care agencies for her review. TEAM CONFERENCE DAY:   CARW informed her of weekly Team review of her progress on Wednesdays to review progress, DME recommendations and DC date. LSW will rturn to give her this update. She gives permisson to speak with her son for DC needs. LSW informed patient of recommendation for PCP visit within 7 days post discharge. LSW informed patient of preferred  time on date of discharge which is between 10 - 12 noon. LSW informed patient of after visit phone calls from [address] on days 2, 7, and 60 for assessment of needs.     [place], [place]     Case Management   [phone]    6/7/2019  6:08 PM

## 2019-06-07 NOTE — PLAN OF CARE
Problem: Risk for Impaired Skin Integrity  Goal: Tissue integrity - skin and mucous membranes  Description  Structural intactness and normal physiological function of skin and  mucous membranes.   6/7/2019 1012 by Pearl Huerta RN  Outcome: Ongoing  6/7/2019 0202 by Leydi Ureña RN  Outcome: Ongoing     Problem: Falls - Risk of:  Goal: Will remain free from falls  Description  Will remain free from falls  6/7/2019 1012 by Pearl Huerta RN  Outcome: Ongoing  6/7/2019 0202 by Leydi Ureña RN  Outcome: Ongoing  Goal: Absence of physical injury  Description  Absence of physical injury  6/7/2019 1012 by Pearl Huerta RN  Outcome: Ongoing  6/7/2019 0202 by Leydi Ureña RN  Outcome: Ongoing     Problem: IP BOWEL ELIMINATION  Goal: LTG - patient will utilize adaptive techniques/equipment to complete bowel elimination  6/7/2019 1012 by Pearl Huerta RN  Outcome: Ongoing  6/7/2019 0202 by Leydi Ureña RN  Outcome: Ongoing  Goal: LTG - patient will have regular and routine bowel evacuation  6/7/2019 1012 by Pearl Huerta RN  Outcome: Ongoing  6/7/2019 0202 by Leydi Ureña RN  Outcome: Ongoing  Goal: STG - patient will be accident free  6/7/2019 1012 by Pearl Huerta RN  Outcome: Ongoing  6/7/2019 0202 by Ledyi Ureña RN  Outcome: Ongoing     Problem: IP BLADDER/VOIDING  Goal: LTG - patient will complete bladder elimination  6/7/2019 1012 by Pearl Huerta RN  Outcome: Ongoing  6/7/2019 0202 by Leydi Ureña RN  Outcome: Ongoing  Goal: LTG - Patient will utilize adaptive techniques/equipment to complete bladder elimination  6/7/2019 1012 by Pearl Huerta RN  Outcome: Ongoing  6/7/2019 0202 by Leydi Ureña RN  Outcome: Ongoing  Goal: LTG - patient will achieve acceptable level of continence  6/7/2019 1012 by Pearl Huerta RN  Outcome: Ongoing  6/7/2019 0202 by Leydi Ureña RN  Outcome: Ongoing

## 2019-06-07 NOTE — PROGRESS NOTES
06/07/2019 28.5* 36.0 - 48.0 % Final    MCV 06/07/2019 89.6  80.0 - 100.0 fL Final    MCH 06/07/2019 29.9  26.0 - 34.0 pg Final    MCHC 06/07/2019 33.4  31.0 - 36.0 g/dL Final    RDW 06/07/2019 15.3  12.4 - 15.4 % Final    Platelets 35/57/7723 231  135 - 450 K/uL Final    MPV 06/07/2019 8.5  5.0 - 10.5 fL Final    Neutrophils % 06/07/2019 79.6  % Final    Lymphocytes % 06/07/2019 7.9  % Final    Monocytes % 06/07/2019 5.4  % Final    Eosinophils % 06/07/2019 6.4  % Final    Basophils % 06/07/2019 0.7  % Final    Neutrophils # 06/07/2019 5.4  1.7 - 7.7 K/uL Final    Lymphocytes # 06/07/2019 0.5* 1.0 - 5.1 K/uL Final    Monocytes # 06/07/2019 0.4  0.0 - 1.3 K/uL Final    Eosinophils # 06/07/2019 0.4  0.0 - 0.6 K/uL Final    Basophils # 06/07/2019 0.0  0.0 - 0.2 K/uL Final   Admission on 05/28/2019, Discharged on 06/06/2019   No results displayed because visit has over 200 results. Assessment & Plan: Active Problems:    Disuse muscle atrophy--cont full rehab   Resolved Problems:    * No resolved hospital problems.  *  Blood loss anemia--follow H/H   Mitral regurg---watch for chf   Will need slow progression of therapies---we will watch for chf   Please note that over 35 minutes was spent in evaluating the patient, review of records and results, discussion with staff/family, etc.    Ashlee Yarbrough MD

## 2019-06-08 LAB
A/G RATIO: 0.7 (ref 1.1–2.2)
ALBUMIN SERPL-MCNC: 2.6 G/DL (ref 3.4–5)
ALP BLD-CCNC: 101 U/L (ref 40–129)
ALT SERPL-CCNC: 22 U/L (ref 10–40)
ANION GAP SERPL CALCULATED.3IONS-SCNC: 12 MMOL/L (ref 3–16)
AST SERPL-CCNC: 20 U/L (ref 15–37)
BASOPHILS ABSOLUTE: 0 K/UL (ref 0–0.2)
BASOPHILS RELATIVE PERCENT: 0.7 %
BILIRUB SERPL-MCNC: 0.5 MG/DL (ref 0–1)
BUN BLDV-MCNC: 24 MG/DL (ref 7–20)
CALCIUM SERPL-MCNC: 8.7 MG/DL (ref 8.3–10.6)
CHLORIDE BLD-SCNC: 94 MMOL/L (ref 99–110)
CO2: 32 MMOL/L (ref 21–32)
CREAT SERPL-MCNC: 1.3 MG/DL (ref 0.6–1.2)
EOSINOPHILS ABSOLUTE: 0.3 K/UL (ref 0–0.6)
EOSINOPHILS RELATIVE PERCENT: 4.1 %
GFR AFRICAN AMERICAN: 47
GFR NON-AFRICAN AMERICAN: 39
GLOBULIN: 3.6 G/DL
GLUCOSE BLD-MCNC: 84 MG/DL (ref 70–99)
HCT VFR BLD CALC: 30.4 % (ref 36–48)
HEMOGLOBIN: 10.1 G/DL (ref 12–16)
LYMPHOCYTES ABSOLUTE: 0.6 K/UL (ref 1–5.1)
LYMPHOCYTES RELATIVE PERCENT: 8.5 %
MAGNESIUM: 2 MG/DL (ref 1.8–2.4)
MCH RBC QN AUTO: 29.6 PG (ref 26–34)
MCHC RBC AUTO-ENTMCNC: 33.3 G/DL (ref 31–36)
MCV RBC AUTO: 88.9 FL (ref 80–100)
MONOCYTES ABSOLUTE: 0.3 K/UL (ref 0–1.3)
MONOCYTES RELATIVE PERCENT: 4.2 %
NEUTROPHILS ABSOLUTE: 5.8 K/UL (ref 1.7–7.7)
NEUTROPHILS RELATIVE PERCENT: 82.5 %
PDW BLD-RTO: 15.6 % (ref 12.4–15.4)
PHOSPHORUS: 3.3 MG/DL (ref 2.5–4.9)
PLATELET # BLD: 255 K/UL (ref 135–450)
PMV BLD AUTO: 8.2 FL (ref 5–10.5)
POTASSIUM SERPL-SCNC: 3.4 MMOL/L (ref 3.5–5.1)
RBC # BLD: 3.42 M/UL (ref 4–5.2)
SODIUM BLD-SCNC: 138 MMOL/L (ref 136–145)
TOTAL PROTEIN: 6.2 G/DL (ref 6.4–8.2)
WBC # BLD: 7 K/UL (ref 4–11)

## 2019-06-08 PROCEDURE — 94669 MECHANICAL CHEST WALL OSCILL: CPT

## 2019-06-08 PROCEDURE — 6370000000 HC RX 637 (ALT 250 FOR IP): Performed by: INTERNAL MEDICINE

## 2019-06-08 PROCEDURE — 97530 THERAPEUTIC ACTIVITIES: CPT

## 2019-06-08 PROCEDURE — 94640 AIRWAY INHALATION TREATMENT: CPT

## 2019-06-08 PROCEDURE — 97535 SELF CARE MNGMENT TRAINING: CPT

## 2019-06-08 PROCEDURE — 99233 SBSQ HOSP IP/OBS HIGH 50: CPT | Performed by: INTERNAL MEDICINE

## 2019-06-08 PROCEDURE — 2700000000 HC OXYGEN THERAPY PER DAY

## 2019-06-08 PROCEDURE — 94668 MNPJ CHEST WALL SBSQ: CPT

## 2019-06-08 PROCEDURE — 1280000000 HC REHAB R&B

## 2019-06-08 PROCEDURE — 80053 COMPREHEN METABOLIC PANEL: CPT

## 2019-06-08 PROCEDURE — 85025 COMPLETE CBC W/AUTO DIFF WBC: CPT

## 2019-06-08 PROCEDURE — 83735 ASSAY OF MAGNESIUM: CPT

## 2019-06-08 PROCEDURE — 94761 N-INVAS EAR/PLS OXIMETRY MLT: CPT

## 2019-06-08 PROCEDURE — 36415 COLL VENOUS BLD VENIPUNCTURE: CPT

## 2019-06-08 PROCEDURE — 97110 THERAPEUTIC EXERCISES: CPT

## 2019-06-08 PROCEDURE — 6370000000 HC RX 637 (ALT 250 FOR IP): Performed by: PHYSICAL MEDICINE & REHABILITATION

## 2019-06-08 PROCEDURE — 97116 GAIT TRAINING THERAPY: CPT

## 2019-06-08 PROCEDURE — 84100 ASSAY OF PHOSPHORUS: CPT

## 2019-06-08 RX ORDER — POTASSIUM CHLORIDE 750 MG/1
10 TABLET, FILM COATED, EXTENDED RELEASE ORAL 3 TIMES DAILY
Status: DISCONTINUED | OUTPATIENT
Start: 2019-06-08 | End: 2019-06-08

## 2019-06-08 RX ORDER — POTASSIUM CHLORIDE 750 MG/1
20 TABLET, FILM COATED, EXTENDED RELEASE ORAL 2 TIMES DAILY
Status: DISCONTINUED | OUTPATIENT
Start: 2019-06-08 | End: 2019-06-10

## 2019-06-08 RX ADMIN — IPRATROPIUM BROMIDE AND ALBUTEROL SULFATE 1 AMPULE: .5; 3 SOLUTION RESPIRATORY (INHALATION) at 15:51

## 2019-06-08 RX ADMIN — LATANOPROST 1 DROP: 50 SOLUTION/ DROPS OPHTHALMIC at 21:55

## 2019-06-08 RX ADMIN — IPRATROPIUM BROMIDE AND ALBUTEROL SULFATE 1 AMPULE: .5; 3 SOLUTION RESPIRATORY (INHALATION) at 07:33

## 2019-06-08 RX ADMIN — SENNOSIDES,DOCUSATE SODIUM 2 TABLET: 8.6; 5 TABLET, FILM COATED ORAL at 09:37

## 2019-06-08 RX ADMIN — PANTOPRAZOLE SODIUM 40 MG: 40 TABLET, DELAYED RELEASE ORAL at 06:19

## 2019-06-08 RX ADMIN — BENZOCAINE AND MENTHOL 1 LOZENGE: 15; 3.6 LOZENGE ORAL at 19:40

## 2019-06-08 RX ADMIN — POTASSIUM CHLORIDE 20 MEQ: 750 TABLET, FILM COATED, EXTENDED RELEASE ORAL at 21:55

## 2019-06-08 RX ADMIN — FUROSEMIDE 40 MG: 40 TABLET ORAL at 09:37

## 2019-06-08 RX ADMIN — MEMANTINE HYDROCHLORIDE 10 MG: 10 TABLET ORAL at 21:55

## 2019-06-08 RX ADMIN — AMIODARONE HYDROCHLORIDE 200 MG: 200 TABLET ORAL at 21:55

## 2019-06-08 RX ADMIN — AMIODARONE HYDROCHLORIDE 200 MG: 200 TABLET ORAL at 09:37

## 2019-06-08 RX ADMIN — IPRATROPIUM BROMIDE AND ALBUTEROL SULFATE 1 AMPULE: .5; 3 SOLUTION RESPIRATORY (INHALATION) at 19:53

## 2019-06-08 RX ADMIN — POTASSIUM CHLORIDE 10 MEQ: 750 TABLET, FILM COATED, EXTENDED RELEASE ORAL at 09:41

## 2019-06-08 RX ADMIN — MOMETASONE FUROATE AND FORMOTEROL FUMARATE DIHYDRATE 2 PUFF: 200; 5 AEROSOL RESPIRATORY (INHALATION) at 07:33

## 2019-06-08 RX ADMIN — LEVOTHYROXINE SODIUM 75 MCG: 75 TABLET ORAL at 06:19

## 2019-06-08 RX ADMIN — MOMETASONE FUROATE AND FORMOTEROL FUMARATE DIHYDRATE 2 PUFF: 200; 5 AEROSOL RESPIRATORY (INHALATION) at 19:53

## 2019-06-08 RX ADMIN — MEMANTINE HYDROCHLORIDE 10 MG: 10 TABLET ORAL at 09:37

## 2019-06-08 RX ADMIN — METOPROLOL SUCCINATE 50 MG: 50 TABLET, EXTENDED RELEASE ORAL at 21:55

## 2019-06-08 ASSESSMENT — PAIN SCALES - GENERAL
PAINLEVEL_OUTOF10: 0

## 2019-06-08 NOTE — PROGRESS NOTES
Patient is a 79 y/o F admitted to rehab with weakness following GI bleed and CHF. A/A/O 4. Transfers with assist of enzo x1. On general diet, tolerating well. May need to add sodium restriction if PO intake increases per dietician Medications taken whole in water. Coumadin on hold due to GI bleed. Skin intact, redness under breasts and to maddy area due to moisture. On O2 at 1 L, weaning as toerated. Has been continent of urine and stool. LBM 6/8. Will monitor for safety.

## 2019-06-08 NOTE — PROGRESS NOTES
Physical Therapy  Facility/Department: [address] IP REHAB  Daily Treatment Note  NAME: Mya Starr  : 1935  MRN: [id number]    Date of Service: 2019    Discharge Recommendations:  Patient would benefit from continued therapy after discharge, 5-7 sessions per week, Continue to assess pending progress   PT Equipment Recommendations  Equipment Needed: Yes  Mobility Devices: Hatfield Mew: Rolling  Other: may need wheeled walker    Assessment   Body structures, Functions, Activity limitations: Decreased functional mobility ; Decreased strength;Decreased endurance  Assessment:  Patient  is zainab 79 y/o female admit  to [facility] hospital 19 with GI Bleed, Respiratory Failure. 19 S/P EGD with Argon Plasms Coag.  19 S/P EGD with Placement Video Capsule. 6/3/19 Push Enteroscopy - no lesions. 6/3/19 Nasogastric Tube Feed placed. PMH includes Mitral Regurg, A-Fib, CHF, CKD, PE, Lung Surg, Humerus Fx, Osteopenia, Polymyalgia Rheumatica. PTA pt living alone in [facility] with elevator access. Pt  has 2 sons both live in [place]. PTA pt very independent with daily care and functional mobility (without assist device). Patient admitted to ARU with dx of paroxymal atrial fibrillation and disuse myopathy. Patient required Min A for transfers from b/s chair, pt ambulated 20 feet with rolling walker and CGA-Min A. Patient is functioning below baseline and will benefit from continued skilled PT intervention to address impaired strength, bed mobility, transfers, and gait. Treatment Diagnosis: Impaired functional mobility  Prognosis: Good  Barriers to Learning: None. REQUIRES PT FOLLOW UP: Yes  Activity Tolerance  Activity Tolerance: Patient limited by fatigue;Patient limited by endurance; Patient Tolerated treatment well     Patient Diagnosis(es): There were no encounter diagnoses.      has a past medical history of Anemia, Anticoagulant long-term use, Atrial fibrillation ([address] CHF (congestive heart failure) ([address] CKD (chronic kidney disease) stage 3, GFR 30-59 ml/min ([facility]), Clostridium difficile diarrhea, Depression, Humerus fracture, Hyperlipidemia, Hypertension, Hypothyroidism, Mitral regurgitation, Optic neuritis, Osteopenia, Polymyalgia rheumatica ([address] Pulmonary embolus ([address] Thyroid disease, and Vitamin D deficiency. has a past surgical history that includes Lung surgery; malignant skin lesion excision; Colonoscopy (09/14/2016); Upper gastrointestinal endoscopy (09/14/2016); Upper gastrointestinal endoscopy (N/A, 5/30/2019); Colonoscopy (N/A, 5/31/2019); Upper gastrointestinal endoscopy (N/A, 5/31/2019); Endoscopy, small bowel with ileum (5/31/2019); and Enteroscopy (N/A, 6/3/2019). Restrictions  Restrictions/Precautions  Restrictions/Precautions: Fall Risk  Position Activity Restriction  Other position/activity restrictions: O2 1L via NC. General Diet. Patient reports does not have O2 at home. Subjective   General  Chart Reviewed: Yes  Additional Pertinent Hx: 79 y/o female admit 5/28/19 with GI Bleed, Respiratory Failure. 5/30/19 S/P EGD with Argon Plasma Coag.  5/31/19 S/P EGD with Placement Video Capsule. 6/3/19  Push Enteroscopy - no lesions. 6/3/19 Nasogastric Tube Feed placed. PMH includes Mitral Regurg, A-Fib, CHF, CKD, PE, Lung Surg, Humerus Fx, Osteopenia, Polymyalgia Rheumatica. Response To Previous Treatment: Patient with no complaints from previous session. Family / Caregiver Present: No  Referring Practitioner: Dr. Tanya Castro: Patient requesting to use bathroom, patient walked to bathroom but then reports too tired to walk back. Used Clora Drier to get pt back to b/s chair. Patient has no c/o's during therapy session.   Pain Screening  Patient Currently in Pain: Denies  Vital Signs  Patient Currently in Pain: Denies       Orientation  Orientation  Overall Orientation Status: Within Functional Limits  Cognition      Objective   Bed mobility  Comment: NT, pt up in chair upon arrival and requesting back to chair at end of session. Transfers  Sit to Stand: Minimal Assistance  Stand to sit: Minimal Assistance  Comment: Did use Joe Jose Antoniorney stedy to get patient from bathroom to b/s chair, pt reports too weak to walk back to b/s chair. Ambulation  Ambulation?: Yes  Ambulation 1  Surface: level tile  Device: Rolling Walker  Other Apparatus: O2  Assistance: Contact guard assistance  Quality of Gait: flexed posture with decreased clearance BLE's. Distance: 20 feet  Stairs/Curb  Stairs?: No     Balance  Posture: Fair  Sitting - Static: Good  Sitting - Dynamic: Fair;+  Standing - Static: Fair  Standing - Dynamic: Fair  Exercises  Hip Flexion: x15  Knee Long Arc Quad: x15  Ankle Pumps: x15  Other exercises  Other exercises?: Yes         Other Activities: Other (see comment)  Comment: Assisted pt with toileting, assist to allison/doff depends and hospital pants, pt performed own maddy care. G-Code     OutComes Score                                                     AM-PAC Score             Goals  Short term goals  Time Frame for Short term goals: 1 week  Short term goal 1: Bed Mob Min assist.   Short term goal 2: Transfers with/without assist device minimal assist  Short term goal 3: Amb with/without assist device 100' SBA/CGA. Short term goal 4: ascend /descend curb steps with mod/min assist  Long term goals  Time Frame for Long term goals : 2-3 weeks  Long term goal 1: Bed Mob MI  Long term goal 2: Transfers with/without assist device MI  Long term goal 3: Amb with/without assist device 150' MI   Long term goal 4: ascend /descend curb steps with SBA  Long term goal 5: 4 steps with B rails with SBA/CGA  Patient Goals   Patient goals : Get stronger and be able to return home.      Plan    Plan  Times per week: 5-6x   Times per day: Twice a day  Plan weeks: 2-3 weeks  Current Treatment Recommendations: Strengthening, Balance Training, Functional Mobility Training, Transfer Training, ADL/Self-care Training, Endurance Training, Gait Training, Stair training, Home Exercise Program, Safety Education & Training, Patient/Caregiver Education & Training, Equipment Evaluation, Education, & procurement, Positioning  Safety Devices  Type of devices: All fall risk precautions in place, Gait belt, Patient at risk for falls, Call light within reach, Chair alarm in place, Left in chair  Restraints  Initially in place: No     Therapy Time   Individual Concurrent Group Co-treatment   Time In 1345         Time Out 1425         Minutes 40         Timed Code Treatment Minutes: 40 Minutes     If patient d/c prior to next session this note will serve as d/c summary.  Care will be turned over to primary therapist.  Bianca Ortiz, PT

## 2019-06-08 NOTE — PROGRESS NOTES
gastrointestinal endoscopy (09/14/2016); Upper gastrointestinal endoscopy (N/A, 5/30/2019); Colonoscopy (N/A, 5/31/2019); Upper gastrointestinal endoscopy (N/A, 5/31/2019); Endoscopy, small bowel with ileum (5/31/2019); and Enteroscopy (N/A, 6/3/2019). Restrictions  Restrictions/Precautions  Restrictions/Precautions: Fall Risk  Position Activity Restriction  Other position/activity restrictions: O2 1L via NC. General Diet. Patient reports does not have O2 at home. Subjective   General  Chart Reviewed: Yes  Additional Pertinent Hx: 80 y. o. female who has a past medical history of Anemia, Anticoagulant long-term use, Atrial fibrillation (HCC), CHF (congestive heart failure) ([address] CKD (chronic kidney disease) stage 3, GFR 30-59 ml/min (HCC), hx of Clostridium difficile diarrhea, Depression, Humerus fracture, Hyperlipidemia, Hypertension, Hypothyroidism, Mitral regurgitation, Optic neuritis, Osteopenia, Polymyalgia rheumatica ([address] Pulmonary embolus ([address] Thyroid disease, and Vitamin D deficiency. Patient came into our hospital with bloody stools.  Patient reported dark stools today and some fatigue and dizziness.  Patient denied abdominal pain.  Patient was lightheaded. Patient evaluated by GI, and workup was done. EGD and colonoscopy revealed no evidence of active bleeding source. GI bleeding stopped after patient taken off Coumadin. H&H has remained stable. 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 GI is now sound off. Patient has been in bed for the past week. Patient started therapies, but is very weak and not safe to return home. Patient needs more therapy before discharged to home safely.  Patient lives at home alone in a condo. .(copied per note Dr Vi Stiles 6/7/19)  Family / Caregiver Present: No  Referring Practitioner: Heis  Diagnosis: disuse myopathy  (GI bleeding, acute blood loss anemia, acute gypoxic repiratory failure, acute pulmonary edema  Subjective  Subjective: pt seen BS, in recliner and agreeable to OT for ADL's, to take a shower, \"if I can take my time\". Pt denied pain. Orientation  Orientation  Overall Orientation Status: Within Functional Limits  Objective    ADL  Equipment Provided: Reacher  Grooming: Setup(from w/c at sink for teeth,hair.)  UE Bathing: Setup;Stand by assistance  LE Bathing: Moderate assistance;Verbal cueing; Increased time to complete(for feet(able to cross LE's, but not all way over knees, reaching to top of feet). Assist for posterior in stance. Assist for drying LE's, buttocks.)  UE Dressing: Moderate assistance(assist with bra, pulling straps onto shoulders and clasping in back. Donned shirt over head w/set up)  LE Dressing: Moderate assistance; Increased time to complete;Verbal cueing(used reacher to thread briefs, hospital pants. Assist for abhinav hose (unable to use sock aid, d/t hose tight,pt with decreased strength, endurance). Able to cross LE's to don footies w/ Min A for RLE. Assist in stance(at sink) to manage clothes over hips.)  Toileting: Dependent/Total;Maximum assistance(dep d/t pants managed down while in stance on stedy. Hygiene while seated, and in stance, with assist d/t easily fatigued.)  Additional Comments: pt showered from chair, with use of [address]; towel on floor for non-skid surface. Pt required several rest breaks. Standing Balance  Activity: ADL's, CGA. Pt amb short, stand-step distances between commode>shower chair; shower chair to w/c, and w/c to recliner. pt's 02 sats 89% with standing/transfers, on 1L. Toilet Transfers  Equipment Used: Grab bars  Toilet Transfers Comments: quentin giraldo to commode, with Min/mod A stand to sit. Sit >stand off commode to stand, at walker, with Mod A  Shower Transfers  Shower - Transfer From: AddIn Social - Transfer Type: To and From  Shower - Transfer To: Shower seat with back  Shower Transfers:  Moderate assistance  Shower Transfers Comments: Mod/Min A sit><stand, with RW and cues for hand placement  Wheelchair Bed Transfers  Wheelchair/Bed - Technique: Ambulating  Equipment Used: Wheelchair(recliner)  Level of Asssistance: Minimal assistance; Moderate assistance  Wheelchair Transfers Comments: with RW; cues for hand placement         Cognition  Overall Cognitive Status: [facility]         Plan   Plan  Times per week: 5-6 days per week  Times per day: Twice a day  Plan weeks: 2-3 weeks  Current Treatment Recommendations: Strengthening, Balance Training, Functional Mobility Training, Endurance Training, Safety Education & Training, Self-Care / ADL, Patient/Caregiver Education & Training, Equipment Evaluation, Education, & procurement, Home Management Training          Goals  Short term goals  Time Frame for Short term goals: 1 week pt will. Hanane Geeina term goal 1: bathe with mod assist and AD  Short term goal 2: dress UB with min assist, LB with mod assist and AD as needed  Short term goal 3: toilet with mod assist and AD  Short term goal 4: transfer with CGA and AD  Short term goal 5: functional mobility and simple meal prep with min assist and AD  Short term goal 6: increase activity tolerance to stand 3 min for ADL/IADL skills  Long term goals  Time Frame for Long term goals : 2-3 weeks pt will.   Long term goal 1: bathe with modified independence  Long term goal 2: dress with modified independence  Long term goal 3: toilet with modified independence  Long term goal 4: transfer with modified independence  Long term goal 5: functional mobility and simple meal prep with rolling walker, modified independence  Patient Goals   Patient goals : \"I want to be able to do everything\"  with further questioning, she agreed she wanted to be off the oxygen, walk around by herself, drive, do her own self care       Therapy Time   Individual Concurrent Group Co-treatment   Time In 1015         Time Out 1145         Minutes 90               Kirk DANIEL/MOO,515

## 2019-06-08 NOTE — PROGRESS NOTES
Patient resting quietly at this time. Denies needs. Physical assessment completed. See flow sheet. Plan of care discussed and all questions answered. Bed locked in low position. Side rails up X3 with bed alarm on. Patient denies pain. Call light within reach. Will continue to monitor.

## 2019-06-08 NOTE — PROGRESS NOTES
3, GFR 30-59 ml/min (HCC), Clostridium difficile diarrhea, Depression, Humerus fracture, Hyperlipidemia, Hypertension, Hypothyroidism, Mitral regurgitation, Optic neuritis, Osteopenia, Polymyalgia rheumatica ([address] Pulmonary embolus ([address] Thyroid disease, and Vitamin D deficiency. has a past surgical history that includes Lung surgery; malignant skin lesion excision; Colonoscopy (09/14/2016); Upper gastrointestinal endoscopy (09/14/2016); Upper gastrointestinal endoscopy (N/A, 5/30/2019); Colonoscopy (N/A, 5/31/2019); Upper gastrointestinal endoscopy (N/A, 5/31/2019); Endoscopy, small bowel with ileum (5/31/2019); and Enteroscopy (N/A, 6/3/2019). Restrictions  Restrictions/Precautions  Restrictions/Precautions: Fall Risk  Position Activity Restriction  Other position/activity restrictions: O2 1L via NC. General Diet. Patient reports does not have O2 at home. Subjective   General  Chart Reviewed: Yes  Additional Pertinent Hx: 79 y/o female admit 5/28/19 with GI Bleed, Respiratory Failure. 5/30/19 S/P EGD with Argon Plasma Coag.  5/31/19 S/P EGD with Placement Video Capsule. 6/3/19  Push Enteroscopy - no lesions. 6/3/19 Nasogastric Tube Feed placed. PMH includes Mitral Regurg, A-Fib, CHF, CKD, PE, Lung Surg, Humerus Fx, Osteopenia, Polymyalgia Rheumatica. Response To Previous Treatment: Patient with no complaints from previous session. Family / Caregiver Present: Yes(two friends are here visiting)  Referring Practitioner: Dr. Angelica Valera: Patient agreeable to PT with some encouragement. Patient has c/o's of weakness, no other c/o's. Pain Screening  Patient Currently in Pain: Denies  Vital Signs  Patient Currently in Pain: Denies       Orientation  Orientation  Overall Orientation Status: Within Functional Limits  Cognition      Objective   Bed mobility  Comment: NT, pt up in chair upon arrival and wanting to return to chair at end of session for lunch.   Transfers  Sit to Stand: alarm in place, Left in chair  Restraints  Initially in place: No     Therapy Time   Individual Concurrent Group Co-treatment   Time In 1200         Time Out 1250         Minutes 50         Timed Code Treatment Minutes: 50 Minutes     If patient d/c prior to next session this note will serve as d/c summary.  Care will be turned over to primary therapist.  Hank Zuniga, PT

## 2019-06-08 NOTE — PLAN OF CARE
Problem: Risk for Impaired Skin Integrity  Goal: Tissue integrity - skin and mucous membranes  Description  Structural intactness and normal physiological function of skin and  mucous membranes.   6/7/2019 2222 by Matthew Bassett RN  Outcome: Ongoing  6/7/2019 1012 by Saranya Deleon RN  Outcome: Ongoing     Problem: Falls - Risk of:  Goal: Will remain free from falls  Description  Will remain free from falls  6/7/2019 2222 by Matthew Bassett RN  Outcome: Ongoing  6/7/2019 1012 by Saranya Deleon RN  Outcome: Ongoing     Problem: IP BOWEL ELIMINATION  Goal: LTG - patient will utilize adaptive techniques/equipment to complete bowel elimination  6/7/2019 2222 by Matthew Bassett RN  Outcome: Ongoing  6/7/2019 1012 by Saranya Deleon RN  Outcome: Ongoing

## 2019-06-08 NOTE — PROGRESS NOTES
[facility] Internal Medicine Note      Chief Complaint: I am so weak    Subjective/Interval History:    The patient was seen today while she was getting dressed in the bathroom. She reports the shower to be and \"ordeal\"  OT reports the patient fatigues very easily. The patient looks very weak overall. No specific complaints. Just feels weak and short of breath easily. Lacks strength. No chest pain. No cough or sputum. No nausea, vomiting, diarrhea. No abdominal pain. No dysuria. The remainder of the review of systems is negative. PMH, PSH, FH/SH reviewed and unchanged as documented in the H&P personally documented at admission 19    Medication list reviewed    Objective:    BP 99/60   Pulse 94   Temp 97.7 °F (36.5 °C) (Oral)   Resp 16   Ht 5' (1.524 m)   Wt 103 lb 2.8 oz (46.8 kg)   SpO2 90%   BMI 20.15 kg/m²   Temp  Av.1 °F (36.7 °C)  Min: 97.7 °F (36.5 °C)  Max: 98.4 °F (36.9 °C)    RRR  Chest- crackles in both lungs, posterior lower lung fields. Respirations easy.   Abdomen-bowel sounds positive, soft, nontender, nondistended  Extremity-minimal edema    The Following Labs Were Reviewed Today:    Recent Results (from the past 24 hour(s))   CBC Auto Differential    Collection Time: 19  6:07 AM   Result Value Ref Range    WBC 7.0 4.0 - 11.0 K/uL    RBC 3.42 (L) 4.00 - 5.20 M/uL    Hemoglobin 10.1 (L) 12.0 - 16.0 g/dL    Hematocrit 30.4 (L) 36.0 - 48.0 %    MCV 88.9 80.0 - 100.0 fL    MCH 29.6 26.0 - 34.0 pg    MCHC 33.3 31.0 - 36.0 g/dL    RDW 15.6 (H) 12.4 - 15.4 %    Platelets 035 328 - 788 K/uL    MPV 8.2 5.0 - 10.5 fL    Neutrophils % 82.5 %    Lymphocytes % 8.5 %    Monocytes % 4.2 %    Eosinophils % 4.1 %    Basophils % 0.7 %    Neutrophils # 5.8 1.7 - 7.7 K/uL    Lymphocytes # 0.6 (L) 1.0 - 5.1 K/uL    Monocytes # 0.3 0.0 - 1.3 K/uL    Eosinophils # 0.3 0.0 - 0.6 K/uL    Basophils # 0.0 0.0 - 0.2 K/uL   Magnesium    Collection Time: 19  6:07 AM   Result Value Ref Range Magnesium 2.00 1.80 - 2.40 mg/dL   Phosphorus    Collection Time: 06/08/19  6:07 AM   Result Value Ref Range    Phosphorus 3.3 2.5 - 4.9 mg/dL   Comprehensive Metabolic Panel    Collection Time: 06/08/19  6:07 AM   Result Value Ref Range    Sodium 138 136 - 145 mmol/L    Potassium 3.4 (L) 3.5 - 5.1 mmol/L    Chloride 94 (L) 99 - 110 mmol/L    CO2 32 21 - 32 mmol/L    Anion Gap 12 3 - 16    Glucose 84 70 - 99 mg/dL    BUN 24 (H) 7 - 20 mg/dL    CREATININE 1.3 (H) 0.6 - 1.2 mg/dL    GFR Non-African American 39 (A) >60    GFR  47 (A) >60    Calcium 8.7 8.3 - 10.6 mg/dL    Total Protein 6.2 (L) 6.4 - 8.2 g/dL    Alb 2.6 (L) 3.4 - 5.0 g/dL    Albumin/Globulin Ratio 0.7 (L) 1.1 - 2.2    Total Bilirubin 0.5 0.0 - 1.0 mg/dL    Alkaline Phosphatase 101 40 - 129 U/L    ALT 22 10 - 40 U/L    AST 20 15 - 37 U/L    Globulin 3.6 g/dL       ASSESSMENT/PLAN:      Principal Problem:    Chronic diastolic congestive heart failure- the patient is now on rehabilitation given her acute generalized weakness related to her heart failure and GI bleed. She is very weak. Still has crackles but is on room air. Continue to monitor. Follow renal function and consider increased diuretic dosing. Active Problems:    Paroxysmal atrial fibrillation ([address]- rate is controlled, regular on exam today. She remains off anticoagulation. Plan to review hospital chart to determine if she can restart this. Non-rheumatic mitral regurgitation- unchanged. Continue to monitor. CKD (chronic kidney disease) stage 3, GFR 30-59 ml/min ([facility])-the renal function is stable. Continue to follow    Acute blood loss anemia-the hemoglobin is stable since her GI bleed. Continue to monitor.  Likely we'll be able to change to every other day monitoring    Disuse muscle atrophy-continue therapy    Time > 35 minutes reviewing chart and patient data, examining and interviewing patient, and discussing with nursing staff, family, etc.     Abhay Mcintosh, MD, [place]  11:53 AM  6/8/2019

## 2019-06-08 NOTE — PLAN OF CARE
Problem: Risk for Impaired Skin Integrity  Goal: Tissue integrity - skin and mucous membranes  Description  Structural intactness and normal physiological function of skin and  mucous membranes. 6/8/2019 1004 by Libia Bryan RN  Outcome: Ongoing  Note:   Skin assessment completed on admission and every shift. Barrier wipes used in the event of incontinence. Pressure relief techniques used as needed while in chair and in bed. Position changes encouraged at least every two hours while in bed. Problem: Falls - Risk of:  Goal: Will remain free from falls  Description  Will remain free from falls  6/8/2019 1004 by Libia Bryan RN  Outcome: Ongoing  Note:   Orange band on patient. Orange light on outside of room. Non skid footwear in place. Alarms used appropriately. Patient instructed to call and wait for staff before getting up. Rounding done to anticipate needs. Appropriate safety devices used for transfers. Problem: ACTIVITY INTOLERANCE/IMPAIRED MOBILITY  Goal: Mobility/activity is maintained at optimum level for patient  6/8/2019 1004 by Libia Bryan RN  Outcome: Ongoing  Note:   Patient working with therapy at least 3 hours/day to obtain maximal mobility. Safety devices used.

## 2019-06-09 ENCOUNTER — APPOINTMENT (OUTPATIENT)
Dept: GENERAL RADIOLOGY | Age: 84
DRG: 091 | End: 2019-06-09
Attending: PHYSICAL MEDICINE & REHABILITATION
Payer: MEDICARE

## 2019-06-09 LAB
ANION GAP SERPL CALCULATED.3IONS-SCNC: 8 MMOL/L (ref 3–16)
BASOPHILS ABSOLUTE: 0 K/UL (ref 0–0.2)
BASOPHILS RELATIVE PERCENT: 0.6 %
BUN BLDV-MCNC: 20 MG/DL (ref 7–20)
CALCIUM SERPL-MCNC: 8.8 MG/DL (ref 8.3–10.6)
CHLORIDE BLD-SCNC: 96 MMOL/L (ref 99–110)
CO2: 32 MMOL/L (ref 21–32)
CREAT SERPL-MCNC: 1.2 MG/DL (ref 0.6–1.2)
EOSINOPHILS ABSOLUTE: 0.3 K/UL (ref 0–0.6)
EOSINOPHILS RELATIVE PERCENT: 3.7 %
GFR AFRICAN AMERICAN: 52
GFR NON-AFRICAN AMERICAN: 43
GLUCOSE BLD-MCNC: 86 MG/DL (ref 70–99)
HCT VFR BLD CALC: 29 % (ref 36–48)
HEMOGLOBIN: 9.4 G/DL (ref 12–16)
LYMPHOCYTES ABSOLUTE: 0.6 K/UL (ref 1–5.1)
LYMPHOCYTES RELATIVE PERCENT: 9 %
MAGNESIUM: 2.2 MG/DL (ref 1.8–2.4)
MCH RBC QN AUTO: 29 PG (ref 26–34)
MCHC RBC AUTO-ENTMCNC: 32.6 G/DL (ref 31–36)
MCV RBC AUTO: 89 FL (ref 80–100)
MONOCYTES ABSOLUTE: 0.3 K/UL (ref 0–1.3)
MONOCYTES RELATIVE PERCENT: 4.4 %
NEUTROPHILS ABSOLUTE: 5.6 K/UL (ref 1.7–7.7)
NEUTROPHILS RELATIVE PERCENT: 82.3 %
PDW BLD-RTO: 15.4 % (ref 12.4–15.4)
PHOSPHORUS: 3.3 MG/DL (ref 2.5–4.9)
PLATELET # BLD: 276 K/UL (ref 135–450)
PMV BLD AUTO: 8.5 FL (ref 5–10.5)
POTASSIUM SERPL-SCNC: 3.7 MMOL/L (ref 3.5–5.1)
RBC # BLD: 3.26 M/UL (ref 4–5.2)
SODIUM BLD-SCNC: 136 MMOL/L (ref 136–145)
WBC # BLD: 6.8 K/UL (ref 4–11)

## 2019-06-09 PROCEDURE — 6370000000 HC RX 637 (ALT 250 FOR IP): Performed by: PHYSICAL MEDICINE & REHABILITATION

## 2019-06-09 PROCEDURE — 84100 ASSAY OF PHOSPHORUS: CPT

## 2019-06-09 PROCEDURE — 80048 BASIC METABOLIC PNL TOTAL CA: CPT

## 2019-06-09 PROCEDURE — 94669 MECHANICAL CHEST WALL OSCILL: CPT

## 2019-06-09 PROCEDURE — 94761 N-INVAS EAR/PLS OXIMETRY MLT: CPT

## 2019-06-09 PROCEDURE — 2700000000 HC OXYGEN THERAPY PER DAY

## 2019-06-09 PROCEDURE — 94640 AIRWAY INHALATION TREATMENT: CPT

## 2019-06-09 PROCEDURE — 6370000000 HC RX 637 (ALT 250 FOR IP): Performed by: INTERNAL MEDICINE

## 2019-06-09 PROCEDURE — 1280000000 HC REHAB R&B

## 2019-06-09 PROCEDURE — 71046 X-RAY EXAM CHEST 2 VIEWS: CPT

## 2019-06-09 PROCEDURE — 36415 COLL VENOUS BLD VENIPUNCTURE: CPT

## 2019-06-09 PROCEDURE — 99232 SBSQ HOSP IP/OBS MODERATE 35: CPT | Performed by: INTERNAL MEDICINE

## 2019-06-09 PROCEDURE — 85025 COMPLETE CBC W/AUTO DIFF WBC: CPT

## 2019-06-09 PROCEDURE — 83735 ASSAY OF MAGNESIUM: CPT

## 2019-06-09 RX ORDER — BETAMETHASONE DIPROPIONATE 0.5 MG/ML
LOTION, AUGMENTED TOPICAL 2 TIMES DAILY
Status: DISCONTINUED | OUTPATIENT
Start: 2019-06-09 | End: 2019-06-16 | Stop reason: HOSPADM

## 2019-06-09 RX ORDER — CETIRIZINE HYDROCHLORIDE 10 MG/1
5 TABLET ORAL DAILY
Status: DISCONTINUED | OUTPATIENT
Start: 2019-06-09 | End: 2019-06-10

## 2019-06-09 RX ORDER — FUROSEMIDE 40 MG/1
40 TABLET ORAL 2 TIMES DAILY
Status: DISCONTINUED | OUTPATIENT
Start: 2019-06-10 | End: 2019-06-11

## 2019-06-09 RX ADMIN — AMIODARONE HYDROCHLORIDE 200 MG: 200 TABLET ORAL at 20:53

## 2019-06-09 RX ADMIN — CETIRIZINE HYDROCHLORIDE 5 MG: 10 TABLET, FILM COATED ORAL at 11:40

## 2019-06-09 RX ADMIN — LATANOPROST 1 DROP: 50 SOLUTION/ DROPS OPHTHALMIC at 20:57

## 2019-06-09 RX ADMIN — BETAMETHASONE DIPROPIONATE: 0.5 LOTION, AUGMENTED TOPICAL at 11:40

## 2019-06-09 RX ADMIN — POTASSIUM CHLORIDE 20 MEQ: 750 TABLET, FILM COATED, EXTENDED RELEASE ORAL at 20:54

## 2019-06-09 RX ADMIN — MOMETASONE FUROATE AND FORMOTEROL FUMARATE DIHYDRATE 2 PUFF: 200; 5 AEROSOL RESPIRATORY (INHALATION) at 20:30

## 2019-06-09 RX ADMIN — POTASSIUM CHLORIDE 20 MEQ: 750 TABLET, FILM COATED, EXTENDED RELEASE ORAL at 09:43

## 2019-06-09 RX ADMIN — MOMETASONE FUROATE AND FORMOTEROL FUMARATE DIHYDRATE 2 PUFF: 200; 5 AEROSOL RESPIRATORY (INHALATION) at 07:38

## 2019-06-09 RX ADMIN — ACETAMINOPHEN 650 MG: 325 TABLET ORAL at 04:51

## 2019-06-09 RX ADMIN — IPRATROPIUM BROMIDE AND ALBUTEROL SULFATE 1 AMPULE: .5; 3 SOLUTION RESPIRATORY (INHALATION) at 20:29

## 2019-06-09 RX ADMIN — PANTOPRAZOLE SODIUM 40 MG: 40 TABLET, DELAYED RELEASE ORAL at 09:42

## 2019-06-09 RX ADMIN — IPRATROPIUM BROMIDE AND ALBUTEROL SULFATE 1 AMPULE: .5; 3 SOLUTION RESPIRATORY (INHALATION) at 12:43

## 2019-06-09 RX ADMIN — LEVOTHYROXINE SODIUM 75 MCG: 75 TABLET ORAL at 09:42

## 2019-06-09 RX ADMIN — FUROSEMIDE 40 MG: 40 TABLET ORAL at 09:42

## 2019-06-09 RX ADMIN — IPRATROPIUM BROMIDE AND ALBUTEROL SULFATE 1 AMPULE: .5; 3 SOLUTION RESPIRATORY (INHALATION) at 16:51

## 2019-06-09 RX ADMIN — MEMANTINE HYDROCHLORIDE 10 MG: 10 TABLET ORAL at 20:54

## 2019-06-09 RX ADMIN — AMIODARONE HYDROCHLORIDE 200 MG: 200 TABLET ORAL at 09:42

## 2019-06-09 RX ADMIN — BETAMETHASONE DIPROPIONATE: 0.5 LOTION, AUGMENTED TOPICAL at 20:57

## 2019-06-09 RX ADMIN — BENZOCAINE AND MENTHOL 1 LOZENGE: 15; 3.6 LOZENGE ORAL at 20:53

## 2019-06-09 RX ADMIN — MEMANTINE HYDROCHLORIDE 10 MG: 10 TABLET ORAL at 09:43

## 2019-06-09 ASSESSMENT — PAIN DESCRIPTION - ORIENTATION: ORIENTATION: LEFT;RIGHT

## 2019-06-09 ASSESSMENT — PAIN SCALES - GENERAL
PAINLEVEL_OUTOF10: 0
PAINLEVEL_OUTOF10: 1
PAINLEVEL_OUTOF10: 0
PAINLEVEL_OUTOF10: 0

## 2019-06-09 ASSESSMENT — PAIN DESCRIPTION - LOCATION: LOCATION: ARM

## 2019-06-09 ASSESSMENT — PAIN DESCRIPTION - ONSET: ONSET: ON-GOING

## 2019-06-09 ASSESSMENT — PAIN DESCRIPTION - FREQUENCY: FREQUENCY: INTERMITTENT

## 2019-06-09 ASSESSMENT — PAIN DESCRIPTION - PAIN TYPE: TYPE: OTHER (COMMENT);ACUTE PAIN

## 2019-06-09 ASSESSMENT — PAIN DESCRIPTION - DESCRIPTORS: DESCRIPTORS: ITCHING

## 2019-06-09 ASSESSMENT — PAIN DESCRIPTION - PROGRESSION: CLINICAL_PROGRESSION: NOT CHANGED

## 2019-06-09 NOTE — PROGRESS NOTES
Patient is a 79 y/o F admitted to rehab with weakness following GI bleed and CHF. A/A/O 4. Transfers with assist of enzo x1. On general diet, tolerating well. May need to add sodium restriction if PO intake increases per dietician. Medications taken whole in water. Coumadin on hold due to GI bleed. Skin intact, redness under breasts and to maddy area due to moisture. On O2 at 1 L, weaning as tolerated. Coarse crackles noted bilaterally. Has been incontinent of urine and stool. LBM 6/9. Patient complaining of feeling much more weak and tired than yesterday, thinks it is due to taking sleeping pills last night. Will monitor for safety.

## 2019-06-09 NOTE — PROGRESS NOTES
Pt resting comfortably in bed. Admitted with debility s/p GI bleed and fluid overload. Transfers x1 with a stedy. Lungs diminished with some crackles to the RLL. Gets SOB with excertion. O2 in place at 1L. IS encouraged. HR regular. Abdomen non tender with active bowel sounds. LBM 6/7. Pt with very poor appetite. Snacks offered. Has been incontinent of urine, maddy care provided. Denies pain. All needs assessed with rounding. Alarms active, will continue to monitor.  Jacquelyn Veloz RN

## 2019-06-09 NOTE — PLAN OF CARE
Problem: Risk for Impaired Skin Integrity  Goal: Tissue integrity - skin and mucous membranes  Description  Structural intactness and normal physiological function of skin and  mucous membranes. Outcome: Ongoing  Note:   Able to change positions in bed without assist, no evidence of skin breakdown noted. Waffle cushion in place to chair. Problem: Falls - Risk of:  Goal: Will remain free from falls  Description  Will remain free from falls  Outcome: Ongoing  Note:   Pt educated on falls precautions and safety. Call light and personal belongings within reach at all times. Non skid socks in use when up. Hourly rounding and alarms active.

## 2019-06-09 NOTE — PROGRESS NOTES
Patient very tired today, refusing to get up to chair. Poor appetite. Turning at least every 2 hours. Has been incontinent of bowel and bladder this shift. Teresa care provided after each episode. Will monitor.

## 2019-06-09 NOTE — PROGRESS NOTES
[facility] Internal Medicine Note      Chief Complaint: I itch all over    Subjective/Interval History: Today the patient is complaining of diffuse itching. Nursing reports there is no rash obvious, but she has dry skin. No new problems noted otherwise. Eating and drinking well. Generally feels weak. Looks exhausted again today. Nursing unable to wean oxygen entirely yesterday. No chest pain. No cough or sputum. No nausea, vomiting, diarrhea. No abdominal pain. No dysuria. The remainder of the review of systems is negative. PMH, PSH, FH/SH reviewed and unchanged as documented in the H&P personally documented at admission 19    Medication list reviewed    Objective:    BP (!) 96/58   Pulse 86   Temp 97.7 °F (36.5 °C) (Oral)   Resp 18   Ht 5' (1.524 m)   Wt 104 lb 0.9 oz (47.2 kg)   SpO2 96%   BMI 20.32 kg/m²   Temp  Av.2 °F (36.8 °C)  Min: 97.7 °F (36.5 °C)  Max: 98.5 °F (36.9 °C)    Patient currently lying on her right side in bed. CV- Irreg Irreg, rate controlled  Chest- crackles in both lung bases, but throughout her right lung fields as she is lying on her right side. Abdomen-bowel sounds positive, soft, nontender, nondistended  Extremity-minimal edema  Skin-no obvious rash. Multiple areas of dry skin.     The Following Labs Were Reviewed Today:    Recent Results (from the past 24 hour(s))   Basic Metabolic Panel    Collection Time: 19  6:36 AM   Result Value Ref Range    Sodium 136 136 - 145 mmol/L    Potassium 3.7 3.5 - 5.1 mmol/L    Chloride 96 (L) 99 - 110 mmol/L    CO2 32 21 - 32 mmol/L    Anion Gap 8 3 - 16    Glucose 86 70 - 99 mg/dL    BUN 20 7 - 20 mg/dL    CREATININE 1.2 0.6 - 1.2 mg/dL    GFR Non- 43 (A) >60    GFR  52 (A) >60    Calcium 8.8 8.3 - 10.6 mg/dL   CBC Auto Differential    Collection Time: 19  6:36 AM   Result Value Ref Range    WBC 6.8 4.0 - 11.0 K/uL    RBC 3.26 (L) 4.00 - 5.20 M/uL    Hemoglobin 9.4 (L) 12.0 - 16.0 g/dL    Hematocrit 29.0 (L) 36.0 - 48.0 %    MCV 89.0 80.0 - 100.0 fL    MCH 29.0 26.0 - 34.0 pg    MCHC 32.6 31.0 - 36.0 g/dL    RDW 15.4 12.4 - 15.4 %    Platelets 746 079 - 612 K/uL    MPV 8.5 5.0 - 10.5 fL    Neutrophils % 82.3 %    Lymphocytes % 9.0 %    Monocytes % 4.4 %    Eosinophils % 3.7 %    Basophils % 0.6 %    Neutrophils # 5.6 1.7 - 7.7 K/uL    Lymphocytes # 0.6 (L) 1.0 - 5.1 K/uL    Monocytes # 0.3 0.0 - 1.3 K/uL    Eosinophils # 0.3 0.0 - 0.6 K/uL    Basophils # 0.0 0.0 - 0.2 K/uL   Magnesium    Collection Time: 06/09/19  6:36 AM   Result Value Ref Range    Magnesium 2.20 1.80 - 2.40 mg/dL   Phosphorus    Collection Time: 06/09/19  6:36 AM   Result Value Ref Range    Phosphorus 3.3 2.5 - 4.9 mg/dL       ASSESSMENT/PLAN:      Principal Problem:    Chronic diastolic congestive heart failure- still with evidence of fluid in her chest on exam. Increase Lasix to twice a day. Follow renal function. Repeat her chest x-ray today. Active Problems:     Pruritus-  change to sterile sheets and add betamethasone lotion to affected areas. Use Zyrtec 5 mg daily. Paroxysmal atrial fibrillation ([address]- rate is controlled, irregular on exam today. She remains off anticoagulation. Plan to review hospital chart to determine if she can restart this. Non-rheumatic mitral regurgitation- unchanged. Continue to monitor. CKD (chronic kidney disease) stage 3, GFR 30-59 ml/min ([facility])-the renal function is stable. Continue to follow with higher lasix dose    Acute blood loss anemia-the hemoglobin is stable since her GI bleed. Continue to monitor.  If remains stable tomorrow, consider changing to every other day monitoring    Disuse muscle atrophy-continue therapy    Time > 25 minutes reviewing chart and patient data, examining and interviewing patient, and discussing with nursing staff, family, etc.     Aure Duffy MD, [address]  9:53 AM  6/9/2019

## 2019-06-10 LAB
ANION GAP SERPL CALCULATED.3IONS-SCNC: 9 MMOL/L (ref 3–16)
BUN BLDV-MCNC: 20 MG/DL (ref 7–20)
CALCIUM SERPL-MCNC: 8.5 MG/DL (ref 8.3–10.6)
CHLORIDE BLD-SCNC: 99 MMOL/L (ref 99–110)
CO2: 31 MMOL/L (ref 21–32)
CREAT SERPL-MCNC: 1.3 MG/DL (ref 0.6–1.2)
GFR AFRICAN AMERICAN: 47
GFR NON-AFRICAN AMERICAN: 39
GLUCOSE BLD-MCNC: 90 MG/DL (ref 70–99)
POTASSIUM SERPL-SCNC: 4.5 MMOL/L (ref 3.5–5.1)
SODIUM BLD-SCNC: 139 MMOL/L (ref 136–145)

## 2019-06-10 PROCEDURE — 97530 THERAPEUTIC ACTIVITIES: CPT

## 2019-06-10 PROCEDURE — 80048 BASIC METABOLIC PNL TOTAL CA: CPT

## 2019-06-10 PROCEDURE — 94640 AIRWAY INHALATION TREATMENT: CPT

## 2019-06-10 PROCEDURE — 97535 SELF CARE MNGMENT TRAINING: CPT

## 2019-06-10 PROCEDURE — 94669 MECHANICAL CHEST WALL OSCILL: CPT

## 2019-06-10 PROCEDURE — 94761 N-INVAS EAR/PLS OXIMETRY MLT: CPT

## 2019-06-10 PROCEDURE — 6370000000 HC RX 637 (ALT 250 FOR IP): Performed by: INTERNAL MEDICINE

## 2019-06-10 PROCEDURE — 2700000000 HC OXYGEN THERAPY PER DAY

## 2019-06-10 PROCEDURE — 97110 THERAPEUTIC EXERCISES: CPT

## 2019-06-10 PROCEDURE — 97116 GAIT TRAINING THERAPY: CPT | Performed by: PHYSICAL THERAPIST

## 2019-06-10 PROCEDURE — 97530 THERAPEUTIC ACTIVITIES: CPT | Performed by: PHYSICAL THERAPIST

## 2019-06-10 PROCEDURE — 1280000000 HC REHAB R&B

## 2019-06-10 PROCEDURE — 99231 SBSQ HOSP IP/OBS SF/LOW 25: CPT | Performed by: INTERNAL MEDICINE

## 2019-06-10 PROCEDURE — 6370000000 HC RX 637 (ALT 250 FOR IP): Performed by: PHYSICAL MEDICINE & REHABILITATION

## 2019-06-10 PROCEDURE — 36415 COLL VENOUS BLD VENIPUNCTURE: CPT

## 2019-06-10 PROCEDURE — 97110 THERAPEUTIC EXERCISES: CPT | Performed by: PHYSICAL THERAPIST

## 2019-06-10 PROCEDURE — 97116 GAIT TRAINING THERAPY: CPT

## 2019-06-10 RX ORDER — CETIRIZINE HYDROCHLORIDE 10 MG/1
5 TABLET ORAL DAILY PRN
Status: DISCONTINUED | OUTPATIENT
Start: 2019-06-10 | End: 2019-06-16 | Stop reason: HOSPADM

## 2019-06-10 RX ORDER — POTASSIUM CHLORIDE 750 MG/1
20 TABLET, FILM COATED, EXTENDED RELEASE ORAL DAILY
Status: DISCONTINUED | OUTPATIENT
Start: 2019-06-10 | End: 2019-06-14

## 2019-06-10 RX ADMIN — MEMANTINE HYDROCHLORIDE 10 MG: 10 TABLET ORAL at 21:03

## 2019-06-10 RX ADMIN — PANTOPRAZOLE SODIUM 40 MG: 40 TABLET, DELAYED RELEASE ORAL at 06:04

## 2019-06-10 RX ADMIN — POTASSIUM CHLORIDE 20 MEQ: 750 TABLET, FILM COATED, EXTENDED RELEASE ORAL at 09:21

## 2019-06-10 RX ADMIN — BETAMETHASONE DIPROPIONATE: 0.5 LOTION, AUGMENTED TOPICAL at 21:04

## 2019-06-10 RX ADMIN — LATANOPROST 1 DROP: 50 SOLUTION/ DROPS OPHTHALMIC at 21:03

## 2019-06-10 RX ADMIN — MOMETASONE FUROATE AND FORMOTEROL FUMARATE DIHYDRATE 2 PUFF: 200; 5 AEROSOL RESPIRATORY (INHALATION) at 19:41

## 2019-06-10 RX ADMIN — AMIODARONE HYDROCHLORIDE 200 MG: 200 TABLET ORAL at 21:03

## 2019-06-10 RX ADMIN — CETIRIZINE HYDROCHLORIDE 5 MG: 10 TABLET, FILM COATED ORAL at 23:48

## 2019-06-10 RX ADMIN — AMIODARONE HYDROCHLORIDE 200 MG: 200 TABLET ORAL at 09:21

## 2019-06-10 RX ADMIN — IPRATROPIUM BROMIDE AND ALBUTEROL SULFATE 1 AMPULE: .5; 3 SOLUTION RESPIRATORY (INHALATION) at 16:35

## 2019-06-10 RX ADMIN — FUROSEMIDE 40 MG: 40 TABLET ORAL at 09:21

## 2019-06-10 RX ADMIN — MOMETASONE FUROATE AND FORMOTEROL FUMARATE DIHYDRATE 2 PUFF: 200; 5 AEROSOL RESPIRATORY (INHALATION) at 08:07

## 2019-06-10 RX ADMIN — IPRATROPIUM BROMIDE AND ALBUTEROL SULFATE 1 AMPULE: .5; 3 SOLUTION RESPIRATORY (INHALATION) at 19:40

## 2019-06-10 RX ADMIN — IPRATROPIUM BROMIDE AND ALBUTEROL SULFATE 1 AMPULE: .5; 3 SOLUTION RESPIRATORY (INHALATION) at 08:07

## 2019-06-10 RX ADMIN — MEMANTINE HYDROCHLORIDE 10 MG: 10 TABLET ORAL at 09:21

## 2019-06-10 RX ADMIN — ACETAMINOPHEN 650 MG: 325 TABLET ORAL at 23:49

## 2019-06-10 RX ADMIN — FUROSEMIDE 40 MG: 40 TABLET ORAL at 17:15

## 2019-06-10 RX ADMIN — LEVOTHYROXINE SODIUM 75 MCG: 75 TABLET ORAL at 06:04

## 2019-06-10 RX ADMIN — BENZOCAINE AND MENTHOL 1 LOZENGE: 15; 3.6 LOZENGE ORAL at 09:31

## 2019-06-10 ASSESSMENT — PAIN DESCRIPTION - PROGRESSION: CLINICAL_PROGRESSION: GRADUALLY IMPROVING

## 2019-06-10 ASSESSMENT — PAIN SCALES - GENERAL
PAINLEVEL_OUTOF10: 3
PAINLEVEL_OUTOF10: 0

## 2019-06-10 ASSESSMENT — PAIN DESCRIPTION - LOCATION: LOCATION: BACK

## 2019-06-10 ASSESSMENT — PAIN DESCRIPTION - ORIENTATION: ORIENTATION: RIGHT;LEFT

## 2019-06-10 ASSESSMENT — PAIN DESCRIPTION - FREQUENCY: FREQUENCY: INTERMITTENT

## 2019-06-10 ASSESSMENT — PAIN DESCRIPTION - DESCRIPTORS: DESCRIPTORS: ITCHING

## 2019-06-10 ASSESSMENT — PAIN DESCRIPTION - PAIN TYPE: TYPE: ACUTE PAIN

## 2019-06-10 ASSESSMENT — PAIN DESCRIPTION - ONSET: ONSET: ON-GOING

## 2019-06-10 NOTE — PROGRESS NOTES
Patient is a 79 y/o male admitted to rehab with disuse myopathy. A/A/O x 4. Transfers with assist of enzo. On general diet, tolerating well. Medications taken whole  in thins. Skin intact. On room air. Has been contiment of urine and stool. LB 6/9. Will monitor for safety.

## 2019-06-10 NOTE — PROGRESS NOTES
Occupational Therapy  Facility/Department: [address] IP REHAB  Daily Treatment Note  NAME: Anderson Mccormick  : 1935  MRN: [id number]    Date of Service: 6/10/2019    Discharge Recommendations:  Home with Home health OT, Home with assist PRN, Continue to assess pending progress       Assessment   Performance deficits / Impairments: Decreased functional mobility ; Decreased ADL status; Decreased strength;Decreased endurance;Decreased high-level IADLs;Decreased balance  Assessment: Pt has improved with activity tolerance, LB dressing/toileting today. Continues to be on 1 L oxygen per NC.  continue to note decreased memory of recent activities. Cont with plan  Activity Tolerance  Activity Tolerance: Patient Tolerated treatment well  Safety Devices  Safety Devices in place: Yes(to room per transport at end of session)  Type of devices: Gait belt              has a past medical history of Anemia, Anticoagulant long-term use, Atrial fibrillation ([address] CHF (congestive heart failure) ([address] CKD (chronic kidney disease) stage 3, GFR 30-59 ml/min ([address] Clostridium difficile diarrhea, Depression, Humerus fracture, Hyperlipidemia, Hypertension, Hypothyroidism, Mitral regurgitation, Optic neuritis, Osteopenia, Polymyalgia rheumatica ([address] Pulmonary embolus ([address] Thyroid disease, and Vitamin D deficiency. has a past surgical history that includes Lung surgery; malignant skin lesion excision; Colonoscopy (2016); Upper gastrointestinal endoscopy (2016); Upper gastrointestinal endoscopy (N/A, 2019); Colonoscopy (N/A, 2019); Upper gastrointestinal endoscopy (N/A, 2019); Endoscopy, small bowel with ileum (2019); and Enteroscopy (N/A, 6/3/2019). Restrictions  Restrictions/Precautions  Restrictions/Precautions: Fall Risk  Position Activity Restriction  Other position/activity restrictions: O2 1L via NC. General Diet. Patient reports does not have O2 at home.   Subjective   General  Chart Reviewed: Yes  Patient assessed for rehabilitation services?: Yes  Additional Pertinent Hx: 80 y. o. female who has a past medical history of Anemia, Anticoagulant long-term use, Atrial fibrillation (HCC), CHF (congestive heart failure) ([address] CKD (chronic kidney disease) stage 3, GFR 30-59 ml/min (HCC), hx of Clostridium difficile diarrhea, Depression, Humerus fracture, Hyperlipidemia, Hypertension, Hypothyroidism, Mitral regurgitation, Optic neuritis, Osteopenia, Polymyalgia rheumatica ([address] Pulmonary embolus ([address] Thyroid disease, and Vitamin D deficiency. Patient came into our hospital with bloody stools.  Patient reported dark stools today and some fatigue and dizziness.  Patient denied abdominal pain.  Patient was lightheaded. Patient evaluated by GI, and workup was done. EGD and colonoscopy revealed no evidence of active bleeding source. GI bleeding stopped after patient taken off Coumadin. H&H has remained stable. 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 GI is now sound off. Patient has been in bed for the past week. Patient started therapies, but is very weak and not safe to return home. Patient needs more therapy before discharged to home safely. Patient lives at home alone in a condo. .(copied per note Dr Kezia Aquino 6/7/19)  Family / Caregiver Present: No  Referring Practitioner: Jigar  Diagnosis: disuse myopathy  (GI bleeding, acute blood loss anemia, acute gypoxic repiratory failure, acute pulmonary edema  Subjective  Subjective: pt met in dept, agreeable to OT, stated she needed to use the commode           Objective    ADL  Grooming: Stand by assistance(standing at sink to wash hands)  LE Dressing: Contact guard assistance;Verbal cueing(incontinent of urine in briefs. Doffed pants and briefs with CGA in stance, cues over feet. Donned clean brief and pants over feet with cues , CGA over hips)  Toileting: Contact guard assistance; Increased time to complete;Setup(hygiene after urination, small BM, pants up/down)  Additional Comments: nursing notified regarding cloudy urine        Functional Mobility  Functional - Mobility Device: Rolling Walker  Activity: To/from bathroom  Assist Level: Contact guard assistance  Functional Mobility Comments: 10 ft to/from bathroom  Toilet Transfers  Toilet - Technique: Ambulating  Equipment Used: Grab bars  Toilet Transfer: Minimal assistance  Toilet Transfers Comments: CGA stand to sit, min sit to stand  Wheelchair Bed Transfers  Wheelchair/Bed - Technique: Ambulating  Equipment Used: Wheelchair  Level of Asssistance: Minimal assistance  Wheelchair Transfers Comments: minor LOB posteriorly initially upon stance, then only CGA                              PM session  Pt met in dept, agreeable to OT  Provided pt with orange resistance band and written exerises. Pt able to complete 10 reps 6 sets shoulder and elbow ex. Completes each exercise with cues for technique. Instructed pt to use over the weekend and use as HEP at discharge    Pt amb short distance in ancillary gym to mock car. Transfer to wheelchair and mock car with very min assist sit to stand, CGA stand to sit. Discussed driving - educated pt on risks of driving with her poor memory - states she didn't drive much, but feel it would still be risky with her decreased problem solving, slower mental processing. Pt may benefit from speech therapy consult    Pt to PT after OT                                         Plan   Plan  Times per week: 5-6 days per week  Times per day: Twice a day  Plan weeks: 2-3 weeks  Current Treatment Recommendations: Strengthening, Balance Training, Functional Mobility Training, Endurance Training, Safety Education & Training, Self-Care / ADL, Patient/Caregiver Education & Training, Equipment Evaluation, Education, & procurement, Home Management Training               Goals  Short term goals  Time Frame for Short term goals: 1 week pt will. Thuy Lewis term goal 1: bathe with mod assist

## 2019-06-10 NOTE — PROGRESS NOTES
PHYSICAL THERAPY  Progress Note     Patient Name: Lamberto Celis7 Record Number: [id number]    Treatment Diagnosis: Impaired functional mobility      Chart Reviewed: Yes   Restrictions/Precautions: Fall Risk Other position/activity restrictions: O2 1L via NC. General Diet. Patient reports does not have O2 at home. Additional Pertinent Hx: 81 y/o female admit 5/28/19 with GI Bleed, Respiratory Failure. 5/30/19 S/P EGD with Argon Plasma Coag.  5/31/19 S/P EGD with Placement Video Capsule. 6/3/19  Push Enteroscopy - no lesions. 6/3/19 Nasogastric Tube Feed placed. PMH includes Mitral Regurg, A-Fib, CHF, CKD, PE, Lung Surg, Humerus Fx, Osteopenia, Polymyalgia Rheumatica. Subjective: Pt sitting up in w/c. Wants to work on [employer]. \" She denies pain but states that \"I haven't been able to walk yet\" indicating that she can only do room based distances. Pain Assessment  Pain Assessment: 0-10  Pain Level: 0  Patient's Stated Pain Goal: No pain  Pain Type:  Other (Comment), Acute pain(itching discomfort )  Pain Location: Arm  Pain Orientation: Left, Right  Pain Descriptors: Itching  Pain Frequency: Intermittent  Pain Onset: On-going  Clinical Progression: Not changed  Non-Pharmaceutical Pain Intervention(s): Distraction(lotion )  Response to Pain Intervention: Asleep with RR greater than 10    Home Environment / Functional History  Lives With: Alone(2 children who live in [place])  Type of Home: (3rd floor condo, laundry in [facility])  Home Layout: One level  Home Access: Elevator, Level entry  Bathroom Shower/Tub: Walk-in shower  Bathroom Toilet: Standard(sink nearby)  Bathroom Equipment: Grab bars in shower  Bathroom Accessibility: Walker accessible  Home Equipment: (no DME)  ADL Assistance: Independent  Homemaking Assistance: Independent(cleaning lady every 3 weeks, pt independent grocery shopping, laundry, cooking)  Homemaking Responsibilities: Yes  Ambulation Assistance: Independent(with no Minutes        Electronically signed by Allen Yates PT on 6/10/2019 at 12:47 PM

## 2019-06-10 NOTE — PROGRESS NOTES
[facility]  Dr. Steffi Malloy Progress Note    Patient Identification:  Omari Chisholm  [id number]  : 1935  Admit date: 2019      Diagnosis:   Patient Active Problem List   Diagnosis    Disorder of bone and cartilage    Edema    Essential hypertension, benign    Other and unspecified hyperlipidemia    Hypothyroidism due to medication    Paroxysmal atrial fibrillation (HCC)    Anemia    Vitamin D deficiency    Fatigue    Osteopenia    Chronic diastolic congestive heart failure (HCC)    Non-rheumatic mitral regurgitation    Pulmonary hypertension    Weight loss    Abnormal chest x-ray    S/P right heart catheterization    Chronic atrial fibrillation (HCC)    Dyspnea    Hx of venous thromboembolic disease    CKD (chronic kidney disease) stage 3, GFR 30-59 ml/min (HCC)    UGI bleed    Supratherapeutic INR    Acute blood loss anemia    Coagulopathy (HCC)    Acute pulmonary edema (HCC)    Acute respiratory failure with hypoxia and hypercapnia (HCC)    Lightheaded    Severe malnutrition (HCC)    Disuse muscle atrophy           Subjective: Pt seen this AM.  She has more appetite now, and is eating better. Patient did well over the weekend, with no problems noted. Repeat labs this morning look good and are stable. Patient had problems with itching over the weekend, but this is now better with treatment. We will see how she does with therapies today.     BP (!) 93/58   Pulse 97   Temp 97.6 °F (36.4 °C) (Oral)   Resp 18   Ht 5' (1.524 m)   Wt 102 lb 11.8 oz (46.6 kg)   SpO2 94%   BMI 20.06 kg/m²     Last 24 hour lab  Recent Results (from the past 24 hour(s))   Basic Metabolic Panel    Collection Time: 06/10/19  6:26 AM   Result Value Ref Range    Sodium 139 136 - 145 mmol/L    Potassium 4.5 3.5 - 5.1 mmol/L    Chloride 99 99 - 110 mmol/L    CO2 31 21 - 32 mmol/L    Anion Gap 9 3 - 16    Glucose 90 70 - 99 mg/dL    BUN 20 7 - 20 mg/dL CREATININE 1.3 (H) 0.6 - 1.2 mg/dL    GFR Non-African American 39 (A) >60    GFR  47 (A) >60    Calcium 8.5 8.3 - 10.6 mg/dL       Therapy progress:  PT  Position Activity Restriction  Other position/activity restrictions: O2 1L via NC. General Diet. Patient reports does not have O2 at home. Objective     Sit to Stand: Minimal Assistance  Stand to sit: Minimal Assistance  Bed to Chair: Moderate assistance  Device: Rolling Walker  Other Apparatus: O2  Assistance: Contact guard assistance  Distance: 20 feet  OT  PT Equipment Recommendations  Equipment Needed: Yes  Mobility Devices: Lauree Maria: Rolling  Other: may need wheeled walker  Toilet - Technique: Ambulating  Equipment Used: Grab bars  Toilet Transfers Comments: quentin pateldy to commode, with Min/mod A stand to sit. Sit >stand off commode to stand, at walker, with Mod A                     Assessment and Plan:        Upper GI bleed secondary to small bowel AVMs- monitor, hold AC, off coumadin     Chronic diastolic congestive heart failure, Acute pulmonary edema- Lasix, DuoNeb Dulera     Paroxysmal atrial fibrillation- off coumadin     Chronic atrial fibrillation- amiodarone, Toprol     Chronic kidney disease - monitor      Severe malnutrition- Nutritional supplements     Bowels: Schedule Miralax + Senna S. Follow bowel movements. Enema or suppository if needed.      Bladder: Check PVR x 3.   [address] if PVR > 200ml or if any volume is > 500 ml.      Pain:  Tylenol is ordered prn.           Gerardo Chester MD, 6/10/2019, 7:43 AM

## 2019-06-10 NOTE — PROGRESS NOTES
Pt awake and AAO lying in bed watching TV. Denies pain. Speech clear. Pt s/p critical myopathy after GI bleed and respiratory failure. Pt weak. Lungs with noted fine crackles in RLL and decreased throughout. Occasional nonproductive cough. On O2 @ 1L/NC. Belly flat and soft with active BS. LBM today. Pt incontinent of urine and small bm and required bed pad change. Pericare given and zinc cream applied to reddened buttocks. No edema noted. Pt does c/o itchy skin at times and ordered lotion applied. Pt turned onto L side for comfort. Call light in reach. Bed alarm on.

## 2019-06-10 NOTE — PROGRESS NOTES
[facility] Internal Medicine Note      Chief Complaint: I Feel better today    Subjective/Interval History: The itching is better today. No other new problems. Feels less worn out, appreciated the day to rest yesterday. No pain. No chest pain. No cough or sputum. No nausea, vomiting, diarrhea. No abdominal pain. No dysuria. The remainder of the review of systems is negative. PMH, PSH, FH/SH reviewed and unchanged as documented in the H&P personally documented at admission 19    Medication list reviewed    Objective:    BP (!) 93/58   Pulse 97   Temp 97.6 °F (36.4 °C) (Oral)   Resp 18   Ht 5' (1.524 m)   Wt 102 lb 11.8 oz (46.6 kg)   SpO2 94%   BMI 20.06 kg/m²   Temp  Av.7 °F (36.5 °C)  Min: 97.6 °F (36.4 °C)  Max: 97.7 °F (36.5 °C)    CV- Irreg Irreg, rate controlled  Chest- crackles in both lung bases,  Unchanged over the last few days  Abdomen-bowel sounds positive, soft, nontender, nondistended  Extremity-minimal edema  Skin-no obvious rash. The Following Labs Were Reviewed Today:    Recent Results (from the past 24 hour(s))   Basic Metabolic Panel    Collection Time: 06/10/19  6:26 AM   Result Value Ref Range    Sodium 139 136 - 145 mmol/L    Potassium 4.5 3.5 - 5.1 mmol/L    Chloride 99 99 - 110 mmol/L    CO2 31 21 - 32 mmol/L    Anion Gap 9 3 - 16    Glucose 90 70 - 99 mg/dL    BUN 20 7 - 20 mg/dL    CREATININE 1.3 (H) 0.6 - 1.2 mg/dL    GFR Non-African American 39 (A) >60    GFR  47 (A) >60    Calcium 8.5 8.3 - 10.6 mg/dL       ASSESSMENT/PLAN:      Principal Problem:    Chronic diastolic congestive heart failure- still with crackles. Lasix increased 19. Continue to follow renal function. Blood pressure fairly low but stable. Follow. Active Problems:     Pruritus-  Improved. changed to sterile sheets and add betamethasone lotion to affected areas. Use Zyrtec 5 mg daily as needed. Better today.     Paroxysmal atrial fibrillation ([address]- rate is

## 2019-06-10 NOTE — PROGRESS NOTES
PHYSICAL THERAPY  Progress Note   Second Session    Patient Name: Lamberto Celis7 Record Number: [id number]    Treatment Diagnosis: Impaired functional mobility      Chart Reviewed: Yes   Restrictions/Precautions: Fall Risk Other position/activity restrictions: O2 1L via NC. General Diet. Patient reports does not have O2 at home. Additional Pertinent Hx: 81 y/o female admit 5/28/19 with GI Bleed, Respiratory Failure. 5/30/19 S/P EGD with Argon Plasma Coag.  5/31/19 S/P EGD with Placement Video Capsule. 6/3/19  Push Enteroscopy - no lesions. 6/3/19 Nasogastric Tube Feed placed. PMH includes Mitral Regurg, A-Fib, CHF, CKD, PE, Lung Surg, Humerus Fx, Osteopenia, Polymyalgia Rheumatica. Subjective: Patient reports no pain just tired, had visitor which tired her out. Objective  Patient sit to stand with CGA  Ambulated with wheeled walker 85' x 2 with CGA with 1L O2, O2 sats after ambulation 97%,   Patient performed sitting exercises- AP, 20 marches x 20 , LAQ x 15  Sit to stand , ambulated 50' with CGA, performed curb step with wheeled walker with minimal assist with mod/max cues assist with wheeled walker. Patient to Nustep with WL 3, x 5 min, 169 steps, O2 sats 94% after trying several fingers  Patient return to room via transpoter    Assessment: Prior to admit she was living alone in [facility] with elevator access; she was very independent with daily care and functional mobility (without assist device). Today, patient required CGA for transfers and gait and was able to ambulate 80' with a walker. Patient is functioning below baseline and will benefit from continued skilled PT intervention to address impaired strength, bed mobility, transfers, and gait.       Safety Device - Type of devices:  []  All fall risk precautions in place [] Bed alarm in place  [] Call light within reach [] Chair alarm in place [] Positioning belt [] Gait belt [] Patient at risk for falls [] Left in bed [] Left in chair [] Telesitter in use [] Sitter present [] Nurse notified []  None      Therapy Time   Individual Co-treatment   Time In 6493     Time Out 1430     Minutes 45         Electronically signed by Sandor Fernandez PT on 6/10/2019 at 2:33 PM

## 2019-06-10 NOTE — PLAN OF CARE
Problem: Risk for Impaired Skin Integrity  Goal: Tissue integrity - skin and mucous membranes  Description  Structural intactness and normal physiological function of skin and  mucous membranes. 6/10/2019 1034 by Matias Almaraz RN  Outcome: Ongoing  Note:   Pt is at risk for impaired skin integrity. Assess skin every shift and prn. Turn every 2 hours. Keep heels off bed. Keep skin clean and dry. Cream to buttocks for moisture associated. 6/10/2019 0238 by Darron Meigs, RN  Outcome: Ongoing  Note:   Pt s/p myopathy. Pt is weak. Check for incontinence. Change depends prn and apply zinc cream prn. Turn pt every 2 hours. Assess skin with each assessment. . Monitor for any further breakdown. Monitor s/s infection. Encourage adequate nutrition to promote wound healing. Encourage turning and repositioning. Monitor gale scores. Problem: Falls - Risk of:  Goal: Will remain free from falls  Description  Will remain free from falls  Outcome: Ongoing  Note:   Pt is at risk for falls. Call light in reach. Bed in low position. Alarm on. Nonskid footwear on. Possessions in reach. Transfers with stedy. Problem: OXYGENATION/RESPIRATORY FUNCTION  Goal: Patient will maintain patent airway  Outcome: Ongoing  Note:   Assess breath sounds. Check O2 sats.  Encourage IS, monitor vs.

## 2019-06-11 LAB
ANION GAP SERPL CALCULATED.3IONS-SCNC: 10 MMOL/L (ref 3–16)
BASOPHILS ABSOLUTE: 0.1 K/UL (ref 0–0.2)
BASOPHILS RELATIVE PERCENT: 1.4 %
BUN BLDV-MCNC: 22 MG/DL (ref 7–20)
CALCIUM SERPL-MCNC: 8.6 MG/DL (ref 8.3–10.6)
CHLORIDE BLD-SCNC: 95 MMOL/L (ref 99–110)
CO2: 31 MMOL/L (ref 21–32)
CREAT SERPL-MCNC: 1.5 MG/DL (ref 0.6–1.2)
EOSINOPHILS ABSOLUTE: 0.4 K/UL (ref 0–0.6)
EOSINOPHILS RELATIVE PERCENT: 5.9 %
GFR AFRICAN AMERICAN: 40
GFR NON-AFRICAN AMERICAN: 33
GLUCOSE BLD-MCNC: 89 MG/DL (ref 70–99)
HCT VFR BLD CALC: 29 % (ref 36–48)
HEMOGLOBIN: 9.4 G/DL (ref 12–16)
LYMPHOCYTES ABSOLUTE: 0.8 K/UL (ref 1–5.1)
LYMPHOCYTES RELATIVE PERCENT: 11.7 %
MCH RBC QN AUTO: 29.2 PG (ref 26–34)
MCHC RBC AUTO-ENTMCNC: 32.6 G/DL (ref 31–36)
MCV RBC AUTO: 89.4 FL (ref 80–100)
MONOCYTES ABSOLUTE: 0.3 K/UL (ref 0–1.3)
MONOCYTES RELATIVE PERCENT: 4.8 %
NEUTROPHILS ABSOLUTE: 5.2 K/UL (ref 1.7–7.7)
NEUTROPHILS RELATIVE PERCENT: 76.2 %
PDW BLD-RTO: 15.9 % (ref 12.4–15.4)
PLATELET # BLD: 313 K/UL (ref 135–450)
PMV BLD AUTO: 8.2 FL (ref 5–10.5)
POTASSIUM SERPL-SCNC: 4 MMOL/L (ref 3.5–5.1)
RBC # BLD: 3.24 M/UL (ref 4–5.2)
SODIUM BLD-SCNC: 136 MMOL/L (ref 136–145)
WBC # BLD: 6.8 K/UL (ref 4–11)

## 2019-06-11 PROCEDURE — 80048 BASIC METABOLIC PNL TOTAL CA: CPT

## 2019-06-11 PROCEDURE — 6370000000 HC RX 637 (ALT 250 FOR IP): Performed by: INTERNAL MEDICINE

## 2019-06-11 PROCEDURE — 94640 AIRWAY INHALATION TREATMENT: CPT

## 2019-06-11 PROCEDURE — 97530 THERAPEUTIC ACTIVITIES: CPT

## 2019-06-11 PROCEDURE — 1280000000 HC REHAB R&B

## 2019-06-11 PROCEDURE — 97116 GAIT TRAINING THERAPY: CPT | Performed by: PHYSICAL THERAPIST

## 2019-06-11 PROCEDURE — 97110 THERAPEUTIC EXERCISES: CPT | Performed by: PHYSICAL THERAPIST

## 2019-06-11 PROCEDURE — 99232 SBSQ HOSP IP/OBS MODERATE 35: CPT | Performed by: INTERNAL MEDICINE

## 2019-06-11 PROCEDURE — 97530 THERAPEUTIC ACTIVITIES: CPT | Performed by: PHYSICAL THERAPIST

## 2019-06-11 PROCEDURE — 6370000000 HC RX 637 (ALT 250 FOR IP): Performed by: PHYSICAL MEDICINE & REHABILITATION

## 2019-06-11 PROCEDURE — 2700000000 HC OXYGEN THERAPY PER DAY

## 2019-06-11 PROCEDURE — 94760 N-INVAS EAR/PLS OXIMETRY 1: CPT

## 2019-06-11 PROCEDURE — 85025 COMPLETE CBC W/AUTO DIFF WBC: CPT

## 2019-06-11 PROCEDURE — 97110 THERAPEUTIC EXERCISES: CPT

## 2019-06-11 PROCEDURE — 94669 MECHANICAL CHEST WALL OSCILL: CPT

## 2019-06-11 PROCEDURE — 36415 COLL VENOUS BLD VENIPUNCTURE: CPT

## 2019-06-11 PROCEDURE — 97535 SELF CARE MNGMENT TRAINING: CPT

## 2019-06-11 RX ORDER — FUROSEMIDE 40 MG/1
40 TABLET ORAL DAILY
Status: DISCONTINUED | OUTPATIENT
Start: 2019-06-12 | End: 2019-06-16

## 2019-06-11 RX ADMIN — IPRATROPIUM BROMIDE AND ALBUTEROL SULFATE 1 AMPULE: .5; 3 SOLUTION RESPIRATORY (INHALATION) at 07:59

## 2019-06-11 RX ADMIN — PANTOPRAZOLE SODIUM 40 MG: 40 TABLET, DELAYED RELEASE ORAL at 05:44

## 2019-06-11 RX ADMIN — AMIODARONE HYDROCHLORIDE 200 MG: 200 TABLET ORAL at 07:44

## 2019-06-11 RX ADMIN — MOMETASONE FUROATE AND FORMOTEROL FUMARATE DIHYDRATE 2 PUFF: 200; 5 AEROSOL RESPIRATORY (INHALATION) at 21:27

## 2019-06-11 RX ADMIN — FUROSEMIDE 40 MG: 40 TABLET ORAL at 07:44

## 2019-06-11 RX ADMIN — IPRATROPIUM BROMIDE AND ALBUTEROL SULFATE 1 AMPULE: .5; 3 SOLUTION RESPIRATORY (INHALATION) at 16:16

## 2019-06-11 RX ADMIN — IPRATROPIUM BROMIDE AND ALBUTEROL SULFATE 1 AMPULE: .5; 3 SOLUTION RESPIRATORY (INHALATION) at 12:43

## 2019-06-11 RX ADMIN — AMIODARONE HYDROCHLORIDE 200 MG: 200 TABLET ORAL at 21:07

## 2019-06-11 RX ADMIN — MEMANTINE HYDROCHLORIDE 10 MG: 10 TABLET ORAL at 07:43

## 2019-06-11 RX ADMIN — METOPROLOL SUCCINATE 50 MG: 50 TABLET, EXTENDED RELEASE ORAL at 21:07

## 2019-06-11 RX ADMIN — BETAMETHASONE DIPROPIONATE: 0.5 LOTION, AUGMENTED TOPICAL at 21:08

## 2019-06-11 RX ADMIN — POTASSIUM CHLORIDE 20 MEQ: 750 TABLET, FILM COATED, EXTENDED RELEASE ORAL at 07:43

## 2019-06-11 RX ADMIN — LATANOPROST 1 DROP: 50 SOLUTION/ DROPS OPHTHALMIC at 21:08

## 2019-06-11 RX ADMIN — MEMANTINE HYDROCHLORIDE 10 MG: 10 TABLET ORAL at 21:07

## 2019-06-11 RX ADMIN — IPRATROPIUM BROMIDE AND ALBUTEROL SULFATE 1 AMPULE: .5; 3 SOLUTION RESPIRATORY (INHALATION) at 21:27

## 2019-06-11 RX ADMIN — MOMETASONE FUROATE AND FORMOTEROL FUMARATE DIHYDRATE 2 PUFF: 200; 5 AEROSOL RESPIRATORY (INHALATION) at 07:59

## 2019-06-11 RX ADMIN — METOPROLOL SUCCINATE 50 MG: 50 TABLET, EXTENDED RELEASE ORAL at 07:44

## 2019-06-11 RX ADMIN — LEVOTHYROXINE SODIUM 75 MCG: 75 TABLET ORAL at 05:44

## 2019-06-11 ASSESSMENT — PAIN SCALES - GENERAL
PAINLEVEL_OUTOF10: 0

## 2019-06-11 NOTE — PLAN OF CARE
Problem: Risk for Impaired Skin Integrity  Goal: Tissue integrity - skin and mucous membranes  Description  Structural intactness and normal physiological function of skin and  mucous membranes. 6/8/2019 1004 by Ernesto Landaverde RN  Outcome: Ongoing  Note:   Skin assessment completed on admission and every shift. Barrier wipes used in the event of incontinence. Pressure relief techniques used as needed while in chair and in bed. Position changes encouraged at least every two hours while in bed. Problem: Falls - Risk of:  Goal: Will remain free from falls  Description  Will remain free from falls  6/8/2019 1004 by Ernesto Landaverde RN  Outcome: Ongoing  Note:   Orange band on patient. Orange light on outside of room. Non skid footwear in place. Alarms used appropriately. Patient instructed to call and wait for staff before getting up. Rounding done to anticipate needs. Appropriate safety devices used for transfers. Problem: ACTIVITY INTOLERANCE/IMPAIRED MOBILITY  Goal: Mobility/activity is maintained at optimum level for patient  6/8/2019 1004 by Ernesto Landaverde RN  Outcome: Ongoing  Note:   Patient working with therapy at least 3 hours/day to obtain maximal mobility. Safety devices used.

## 2019-06-11 NOTE — PATIENT CARE CONFERENCE
Devices: Lana Sifuentes: Rolling  Other: may need wheeled walker    Assessment: Prior to admit she was living alone in [facility] with elevator access; she was very independent with daily care and functional mobility (without assist device). Today, Pt has met 2/4 STGs and is working towards her LTGs. She required CGA for transfers and gait, min A for bed mobility and was able to ambulate [de-identified]' with a RW and amb up/down curb with min A. Patient is functioning below baseline and will benefit from continued skilled PT intervention to address impaired strength, bed mobility, transfers, and gait. SPEECH THERAPY (intentionally left blank if not actively being seen by this service):      OCCUPATIONAL THERAPY    ADL  Equipment Provided: Reacher  Feeding: Independent(feeing self indep , opening packages indep)  Grooming: Stand by assistance(stood to brush teeth, comb hair)  UE Bathing: Supervision  LE Bathing: Stand by assistance(seated on shower chair for majority of shower, grab bar for stance to bathe buttocks)  UE Dressing: Setup(chose not to wear bra, shirt per self)  LE Dressing: Contact guard assistance(assisted with BRANNON hose, otherwise only CGA in stance)  Toileting: Contact guard assistance, Increased time to complete, Setup(hygiene after urination, small BM, pants up/down)  Additional Comments: mepelix borders on heels removed, noted blister mid upper back. Nursing notified of both. Bed mobility  Rolling to Left: Minimal assistance  Rolling to Right: Contact guard assistance  Supine to Sit: Minimal assistance  Sit to Supine: Stand by assistance  Scooting: Contact guard assistance  Comment: from treatment mat    Functional Transfers: Toilet Transfers  Toilet - Technique: Ambulating  Equipment Used: Grab bars  Toilet Transfer: Minimal assistance  Toilet Transfers Comments: CGA stand to sit, min sit to stand     Shower Transfers  Shower - Transfer From: Maddie Smithman - Transfer Type:  To and From  [employer] - Transfer To: Shower seat with back  Shower Transfers: Contact Guard  Shower Transfers Comments: grab bars    Perception  Overall Perceptual Status: WFL         UE Function:  Min decreased shoulder strength, fatigues quickly, but functional for ADL      FIMS:  Eatin - Patient feeds self  Groomin - Requires setup/cues to do all tasks  Bathin - Able to bathe 8-9 areas  Dressing-Upper: 5 - Requires setup/supervision/cues and/or requires assist with presthesis/brace only  Dressing-Lower: 4 - Requires assist with buttons/zippers/shoelaces and/or assist with shoes only  Toiletin - Requires steadying assistance only  Toilet Transfer: 4 - Requires steadying assistance only < 25% assist  Shower Transfer: 4 - Minimal contact assistance, pt. expends 75% or more effort         Assessment: Pt has improved with self care to CGA using grab bar for bathing/dressing LB, otherwise only assist for BRANNON hose which she will most likely not need at discharge. Transfers and functional mobilitly using rolling walker with CGA. Activity tolerance has improved, she is now on room air. Moves slowly, extra time for activities, but much improved. She continues to demonstrate decreased STM, but seems to be improving. Anticipate pt will require [id number] days inpt rehab to maximize independence for safe return home alone        NUTRITION  Most recent weightWeight: 101 lb 13.6 oz (46.2 kg)  BSA (Calculated - sq m): 1.43 sq meters  BMI (Calculated): 19.9  Diet Order: DIET GENERAL;  PO Meals Eaten (%): 51 - 75%      NURSING  Bladder 6, can be 1 due to level of assist, last bm FIM, 1 on , monitor and maintain skin integrity, excoriation under breast and maddy area. Family Education: Patient education: CHF, safety/fall prevention, medications, self care bladder and bowel needs, skin care/prevention, pain control as needed. MEDICAL  Adjusting lasix with monitoring of renal function. Monitoring Hgb in setting of GIB.    Working on improving severe malnutrition. TEAM SUMMARY AND DISCHARGE PLAN  Estimated Length of Stay:DC 6/21/19  Destination: home alone with home care services   · Anticipated Services at Discharge:    [x] OT  [x] PT   [] SLP    [x] RN   [] Home Health aide []   Community Resources: _______________________________  Equipment recommendations:  [] Hospital bed [] Tub bench  [] Shower chair [] Hand held shower  [] Raised toilet seat [] Toilet safety frame [] Bedside commode   [] W/C: _____  [x] [facility] [] Standard walker [] Gait belt [] cane: _________  [] Sliding board [] Alternate seating/furniture [] O2 [] Hip Kit: _______  [] Life Line [] Other: _______  Factors facilitating achievement of predicted outcomes: works hard, motivated, cooperative, pleasant   Barriers to the achievement of predicted outcomes/Interventions:   Lives alone, decreased activity tolerance- strengthening, conditioning, adaptive equipment and compensatory strategies for self care and mobility, activity pacing       Interdisciplinary Individualized Plan of Care Review:    · Continue Current Plan of Care: Yes    · Modifications:_____________________________    Special Needs in the Upcoming Week :    [] Family/Caregiver Education  [] Home visit  []Therapeutic Pass   [] Consults:_______    [] Other;_______    Patient Rehab Team Goals for the Upcoming Week:  1. Improve standing balance and household distance ambulation with wheeled walker to allow patient to maximize independence with self-care activities. 2. Improve activity tolerance to complete ADL and mobility tasks with min rest breaks  3.  Patient goals : \"I want to be able to do everything\"  with further questioning, she agreed she wanted to be off the oxygen, walk around by herself, drive, do her own self care              Team Members Present at Conference:  Physician: Dr Levi Valladares  : YON Hdez    Occupational Therapist:  Maxx Underwood, OTR/L [id number]  Physical

## 2019-06-11 NOTE — PROGRESS NOTES
Physical Therapy  Facility/Department: [address] IP REHAB  Daily Treatment Note  NAME: Ronald Crook  : 1935  MRN: [id number]    Date of Service: 2019    Discharge Recommendations:  Patient would benefit from continued therapy after discharge, Continue to assess pending progress   PT Equipment Recommendations  Equipment Needed: Yes  Mobility Devices: Radha Karon: Rolling  Other: may need wheeled walker    Assessment   Body structures, Functions, Activity limitations: Decreased functional mobility ; Decreased strength;Decreased endurance;Decreased balance;Decreased ADL status  Assessment: Prior to admit she was living alone in [facility] with elevator access; she was very independent with daily care and functional mobility (without assist device). Today, Pt has met 2/4 STGs and is working towards her LTGs. She required CGA for transfers and gait, min A for bed mobility and was able to ambulate [de-identified]' with a RW and amb up/down curb with min A. Patient is functioning below baseline and will benefit from continued skilled PT intervention to address impaired strength, bed mobility, transfers, and gait. Treatment Diagnosis: Impaired functional mobility  Prognosis: Good  Patient Education: Hand placement for transfers  REQUIRES PT FOLLOW UP: Yes  Activity Tolerance  Activity Tolerance: Patient limited by fatigue;Patient limited by endurance; Patient Tolerated treatment well     Patient Diagnosis(es): There were no encounter diagnoses. has a past medical history of Anemia, Anticoagulant long-term use, Atrial fibrillation (HCC), CHF (congestive heart failure) ([address] CKD (chronic kidney disease) stage 3, GFR 30-59 ml/min (HCC), Clostridium difficile diarrhea, Depression, Humerus fracture, Hyperlipidemia, Hypertension, Hypothyroidism, Mitral regurgitation, Optic neuritis, Osteopenia, Polymyalgia rheumatica ([address] Pulmonary embolus ([address] Thyroid disease, and Vitamin D deficiency.    has a past surgical history

## 2019-06-11 NOTE — PROGRESS NOTES
Department of [facility] Progress Note    Patient Identification:  Johnathan Nelson  [id number]  : 1935  Admit date: 2019      Diagnosis:   Patient Active Problem List   Diagnosis    Disorder of bone and cartilage    Edema    Essential hypertension, benign    Other and unspecified hyperlipidemia    Hypothyroidism due to medication    Paroxysmal atrial fibrillation (HCC)    Anemia    Vitamin D deficiency    Fatigue    Osteopenia    Chronic diastolic congestive heart failure (HCC)    Non-rheumatic mitral regurgitation    Pulmonary hypertension    Weight loss    Abnormal chest x-ray    S/P right heart catheterization    Chronic atrial fibrillation (HCC)    Dyspnea    Hx of venous thromboembolic disease    CKD (chronic kidney disease) stage 3, GFR 30-59 ml/min (HCC)    UGI bleed    Supratherapeutic INR    Acute blood loss anemia    Coagulopathy (HCC)    Acute pulmonary edema (HCC)    Acute respiratory failure with hypoxia and hypercapnia (HCC)    Lightheaded    Severe malnutrition (HCC)    Disuse muscle atrophy           Subjective: Pt seen this AM.  She is worn out from her therapies yesterday, but feels that she did fairly well in therapies, and is improving with her leg strength and endurance. She also notes that she is eating better. Repeat labs look good and are stable. Patient had repeat labs this morning, which show a slight increase in her creatinine level. Blood pressure remains low. We will encourage her to drink more fluids.       /60   Pulse 103   Temp 97.4 °F (36.3 °C) (Oral)   Resp 16   Ht 5' (1.524 m)   Wt 101 lb 13.6 oz (46.2 kg)   SpO2 92%   BMI 19.89 kg/m²     Last 24 hour lab  Recent Results (from the past 24 hour(s))   Basic Metabolic Panel    Collection Time: 19  6:28 AM   Result Value Ref Range    Sodium 136 136 - 145 mmol/L    Potassium 4.0 3.5 - 5.1 mmol/L    Chloride 95 (L) 99 - 110 mmol/L    CO2 31 21 - 32 mmol/L    Anion Gap 10 3 - 16    Glucose 89 70 - 99 mg/dL    BUN 22 (H) 7 - 20 mg/dL    CREATININE 1.5 (H) 0.6 - 1.2 mg/dL    GFR Non- 33 (A) >60    GFR  40 (A) >60    Calcium 8.6 8.3 - 10.6 mg/dL   CBC Auto Differential    Collection Time: 06/11/19  6:28 AM   Result Value Ref Range    WBC 6.8 4.0 - 11.0 K/uL    RBC 3.24 (L) 4.00 - 5.20 M/uL    Hemoglobin 9.4 (L) 12.0 - 16.0 g/dL    Hematocrit 29.0 (L) 36.0 - 48.0 %    MCV 89.4 80.0 - 100.0 fL    MCH 29.2 26.0 - 34.0 pg    MCHC 32.6 31.0 - 36.0 g/dL    RDW 15.9 (H) 12.4 - 15.4 %    Platelets 948 520 - 509 K/uL    MPV 8.2 5.0 - 10.5 fL    Neutrophils % 76.2 %    Lymphocytes % 11.7 %    Monocytes % 4.8 %    Eosinophils % 5.9 %    Basophils % 1.4 %    Neutrophils # 5.2 1.7 - 7.7 K/uL    Lymphocytes # 0.8 (L) 1.0 - 5.1 K/uL    Monocytes # 0.3 0.0 - 1.3 K/uL    Eosinophils # 0.4 0.0 - 0.6 K/uL    Basophils # 0.1 0.0 - 0.2 K/uL       Therapy progress:  PT  Position Activity Restriction  Other position/activity restrictions: O2 1L via NC. General Diet. Patient reports does not have O2 at home.   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 Follow bowel movements. Enema or suppository if needed.      Bladder: Check PVR x 3.   [facility] if PVR > 200ml or if any volume is > 500 ml.      Pain:  Tylenol is ordered prn.           Simin Schuster MD, 6/11/2019, 8:28 AM

## 2019-06-11 NOTE — PROGRESS NOTES
Patient is a 79 y/o F admitted to rehab with weakness following GI bleed and CHF. A/A/O 4. Transfers with assist of walker x1 with CGA. On general diet, tolerating well. May need to add sodium restriction if PO intake increases per dietician. Medications taken whole in water. Coumadin on hold due to GI bleed. Skin intact, redness under breasts and to maddy area due to moisture. O2 weaned to RA this AM, will monitor for desaturation. No crackles noted to lungs. Has been incontinent of urine and stool. LBM 6/9. Discussed metoprolol parameters with MD so medication will not be held consistently. Will monitor for safety.

## 2019-06-11 NOTE — PROGRESS NOTES
Occupational Therapy  Facility/Department: [address] REHAB  Daily Treatment Note  NAME: Steph Arthur  : 1935  MRN: [id number]    Date of Service: 2019    Discharge Recommendations:  Home with Home health OT, Home with assist PRN, Continue to assess pending progress  OT Equipment Recommendations  Other: TBD    Assessment   Performance deficits / Impairments: Decreased functional mobility ; Decreased ADL status; Decreased strength;Decreased endurance;Decreased high-level IADLs;Decreased balance  Assessment: Pt has improved with self care to CGA using grab bar for bathing/dressing LB, otherwise only assist for BRANNON hose which she will most likely not need at discharge. Transfers and functional mobilitly using rolling walker with CGA. Activity tolerance has improved, she is now on room air. Moves slowly, extra time for activities, but much improved. She continues to demonstrate decreased STM, but seems to be improving. Anticipate pt will require [id number] days inpt rehab to maximize independence for safe return home alone  Treatment Diagnosis: decreased ADl, IADL, balance, activity tolerance  REQUIRES OT FOLLOW UP: Yes  Activity Tolerance  Activity Tolerance: Patient Tolerated treatment well  Safety Devices  Type of devices: Call light within reach;Nurse notified;Gait belt;Left in chair;Chair alarm in place; Patient at risk for falls            has a past medical history of Anemia, Anticoagulant long-term use, Atrial fibrillation ([address] CHF (congestive heart failure) ([address] CKD (chronic kidney disease) stage 3, GFR 30-59 ml/min ([facility]), Clostridium difficile diarrhea, Depression, Humerus fracture, Hyperlipidemia, Hypertension, Hypothyroidism, Mitral regurgitation, Optic neuritis, Osteopenia, Polymyalgia rheumatica ([address] Pulmonary embolus ([address] Thyroid disease, and Vitamin D deficiency.    has a past surgical history that includes Lung surgery; malignant skin lesion excision; Colonoscopy repiratory failure, acute pulmonary edema  Subjective  Subjective: pt met in dept, agreeable to OT, stated she needed to use the commode           Objective    ADL  Feeding: Independent(feeing self indep , opening packages indep)  Grooming: Stand by assistance(stood to brush teeth, comb hair)  UE Bathing: Supervision  LE Bathing: Stand by assistance(seated on shower chair for majority of shower, grab bar for stance to bathe buttocks)  UE Dressing: Setup(chose not to wear bra, shirt per self)  LE Dressing: Contact guard assistance(assisted with BRANNON hose, otherwise only CGA in stance)  Additional Comments: mepelix borders on heels removed, noted blister mid upper back. Nursing notified of both. Balance  Sitting Balance: Independent  Standing Balance: Stand by assistance  Standing Balance  Time: 3 min  Activity: standing at sink for grooming  Functional Mobility  Functional - Mobility Device: Rolling Walker  Activity: To/from bathroom  Assist Level: Contact guard assistance  Functional Mobility Comments: 20 ft to bathroom  Shower Transfers  Shower - Transfer From: [facility] - Transfer Type: To and From  Shower - Transfer To: Shower seat with back  Shower Transfers: Contact Guard  Shower Transfers Comments: grab bars                             Cognition  Overall Cognitive Status: Exceptions  Memory: Decreased short term memory;Decreased recall of recent events  Cognition Comment: noted decreased memory, pt stated it feels like it is worse now than PTA. Will determine if speech therapy is needed or if it resolves               PM session  Pt met bedside, agreeable to OT, Stood from recliner, amb with SBA/CGA to bathroom, managed clothing up/down with CGA - minor LOB posterior, standing without grab bar to pull pants with both hands. Hygiene indep.     Amb to sink to wash hands with SBA/CGA for safety      In dept, completed UE ex to improve activity tolerance for ADL and mobility skills  Hand bike 2 min [facility] Yasir Ornelas, OTR/L 770

## 2019-06-11 NOTE — PROGRESS NOTES
[facility] Internal Medicine Note      Chief Complaint: My breathing is better    Subjective/Interval History: The itching is back today. She reports her breathing is much better. Still feels very weak overall. No new problems otherwise noted. Weight is down 3 pounds over the last few days. No chest pain. No cough or sputum. No nausea, vomiting, diarrhea. No abdominal pain. No dysuria. The remainder of the review of systems is negative. PMH, PSH, FH/SH reviewed and unchanged as documented in the H&P personally documented at admission 19    Medication list reviewed    Objective:    /60   Pulse 103   Temp 97.4 °F (36.3 °C) (Oral)   Resp 16   Ht 5' (1.524 m)   Wt 101 lb 13.6 oz (46.2 kg)   SpO2 92%   BMI 19.89 kg/m²   Temp  Av.4 °F (36.3 °C)  Min: 97.4 °F (36.3 °C)  Max: 97.4 °F (36.3 °C)    CV- Irreg Irreg, rate controlled  Chest- crackles in both lung bases, improved since yesterday. Abdomen-bowel sounds positive, soft, nontender, nondistended  Extremity-minimal edema  Skin- remains without an obvious rash.      The Following Labs Were Reviewed Today:    Recent Results (from the past 24 hour(s))   Basic Metabolic Panel    Collection Time: 19  6:28 AM   Result Value Ref Range    Sodium 136 136 - 145 mmol/L    Potassium 4.0 3.5 - 5.1 mmol/L    Chloride 95 (L) 99 - 110 mmol/L    CO2 31 21 - 32 mmol/L    Anion Gap 10 3 - 16    Glucose 89 70 - 99 mg/dL    BUN 22 (H) 7 - 20 mg/dL    CREATININE 1.5 (H) 0.6 - 1.2 mg/dL    GFR Non- 33 (A) >60    GFR  40 (A) >60    Calcium 8.6 8.3 - 10.6 mg/dL   CBC Auto Differential    Collection Time: 19  6:28 AM   Result Value Ref Range    WBC 6.8 4.0 - 11.0 K/uL    RBC 3.24 (L) 4.00 - 5.20 M/uL    Hemoglobin 9.4 (L) 12.0 - 16.0 g/dL    Hematocrit 29.0 (L) 36.0 - 48.0 %    MCV 89.4 80.0 - 100.0 fL    MCH 29.2 26.0 - 34.0 pg    MCHC 32.6 31.0 - 36.0 g/dL    RDW 15.9 (H) 12.4 - 15.4 %    Platelets 419 864 - 577 K/uL    MPV 8.2 5.0 - 10.5 fL    Neutrophils % 76.2 %    Lymphocytes % 11.7 %    Monocytes % 4.8 %    Eosinophils % 5.9 %    Basophils % 1.4 %    Neutrophils # 5.2 1.7 - 7.7 K/uL    Lymphocytes # 0.8 (L) 1.0 - 5.1 K/uL    Monocytes # 0.3 0.0 - 1.3 K/uL    Eosinophils # 0.4 0.0 - 0.6 K/uL    Basophils # 0.1 0.0 - 0.2 K/uL       ASSESSMENT/PLAN:      Principal Problem:    Chronic diastolic congestive heart failure-weight improved. Crackles improved. Creatinine up today, so return to once daily Lasix. Active Problems:     Pruritus-itching back today. Encouraged the patient to make sure she got her Zyrtec today. Discussed with nursing. Paroxysmal atrial fibrillation (HCC)-rate is higher today. Nursing reports the Toprol has been held due to low blood pressure. Hold parameters adjusted to systolic blood pressure less than 90. She would benefit from better rate control. Non-rheumatic mitral regurgitation- unchanged. Continue to monitor. Keep blood pressure as low as tolerated. CKD (chronic kidney disease) stage 3, GFR 30-59 ml/min ([facility])-the renal function is slightly worse. Reduce the Lasix to once daily. Acute blood loss anemia- follow, hemoglobin stable.     Disuse muscle atrophy-continue therapy    Cosme Phoenix MD, FACP  8:25 AM  6/11/2019

## 2019-06-11 NOTE — PLAN OF CARE
Problem: Nutrition  Goal: Optimal nutrition therapy  Outcome: Ongoing     Nutrition Problem: Inadequate oral intake  Intervention: Food and/or Nutrient Delivery: Continue current diet, Discontinue ONS  Nutritional Goals: >50% of meals and supplement consumed

## 2019-06-11 NOTE — PROGRESS NOTES
Nutrition Assessment    Type and Reason for Visit: Reassess    Nutrition Recommendations:   Continue diet free of therapeutic restrictions, monitor need for sodium restrictions once intake more consistent   Will d/c Ensure per pt request   Will monitor nutritional adequacy, nutrition-related labs, weights, BMs, and clinical progress     Nutrition Assessment: Pt remains at nutritional risk, however intake is improving per pt report and observation. Visited during lunch, pt was close to finishing tray. Pt does not like Ensure (too sweet per her report), will d/c. Declined other supplements. Further nutrition compromise possible r/t dysgeusia (per pt report), renal/cardiac dysfunction, increased nutrition needs. Will continue to monitor. Malnutrition Assessment:  · Malnutrition Status: No malnutrition  · Context: Acute illness or injury  · Findings of the 6 clinical characteristics of malnutrition (Minimum of 2 out of 6 clinical characteristics is required to make the diagnosis of moderate or severe Protein Calorie Malnutrition based on AND/ASPEN Guidelines):  1. Energy Intake-Greater than 75% of estimated energy requirement, Greater than or equal to 5 days    2. Weight Loss-Unable to assess, in 1 week  3. Fat Loss-Unable to assess,    4. Muscle Loss-Unable to assess,    5. Fluid Accumulation-No significant fluid accumulation,    6.  Strength-Not measured    Nutrition Risk Level:  Moderate    Nutrient Needs:  · Estimated Daily Total Kcal: 9014-1630 caloreis based on 25-30 calories/kgm  · Estimated Daily Protein (g): 48-58 gm based on 1-1.2 gm/ kgm  · Estimated Daily Total Fluid (ml/day): 1 ml/kcal    Nutrition Diagnosis:   · Problem: Inadequate oral intake  · Etiology: related to Alteration in GI function     Signs and symptoms:  as evidenced by Diet history of poor intake, Weight loss    Objective Information:  · Nutrition-Focused Physical Findings: pt tolerating diet   · Wound Type: None  · Current Nutrition Therapies:  · Oral Diet Orders: General   · Oral Diet intake: %  · Oral Nutrition Supplement (ONS) Orders: Standard High Calorie Oral Supplement  · ONS intake: Refused  · Anthropometric Measures:  · Ht: 5' (152.4 cm)   · Current Body Wt: 101 lb (45.8 kg)  · % Weight Change:  ,  trending down, pt is on lasix however   · BMI Classification: BMI 18.5 - 24.9 Normal Weight    Nutrition Interventions:   Continue current diet, Discontinue ONS  Continued Inpatient Monitoring    Nutrition Evaluation:   · Evaluation: Progressing toward goals   · Goals: >50% of meals and supplement consumed    · Monitoring: Meal Intake, Supplement Intake, Pertinent Labs, Weight, Monitor Bowel Function      Electronically signed by Sandra Stanton RD, LD on 6/11/19 at 12:49 PM    Contact Number: [phone]

## 2019-06-12 LAB
ANION GAP SERPL CALCULATED.3IONS-SCNC: 12 MMOL/L (ref 3–16)
BUN BLDV-MCNC: 24 MG/DL (ref 7–20)
CALCIUM SERPL-MCNC: 9 MG/DL (ref 8.3–10.6)
CHLORIDE BLD-SCNC: 94 MMOL/L (ref 99–110)
CO2: 30 MMOL/L (ref 21–32)
CREAT SERPL-MCNC: 1.4 MG/DL (ref 0.6–1.2)
GFR AFRICAN AMERICAN: 43
GFR NON-AFRICAN AMERICAN: 36
GLUCOSE BLD-MCNC: 78 MG/DL (ref 70–99)
POTASSIUM SERPL-SCNC: 4.1 MMOL/L (ref 3.5–5.1)
SODIUM BLD-SCNC: 136 MMOL/L (ref 136–145)

## 2019-06-12 PROCEDURE — 94668 MNPJ CHEST WALL SBSQ: CPT

## 2019-06-12 PROCEDURE — 97530 THERAPEUTIC ACTIVITIES: CPT

## 2019-06-12 PROCEDURE — 6370000000 HC RX 637 (ALT 250 FOR IP): Performed by: PHYSICAL MEDICINE & REHABILITATION

## 2019-06-12 PROCEDURE — 2700000000 HC OXYGEN THERAPY PER DAY

## 2019-06-12 PROCEDURE — 94760 N-INVAS EAR/PLS OXIMETRY 1: CPT

## 2019-06-12 PROCEDURE — 97530 THERAPEUTIC ACTIVITIES: CPT | Performed by: PHYSICAL THERAPIST

## 2019-06-12 PROCEDURE — 80048 BASIC METABOLIC PNL TOTAL CA: CPT

## 2019-06-12 PROCEDURE — 94640 AIRWAY INHALATION TREATMENT: CPT

## 2019-06-12 PROCEDURE — 6370000000 HC RX 637 (ALT 250 FOR IP): Performed by: INTERNAL MEDICINE

## 2019-06-12 PROCEDURE — 97110 THERAPEUTIC EXERCISES: CPT

## 2019-06-12 PROCEDURE — 97116 GAIT TRAINING THERAPY: CPT | Performed by: PHYSICAL THERAPIST

## 2019-06-12 PROCEDURE — 99232 SBSQ HOSP IP/OBS MODERATE 35: CPT | Performed by: INTERNAL MEDICINE

## 2019-06-12 PROCEDURE — 94669 MECHANICAL CHEST WALL OSCILL: CPT

## 2019-06-12 PROCEDURE — 1280000000 HC REHAB R&B

## 2019-06-12 PROCEDURE — 97110 THERAPEUTIC EXERCISES: CPT | Performed by: PHYSICAL THERAPIST

## 2019-06-12 PROCEDURE — 36415 COLL VENOUS BLD VENIPUNCTURE: CPT

## 2019-06-12 PROCEDURE — 97535 SELF CARE MNGMENT TRAINING: CPT

## 2019-06-12 RX ADMIN — METOPROLOL SUCCINATE 50 MG: 50 TABLET, EXTENDED RELEASE ORAL at 08:12

## 2019-06-12 RX ADMIN — PANTOPRAZOLE SODIUM 40 MG: 40 TABLET, DELAYED RELEASE ORAL at 05:29

## 2019-06-12 RX ADMIN — POTASSIUM CHLORIDE 20 MEQ: 750 TABLET, FILM COATED, EXTENDED RELEASE ORAL at 08:12

## 2019-06-12 RX ADMIN — MEMANTINE HYDROCHLORIDE 10 MG: 10 TABLET ORAL at 20:29

## 2019-06-12 RX ADMIN — MOMETASONE FUROATE AND FORMOTEROL FUMARATE DIHYDRATE 2 PUFF: 200; 5 AEROSOL RESPIRATORY (INHALATION) at 20:36

## 2019-06-12 RX ADMIN — ACETAMINOPHEN 650 MG: 325 TABLET ORAL at 20:29

## 2019-06-12 RX ADMIN — FUROSEMIDE 40 MG: 40 TABLET ORAL at 08:12

## 2019-06-12 RX ADMIN — IPRATROPIUM BROMIDE AND ALBUTEROL SULFATE 1 AMPULE: .5; 3 SOLUTION RESPIRATORY (INHALATION) at 16:51

## 2019-06-12 RX ADMIN — AMIODARONE HYDROCHLORIDE 200 MG: 200 TABLET ORAL at 08:12

## 2019-06-12 RX ADMIN — IPRATROPIUM BROMIDE AND ALBUTEROL SULFATE 1 AMPULE: .5; 3 SOLUTION RESPIRATORY (INHALATION) at 20:36

## 2019-06-12 RX ADMIN — MOMETASONE FUROATE AND FORMOTEROL FUMARATE DIHYDRATE 2 PUFF: 200; 5 AEROSOL RESPIRATORY (INHALATION) at 07:58

## 2019-06-12 RX ADMIN — SENNOSIDES,DOCUSATE SODIUM 2 TABLET: 8.6; 5 TABLET, FILM COATED ORAL at 08:12

## 2019-06-12 RX ADMIN — BETAMETHASONE DIPROPIONATE: 0.5 LOTION, AUGMENTED TOPICAL at 20:32

## 2019-06-12 RX ADMIN — IPRATROPIUM BROMIDE AND ALBUTEROL SULFATE 1 AMPULE: .5; 3 SOLUTION RESPIRATORY (INHALATION) at 07:58

## 2019-06-12 RX ADMIN — CETIRIZINE HYDROCHLORIDE 5 MG: 10 TABLET, FILM COATED ORAL at 20:29

## 2019-06-12 RX ADMIN — LEVOTHYROXINE SODIUM 75 MCG: 75 TABLET ORAL at 05:29

## 2019-06-12 RX ADMIN — SENNOSIDES,DOCUSATE SODIUM 2 TABLET: 8.6; 5 TABLET, FILM COATED ORAL at 20:28

## 2019-06-12 RX ADMIN — MEMANTINE HYDROCHLORIDE 10 MG: 10 TABLET ORAL at 08:12

## 2019-06-12 RX ADMIN — AMIODARONE HYDROCHLORIDE 200 MG: 200 TABLET ORAL at 20:29

## 2019-06-12 RX ADMIN — LATANOPROST 1 DROP: 50 SOLUTION/ DROPS OPHTHALMIC at 20:32

## 2019-06-12 RX ADMIN — METOPROLOL SUCCINATE 50 MG: 50 TABLET, EXTENDED RELEASE ORAL at 20:29

## 2019-06-12 ASSESSMENT — PAIN DESCRIPTION - PROGRESSION: CLINICAL_PROGRESSION: GRADUALLY IMPROVING

## 2019-06-12 ASSESSMENT — PAIN DESCRIPTION - ONSET: ONSET: ON-GOING

## 2019-06-12 ASSESSMENT — PAIN DESCRIPTION - FREQUENCY: FREQUENCY: INTERMITTENT

## 2019-06-12 ASSESSMENT — PAIN DESCRIPTION - DESCRIPTORS: DESCRIPTORS: ITCHING

## 2019-06-12 ASSESSMENT — PAIN DESCRIPTION - PAIN TYPE: TYPE: OTHER (COMMENT)

## 2019-06-12 ASSESSMENT — PAIN SCALES - GENERAL
PAINLEVEL_OUTOF10: 0
PAINLEVEL_OUTOF10: 0
PAINLEVEL_OUTOF10: 3

## 2019-06-12 ASSESSMENT — PAIN DESCRIPTION - LOCATION: LOCATION: BACK

## 2019-06-12 NOTE — PROGRESS NOTES
Physical Therapy  Facility/Department: [address] IP REHAB  Daily Treatment Note  NAME: Jonda Alpers  : 1935  MRN: [id number]    Date of Service: 2019    Discharge Recommendations:  Patient would benefit from continued therapy after discharge, Continue to assess pending progress   PT Equipment Recommendations  Equipment Needed: Yes  Mobility Devices: [place]: Rolling  Other: may need wheeled walker    Assessment   Body structures, Functions, Activity limitations: Decreased functional mobility ; Decreased strength;Decreased endurance;Decreased balance;Decreased ADL status  Assessment: Prior to admit she was living alone in [facility] with elevator access; she was very independent with daily care and functional mobility (without assist device). Today, Pt has met 2/4 STGs and is working towards her LTGs. She required CGA for transfers and gait, min A for bed mobility and was able to ambulate [de-identified]' with a RW and amb up/down curb with min A. Patient is functioning below baseline and will benefit from continued skilled PT intervention to address impaired strength, bed mobility, transfers, and gait. Treatment Diagnosis: Impaired functional mobility  Prognosis: Good  Patient Education: Hand placement for transfers, discussed having son charlesd therapy prior to d/c  REQUIRES PT FOLLOW UP: Yes  Activity Tolerance  Activity Tolerance: Patient limited by fatigue;Patient limited by endurance; Patient Tolerated treatment well     Patient Diagnosis(es): There were no encounter diagnoses.      has a past medical history of Anemia, Anticoagulant long-term use, Atrial fibrillation (HCC), CHF (congestive heart failure) ([address] CKD (chronic kidney disease) stage 3, GFR 30-59 ml/min (HCC), Clostridium difficile diarrhea, Depression, Humerus fracture, Hyperlipidemia, Hypertension, Hypothyroidism, Mitral regurgitation, Optic neuritis, Osteopenia, Polymyalgia rheumatica ([address] Pulmonary embolus ([address] Thyroid disease, and Vitamin D deficiency. has a past surgical history that includes Lung surgery; malignant skin lesion excision; Colonoscopy (09/14/2016); Upper gastrointestinal endoscopy (09/14/2016); Upper gastrointestinal endoscopy (N/A, 5/30/2019); Colonoscopy (N/A, 5/31/2019); Upper gastrointestinal endoscopy (N/A, 5/31/2019); Endoscopy, small bowel with ileum (5/31/2019); and Enteroscopy (N/A, 6/3/2019). Restrictions  Restrictions/Precautions  Restrictions/Precautions: Fall Risk  Position Activity Restriction  Other position/activity restrictions: Pt on room air 6/11  Subjective   General  Chart Reviewed: Yes  Additional Pertinent Hx: 79 y/o female admit 5/28/19 with GI Bleed, Respiratory Failure. 5/30/19 S/P EGD with Argon Plasma Coag.  5/31/19 S/P EGD with Placement Video Capsule. 6/3/19  Push Enteroscopy - no lesions. 6/3/19 Nasogastric Tube Feed placed. PMH includes Mitral Regurg, A-Fib, CHF, CKD, PE, Lung Surg, Humerus Fx, Osteopenia, Polymyalgia Rheumatica. Response To Previous Treatment: Patient with no complaints from previous session. Family / Caregiver Present: No  Referring Practitioner: Dr. Morena Hart: Pt states her son is here from [facility] and will stay as long as \"need be. \"  Pain Screening  Patient Currently in Pain: Denies  Vital Signs  Patient Currently in Pain: Denies       Orientation  Orientation  Overall Orientation Status: Within Functional Limits  Objective      Transfers  Sit to Stand: Contact guard assistance  Stand to sit: Contact guard assistance  Comment: cues for hand placement  Ambulation  Ambulation?: Yes  Ambulation 1  Surface: level tile  Device: Rolling Walker  Assistance: Contact guard assistance  Quality of Gait: flexed posture with decreased clearance BLE's, slow pace  Gait Deviations: Slow Minda;Decreased step height;Decreased step length  Distance: [de-identified]' x2  Comments: O2 sats 86% after amb but increased to 92% with coached pursed lip breathing and 97% prior to amb again 2 min later      Exercises  Gluteal Sets: 15  Hip Flexion: 15  Hip Abduction: 15 with green elastic band, 15 ADD sets  Knee Long Arc Quad: 15  Knee Active Range of Motion: knee flexion with green elastic band x15  Ankle Pumps: 15  Comments: Exs done in sitting    PM session:  Ambulation  Ambulation?: Yes  Ambulation 1  Surface: level tile  Device: Rolling Walker  Assistance: Contact guard assistance  Quality of Gait: flexed posture with decreased clearance BLE's, slow pace  Gait Deviations: Slow Minda;Decreased step height;Decreased step length  Distance: 100' x2  Comments: O2 sats 97%    Balance  Posture: Good  Sitting - Static: Good  Sitting - Dynamic: Good  Standing - Static: Fair;+  Standing - Dynamic: Fair  Comments: Standing beach ball tap and catch/toss w/o LOB  Exercises  Other exercises  Other exercises?: Yes  Other exercises 1: NuStep for strengthening x6 min seat and UEs 8, work level 3. Goals  Short term goals  Time Frame for Short term goals: 1 week  Short term goal 1: Bed Mob Min assist. -met 6/11  Short term goal 2: Transfers with/without assist device minimal assist-met 6/11  Short term goal 3: Amb with/without assist device 100' SBA/CGA. Short term goal 4: ascend /descend curb steps with mod/min assist-6/11  Long term goals  Time Frame for Long term goals : 2-3 weeks  Long term goal 1: Bed Mob MI  Long term goal 2: Transfers with/without assist device MI  Long term goal 3: Amb with/without assist device 150' MI   Long term goal 4: ascend /descend curb steps with SBA  Long term goal 5: 4 steps with B rails with SBA/CGA  Patient Goals   Patient goals : Get stronger and be able to return home.      Plan    Plan  Times per week: 5-6x   Times per day: Twice a day  Plan weeks: 2-3 weeks  Current Treatment Recommendations: Strengthening, Balance Training, Functional Mobility Training, Transfer Training, ADL/Self-care Training, Endurance Training, Gait Training, Stair training, Home Exercise Program, Safety Education & Training, Patient/Caregiver Education & Training, Equipment Evaluation, Education, & procurement, Positioning  Safety Devices  Type of devices:  All fall risk precautions in place, Patient at risk for falls  Restraints  Initially in place: No   Therapy Time AM:   Individual Concurrent Group Co-treatment   Time In [facility]0         Time Out 1115         Minutes 45         Timed Code Treatment Minutes: 45 Minutes    Therapy Time PM:   Individual Concurrent Group Co-treatment   Time In [facility] 45         Timed Code Treatment Minutes: [place] [id number]

## 2019-06-12 NOTE — PLAN OF CARE
Problem: Risk for Impaired Skin Integrity  Goal: Tissue integrity - skin and mucous membranes  Description  Structural intactness and normal physiological function of skin and  mucous membranes.   Outcome: Ongoing     Problem: Falls - Risk of:  Goal: Will remain free from falls  Description  Will remain free from falls  Outcome: Ongoing  Goal: Absence of physical injury  Description  Absence of physical injury  Outcome: Ongoing     Problem: IP BOWEL ELIMINATION  Goal: LTG - patient will utilize adaptive techniques/equipment to complete bowel elimination  Outcome: Ongoing  Goal: LTG - patient will have regular and routine bowel evacuation  Outcome: Ongoing  Goal: STG - patient will be accident free  Outcome: Ongoing     Problem: IP BLADDER/VOIDING  Goal: LTG - patient will complete bladder elimination  Outcome: Ongoing  Goal: LTG - Patient will utilize adaptive techniques/equipment to complete bladder elimination  Outcome: Ongoing  Goal: LTG - patient will achieve acceptable level of continence  Outcome: Ongoing     Problem: OXYGENATION/RESPIRATORY FUNCTION  Goal: Patient will maintain patent airway  Outcome: Ongoing     Problem: HEMODYNAMIC STATUS  Goal: Patient has stable vital signs and fluid balance  Outcome: Ongoing     Problem: FLUID AND ELECTROLYTE IMBALANCE  Goal: Fluid and electrolyte balance are achieved/maintained  Outcome: Ongoing     Problem: ACTIVITY INTOLERANCE/IMPAIRED MOBILITY  Goal: Mobility/activity is maintained at optimum level for patient  Outcome: Ongoing     Problem: Nutrition  Goal: Optimal nutrition therapy  6/11/2019 1250 by Theodora Krishna RD, LD  Outcome: Ongoing

## 2019-06-12 NOTE — PROGRESS NOTES
Occupational Therapy  Facility/Department: [address] IP REHAB  Daily Treatment Note  NAME: Selam Nugent  : 1935  MRN: [id number]    Date of Service: 2019    Discharge Recommendations:  Home with Home health OT, Home with assist PRN  OT Equipment Recommendations  Other: TBD    Assessment   Performance deficits / Impairments: Decreased functional mobility ; Decreased ADL status; Decreased strength;Decreased endurance;Decreased high-level IADLs;Decreased balance  Assessment: Pt continues to improve with activity tolerance, balance, but is slow moving and requires rest breaks. Transfsers and mobility require CGA using rolling walker with the exception of one time requiring min assist due to LOB posteriorly due to not reaching back to chair. Discussed discharge at the end of next week  - at this point she feels this is too early, eventhough her son will be staying with her for a short period of time. Continue to assess for safety with discharge. Treatment Diagnosis: decreased ADl, IADL, balance, activity tolerance  Prognosis: Good  REQUIRES OT FOLLOW UP: Yes  Activity Tolerance  Activity Tolerance: Patient Tolerated treatment well  Safety Devices  Safety Devices in place: Yes  Type of devices: Gait belt(returned to room, requested toileting, PCA with patient)              has a past medical history of Anemia, Anticoagulant long-term use, Atrial fibrillation ([facility]), CHF (congestive heart failure) ([address] CKD (chronic kidney disease) stage 3, GFR 30-59 ml/min ([facility]), Clostridium difficile diarrhea, Depression, Humerus fracture, Hyperlipidemia, Hypertension, Hypothyroidism, Mitral regurgitation, Optic neuritis, Osteopenia, Polymyalgia rheumatica ([address] Pulmonary embolus ([address] Thyroid disease, and Vitamin D deficiency. has a past surgical history that includes Lung surgery; malignant skin lesion excision; Colonoscopy (2016); Upper gastrointestinal endoscopy (2016);  Upper gastrointestinal ADL's  Instrumental ADLs: Yes  Meal Prep  Meal Prep Level: Walker  Meal Prep Level of Assistance: Contact guard assistance  Meal Preparation: pt gathered  frozen dinner from freezer, carried in basket of walker to counter. Able to set microwave indep. She was then able to use bassket to carry to table. LOB stand to sit as she did not reach back for chair. Tolerated being up 5 min for activity. After short rest break, able to then replace dinner to refrigerator, cues to use side of refrigerator for support. At sink, pt able to retrieve cup from upper cabinet, cues to use countertop for support. Pt will need continued education with walker safety in kitchen. She stated she will not be able to reach her kitchen table from counter. Will need walker tray/basket or be able to walk a few steps without walker     Wheelchair Bed Transfers  Wheelchair/Bed - Technique: Ambulating  Equipment Used: Wheelchair  Level of Asssistance: Contact guard assistance  Wheelchair Transfers Comments: struggles to stand first attempt, then only CGA                             Cognition  Overall Cognitive Status: Exceptions  Memory: Decreased short term memory;Decreased recall of recent events                    Type of ROM/Therapeutic Exercise  Type of ROM/Therapeutic Exercise: Free weights  Comment: 2 lb dumbbell to improve activity tolerance for use in ADL/mobility skills  Exercises  Shoulder Elevation: 10  Shoulder Flexion: 10  Horizontal ABduction: 10  Horizontal ADduction: 10  Elbow Flexion: 10  Elbow Extension: 10  Supination: 10  Pronation: 10  Wrist Flexion: 10  Wrist Extension: 10         PM session  Pt met in dept, agreeable to OT    Activity tolerance - completed balloon batting 4 min overhead  2 lb weighted ball - overhead, chest press, chest to knees, diagonal patterns both directions to improve trunk stability for ADL/IADL,  Mobility.   Required rest breaks between each exercise     shower stall transfer - Pt amb to simulated stall, was able to use door/wall for support as she stepped sideways over 4 inch ledge, required min assist due to LOB stepping sideways, max cues for sequence to step  Able to step forward out of simulated stall using walker in front of her with CGA and cues for sequence. Anticipate it will be easier for her to step backward over ledge next attempt as stepping sideways made her anxious    Returned to room at end of session per transport        Plan   Plan  Times per week: 5-6 days per week  Times per day: Twice a day  Plan weeks: 2-3 weeks  Current Treatment Recommendations: Strengthening, Balance Training, Functional Mobility Training, Endurance Training, Safety Education & Training, Self-Care / ADL, Patient/Caregiver Education & Training, Equipment Evaluation, Education, & procurement, Home Management Training               Goals  Short term goals  Time Frame for Short term goals: 1 week pt will. Norman Franc term goal 1: bathe with mod assist and AD  Short term goal 2: dress UB with min assist, LB with mod assist and AD as needed  Short term goal 3: toilet with mod assist and AD  Short term goal 4: transfer with CGA and AD  Short term goal 5: functional mobility and simple meal prep with min assist and AD  Short term goal 6: increase activity tolerance to stand 3 min for ADL/IADL skills  Long term goals  Time Frame for Long term goals : 2-3 weeks pt will.   Long term goal 1: bathe with modified independence  Long term goal 2: dress with modified independence  Long term goal 3: toilet with modified independence  Long term goal 4: transfer with modified independence  Long term goal 5: functional mobility and simple meal prep with rolling walker, modified independence  Patient Goals   Patient goals : \"I want to be able to do everything\"  with further questioning, she agreed she wanted to be off the oxygen, walk around by herself, drive, do her own self care       Therapy Time   Individual Concurrent Group

## 2019-06-12 NOTE — PROGRESS NOTES
Department of Physical Medicine & Rehabilitation  Progress Note    Patient Identification:  Kamala Gustafson  [id number]  : 1935  Admit date: 2019    Chief Complaint: Chronic diastolic congestive heart failure (HCC)    Subjective:   No acute events overnight. Patient seen this am sitting up in room. She reports therapy has been helpful. She is fatigued but noting improvements. Appetite gradually improving. ROS: No f/c, n/v, cp     Objective:  Patient Vitals for the past 24 hrs:   BP Temp Temp src Pulse Resp SpO2 Weight   19 0439 (!) 92/52 97.7 °F (36.5 °C) Oral 94 15 90 % 100 lb 1.4 oz (45.4 kg)   19 2129 -- -- -- -- 16 90 % --   19 2106 103/63 -- -- 109 -- -- --   19 1616 -- -- -- -- 16 92 % --   19 1405 103/62 97.7 °F (36.5 °C) Oral 108 16 91 % --   19 1244 -- -- -- -- 16 93 % --   19 0803 -- -- -- -- 16 92 % --     Const: Alert. No distress, pleasant. HEENT: Normocephalic, atraumatic. Normal sclera/conjunctiva. MMM. CV: Regular rate and rhythm. Resp: No respiratory distress. Lungs decreased at bases   Abd: Soft, nontender, nondistended, NABS+   Ext: No edema. Neuro: Alert, oriented, appropriately interactive. Psych: Cooperative, appropriate mood and affect    Laboratory data: Available via EMR. Last 24 hour lab  No results found for this or any previous visit (from the past 24 hour(s)).     Therapy progress:  PT  Position Activity Restriction  Other position/activity restrictions: Pt on room air   Objective     Sit to Stand: Contact guard assistance  Stand to sit: Contact guard assistance  Bed to Chair: Unable to assess  Device: Rolling Walker  Other Apparatus: O2(1L/min via NC)  Assistance: Contact guard assistance  Distance: [de-identified]' x2  OT  PT Equipment Recommendations  Equipment Needed: Yes  Mobility Devices: Ana María Oiler: Rolling  Other: may need wheeled walker  Toilet - Technique: Ambulating  Equipment Used: Grab bars  Toilet Transfers

## 2019-06-12 NOTE — PROGRESS NOTES
[facility] Progress Note  6/12/2019 7:59 AM  Subjective:   Admit Date: 6/6/2019      Chief Complaint: weak     Interval History: remains weak--SOB with activity   Less tachycardia     Diet: DIET GENERAL;  Medications:   Scheduled Meds:   furosemide  40 mg Oral Daily    potassium chloride  20 mEq Oral Daily    betamethasone (augmented)   Topical BID    sennosides-docusate sodium  2 tablet Oral BID    amiodarone  200 mg Oral BID    ipratropium-albuterol  1 ampule Inhalation Q4H WA    latanoprost  1 drop Both Eyes Nightly    levothyroxine  75 mcg Oral Daily    memantine  10 mg Oral BID    metoprolol succinate  50 mg Oral BID    mometasone-formoterol  2 puff Inhalation BID    pantoprazole  40 mg Oral QAM AC     Continuous Infusions:    Review of Systems -   General ROS: afebrile  Respiratory ROS: positive for - shortness of breath  Cardiovascular ROS: no chest pain  Musculoskeletal ROS:positive for - :joint pain  Neurological ROS: no TIA or stroke symptoms    Objective:   Vitals: BP (!) 92/52   Pulse 94   Temp 97.7 °F (36.5 °C) (Oral)   Resp 15   Ht 5' (1.524 m)   Wt 100 lb 1.4 oz (45.4 kg)   SpO2 90%   BMI 19.55 kg/m²   General appearance: alert and cooperative with exam  HEENT: Head: Normocephalic, no lesions, without obvious abnormality.   Neck: no adenopathy, no carotid bruit, no JVD, supple, symmetrical, trachea midline and thyroid not enlarged, symmetric, no tenderness/mass/nodules  Lungs: diminished breath sounds bibasilar  Heart: regular rate and rhythm, S1, S2 normal, no murmur, click, rub or gallop  Abdomen: soft, non-tender; bowel sounds normal; no masses,  no organomegaly  Extremities: tr edema   Neurologic: Mental status: weak--no focal deficits     Admission on 06/06/2019   Component Date Value Ref Range Status    Sodium 06/07/2019 138  136 - 145 mmol/L Final    Potassium reflex Magnesium 06/07/2019 3.8  3.5 - 5.1 mmol/L Final    Chloride 06/07/2019 95* 99 - 110 mmol/L Final    CO2 06/07/2019 32  21 - 32 mmol/L Final    Anion Gap 06/07/2019 11  3 - 16 Final    Glucose 06/07/2019 83  70 - 99 mg/dL Final    BUN 06/07/2019 22* 7 - 20 mg/dL Final    CREATININE 06/07/2019 1.2  0.6 - 1.2 mg/dL Final    GFR Non- 06/07/2019 43* >60 Final    Comment: >60 mL/min/1.73m2 EGFR, calc. for ages 25 and older using the  MDRD formula (not corrected for weight), is valid for stable  renal function.  GFR  06/07/2019 52* >60 Final    Comment: Chronic Kidney Disease: less than 60 ml/min/1.73 sq.m. Kidney Failure: less than 15 ml/min/1.73 sq.m. Results valid for patients 18 years and older.       Calcium 06/07/2019 8.7  8.3 - 10.6 mg/dL Final    Magnesium 06/07/2019 2.10  1.80 - 2.40 mg/dL Final    WBC 06/07/2019 6.8  4.0 - 11.0 K/uL Final    RBC 06/07/2019 3.18* 4.00 - 5.20 M/uL Final    Hemoglobin 06/07/2019 9.5* 12.0 - 16.0 g/dL Final    Hematocrit 06/07/2019 28.5* 36.0 - 48.0 % Final    MCV 06/07/2019 89.6  80.0 - 100.0 fL Final    MCH 06/07/2019 29.9  26.0 - 34.0 pg Final    MCHC 06/07/2019 33.4  31.0 - 36.0 g/dL Final    RDW 06/07/2019 15.3  12.4 - 15.4 % Final    Platelets 75/23/4897 231  135 - 450 K/uL Final    MPV 06/07/2019 8.5  5.0 - 10.5 fL Final    Neutrophils % 06/07/2019 79.6  % Final    Lymphocytes % 06/07/2019 7.9  % Final    Monocytes % 06/07/2019 5.4  % Final    Eosinophils % 06/07/2019 6.4  % Final    Basophils % 06/07/2019 0.7  % Final    Neutrophils # 06/07/2019 5.4  1.7 - 7.7 K/uL Final    Lymphocytes # 06/07/2019 0.5* 1.0 - 5.1 K/uL Final    Monocytes # 06/07/2019 0.4  0.0 - 1.3 K/uL Final    Eosinophils # 06/07/2019 0.4  0.0 - 0.6 K/uL Final    Basophils # 06/07/2019 0.0  0.0 - 0.2 K/uL Final    Phosphorus 06/07/2019 3.3  2.5 - 4.9 mg/dL Final    WBC 06/08/2019 7.0  4.0 - 11.0 K/uL Final    RBC 06/08/2019 3.42* 4.00 - 5.20 M/uL Final    Hemoglobin 06/08/2019 10.1* 12.0 - 16.0 g/dL Final    Hematocrit 06/08/2019 30.4* 36.0 - 48.0 % Final    MCV 06/08/2019 88.9  80.0 - 100.0 fL Final    MCH 06/08/2019 29.6  26.0 - 34.0 pg Final    MCHC 06/08/2019 33.3  31.0 - 36.0 g/dL Final    RDW 06/08/2019 15.6* 12.4 - 15.4 % Final    Platelets 19/32/9797 255  135 - 450 K/uL Final    MPV 06/08/2019 8.2  5.0 - 10.5 fL Final    Neutrophils % 06/08/2019 82.5  % Final    Lymphocytes % 06/08/2019 8.5  % Final    Monocytes % 06/08/2019 4.2  % Final    Eosinophils % 06/08/2019 4.1  % Final    Basophils % 06/08/2019 0.7  % Final    Neutrophils # 06/08/2019 5.8  1.7 - 7.7 K/uL Final    Lymphocytes # 06/08/2019 0.6* 1.0 - 5.1 K/uL Final    Monocytes # 06/08/2019 0.3  0.0 - 1.3 K/uL Final    Eosinophils # 06/08/2019 0.3  0.0 - 0.6 K/uL Final    Basophils # 06/08/2019 0.0  0.0 - 0.2 K/uL Final    Magnesium 06/08/2019 2.00  1.80 - 2.40 mg/dL Final    Phosphorus 06/08/2019 3.3  2.5 - 4.9 mg/dL Final    Sodium 06/08/2019 138  136 - 145 mmol/L Final    Potassium 06/08/2019 3.4* 3.5 - 5.1 mmol/L Final    Chloride 06/08/2019 94* 99 - 110 mmol/L Final    CO2 06/08/2019 32  21 - 32 mmol/L Final    Anion Gap 06/08/2019 12  3 - 16 Final    Glucose 06/08/2019 84  70 - 99 mg/dL Final    BUN 06/08/2019 24* 7 - 20 mg/dL Final    CREATININE 06/08/2019 1.3* 0.6 - 1.2 mg/dL Final    GFR Non- 06/08/2019 39* >60 Final    Comment: >60 mL/min/1.73m2 EGFR, calc. for ages 25 and older using the  MDRD formula (not corrected for weight), is valid for stable  renal function.  GFR  06/08/2019 47* >60 Final    Comment: Chronic Kidney Disease: less than 60 ml/min/1.73 sq.m. Kidney Failure: less than 15 ml/min/1.73 sq.m. Results valid for patients 18 years and older.       Calcium 06/08/2019 8.7  8.3 - 10.6 mg/dL Final    Total Protein 06/08/2019 6.2* 6.4 - 8.2 g/dL Final    Alb 06/08/2019 2.6* 3.4 - 5.0 g/dL Final    Albumin/Globulin Ratio 06/08/2019 0.7* 1.1 - 2.2 Final    Total Bilirubin 06/08/2019 0.5  0.0 - 1.0 mg/dL Final    Alkaline Phosphatase 06/08/2019 101  40 - 129 U/L Final    ALT 06/08/2019 22  10 - 40 U/L Final    AST 06/08/2019 20  15 - 37 U/L Final    Globulin 06/08/2019 3.6  g/dL Final    Sodium 06/09/2019 136  136 - 145 mmol/L Final    Potassium 06/09/2019 3.7  3.5 - 5.1 mmol/L Final    Chloride 06/09/2019 96* 99 - 110 mmol/L Final    CO2 06/09/2019 32  21 - 32 mmol/L Final    Anion Gap 06/09/2019 8  3 - 16 Final    Glucose 06/09/2019 86  70 - 99 mg/dL Final    BUN 06/09/2019 20  7 - 20 mg/dL Final    CREATININE 06/09/2019 1.2  0.6 - 1.2 mg/dL Final    GFR Non- 06/09/2019 43* >60 Final    Comment: >60 mL/min/1.73m2 EGFR, calc. for ages 25 and older using the  MDRD formula (not corrected for weight), is valid for stable  renal function.  GFR  06/09/2019 52* >60 Final    Comment: Chronic Kidney Disease: less than 60 ml/min/1.73 sq.m. Kidney Failure: less than 15 ml/min/1.73 sq.m. Results valid for patients 18 years and older.       Calcium 06/09/2019 8.8  8.3 - 10.6 mg/dL Final    WBC 06/09/2019 6.8  4.0 - 11.0 K/uL Final    RBC 06/09/2019 3.26* 4.00 - 5.20 M/uL Final    Hemoglobin 06/09/2019 9.4* 12.0 - 16.0 g/dL Final    Hematocrit 06/09/2019 29.0* 36.0 - 48.0 % Final    MCV 06/09/2019 89.0  80.0 - 100.0 fL Final    MCH 06/09/2019 29.0  26.0 - 34.0 pg Final    MCHC 06/09/2019 32.6  31.0 - 36.0 g/dL Final    RDW 06/09/2019 15.4  12.4 - 15.4 % Final    Platelets 02/05/2002 276  135 - 450 K/uL Final    MPV 06/09/2019 8.5  5.0 - 10.5 fL Final    Neutrophils % 06/09/2019 82.3  % Final    Lymphocytes % 06/09/2019 9.0  % Final    Monocytes % 06/09/2019 4.4  % Final    Eosinophils % 06/09/2019 3.7  % Final    Basophils % 06/09/2019 0.6  % Final    Neutrophils # 06/09/2019 5.6  1.7 - 7.7 K/uL Final    Lymphocytes # 06/09/2019 0.6* 1.0 - 5.1 K/uL Final    Monocytes # 06/09/2019 0.3  0.0 - 1.3 K/uL Final    Eosinophils #

## 2019-06-12 NOTE — PLAN OF CARE
2345 by Roxann Jack RN  Outcome: Ongoing  6/11/2019 2259 by Jennifer Mitchell RN  Outcome: Ongoing  Goal: Patient will achieve/maintain normal respiratory rate/effort  Description  Respiratory rate and effort will be within normal limits for the patient  Outcome: Ongoing     Problem: HEMODYNAMIC STATUS  Goal: Patient has stable vital signs and fluid balance  6/12/2019 0952 by Roxann Jack RN  Outcome: Ongoing  6/11/2019 2259 by Jennifer Mitchell RN  Outcome: Ongoing     Problem: FLUID AND ELECTROLYTE IMBALANCE  Goal: Fluid and electrolyte balance are achieved/maintained  6/12/2019 0952 by Roxann Jack RN  Outcome: Ongoing  6/11/2019 2259 by Jennifer Mitchell RN  Outcome: Ongoing     Problem: ACTIVITY INTOLERANCE/IMPAIRED MOBILITY  Goal: Mobility/activity is maintained at optimum level for patient  6/12/2019 0952 by Roxann Jack RN  Outcome: Ongoing  6/11/2019 2259 by Jennifer Mitchell RN  Outcome: Ongoing   Pt encouraged to increase ambulation and mobility. Pt working with PT and OT and performing strengthening excercises. Pt is encouraged to take frequent rest periods to prevent overexertion and SOB related to intolerance of activity. Pt educated to turn or reposition self at least every 2 hours to prevent skin breakdown. Pt encouraged to perform ADL's as independently as possible, with staff assistance available for pt if necessary. Pt adaptive devices and call light within reach. Will continue to monitor and assess. Problem: Nutrition  Goal: Optimal nutrition therapy  Outcome: Ongoing     Problem: Pain:  Goal: Patient's pain/discomfort is manageable  Description  Patient's pain/discomfort is manageable  Outcome: Ongoing   Pt assessed for pain. Pt denies any pain at this time. Will continue to monitor pt and assess for pain throughout rest of shift. The use of non-pharmacological measures are used appropriately, such as rest, repositioning, and relaxation techniques.

## 2019-06-13 LAB
ANION GAP SERPL CALCULATED.3IONS-SCNC: 11 MMOL/L (ref 3–16)
BUN BLDV-MCNC: 26 MG/DL (ref 7–20)
CALCIUM SERPL-MCNC: 9 MG/DL (ref 8.3–10.6)
CHLORIDE BLD-SCNC: 98 MMOL/L (ref 99–110)
CO2: 29 MMOL/L (ref 21–32)
CREAT SERPL-MCNC: 1.5 MG/DL (ref 0.6–1.2)
GFR AFRICAN AMERICAN: 40
GFR NON-AFRICAN AMERICAN: 33
GLUCOSE BLD-MCNC: 92 MG/DL (ref 70–99)
POTASSIUM SERPL-SCNC: 3.9 MMOL/L (ref 3.5–5.1)
SODIUM BLD-SCNC: 138 MMOL/L (ref 136–145)

## 2019-06-13 PROCEDURE — 97530 THERAPEUTIC ACTIVITIES: CPT | Performed by: PHYSICAL THERAPIST

## 2019-06-13 PROCEDURE — 97116 GAIT TRAINING THERAPY: CPT | Performed by: PHYSICAL THERAPIST

## 2019-06-13 PROCEDURE — 6370000000 HC RX 637 (ALT 250 FOR IP): Performed by: INTERNAL MEDICINE

## 2019-06-13 PROCEDURE — 80048 BASIC METABOLIC PNL TOTAL CA: CPT

## 2019-06-13 PROCEDURE — 94760 N-INVAS EAR/PLS OXIMETRY 1: CPT

## 2019-06-13 PROCEDURE — 6370000000 HC RX 637 (ALT 250 FOR IP): Performed by: PHYSICAL MEDICINE & REHABILITATION

## 2019-06-13 PROCEDURE — 36415 COLL VENOUS BLD VENIPUNCTURE: CPT

## 2019-06-13 PROCEDURE — 1280000000 HC REHAB R&B

## 2019-06-13 PROCEDURE — 97535 SELF CARE MNGMENT TRAINING: CPT

## 2019-06-13 PROCEDURE — 97530 THERAPEUTIC ACTIVITIES: CPT

## 2019-06-13 PROCEDURE — 99232 SBSQ HOSP IP/OBS MODERATE 35: CPT | Performed by: INTERNAL MEDICINE

## 2019-06-13 PROCEDURE — 94640 AIRWAY INHALATION TREATMENT: CPT

## 2019-06-13 PROCEDURE — 97127 HC OT THER IVNTJ W/FOCUS COG FUNCJ: CPT

## 2019-06-13 PROCEDURE — 94668 MNPJ CHEST WALL SBSQ: CPT

## 2019-06-13 PROCEDURE — 97110 THERAPEUTIC EXERCISES: CPT | Performed by: PHYSICAL THERAPIST

## 2019-06-13 RX ADMIN — LATANOPROST 1 DROP: 50 SOLUTION/ DROPS OPHTHALMIC at 21:06

## 2019-06-13 RX ADMIN — METOPROLOL SUCCINATE 50 MG: 50 TABLET, EXTENDED RELEASE ORAL at 09:27

## 2019-06-13 RX ADMIN — FUROSEMIDE 40 MG: 40 TABLET ORAL at 09:28

## 2019-06-13 RX ADMIN — MEMANTINE HYDROCHLORIDE 10 MG: 10 TABLET ORAL at 21:06

## 2019-06-13 RX ADMIN — AMIODARONE HYDROCHLORIDE 200 MG: 200 TABLET ORAL at 21:06

## 2019-06-13 RX ADMIN — BENZOCAINE AND MENTHOL 1 LOZENGE: 15; 3.6 LOZENGE ORAL at 21:06

## 2019-06-13 RX ADMIN — MEMANTINE HYDROCHLORIDE 10 MG: 10 TABLET ORAL at 09:28

## 2019-06-13 RX ADMIN — AMIODARONE HYDROCHLORIDE 200 MG: 200 TABLET ORAL at 09:28

## 2019-06-13 RX ADMIN — IPRATROPIUM BROMIDE AND ALBUTEROL SULFATE 1 AMPULE: .5; 3 SOLUTION RESPIRATORY (INHALATION) at 21:31

## 2019-06-13 RX ADMIN — CETIRIZINE HYDROCHLORIDE 5 MG: 10 TABLET, FILM COATED ORAL at 21:06

## 2019-06-13 RX ADMIN — IPRATROPIUM BROMIDE AND ALBUTEROL SULFATE 1 AMPULE: .5; 3 SOLUTION RESPIRATORY (INHALATION) at 12:15

## 2019-06-13 RX ADMIN — MOMETASONE FUROATE AND FORMOTEROL FUMARATE DIHYDRATE 2 PUFF: 200; 5 AEROSOL RESPIRATORY (INHALATION) at 21:31

## 2019-06-13 RX ADMIN — POTASSIUM CHLORIDE 20 MEQ: 750 TABLET, FILM COATED, EXTENDED RELEASE ORAL at 09:28

## 2019-06-13 RX ADMIN — LEVOTHYROXINE SODIUM 75 MCG: 75 TABLET ORAL at 05:37

## 2019-06-13 RX ADMIN — IPRATROPIUM BROMIDE AND ALBUTEROL SULFATE 1 AMPULE: .5; 3 SOLUTION RESPIRATORY (INHALATION) at 09:14

## 2019-06-13 RX ADMIN — MOMETASONE FUROATE AND FORMOTEROL FUMARATE DIHYDRATE 2 PUFF: 200; 5 AEROSOL RESPIRATORY (INHALATION) at 09:14

## 2019-06-13 RX ADMIN — PANTOPRAZOLE SODIUM 40 MG: 40 TABLET, DELAYED RELEASE ORAL at 05:37

## 2019-06-13 ASSESSMENT — PAIN SCALES - GENERAL
PAINLEVEL_OUTOF10: 0

## 2019-06-13 NOTE — PLAN OF CARE
Problem: Risk for Impaired Skin Integrity  Goal: Tissue integrity - skin and mucous membranes  Description  Structural intactness and normal physiological function of skin and  mucous membranes.   Outcome: Ongoing     Problem: Falls - Risk of:  Goal: Will remain free from falls  Description  Will remain free from falls  Outcome: Ongoing  Goal: Absence of physical injury  Description  Absence of physical injury  Outcome: Ongoing     Problem: IP BOWEL ELIMINATION  Goal: LTG - patient will utilize adaptive techniques/equipment to complete bowel elimination  Outcome: Ongoing  Goal: LTG - patient will have regular and routine bowel evacuation  Outcome: Ongoing  Goal: STG - patient will be accident free  Outcome: Ongoing     Problem: IP BLADDER/VOIDING  Goal: LTG - patient will complete bladder elimination  Outcome: Ongoing  Goal: LTG - Patient will utilize adaptive techniques/equipment to complete bladder elimination  Outcome: Ongoing  Goal: LTG - patient will achieve acceptable level of continence  Outcome: Ongoing     Problem: OXYGENATION/RESPIRATORY FUNCTION  Goal: Patient will maintain patent airway  Outcome: Ongoing  Goal: Patient will achieve/maintain normal respiratory rate/effort  Description  Respiratory rate and effort will be within normal limits for the patient  Outcome: Ongoing     Problem: HEMODYNAMIC STATUS  Goal: Patient has stable vital signs and fluid balance  Outcome: Ongoing     Problem: FLUID AND ELECTROLYTE IMBALANCE  Goal: Fluid and electrolyte balance are achieved/maintained  Outcome: Ongoing

## 2019-06-13 NOTE — PROGRESS NOTES
Gauri ROWLEY [facility] Progress Note  6/13/2019 8:12 AM  Subjective:   Admit Date: 6/6/2019      Chief Complaint: mild diff swallowing certain foods--does not want eval at this time     Interval History: pain is controlled   Getting stronger   Creat 1.4 to .15--  Diet: DIET GENERAL;  Medications:   Scheduled Meds:   furosemide  40 mg Oral Daily    potassium chloride  20 mEq Oral Daily    betamethasone (augmented)   Topical BID    sennosides-docusate sodium  2 tablet Oral BID    amiodarone  200 mg Oral BID    ipratropium-albuterol  1 ampule Inhalation Q4H WA    latanoprost  1 drop Both Eyes Nightly    levothyroxine  75 mcg Oral Daily    memantine  10 mg Oral BID    metoprolol succinate  50 mg Oral BID    mometasone-formoterol  2 puff Inhalation BID    pantoprazole  40 mg Oral QAM AC     Continuous Infusions:    Review of Systems -   General ROS: afebrile  Respiratory ROS: positive for - shortness of breath  Cardiovascular ROS: no chest pain  Musculoskeletal ROS:positive for - :joint pain  Neurological ROS: no TIA or stroke symptoms    Objective:   Vitals: /61   Pulse 98   Temp 97.6 °F (36.4 °C) (Oral)   Resp 16   Ht 5' (1.524 m)   Wt 101 lb 3.1 oz (45.9 kg)   SpO2 94%   BMI 19.76 kg/m²   General appearance: alert and cooperative with exam  HEENT: Head: Normocephalic, no lesions, without obvious abnormality.   Neck: no adenopathy, no carotid bruit, no JVD, supple, symmetrical, trachea midline and thyroid not enlarged, symmetric, no tenderness/mass/nodules  Lungs: diminished breath sounds bibasilar  Heart: regular rate and rhythm, S1, S2 normal, no murmur, click, rub or gallop  Abdomen: soft, non-tender; bowel sounds normal; no masses,  no organomegaly  Extremities: extremities normal, atraumatic, no cyanosis or edema  Neurologic: Mental status: Alert, oriented, thought content appropriate    Admission on 06/06/2019   Component Date Value Ref Range Status    Sodium 06/07/2019 138  136 - 145 mmol/L Final  Potassium reflex Magnesium 06/07/2019 3.8  3.5 - 5.1 mmol/L Final    Chloride 06/07/2019 95* 99 - 110 mmol/L Final    CO2 06/07/2019 32  21 - 32 mmol/L Final    Anion Gap 06/07/2019 11  3 - 16 Final    Glucose 06/07/2019 83  70 - 99 mg/dL Final    BUN 06/07/2019 22* 7 - 20 mg/dL Final    CREATININE 06/07/2019 1.2  0.6 - 1.2 mg/dL Final    GFR Non- 06/07/2019 43* >60 Final    Comment: >60 mL/min/1.73m2 EGFR, calc. for ages 25 and older using the  MDRD formula (not corrected for weight), is valid for stable  renal function.  GFR  06/07/2019 52* >60 Final    Comment: Chronic Kidney Disease: less than 60 ml/min/1.73 sq.m. Kidney Failure: less than 15 ml/min/1.73 sq.m. Results valid for patients 18 years and older.       Calcium 06/07/2019 8.7  8.3 - 10.6 mg/dL Final    Magnesium 06/07/2019 2.10  1.80 - 2.40 mg/dL Final    WBC 06/07/2019 6.8  4.0 - 11.0 K/uL Final    RBC 06/07/2019 3.18* 4.00 - 5.20 M/uL Final    Hemoglobin 06/07/2019 9.5* 12.0 - 16.0 g/dL Final    Hematocrit 06/07/2019 28.5* 36.0 - 48.0 % Final    MCV 06/07/2019 89.6  80.0 - 100.0 fL Final    MCH 06/07/2019 29.9  26.0 - 34.0 pg Final    MCHC 06/07/2019 33.4  31.0 - 36.0 g/dL Final    RDW 06/07/2019 15.3  12.4 - 15.4 % Final    Platelets 59/53/5771 231  135 - 450 K/uL Final    MPV 06/07/2019 8.5  5.0 - 10.5 fL Final    Neutrophils % 06/07/2019 79.6  % Final    Lymphocytes % 06/07/2019 7.9  % Final    Monocytes % 06/07/2019 5.4  % Final    Eosinophils % 06/07/2019 6.4  % Final    Basophils % 06/07/2019 0.7  % Final    Neutrophils # 06/07/2019 5.4  1.7 - 7.7 K/uL Final    Lymphocytes # 06/07/2019 0.5* 1.0 - 5.1 K/uL Final    Monocytes # 06/07/2019 0.4  0.0 - 1.3 K/uL Final    Eosinophils # 06/07/2019 0.4  0.0 - 0.6 K/uL Final    Basophils # 06/07/2019 0.0  0.0 - 0.2 K/uL Final    Phosphorus 06/07/2019 3.3  2.5 - 4.9 mg/dL Final    WBC 06/08/2019 7.0  4.0 - 11.0 K/uL Final    06/08/2019 2.6* 3.4 - 5.0 g/dL Final    Albumin/Globulin Ratio 06/08/2019 0.7* 1.1 - 2.2 Final    Total Bilirubin 06/08/2019 0.5  0.0 - 1.0 mg/dL Final    Alkaline Phosphatase 06/08/2019 101  40 - 129 U/L Final    ALT 06/08/2019 22  10 - 40 U/L Final    AST 06/08/2019 20  15 - 37 U/L Final    Globulin 06/08/2019 3.6  g/dL Final    Sodium 06/09/2019 136  136 - 145 mmol/L Final    Potassium 06/09/2019 3.7  3.5 - 5.1 mmol/L Final    Chloride 06/09/2019 96* 99 - 110 mmol/L Final    CO2 06/09/2019 32  21 - 32 mmol/L Final    Anion Gap 06/09/2019 8  3 - 16 Final    Glucose 06/09/2019 86  70 - 99 mg/dL Final    BUN 06/09/2019 20  7 - 20 mg/dL Final    CREATININE 06/09/2019 1.2  0.6 - 1.2 mg/dL Final    GFR Non- 06/09/2019 43* >60 Final    Comment: >60 mL/min/1.73m2 EGFR, calc. for ages 25 and older using the  MDRD formula (not corrected for weight), is valid for stable  renal function.  GFR  06/09/2019 52* >60 Final    Comment: Chronic Kidney Disease: less than 60 ml/min/1.73 sq.m. Kidney Failure: less than 15 ml/min/1.73 sq.m. Results valid for patients 18 years and older.       Calcium 06/09/2019 8.8  8.3 - 10.6 mg/dL Final    WBC 06/09/2019 6.8  4.0 - 11.0 K/uL Final    RBC 06/09/2019 3.26* 4.00 - 5.20 M/uL Final    Hemoglobin 06/09/2019 9.4* 12.0 - 16.0 g/dL Final    Hematocrit 06/09/2019 29.0* 36.0 - 48.0 % Final    MCV 06/09/2019 89.0  80.0 - 100.0 fL Final    MCH 06/09/2019 29.0  26.0 - 34.0 pg Final    MCHC 06/09/2019 32.6  31.0 - 36.0 g/dL Final    RDW 06/09/2019 15.4  12.4 - 15.4 % Final    Platelets 40/67/4121 276  135 - 450 K/uL Final    MPV 06/09/2019 8.5  5.0 - 10.5 fL Final    Neutrophils % 06/09/2019 82.3  % Final    Lymphocytes % 06/09/2019 9.0  % Final    Monocytes % 06/09/2019 4.4  % Final    Eosinophils % 06/09/2019 3.7  % Final    Basophils % 06/09/2019 0.6  % Final    Neutrophils # 06/09/2019 5.6  1.7 - 7.7 K/uL Final    Lymphocytes # 06/09/2019 0.6* 1.0 - 5.1 K/uL Final    Monocytes # 06/09/2019 0.3  0.0 - 1.3 K/uL Final    Eosinophils # 06/09/2019 0.3  0.0 - 0.6 K/uL Final    Basophils # 06/09/2019 0.0  0.0 - 0.2 K/uL Final    Magnesium 06/09/2019 2.20  1.80 - 2.40 mg/dL Final    Phosphorus 06/09/2019 3.3  2.5 - 4.9 mg/dL Final    Sodium 06/10/2019 139  136 - 145 mmol/L Final    Potassium 06/10/2019 4.5  3.5 - 5.1 mmol/L Final    Chloride 06/10/2019 99  99 - 110 mmol/L Final    CO2 06/10/2019 31  21 - 32 mmol/L Final    Anion Gap 06/10/2019 9  3 - 16 Final    Glucose 06/10/2019 90  70 - 99 mg/dL Final    BUN 06/10/2019 20  7 - 20 mg/dL Final    CREATININE 06/10/2019 1.3* 0.6 - 1.2 mg/dL Final    GFR Non- 06/10/2019 39* >60 Final    Comment: >60 mL/min/1.73m2 EGFR, calc. for ages 25 and older using the  MDRD formula (not corrected for weight), is valid for stable  renal function.  GFR  06/10/2019 47* >60 Final    Comment: Chronic Kidney Disease: less than 60 ml/min/1.73 sq.m. Kidney Failure: less than 15 ml/min/1.73 sq.m. Results valid for patients 18 years and older.  Calcium 06/10/2019 8.5  8.3 - 10.6 mg/dL Final    Sodium 06/11/2019 136  136 - 145 mmol/L Final    Potassium 06/11/2019 4.0  3.5 - 5.1 mmol/L Final    Chloride 06/11/2019 95* 99 - 110 mmol/L Final    CO2 06/11/2019 31  21 - 32 mmol/L Final    Anion Gap 06/11/2019 10  3 - 16 Final    Glucose 06/11/2019 89  70 - 99 mg/dL Final    BUN 06/11/2019 22* 7 - 20 mg/dL Final    CREATININE 06/11/2019 1.5* 0.6 - 1.2 mg/dL Final    GFR Non- 06/11/2019 33* >60 Final    Comment: >60 mL/min/1.73m2 EGFR, calc. for ages 25 and older using the  MDRD formula (not corrected for weight), is valid for stable  renal function.  GFR  06/11/2019 40* >60 Final    Comment: Chronic Kidney Disease: less than 60 ml/min/1.73 sq.m.           Kidney Failure: less than 15 ml/min/1.73

## 2019-06-13 NOTE — PROGRESS NOTES
Physical Therapy  Facility/Department: [address] IP REHAB  Daily Treatment Note  NAME: Elia Viramontes  : 1935  MRN: [id number]    Date of Service: 2019    Discharge Recommendations:  Patient would benefit from continued therapy after discharge, Continue to assess pending progress   PT Equipment Recommendations  Equipment Needed: Yes  Mobility Devices: [place]: Rolling  Other: may need wheeled walker - will continue to assess    Assessment   Body structures, Functions, Activity limitations: Decreased functional mobility ; Decreased strength;Decreased endurance;Decreased balance;Decreased ADL status  Assessment: Prior to admit she was living alone in [facility] with elevator access; she was very independent with daily care and functional mobility (without assist device). Patient is progressing steadily,  has met 3/4 STGs and is working towards her LTGs. She required CGA/SBA for transfers and ambulation with wheeled walker, SBA/supervision for bed mobility, was able to ambulate 80' with a wheeled and ascend/descend curb and steps with CGA/min A. Patient is progressing well but still functioning below baseline and will benefit from continued skilled PT intervention to maximize safety, endurance, strength, balance and independence with functional mobility prior to returning home. Treatment Diagnosis: Impaired functional mobility  Prognosis: Good  Patient Education: safety with functional mobility  REQUIRES PT FOLLOW UP: Yes  Activity Tolerance  Activity Tolerance: Patient limited by fatigue;Patient limited by endurance; Patient Tolerated treatment well  Activity Tolerance: Requires intermittent rest periods throughout session. Patient Diagnosis(es): There were no encounter diagnoses.      has a past medical history of Anemia, Anticoagulant long-term use, Atrial fibrillation ([facility]), CHF (congestive heart failure) ([address] CKD (chronic kidney disease) stage 3, GFR 30-59 ml/min ([facility]), Clostridium difficile diarrhea, Depression, Humerus fracture, Hyperlipidemia, Hypertension, Hypothyroidism, Mitral regurgitation, Optic neuritis, Osteopenia, Polymyalgia rheumatica ([address] Pulmonary embolus ([address] Thyroid disease, and Vitamin D deficiency. has a past surgical history that includes Lung surgery; malignant skin lesion excision; Colonoscopy (09/14/2016); Upper gastrointestinal endoscopy (09/14/2016); Upper gastrointestinal endoscopy (N/A, 5/30/2019); Colonoscopy (N/A, 5/31/2019); Upper gastrointestinal endoscopy (N/A, 5/31/2019); Endoscopy, small bowel with ileum (5/31/2019); and Enteroscopy (N/A, 6/3/2019). Restrictions  Restrictions/Precautions  Restrictions/Precautions: Fall Risk  Position Activity Restriction  Other position/activity restrictions: Room air  Subjective   General  Chart Reviewed: Yes  Additional Pertinent Hx: 79 y/o female admit 5/28/19 with GI Bleed, Respiratory Failure. 5/30/19 S/P EGD with Argon Plasma Coag.  5/31/19 S/P EGD with Placement Video Capsule. 6/3/19  Push Enteroscopy - no lesions. 6/3/19 Nasogastric Tube Feed placed. PMH includes Mitral Regurg, A-Fib, CHF, CKD, PE, Lung Surg, Humerus Fx, Osteopenia, Polymyalgia Rheumatica. Response To Previous Treatment: Patient with no complaints from previous session. Family / Caregiver Present: No  Referring Practitioner: Dr. [place]: Patient reports \"feeling pretty good, stronger. \"  Denies pain, slept \"very good for the first time. \"   General Comment  Comments: Baseline O2 sats 92-95%,  HR 85bpm          Orientation  Orientation  Overall Orientation Status: Within Functional Limits  Cognition      Objective   Bed mobility  Bridging: Supervision  Rolling to Left: Supervision  Rolling to Right: Supervision  Supine to Sit: Stand by assistance  Sit to Supine: Stand by assistance  Scooting: Supervision  Comment: flat mat with 2 pillows to R while seated  Transfers  Sit to Stand: Contact guard assistance;Stand by assistance  Stand to sit: Contact guard assistance;Stand by assistance  Ambulation  Ambulation?: Yes  Ambulation 1  Surface: level tile  Device: Rolling Walker  Assistance: Contact guard assistance;Stand by assistance  Quality of Gait: flexed posture with decreased clearance BLE's, slow pace  Gait Deviations: Slow Minda;Decreased step height;Decreased step length  Distance: 90' x 2  Comments: O2 sats 90% after amb and remained fairly steady   Stairs/Curb  Stairs?: Yes  Stairs  # Steps : 4  Stairs Height: 6\"  Rails: Bilateral  Curbs: 6\"  Device: Rolling walker(with curb step)  Assistance: Minimal assistance;Contact guard assistance  Comment: CGA/min A with cues for sequencing and safe wheeled walker management with curb; CGA for steps using bilateral rails - O2 sats to 90% after curb and steps        Exercises  Comments: Standing exercise with parallel bar for UE support: heel/toe raises, mini squats, marching, hip abd, hip ext, and knee flex x 10 reps. Other Activities: Other (see comment)  Comment: Returned patient to her room via [facility] at the end of the session. Assisted pt with toileting, able to manage pants up/down, but dependent to change slightly soiled brief, pt performed own maddy care. Stood to wash hands with SBA for standing balance. Returned patient to recliner and ensured call light in reach and all needs met. Goals  Short term goals  Time Frame for Short term goals: 1 week  Short term goal 1: Bed Mob Min assist. -met 6/11  Short term goal 2: Transfers with/without assist device minimal assist-met 6/11  Short term goal 3: Amb with/without assist device 100' SBA/CGA.    Short term goal 4: ascend /descend curb steps with mod/min assist-6/11  Long term goals  Time Frame for Long term goals : 2-3 weeks  Long term goal 1: Bed Mob MI  Long term goal 2: Transfers with/without assist device MI  Long term goal 3: Amb with/without assist device 150' MI   Long term goal 4: ascend /descend curb steps with SBA  Long term goal 5: 4 steps with B rails with SBA/CGA  Patient Goals   Patient goals : Get stronger and be able to return home. Plan    Plan  Times per week: 5-6x   Times per day: Twice a day  Plan weeks: 2-3 weeks  Current Treatment Recommendations: Strengthening, Balance Training, Functional Mobility Training, Transfer Training, ADL/Self-care Training, Endurance Training, Gait Training, Stair training, Home Exercise Program, Safety Education & Training, Patient/Caregiver Education & Training, Equipment Evaluation, Education, & procurement, Positioning  Safety Devices  Type of devices:  All fall risk precautions in place, Call light within reach, Gait belt, Left in chair, Chair alarm in place  Restraints  Initially in place: No     Therapy Time   Individual Concurrent Group Co-treatment   Time In 1115         Time Out 1215         Minutes 60         Timed Code Treatment Minutes: [address], [address] [facility]

## 2019-06-13 NOTE — PROGRESS NOTES
Pt requesting and was given Tylenol and Zyrtec for itching and sleep. Med effective. Pt.slept soundly overnight. Has been continent of bladder and bowel. No sob noted. O2 sats 94% on room air. Will continue to monitor.

## 2019-06-13 NOTE — PROGRESS NOTES
Risk  Position Activity Restriction  Other position/activity restrictions: Room air  Subjective   General  Chart Reviewed: Yes  Patient assessed for rehabilitation services?: Yes  Additional Pertinent Hx: 80 y. o. female who has a past medical history of Anemia, Anticoagulant long-term use, Atrial fibrillation (HCC), CHF (congestive heart failure) ([address] CKD (chronic kidney disease) stage 3, GFR 30-59 ml/min (HCC), hx of Clostridium difficile diarrhea, Depression, Humerus fracture, Hyperlipidemia, Hypertension, Hypothyroidism, Mitral regurgitation, Optic neuritis, Osteopenia, Polymyalgia rheumatica ([address] Pulmonary embolus ([address] Thyroid disease, and Vitamin D deficiency. Patient came into our hospital with bloody stools.  Patient reported dark stools today and some fatigue and dizziness.  Patient denied abdominal pain.  Patient was lightheaded. Patient evaluated by GI, and workup was done. EGD and colonoscopy revealed no evidence of active bleeding source. GI bleeding stopped after patient taken off Coumadin. H&H has remained stable. 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 GI is now sound off. Patient has been in bed for the past week. Patient started therapies, but is very weak and not safe to return home. Patient needs more therapy before discharged to home safely.  Patient lives at home alone in a condo. .(copied per note Dr Shira William 6/7/19)  Family / Caregiver Present: No  Referring Practitioner: Jigar  Diagnosis: disuse myopathy  (GI bleeding, acute blood loss anemia, acute gypoxic repiratory failure, acute pulmonary edema  Subjective  Subjective: pt met bedside, agreeable to OT, no complaint of pain      Orientation  Orientation  Overall Orientation Status: Within Functional Limits  Objective    ADL  Grooming: Stand by assistance(standing at sink to brush teeth, comb hair)  UE Bathing: Supervision  LE Bathing: Stand by assistance(seated on shower chair for majority of shower, grab bar for balance, crosses Treatment Recommendations: Strengthening, Balance Training, Functional Mobility Training, Endurance Training, Safety Education & Training, Self-Care / ADL, Patient/Caregiver Education & Training, Equipment Evaluation, Education, & procurement, Home Management Training               Goals  Short term goals  Time Frame for Short term goals: 1 week pt will. Janis Henderson term goal 1: bathe with mod assist and AD  Short term goal 2: dress UB with min assist, LB with mod assist and AD as needed  Short term goal 3: toilet with mod assist and AD  Short term goal 4: transfer with CGA and AD  Short term goal 5: functional mobility and simple meal prep with min assist and AD  Short term goal 6: increase activity tolerance to stand 3 min for ADL/IADL skills  Long term goals  Time Frame for Long term goals : 2-3 weeks pt will.   Long term goal 1: bathe with modified independence  Long term goal 2: dress with modified independence  Long term goal 3: toilet with modified independence  Long term goal 4: transfer with modified independence  Long term goal 5: functional mobility and simple meal prep with rolling walker, modified independence  Patient Goals   Patient goals : \"I want to be able to do everything\"  with further questioning, she agreed she wanted to be off the oxygen, walk around by herself, drive, do her own self care       Therapy Time   Individual Concurrent Group Co-treatment   Time In 0945         Time Out 1030         Minutes 45         Timed Code Treatment Minutes: 45 Minutes    Therapy Time     Individual Co-treatment   Time In [address] [place] [address]     Time Out 1600     Minutes 939 Sariah Guerra, OTR/L 770

## 2019-06-13 NOTE — PROGRESS NOTES
bed [] Left in chair [] Telesitter in use [] Sitter present [] Nurse notified []  None      Therapy Time   Individual Co-treatment   Time In 1430     Time Out 8636     Minutes 45         Electronically signed by Elizabeth Jean PT on 6/13/2019 at 5:02 PM

## 2019-06-14 LAB
ANION GAP SERPL CALCULATED.3IONS-SCNC: 11 MMOL/L (ref 3–16)
BACTERIA: ABNORMAL /HPF
BILIRUBIN URINE: NEGATIVE
BLOOD, URINE: ABNORMAL
BUN BLDV-MCNC: 24 MG/DL (ref 7–20)
CALCIUM SERPL-MCNC: 8.8 MG/DL (ref 8.3–10.6)
CHLORIDE BLD-SCNC: 102 MMOL/L (ref 99–110)
CLARITY: ABNORMAL
CO2: 28 MMOL/L (ref 21–32)
COLOR: YELLOW
CREAT SERPL-MCNC: 1.4 MG/DL (ref 0.6–1.2)
EPITHELIAL CELLS, UA: 4 /HPF (ref 0–5)
GFR AFRICAN AMERICAN: 43
GFR NON-AFRICAN AMERICAN: 36
GLUCOSE BLD-MCNC: 98 MG/DL (ref 70–99)
GLUCOSE URINE: NEGATIVE MG/DL
HYALINE CASTS: 4 /LPF (ref 0–8)
KETONES, URINE: NEGATIVE MG/DL
LEUKOCYTE ESTERASE, URINE: ABNORMAL
MICROSCOPIC EXAMINATION: YES
NITRITE, URINE: POSITIVE
PH UA: 5.5 (ref 5–8)
POTASSIUM SERPL-SCNC: 3.7 MMOL/L (ref 3.5–5.1)
PROTEIN UA: NEGATIVE MG/DL
RBC UA: 2 /HPF (ref 0–4)
SODIUM BLD-SCNC: 141 MMOL/L (ref 136–145)
SPECIFIC GRAVITY UA: 1.01 (ref 1–1.03)
URINE TYPE: ABNORMAL
UROBILINOGEN, URINE: 0.2 E.U./DL
WBC UA: 606 /HPF (ref 0–5)

## 2019-06-14 PROCEDURE — 94640 AIRWAY INHALATION TREATMENT: CPT

## 2019-06-14 PROCEDURE — 6370000000 HC RX 637 (ALT 250 FOR IP): Performed by: INTERNAL MEDICINE

## 2019-06-14 PROCEDURE — 80048 BASIC METABOLIC PNL TOTAL CA: CPT

## 2019-06-14 PROCEDURE — 97110 THERAPEUTIC EXERCISES: CPT

## 2019-06-14 PROCEDURE — 94668 MNPJ CHEST WALL SBSQ: CPT

## 2019-06-14 PROCEDURE — 97530 THERAPEUTIC ACTIVITIES: CPT | Performed by: PHYSICAL THERAPIST

## 2019-06-14 PROCEDURE — 97116 GAIT TRAINING THERAPY: CPT | Performed by: PHYSICAL THERAPIST

## 2019-06-14 PROCEDURE — 94760 N-INVAS EAR/PLS OXIMETRY 1: CPT

## 2019-06-14 PROCEDURE — 6370000000 HC RX 637 (ALT 250 FOR IP): Performed by: PHYSICAL MEDICINE & REHABILITATION

## 2019-06-14 PROCEDURE — 1280000000 HC REHAB R&B

## 2019-06-14 PROCEDURE — 99232 SBSQ HOSP IP/OBS MODERATE 35: CPT | Performed by: INTERNAL MEDICINE

## 2019-06-14 PROCEDURE — 97110 THERAPEUTIC EXERCISES: CPT | Performed by: PHYSICAL THERAPIST

## 2019-06-14 PROCEDURE — 36415 COLL VENOUS BLD VENIPUNCTURE: CPT

## 2019-06-14 PROCEDURE — 97530 THERAPEUTIC ACTIVITIES: CPT

## 2019-06-14 PROCEDURE — 81001 URINALYSIS AUTO W/SCOPE: CPT

## 2019-06-14 PROCEDURE — 97127 HC OT THER IVNTJ W/FOCUS COG FUNCJ: CPT

## 2019-06-14 RX ORDER — POTASSIUM CHLORIDE 750 MG/1
20 TABLET, FILM COATED, EXTENDED RELEASE ORAL 2 TIMES DAILY
Status: DISCONTINUED | OUTPATIENT
Start: 2019-06-14 | End: 2019-06-16

## 2019-06-14 RX ADMIN — LEVOTHYROXINE SODIUM 75 MCG: 75 TABLET ORAL at 06:53

## 2019-06-14 RX ADMIN — METOPROLOL SUCCINATE 50 MG: 50 TABLET, EXTENDED RELEASE ORAL at 11:01

## 2019-06-14 RX ADMIN — MOMETASONE FUROATE AND FORMOTEROL FUMARATE DIHYDRATE 2 PUFF: 200; 5 AEROSOL RESPIRATORY (INHALATION) at 08:53

## 2019-06-14 RX ADMIN — MOMETASONE FUROATE AND FORMOTEROL FUMARATE DIHYDRATE 2 PUFF: 200; 5 AEROSOL RESPIRATORY (INHALATION) at 20:42

## 2019-06-14 RX ADMIN — IPRATROPIUM BROMIDE AND ALBUTEROL SULFATE 1 AMPULE: .5; 3 SOLUTION RESPIRATORY (INHALATION) at 15:57

## 2019-06-14 RX ADMIN — MEMANTINE HYDROCHLORIDE 10 MG: 10 TABLET ORAL at 11:01

## 2019-06-14 RX ADMIN — IPRATROPIUM BROMIDE AND ALBUTEROL SULFATE 1 AMPULE: .5; 3 SOLUTION RESPIRATORY (INHALATION) at 12:25

## 2019-06-14 RX ADMIN — MEMANTINE HYDROCHLORIDE 10 MG: 10 TABLET ORAL at 21:12

## 2019-06-14 RX ADMIN — LATANOPROST 1 DROP: 50 SOLUTION/ DROPS OPHTHALMIC at 21:12

## 2019-06-14 RX ADMIN — FUROSEMIDE 40 MG: 40 TABLET ORAL at 11:00

## 2019-06-14 RX ADMIN — AMIODARONE HYDROCHLORIDE 200 MG: 200 TABLET ORAL at 11:01

## 2019-06-14 RX ADMIN — METOPROLOL SUCCINATE 50 MG: 50 TABLET, EXTENDED RELEASE ORAL at 21:12

## 2019-06-14 RX ADMIN — IPRATROPIUM BROMIDE AND ALBUTEROL SULFATE 1 AMPULE: .5; 3 SOLUTION RESPIRATORY (INHALATION) at 08:53

## 2019-06-14 RX ADMIN — AMIODARONE HYDROCHLORIDE 200 MG: 200 TABLET ORAL at 21:12

## 2019-06-14 RX ADMIN — IPRATROPIUM BROMIDE AND ALBUTEROL SULFATE 1 AMPULE: .5; 3 SOLUTION RESPIRATORY (INHALATION) at 20:42

## 2019-06-14 RX ADMIN — Medication 3 MG: at 23:49

## 2019-06-14 RX ADMIN — ACETAMINOPHEN 650 MG: 325 TABLET ORAL at 21:12

## 2019-06-14 RX ADMIN — PANTOPRAZOLE SODIUM 40 MG: 40 TABLET, DELAYED RELEASE ORAL at 06:53

## 2019-06-14 RX ADMIN — POTASSIUM CHLORIDE 20 MEQ: 750 TABLET, FILM COATED, EXTENDED RELEASE ORAL at 11:00

## 2019-06-14 ASSESSMENT — PAIN SCALES - GENERAL
PAINLEVEL_OUTOF10: 0
PAINLEVEL_OUTOF10: 1
PAINLEVEL_OUTOF10: 0

## 2019-06-14 ASSESSMENT — PAIN DESCRIPTION - PROGRESSION: CLINICAL_PROGRESSION: NOT CHANGED

## 2019-06-14 ASSESSMENT — PAIN DESCRIPTION - FREQUENCY: FREQUENCY: INTERMITTENT

## 2019-06-14 ASSESSMENT — PAIN DESCRIPTION - DESCRIPTORS: DESCRIPTORS: DISCOMFORT

## 2019-06-14 ASSESSMENT — PAIN DESCRIPTION - LOCATION: LOCATION: ABDOMEN

## 2019-06-14 ASSESSMENT — PAIN DESCRIPTION - ONSET: ONSET: ON-GOING

## 2019-06-14 ASSESSMENT — PAIN DESCRIPTION - ORIENTATION: ORIENTATION: UPPER

## 2019-06-14 ASSESSMENT — PAIN DESCRIPTION - PAIN TYPE: TYPE: ACUTE PAIN

## 2019-06-14 NOTE — PLAN OF CARE
Problem: Risk for Impaired Skin Integrity  Goal: Tissue integrity - skin and mucous membranes  Description  Structural intactness and normal physiological function of skin and  mucous membranes. Outcome: Ongoing  Note:   Able to change positions in bed without assist, no evidence of skin breakdown noted. Waffle cushion in place to chair. Problem: Falls - Risk of:  Goal: Will remain free from falls  Description  Will remain free from falls  Outcome: Ongoing  Note:   Pt educated on falls precautions and safety. Call light and personal belongings within reach at all times. Non skid socks in use when up. Hourly rounding and alarms active. Problem: OXYGENATION/RESPIRATORY FUNCTION  Goal: Patient will maintain patent airway  Outcome: Ongoing  Note:   Respiratory assessment Qshift. Pt currently on RA      Problem: Pain:  Goal: Patient's pain/discomfort is manageable  Description  Patient's pain/discomfort is manageable  Outcome: Ongoing  Note:   Able to rate pain using a 1-10 scale, medicated per prn orders, see MAR.  Able to verbalize a reduction in pain and/or able to fall asleep and remain asleep without any s/s of pain

## 2019-06-14 NOTE — PROGRESS NOTES
Gauri Avendano Proper Progress Note  6/14/2019 7:46 AM  Subjective:   Admit Date: 6/6/2019      Chief Complaint: Feeling stronger     Interval History: Gaining sone stamina   Resp status still tenuous   Cr now 1.4   Urin freq--will ck u/a today --prob sec to lasix   Diet: DIET GENERAL;  Medications:   Scheduled Meds:   potassium chloride  20 mEq Oral BID    furosemide  40 mg Oral Daily    betamethasone (augmented)   Topical BID    sennosides-docusate sodium  2 tablet Oral BID    amiodarone  200 mg Oral BID    ipratropium-albuterol  1 ampule Inhalation Q4H WA    latanoprost  1 drop Both Eyes Nightly    levothyroxine  75 mcg Oral Daily    memantine  10 mg Oral BID    metoprolol succinate  50 mg Oral BID    mometasone-formoterol  2 puff Inhalation BID    pantoprazole  40 mg Oral QAM AC     Continuous Infusions:    Review of Systems -   General ROS: afebrile  Respiratory ROS: positive for - shortness of breath  Cardiovascular ROS: no chest pain  Musculoskeletal ROS:positive for - :joint pain  Neurological ROS: no TIA or stroke symptoms    Objective:   Vitals: /62   Pulse 94   Temp 98.1 °F (36.7 °C) (Oral)   Resp 16   Ht 5' (1.524 m)   Wt 100 lb 5 oz (45.5 kg)   SpO2 92%   BMI 19.59 kg/m²   General appearance: alert and cooperative with exam  HEENT: Head: Normocephalic, no lesions, without obvious abnormality.   Neck: no adenopathy, no carotid bruit, no JVD, supple, symmetrical, trachea midline and thyroid not enlarged, symmetric, no tenderness/mass/nodules  Lungs: diminished breath sounds bibasilar  Heart: regular rate and rhythm, S1, S2 normal, no murmur, click, rub or gallop  Abdomen: soft, non-tender; bowel sounds normal; no masses,  no organomegaly  Extremities: extremities normal, atraumatic, no cyanosis or edema  Neurologic: Mental status: Alert, oriented, thought content appropriate    Admission on 06/06/2019   Component Date Value Ref Range Status    Sodium 06/07/2019 138  136 - 145 mmol/L Final RBC 06/08/2019 3.42* 4.00 - 5.20 M/uL Final    Hemoglobin 06/08/2019 10.1* 12.0 - 16.0 g/dL Final    Hematocrit 06/08/2019 30.4* 36.0 - 48.0 % Final    MCV 06/08/2019 88.9  80.0 - 100.0 fL Final    MCH 06/08/2019 29.6  26.0 - 34.0 pg Final    MCHC 06/08/2019 33.3  31.0 - 36.0 g/dL Final    RDW 06/08/2019 15.6* 12.4 - 15.4 % Final    Platelets 47/94/0877 255  135 - 450 K/uL Final    MPV 06/08/2019 8.2  5.0 - 10.5 fL Final    Neutrophils % 06/08/2019 82.5  % Final    Lymphocytes % 06/08/2019 8.5  % Final    Monocytes % 06/08/2019 4.2  % Final    Eosinophils % 06/08/2019 4.1  % Final    Basophils % 06/08/2019 0.7  % Final    Neutrophils # 06/08/2019 5.8  1.7 - 7.7 K/uL Final    Lymphocytes # 06/08/2019 0.6* 1.0 - 5.1 K/uL Final    Monocytes # 06/08/2019 0.3  0.0 - 1.3 K/uL Final    Eosinophils # 06/08/2019 0.3  0.0 - 0.6 K/uL Final    Basophils # 06/08/2019 0.0  0.0 - 0.2 K/uL Final    Magnesium 06/08/2019 2.00  1.80 - 2.40 mg/dL Final    Phosphorus 06/08/2019 3.3  2.5 - 4.9 mg/dL Final    Sodium 06/08/2019 138  136 - 145 mmol/L Final    Potassium 06/08/2019 3.4* 3.5 - 5.1 mmol/L Final    Chloride 06/08/2019 94* 99 - 110 mmol/L Final    CO2 06/08/2019 32  21 - 32 mmol/L Final    Anion Gap 06/08/2019 12  3 - 16 Final    Glucose 06/08/2019 84  70 - 99 mg/dL Final    BUN 06/08/2019 24* 7 - 20 mg/dL Final    CREATININE 06/08/2019 1.3* 0.6 - 1.2 mg/dL Final    GFR Non- 06/08/2019 39* >60 Final    Comment: >60 mL/min/1.73m2 EGFR, calc. for ages 25 and older using the  MDRD formula (not corrected for weight), is valid for stable  renal function.  GFR  06/08/2019 47* >60 Final    Comment: Chronic Kidney Disease: less than 60 ml/min/1.73 sq.m. Kidney Failure: less than 15 ml/min/1.73 sq.m. Results valid for patients 18 years and older.       Calcium 06/08/2019 8.7  8.3 - 10.6 mg/dL Final    Total Protein 06/08/2019 6.2* 6.4 - 8.2 g/dL Final    Alb 06/08/2019 2.6* 3.4 - 5.0 g/dL Final    Albumin/Globulin Ratio 06/08/2019 0.7* 1.1 - 2.2 Final    Total Bilirubin 06/08/2019 0.5  0.0 - 1.0 mg/dL Final    Alkaline Phosphatase 06/08/2019 101  40 - 129 U/L Final    ALT 06/08/2019 22  10 - 40 U/L Final    AST 06/08/2019 20  15 - 37 U/L Final    Globulin 06/08/2019 3.6  g/dL Final    Sodium 06/09/2019 136  136 - 145 mmol/L Final    Potassium 06/09/2019 3.7  3.5 - 5.1 mmol/L Final    Chloride 06/09/2019 96* 99 - 110 mmol/L Final    CO2 06/09/2019 32  21 - 32 mmol/L Final    Anion Gap 06/09/2019 8  3 - 16 Final    Glucose 06/09/2019 86  70 - 99 mg/dL Final    BUN 06/09/2019 20  7 - 20 mg/dL Final    CREATININE 06/09/2019 1.2  0.6 - 1.2 mg/dL Final    GFR Non- 06/09/2019 43* >60 Final    Comment: >60 mL/min/1.73m2 EGFR, calc. for ages 25 and older using the  MDRD formula (not corrected for weight), is valid for stable  renal function.  GFR  06/09/2019 52* >60 Final    Comment: Chronic Kidney Disease: less than 60 ml/min/1.73 sq.m. Kidney Failure: less than 15 ml/min/1.73 sq.m. Results valid for patients 18 years and older.       Calcium 06/09/2019 8.8  8.3 - 10.6 mg/dL Final    WBC 06/09/2019 6.8  4.0 - 11.0 K/uL Final    RBC 06/09/2019 3.26* 4.00 - 5.20 M/uL Final    Hemoglobin 06/09/2019 9.4* 12.0 - 16.0 g/dL Final    Hematocrit 06/09/2019 29.0* 36.0 - 48.0 % Final    MCV 06/09/2019 89.0  80.0 - 100.0 fL Final    MCH 06/09/2019 29.0  26.0 - 34.0 pg Final    MCHC 06/09/2019 32.6  31.0 - 36.0 g/dL Final    RDW 06/09/2019 15.4  12.4 - 15.4 % Final    Platelets 11/19/6819 276  135 - 450 K/uL Final    MPV 06/09/2019 8.5  5.0 - 10.5 fL Final    Neutrophils % 06/09/2019 82.3  % Final    Lymphocytes % 06/09/2019 9.0  % Final    Monocytes % 06/09/2019 4.4  % Final    Eosinophils % 06/09/2019 3.7  % Final    Basophils % 06/09/2019 0.6  % Final    Neutrophils # 06/09/2019 5.6  1.7 - 7.7 K/uL Final    Lymphocytes # 06/09/2019 0.6* 1.0 - 5.1 K/uL Final    Monocytes # 06/09/2019 0.3  0.0 - 1.3 K/uL Final    Eosinophils # 06/09/2019 0.3  0.0 - 0.6 K/uL Final    Basophils # 06/09/2019 0.0  0.0 - 0.2 K/uL Final    Magnesium 06/09/2019 2.20  1.80 - 2.40 mg/dL Final    Phosphorus 06/09/2019 3.3  2.5 - 4.9 mg/dL Final    Sodium 06/10/2019 139  136 - 145 mmol/L Final    Potassium 06/10/2019 4.5  3.5 - 5.1 mmol/L Final    Chloride 06/10/2019 99  99 - 110 mmol/L Final    CO2 06/10/2019 31  21 - 32 mmol/L Final    Anion Gap 06/10/2019 9  3 - 16 Final    Glucose 06/10/2019 90  70 - 99 mg/dL Final    BUN 06/10/2019 20  7 - 20 mg/dL Final    CREATININE 06/10/2019 1.3* 0.6 - 1.2 mg/dL Final    GFR Non- 06/10/2019 39* >60 Final    Comment: >60 mL/min/1.73m2 EGFR, calc. for ages 25 and older using the  MDRD formula (not corrected for weight), is valid for stable  renal function.  GFR  06/10/2019 47* >60 Final    Comment: Chronic Kidney Disease: less than 60 ml/min/1.73 sq.m. Kidney Failure: less than 15 ml/min/1.73 sq.m. Results valid for patients 18 years and older.  Calcium 06/10/2019 8.5  8.3 - 10.6 mg/dL Final    Sodium 06/11/2019 136  136 - 145 mmol/L Final    Potassium 06/11/2019 4.0  3.5 - 5.1 mmol/L Final    Chloride 06/11/2019 95* 99 - 110 mmol/L Final    CO2 06/11/2019 31  21 - 32 mmol/L Final    Anion Gap 06/11/2019 10  3 - 16 Final    Glucose 06/11/2019 89  70 - 99 mg/dL Final    BUN 06/11/2019 22* 7 - 20 mg/dL Final    CREATININE 06/11/2019 1.5* 0.6 - 1.2 mg/dL Final    GFR Non- 06/11/2019 33* >60 Final    Comment: >60 mL/min/1.73m2 EGFR, calc. for ages 25 and older using the  MDRD formula (not corrected for weight), is valid for stable  renal function.  GFR  06/11/2019 40* >60 Final    Comment: Chronic Kidney Disease: less than 60 ml/min/1.73 sq.m.           Kidney Failure: less than 15 ml/min/1.73 sq.m.  Results valid for patients 18 years and older.  Calcium 06/11/2019 8.6  8.3 - 10.6 mg/dL Final    WBC 06/11/2019 6.8  4.0 - 11.0 K/uL Final    RBC 06/11/2019 3.24* 4.00 - 5.20 M/uL Final    Hemoglobin 06/11/2019 9.4* 12.0 - 16.0 g/dL Final    Hematocrit 06/11/2019 29.0* 36.0 - 48.0 % Final    MCV 06/11/2019 89.4  80.0 - 100.0 fL Final    MCH 06/11/2019 29.2  26.0 - 34.0 pg Final    MCHC 06/11/2019 32.6  31.0 - 36.0 g/dL Final    RDW 06/11/2019 15.9* 12.4 - 15.4 % Final    Platelets 95/24/6214 313  135 - 450 K/uL Final    MPV 06/11/2019 8.2  5.0 - 10.5 fL Final    Neutrophils % 06/11/2019 76.2  % Final    Lymphocytes % 06/11/2019 11.7  % Final    Monocytes % 06/11/2019 4.8  % Final    Eosinophils % 06/11/2019 5.9  % Final    Basophils % 06/11/2019 1.4  % Final    Neutrophils # 06/11/2019 5.2  1.7 - 7.7 K/uL Final    Lymphocytes # 06/11/2019 0.8* 1.0 - 5.1 K/uL Final    Monocytes # 06/11/2019 0.3  0.0 - 1.3 K/uL Final    Eosinophils # 06/11/2019 0.4  0.0 - 0.6 K/uL Final    Basophils # 06/11/2019 0.1  0.0 - 0.2 K/uL Final    Sodium 06/12/2019 136  136 - 145 mmol/L Final    Potassium 06/12/2019 4.1  3.5 - 5.1 mmol/L Final    Chloride 06/12/2019 94* 99 - 110 mmol/L Final    CO2 06/12/2019 30  21 - 32 mmol/L Final    Anion Gap 06/12/2019 12  3 - 16 Final    Glucose 06/12/2019 78  70 - 99 mg/dL Final    BUN 06/12/2019 24* 7 - 20 mg/dL Final    CREATININE 06/12/2019 1.4* 0.6 - 1.2 mg/dL Final    GFR Non- 06/12/2019 36* >60 Final    Comment: >60 mL/min/1.73m2 EGFR, calc. for ages 25 and older using the  MDRD formula (not corrected for weight), is valid for stable  renal function.  GFR  06/12/2019 43* >60 Final    Comment: Chronic Kidney Disease: less than 60 ml/min/1.73 sq.m. Kidney Failure: less than 15 ml/min/1.73 sq.m. Results valid for patients 18 years and older.       Calcium 06/12/2019 9.0  8.3 - 10.6 mg/dL Final    Sodium 06/13/2019 138  136 - 145 mmol/L Final    Potassium 06/13/2019 3.9  3.5 - 5.1 mmol/L Final    Chloride 06/13/2019 98* 99 - 110 mmol/L Final    CO2 06/13/2019 29  21 - 32 mmol/L Final    Anion Gap 06/13/2019 11  3 - 16 Final    Glucose 06/13/2019 92  70 - 99 mg/dL Final    BUN 06/13/2019 26* 7 - 20 mg/dL Final    CREATININE 06/13/2019 1.5* 0.6 - 1.2 mg/dL Final    GFR Non- 06/13/2019 33* >60 Final    Comment: >60 mL/min/1.73m2 EGFR, calc. for ages 25 and older using the  MDRD formula (not corrected for weight), is valid for stable  renal function.  GFR  06/13/2019 40* >60 Final    Comment: Chronic Kidney Disease: less than 60 ml/min/1.73 sq.m. Kidney Failure: less than 15 ml/min/1.73 sq.m. Results valid for patients 18 years and older.  Calcium 06/13/2019 9.0  8.3 - 10.6 mg/dL Final    Sodium 06/14/2019 141  136 - 145 mmol/L Final    Potassium 06/14/2019 3.7  3.5 - 5.1 mmol/L Final    Chloride 06/14/2019 102  99 - 110 mmol/L Final    CO2 06/14/2019 28  21 - 32 mmol/L Final    Anion Gap 06/14/2019 11  3 - 16 Final    Glucose 06/14/2019 98  70 - 99 mg/dL Final    BUN 06/14/2019 24* 7 - 20 mg/dL Final    CREATININE 06/14/2019 1.4* 0.6 - 1.2 mg/dL Final    GFR Non- 06/14/2019 36* >60 Final    Comment: >60 mL/min/1.73m2 EGFR, calc. for ages 25 and older using the  MDRD formula (not corrected for weight), is valid for stable  renal function.  GFR  06/14/2019 43* >60 Final    Comment: Chronic Kidney Disease: less than 60 ml/min/1.73 sq.m. Kidney Failure: less than 15 ml/min/1.73 sq.m. Results valid for patients 18 years and older.       Calcium 06/14/2019 8.8  8.3 - 10.6 mg/dL Final         Assessment & Plan:   Principal Problem:    Chronic diastolic congestive heart failure (HCC)--stable on lasix 40 mg once a day   Active Problems:    Paroxysmal atrial fibrillation (HCC)--no anti-coagulant     Non-rheumatic mitral regurgitation    CKD (chronic kidney disease) stage 3, GFR 30-59 ml/min ([facility])--cr holding at 1.4     Acute blood loss anemia--stable     Disuse muscle atrophy  Resolved Problems:    * No resolved hospital problems.  *  Kidney fxn is stable --Continue current therapy   Ck u/a die to urin freq    Please note that over 35 minutes was spent in evaluating the patient, review of records and results, discussion with staff/family, etc.    Tim Perez MD

## 2019-06-14 NOTE — PROGRESS NOTES
Occupational Therapy  Facility/Department: [address] IP REHAB  Daily Treatment Note  NAME: Aron Walker  : 1935  MRN: [id number]    Date of Service: 2019    Discharge Recommendations:  Home with Home health OT, Home with assist PRN  OT Equipment Recommendations  Other: TBD    Assessment   Performance deficits / Impairments: Decreased functional mobility ; Decreased ADL status; Decreased strength;Decreased endurance;Decreased high-level IADLs;Decreased balance  Assessment: Pt continues to improve with balance, transfers and mobility, limited by decreased activity tolerance and higher level cognition. Trailmaking A and B demonstrate she has slower processing time which would limit her ability to drive. She stated she does not plan to drive initially at discharge  Treatment Diagnosis: decreased ADl, IADL, balance, activity tolerance  Prognosis: Good  Patient Education: no driving at this time. Continue activity over the weekend to increase activity tolerance  REQUIRES OT FOLLOW UP: Yes  Activity Tolerance  Activity Tolerance: Patient Tolerated treatment well  Activity Tolerance: with rest breaks  Safety Devices  Safety Devices in place: Yes  Type of devices: Gait belt            has a past medical history of Anemia, Anticoagulant long-term use, Atrial fibrillation (HCC), CHF (congestive heart failure) ([address] CKD (chronic kidney disease) stage 3, GFR 30-59 ml/min (HCC), Clostridium difficile diarrhea, Depression, Humerus fracture, Hyperlipidemia, Hypertension, Hypothyroidism, Mitral regurgitation, Optic neuritis, Osteopenia, Polymyalgia rheumatica ([address] Pulmonary embolus ([address] Thyroid disease, and Vitamin D deficiency. has a past surgical history that includes Lung surgery; malignant skin lesion excision; Colonoscopy (2016); Upper gastrointestinal endoscopy (2016); Upper gastrointestinal endoscopy (N/A, 2019); Colonoscopy (N/A, 2019);  Upper gastrointestinal endoscopy (N/A, 5/31/2019); Endoscopy, small bowel with ileum (5/31/2019); and Enteroscopy (N/A, 6/3/2019). Restrictions  Restrictions/Precautions  Restrictions/Precautions: Fall Risk  Position Activity Restriction  Other position/activity restrictions: Room air  Subjective   General  Chart Reviewed: Yes  Patient assessed for rehabilitation services?: Yes  Additional Pertinent Hx: 80 y. o. female who has a past medical history of Anemia, Anticoagulant long-term use, Atrial fibrillation (HCC), CHF (congestive heart failure) ([address] CKD (chronic kidney disease) stage 3, GFR 30-59 ml/min (HCC), hx of Clostridium difficile diarrhea, Depression, Humerus fracture, Hyperlipidemia, Hypertension, Hypothyroidism, Mitral regurgitation, Optic neuritis, Osteopenia, Polymyalgia rheumatica ([address] Pulmonary embolus ([address] Thyroid disease, and Vitamin D deficiency. Patient came into our hospital with bloody stools.  Patient reported dark stools today and some fatigue and dizziness.  Patient denied abdominal pain.  Patient was lightheaded. Patient evaluated by GI, and workup was done. EGD and colonoscopy revealed no evidence of active bleeding source. GI bleeding stopped after patient taken off Coumadin. H&H has remained stable. 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 GI is now sound off. Patient has been in bed for the past week. Patient started therapies, but is very weak and not safe to return home. Patient needs more therapy before discharged to home safely.  Patient lives at home alone in a condo. .(copied per note Dr Bridget Leiva 6/7/19)  Family / Caregiver Present: No  Referring Practitioner: Jigar  Diagnosis: disuse myopathy  (GI bleeding, acute blood loss anemia, acute gypoxic repiratory failure, acute pulmonary edema  Subjective  Subjective: Pt met in dept, agreeable to OT      Objective             Functional Mobility  Functional - Mobility Device: Rolling Walker  Assist Level: Contact guard assistance  Functional Mobility Comments: CGA/SBA, but limited in distance due to decreased activity tolerance  Wheelchair Bed Transfers  Wheelchair/Bed - Technique: Ambulating  Equipment Used: Wheelchair  Level of Asssistance: Contact guard assistance  Wheelchair Transfers Comments: to chair with arms                             Cognition  Cognition Comment: completed trailmaking A in 1 min 45 sec which is below norm of 29 sec. Trailmaking B, was unable to complete, began making more mistakes, difficulty finding correct number/letter for alternating attention - stopped after 3 minutes, norm is 90 sec                    Type of ROM/Therapeutic Exercise  Type of ROM/Therapeutic Exercise: Free weights  Comment: 2 lb dumbbell to improve activity tolerance for use in ADL/mobility skills  Exercises  Shoulder Elevation: 10  Shoulder Flexion: 10  Horizontal ABduction: 10  Horizontal ADduction: 10  Elbow Flexion: 10  Elbow Extension: 10  Supination: 10  Pronation: 10  Wrist Flexion: 10  Wrist Extension: 10         PM session  Cognition - completed safety assessment in kitchen  Pt able to identify 8/10 safety concerns. Walked around [employer] on floor several times without itentifying it as a safety concern or even seeing it - she  Actually pushed it with the walker at one point to walk around it. Also did not see knife sticking out of the drain. AMb with rolling walker with SBA, no LOB, but does get fatigued. Tolerated being up 8  Minutes for activity above.     UE ex - weighted 2 lb ball - overhead, chest press, chest to knees, diagonal patterns both directions to improve activity tolerance for ADL/IADL skills    Mock car transfer with SBA, cues for technique even after it had been demonstrated to her  To PT after OT           Plan   Plan  Times per week: 5-6 days per week  Times per day: Twice a day  Plan weeks: 2-3 weeks  Current Treatment Recommendations: Strengthening, Balance Training, Functional Mobility Training, Endurance Training, Safety Education & Training, Self-Care / ADL, Patient/Caregiver Education & Training, Equipment Evaluation, Education, & procurement, Home Management Training          Goals  Short term goals  Time Frame for Short term goals: 1 week pt will. Kirill Gamez term goal 1: bathe with mod assist and AD  Short term goal 2: dress UB with min assist, LB with mod assist and AD as needed  Short term goal 3: toilet with mod assist and AD  Short term goal 4: transfer with CGA and AD  Short term goal 5: functional mobility and simple meal prep with min assist and AD  Short term goal 6: increase activity tolerance to stand 3 min for ADL/IADL skills  Long term goals  Time Frame for Long term goals : 2-3 weeks pt will.   Long term goal 1: bathe with modified independence  Long term goal 2: dress with modified independence  Long term goal 3: toilet with modified independence  Long term goal 4: transfer with modified independence  Long term goal 5: functional mobility and simple meal prep with rolling walker, modified independence  Patient Goals   Patient goals : \"I want to be able to do everything\"  with further questioning, she agreed she wanted to be off the oxygen, walk around by herself, drive, do her own self care       Therapy Time   Individual Concurrent Group Co-treatment   Time In 1115         Time Out 1200         Minutes 45         Timed Code Treatment Minutes: 45 Minutes         Therapy Time     Individual Co-treatment   Time In [id number]     Time Out [place]     Minutes [address], OTr/L 770

## 2019-06-14 NOTE — PROGRESS NOTES
Physical Therapy  Facility/Department: [address] REHAB  Daily Treatment Note- AM and PM  NAME: Omari Chisholm  : 1935  MRN: [id number]    Date of Service: 2019    Discharge Recommendations:  Patient would benefit from continued therapy after discharge, Continue to assess pending progress   PT Equipment Recommendations  Equipment Needed: Yes  Myles Lita: Rolling  Other: may need wheeled walker - will continue to assess    Assessment   Body structures, Functions, Activity limitations: Decreased functional mobility ; Decreased strength;Decreased endurance;Decreased balance;Decreased ADL status  Assessment: Prior to admit she was living alone in [facility] with elevator access; she was very independent with daily care and functional mobility (without assist device). Patient is progressing steadily,  has met 4/4 STGs and is working towards her LTGs. She required CGA/SBA for transfers and ambulation with wheeled walker, was able to ambulate 125' with a wheeled walker and ascend/descend curb and steps with CGA/min A. Patient is progressing well but still functioning below baseline and will benefit from continued skilled PT intervention to maximize safety, endurance, strength, balance and independence with functional mobility prior to returning home. Anticipate D/C home, Friday,  . Treatment Diagnosis: Impaired functional mobility  Prognosis: Good  Patient Education: safety with functional mobility  REQUIRES PT FOLLOW UP: Yes  Activity Tolerance  Activity Tolerance: Patient limited by fatigue;Patient limited by endurance; Patient Tolerated treatment well  Activity Tolerance: Requires intermittent rest periods throughout session. Patient Diagnosis(es): There were no encounter diagnoses.      has a past medical history of Anemia, Anticoagulant long-term use, Atrial fibrillation ([facility]), CHF (congestive heart failure) ([facility] [address] CKD (chronic kidney disease) stage 3, GFR 30-59 ml/min ([facility]), Clostridium difficile diarrhea, Depression, Humerus fracture, Hyperlipidemia, Hypertension, Hypothyroidism, Mitral regurgitation, Optic neuritis, Osteopenia, Polymyalgia rheumatica ([address] Pulmonary embolus ([address] Thyroid disease, and Vitamin D deficiency. has a past surgical history that includes Lung surgery; malignant skin lesion excision; Colonoscopy (09/14/2016); Upper gastrointestinal endoscopy (09/14/2016); Upper gastrointestinal endoscopy (N/A, 5/30/2019); Colonoscopy (N/A, 5/31/2019); Upper gastrointestinal endoscopy (N/A, 5/31/2019); Endoscopy, small bowel with ileum (5/31/2019); and Enteroscopy (N/A, 6/3/2019). Restrictions  Restrictions/Precautions  Restrictions/Precautions: Fall Risk  Position Activity Restriction  Other position/activity restrictions: Room air  Subjective   General  Chart Reviewed: Yes  Additional Pertinent Hx: 81 y/o female admit 5/28/19 with GI Bleed, Respiratory Failure. 5/30/19 S/P EGD with Argon Plasma Coag.  5/31/19 S/P EGD with Placement Video Capsule. 6/3/19  Push Enteroscopy - no lesions. 6/3/19 Nasogastric Tube Feed placed. PMH includes Mitral Regurg, A-Fib, CHF, CKD, PE, Lung Surg, Humerus Fx, Osteopenia, Polymyalgia Rheumatica. Response To Previous Treatment: Patient with no complaints from previous session. Family / Caregiver Present: No  Referring Practitioner: Dr. May Victoria: Patient reports doing well but worn out from ysterday , walking too far.   General Comment  Comments: Baseline O2 sats 92%,  HR 92 bpm          Orientation  Orientation  Overall Orientation Status: Within Functional Limits  Cognition      Objective      Transfers  Sit to Stand: Contact guard assistance;Stand by assistance  Stand to sit: Contact guard assistance;Stand by assistance  Ambulation  Ambulation?: Yes  Ambulation 1  Surface: level tile  Device: Rolling Walker  Assistance: Contact guard assistance;Stand by assistance  Quality of Gait: flexed posture with decreased clearance BLE's, slow pace  Gait Deviations: Slow Vidal;Decreased step height;Decreased step length  Distance: 125', 130'  Comments: O2 sats 88%  HR 91after amb and remained fairly steady   Stairs/Curb  Stairs?: Yes  Stairs  # Steps : 4  Stairs Height: 6\"  Rails: Bilateral  Curbs: 6\"  Device: Rolling walker(with curb step)  Assistance: Minimal assistance;Contact guard assistance  Comment: CGA/min A with cues for sequencing and safe wheeled walker management with curb; CGA for steps using bilateral rails - O2 sats to 92% after curb and steps        Exercises  Comments: Standing exercise with wheeled walker for UE support: heel/toe raises, mini squats, marching x  10 reps, cues to stay focused       Second session  S/ Patient with no pain, agreeable to perform therapy. O/ Patient given a written sitting HEP to perform over the weekend. Patient performed AP, LAQ x 15, hip abduction with orange theraband x 15 , adductor set x 10, marches x20  Patient's baseline O2 sats 94% and HR 94 on room air. Patient ambulated with wheeled walker 135', 90', O2 sats 95%, HR 94  After rest, sit to stand with SBA/CGA, ambulated with no device with HHA, slower vidal and tends to shuffle, no LOB  Patient to room via transporter  Goals  Short term goals  Time Frame for Short term goals: 1 week  Short term goal 1: Bed Mob Min assist. -met 6/11  Short term goal 2: Transfers with/without assist device minimal assist-met 6/11  Short term goal 3: Amb with/without assist device 100' SBA/CGA. - met 6/14  Short term goal 4: ascend /descend curb steps with mod/min assist-6/11  Long term goals  Time Frame for Long term goals : 2-3 weeks  Long term goal 1: Bed Mob MI  Long term goal 2: Transfers with/without assist device MI  Long term goal 3: Amb with/without assist device 150' MI   Long term goal 4: ascend /descend curb steps with SBA  Long term goal 5: 4 steps with B rails with SBA/CGA  Patient Goals   Patient goals : Get stronger and be able to return home. Plan    Plan  Times per week: 5-6x   Times per day: Twice a day  Plan weeks: 2-3 weeks  Current Treatment Recommendations: Strengthening, Balance Training, Functional Mobility Training, Transfer Training, ADL/Self-care Training, Endurance Training, Gait Training, Stair training, Home Exercise Program, Safety Education & Training, Patient/Caregiver Education & Training, Equipment Evaluation, Education, & procurement, Positioning  Plan Comment: D/C Friday, 6/21  Safety Devices  Type of devices:  All fall risk precautions in place, Gait belt(transporter to take patient back to room)  Restraints  Initially in place: No     Therapy Time   Individual Concurrent Group Co-treatment   Time In 0950         Time Out 1035         Minutes 45         Timed Code Treatment Minutes: [address]   Time In [facility], PT # [id number]

## 2019-06-14 NOTE — PROGRESS NOTES
Pt slept well overnight. Admitted with debility s/p GI bleed and CHF exacerbation. Tranfers with a walker x1. Lungs are diminished with crackles to the RLL, cough and deep breathing encouraged. On RA. HR regular. Abdomen non tender with active bowel sounds. LBM 6/13. Poor appetite, snacks offered. Was incontinent of urine overnight. Denies pain. Checked for needs Q2H when rounding. Alarms active at all times, call light in reach. Will monitor.  Oralia Storey RN

## 2019-06-15 LAB
ANION GAP SERPL CALCULATED.3IONS-SCNC: 13 MMOL/L (ref 3–16)
BACTERIA: ABNORMAL /HPF
BILIRUBIN URINE: NEGATIVE
BLOOD, URINE: ABNORMAL
BUN BLDV-MCNC: 24 MG/DL (ref 7–20)
CALCIUM SERPL-MCNC: 9.2 MG/DL (ref 8.3–10.6)
CHLORIDE BLD-SCNC: 99 MMOL/L (ref 99–110)
CLARITY: ABNORMAL
CO2: 27 MMOL/L (ref 21–32)
COLOR: YELLOW
CREAT SERPL-MCNC: 1.4 MG/DL (ref 0.6–1.2)
EPITHELIAL CELLS, UA: 1 /HPF (ref 0–5)
GFR AFRICAN AMERICAN: 43
GFR NON-AFRICAN AMERICAN: 36
GLUCOSE BLD-MCNC: 80 MG/DL (ref 70–99)
GLUCOSE URINE: NEGATIVE MG/DL
HYALINE CASTS: 6 /LPF (ref 0–8)
KETONES, URINE: NEGATIVE MG/DL
LEUKOCYTE ESTERASE, URINE: ABNORMAL
MICROSCOPIC EXAMINATION: YES
NITRITE, URINE: NEGATIVE
PH UA: 5.5 (ref 5–8)
POTASSIUM SERPL-SCNC: 3.9 MMOL/L (ref 3.5–5.1)
PROTEIN UA: NEGATIVE MG/DL
RBC UA: 1 /HPF (ref 0–4)
SODIUM BLD-SCNC: 139 MMOL/L (ref 136–145)
SPECIFIC GRAVITY UA: 1.01 (ref 1–1.03)
URINE REFLEX TO CULTURE: YES
URINE TYPE: ABNORMAL
UROBILINOGEN, URINE: 0.2 E.U./DL
WBC UA: 113 /HPF (ref 0–5)

## 2019-06-15 PROCEDURE — 87077 CULTURE AEROBIC IDENTIFY: CPT

## 2019-06-15 PROCEDURE — 6360000002 HC RX W HCPCS: Performed by: PHYSICAL MEDICINE & REHABILITATION

## 2019-06-15 PROCEDURE — 94669 MECHANICAL CHEST WALL OSCILL: CPT

## 2019-06-15 PROCEDURE — 6370000000 HC RX 637 (ALT 250 FOR IP): Performed by: INTERNAL MEDICINE

## 2019-06-15 PROCEDURE — 36415 COLL VENOUS BLD VENIPUNCTURE: CPT

## 2019-06-15 PROCEDURE — 94668 MNPJ CHEST WALL SBSQ: CPT

## 2019-06-15 PROCEDURE — 94760 N-INVAS EAR/PLS OXIMETRY 1: CPT

## 2019-06-15 PROCEDURE — 51701 INSERT BLADDER CATHETER: CPT

## 2019-06-15 PROCEDURE — 1280000000 HC REHAB R&B

## 2019-06-15 PROCEDURE — C9113 INJ PANTOPRAZOLE SODIUM, VIA: HCPCS | Performed by: INTERNAL MEDICINE

## 2019-06-15 PROCEDURE — 80048 BASIC METABOLIC PNL TOTAL CA: CPT

## 2019-06-15 PROCEDURE — 51798 US URINE CAPACITY MEASURE: CPT

## 2019-06-15 PROCEDURE — 94640 AIRWAY INHALATION TREATMENT: CPT

## 2019-06-15 PROCEDURE — 87186 SC STD MICRODIL/AGAR DIL: CPT

## 2019-06-15 PROCEDURE — 99233 SBSQ HOSP IP/OBS HIGH 50: CPT | Performed by: INTERNAL MEDICINE

## 2019-06-15 PROCEDURE — 6360000002 HC RX W HCPCS: Performed by: INTERNAL MEDICINE

## 2019-06-15 PROCEDURE — 2580000003 HC RX 258: Performed by: INTERNAL MEDICINE

## 2019-06-15 PROCEDURE — 81001 URINALYSIS AUTO W/SCOPE: CPT

## 2019-06-15 PROCEDURE — 87086 URINE CULTURE/COLONY COUNT: CPT

## 2019-06-15 RX ORDER — CIPROFLOXACIN 2 MG/ML
250 INJECTION, SOLUTION INTRAVENOUS EVERY 12 HOURS
Status: DISCONTINUED | OUTPATIENT
Start: 2019-06-15 | End: 2019-06-15 | Stop reason: DRUGHIGH

## 2019-06-15 RX ORDER — PANTOPRAZOLE SODIUM 40 MG/10ML
40 INJECTION, POWDER, LYOPHILIZED, FOR SOLUTION INTRAVENOUS DAILY
Status: DISCONTINUED | OUTPATIENT
Start: 2019-06-15 | End: 2019-06-15

## 2019-06-15 RX ORDER — 0.9 % SODIUM CHLORIDE 0.9 %
10 VIAL (ML) INJECTION 2 TIMES DAILY
Status: DISCONTINUED | OUTPATIENT
Start: 2019-06-15 | End: 2019-06-16 | Stop reason: HOSPADM

## 2019-06-15 RX ORDER — PANTOPRAZOLE SODIUM 40 MG/10ML
40 INJECTION, POWDER, LYOPHILIZED, FOR SOLUTION INTRAVENOUS 2 TIMES DAILY
Status: DISCONTINUED | OUTPATIENT
Start: 2019-06-15 | End: 2019-06-16 | Stop reason: HOSPADM

## 2019-06-15 RX ORDER — SODIUM CHLORIDE 9 MG/ML
INJECTION, SOLUTION INTRAVENOUS CONTINUOUS
Status: DISCONTINUED | OUTPATIENT
Start: 2019-06-15 | End: 2019-06-16

## 2019-06-15 RX ORDER — FUROSEMIDE 10 MG/ML
20 INJECTION INTRAMUSCULAR; INTRAVENOUS ONCE
Status: COMPLETED | OUTPATIENT
Start: 2019-06-15 | End: 2019-06-15

## 2019-06-15 RX ORDER — SUCRALFATE 1 G/1
1 TABLET ORAL EVERY 6 HOURS SCHEDULED
Status: DISCONTINUED | OUTPATIENT
Start: 2019-06-15 | End: 2019-06-16

## 2019-06-15 RX ORDER — 0.9 % SODIUM CHLORIDE 0.9 %
10 VIAL (ML) INJECTION DAILY
Status: DISCONTINUED | OUTPATIENT
Start: 2019-06-15 | End: 2019-06-15

## 2019-06-15 RX ORDER — CIPROFLOXACIN 2 MG/ML
400 INJECTION, SOLUTION INTRAVENOUS EVERY 24 HOURS
Status: DISCONTINUED | OUTPATIENT
Start: 2019-06-15 | End: 2019-06-16 | Stop reason: HOSPADM

## 2019-06-15 RX ORDER — ONDANSETRON 4 MG/1
4 TABLET, FILM COATED ORAL EVERY 8 HOURS PRN
Status: DISCONTINUED | OUTPATIENT
Start: 2019-06-15 | End: 2019-06-15

## 2019-06-15 RX ORDER — ONDANSETRON 2 MG/ML
4 INJECTION INTRAMUSCULAR; INTRAVENOUS EVERY 6 HOURS PRN
Status: DISCONTINUED | OUTPATIENT
Start: 2019-06-15 | End: 2019-06-16 | Stop reason: HOSPADM

## 2019-06-15 RX ADMIN — MOMETASONE FUROATE AND FORMOTEROL FUMARATE DIHYDRATE 2 PUFF: 200; 5 AEROSOL RESPIRATORY (INHALATION) at 07:51

## 2019-06-15 RX ADMIN — ONDANSETRON 4 MG: 2 INJECTION INTRAMUSCULAR; INTRAVENOUS at 21:33

## 2019-06-15 RX ADMIN — MOMETASONE FUROATE AND FORMOTEROL FUMARATE DIHYDRATE 2 PUFF: 200; 5 AEROSOL RESPIRATORY (INHALATION) at 21:16

## 2019-06-15 RX ADMIN — SODIUM CHLORIDE: 9 INJECTION, SOLUTION INTRAVENOUS at 13:52

## 2019-06-15 RX ADMIN — METOPROLOL SUCCINATE 50 MG: 50 TABLET, EXTENDED RELEASE ORAL at 21:38

## 2019-06-15 RX ADMIN — IPRATROPIUM BROMIDE AND ALBUTEROL SULFATE 1 AMPULE: .5; 3 SOLUTION RESPIRATORY (INHALATION) at 12:39

## 2019-06-15 RX ADMIN — AMIODARONE HYDROCHLORIDE 200 MG: 200 TABLET ORAL at 21:38

## 2019-06-15 RX ADMIN — Medication 10 ML: at 13:56

## 2019-06-15 RX ADMIN — PANTOPRAZOLE SODIUM 40 MG: 40 INJECTION, POWDER, FOR SOLUTION INTRAVENOUS at 13:56

## 2019-06-15 RX ADMIN — LATANOPROST 1 DROP: 50 SOLUTION/ DROPS OPHTHALMIC at 22:43

## 2019-06-15 RX ADMIN — SUCRALFATE 1 G: 1 TABLET ORAL at 23:59

## 2019-06-15 RX ADMIN — CIPROFLOXACIN 400 MG: 2 INJECTION, SOLUTION INTRAVENOUS at 22:43

## 2019-06-15 RX ADMIN — FUROSEMIDE 20 MG: 10 INJECTION, SOLUTION INTRAMUSCULAR; INTRAVENOUS at 13:52

## 2019-06-15 RX ADMIN — IPRATROPIUM BROMIDE AND ALBUTEROL SULFATE 1 AMPULE: .5; 3 SOLUTION RESPIRATORY (INHALATION) at 21:16

## 2019-06-15 RX ADMIN — IPRATROPIUM BROMIDE AND ALBUTEROL SULFATE 1 AMPULE: .5; 3 SOLUTION RESPIRATORY (INHALATION) at 07:51

## 2019-06-15 RX ADMIN — SUCRALFATE 1 G: 1 TABLET ORAL at 16:33

## 2019-06-15 RX ADMIN — PANTOPRAZOLE SODIUM 40 MG: 40 INJECTION, POWDER, FOR SOLUTION INTRAVENOUS at 21:33

## 2019-06-15 RX ADMIN — IPRATROPIUM BROMIDE AND ALBUTEROL SULFATE 1 AMPULE: .5; 3 SOLUTION RESPIRATORY (INHALATION) at 16:29

## 2019-06-15 ASSESSMENT — PAIN SCALES - WONG BAKER: WONGBAKER_NUMERICALRESPONSE: 0

## 2019-06-15 ASSESSMENT — PAIN SCALES - GENERAL
PAINLEVEL_OUTOF10: 0
PAINLEVEL_OUTOF10: 0

## 2019-06-15 ASSESSMENT — ENCOUNTER SYMPTOMS: COUGH: 1

## 2019-06-15 NOTE — PROGRESS NOTES
Patient attempted to eat some oatmeal for dinner, was not able to keep it down. IV fluid infusing per order. Started on carafate this evening per GI. Willl monitor strict I/O.

## 2019-06-15 NOTE — CONSULTS
GI Consult Note      Admission Date: 6/6/2019  Hospital Day: Hospital Day: 10  Attending: Denise Liu MD  Date of service: 6/15/19    Subjective:     Chief complaint/ Reason for consult:   Dysphagia     HPI: Gaviota Emanuel is a 80 y.o.  female patient, who was seen at the request of Dr. Denise Liu MD.    History was obtained from chart review and the patient. Patient was admitted in late May for melena, anemia and supratherapeutic INR. Her coumadin was held since then. She also had extensive endoscopic evaluation summarized below. She has chronic intermittent dysphagia for years. It only bother hers once a week. Since her last endoscopic evaluation in 6/3, she started having progressive dysphagia. She points to food getting stuck in her mid chest.  Last night, she started having trouble swallowing pills. Today, she cannot tolerate liquids or solids. Past Endoscopic History:  EGD 5/30: 3 cm sliding HH, 2mm nonbleeding AVM in D2 s/p APC  Colonoscopy 5/31: 2 mm transverse colon polyp, left alone; diverticulosis   Capsule endoscopy: possible SB bleeding source 29:00 (prominent red spot of clot), scattered AVMs   Push Enteroscopy 6/3: the colonoscope was inserted 65 cm post pylorus. The bleeding lesion seen in capsule endoscopy not endoscopically visualized                 PMH significant for CHF, CKD, A.  Fib on coumadin, HTN, HLD, hypothyroidism, and PE         Past Medical History:     Past Medical History:   Diagnosis Date    Anemia     Anticoagulant long-term use     Atrial fibrillation ([address] 6/2011    Armand Lester CHF (congestive heart failure) ([facility])     CKD (chronic kidney disease) stage 3, GFR 30-59 ml/min ([facility]) 2/11/2019    Clostridium difficile diarrhea 09/14/2016    Depression     Humerus fracture     Hyperlipidemia     Hypertension     Hypothyroidism     Mitral regurgitation     Optic neuritis     Osteopenia     Fosamax stopped 2/2014, had been treated at least since 2004    Polymyalgia rheumatica [facility])     Pulmonary embolus ([address] 6/2011    Right pulmonary obstruction suspected to be possible chronic pulmonary embolus    Thyroid disease     Vitamin D deficiency        Past Surgical History:    Past Surgical History:   Procedure Laterality Date    COLONOSCOPY  09/14/2016    Ilya    COLONOSCOPY N/A 5/31/2019    [facility]  5/31[facility]2019    BOWEL SMALL CAPSULE ENDOSCOPY DEPLOYED VIA EGD performed by Judith Schulz MD at [address] ENTEROSCOPY N/A 6/3/2019    ENTEROSCOPY PUSH DIAGNOSTIC performed by Tawanna Rinne.DO at [address] [facility] MALIGNANT SKIN LESION EXCISION      UPPER GASTROINTESTINAL ENDOSCOPY  09/14/2016    Ilya    UPPER GASTROINTESTINAL ENDOSCOPY N/A 5/30/2019    EGD POLYP ABLATION/OTHER performed by Judith Schulz MD at [address] N/A 5/31/2019    ESOPHAGOGASTRODUODENOSCOPY WITH CAPSULE PLACEMENT performed by Judith Schulz MD at [facility] History:     · Tobacco use:   reports that she quit smoking about 54 years ago. She has never used smokeless tobacco.  · Alcohol use:   reports that she drinks alcohol. · Currently lives in: [address] [place] [address]  ·  reports that she does not use drugs.        Family History:       Problem Relation Age of Onset   Van Manjula Cancer Mother         unknown primary    Other Father         Tuberculosis    Lung Cancer Sister     Tuberculosis Brother     Diabetes Brother      Medications:    potassium chloride  20 mEq Oral BID    furosemide  40 mg Oral Daily    betamethasone (augmented)   Topical BID    sennosides-docusate sodium  2 tablet Oral BID    amiodarone  200 mg Oral BID    ipratropium-albuterol  1 ampule Inhalation Q4H WA    latanoprost  1 drop Both Eyes Nightly    levothyroxine  75 mcg Oral Daily    memantine  10 mg Oral BID    metoprolol succinate  50 mg Oral BID    mometasone-formoterol  2 puff Inhalation BID    pantoprazole  40 mg Oral QAM AC        REVIEW OF SYSTEMS:       Review of systems not obtained due to patient factors. Objective:   PHYSICAL EXAM:      Vitals:   Vitals:    06/14/19 2056 06/15/19 0601 06/15/19 0753 06/15/19 1014   BP: 112/63 106/65  (!) 100/56   Pulse: 90 96  97   Resp: 16 15     Temp: 97.7 °F (36.5 °C) 97.8 °F (36.6 °C)     TempSrc: Oral Oral     SpO2: 93% 92% 92%    Weight:  101 lb 10.1 oz (46.1 kg)     Height:           General appearance: alert, cooperative, no distress, appears stated age  Eyes: Anicteric  Head: Normocephalic, without obvious abnormality  Lungs: clear to auscultation bilaterally, Normal Effort  Heart: irregular rate and rhythm, normal S1 and S2, no murmurs or rubs  Abdomen: soft, non-tender. Bowel sounds normal. No masses,  no organomegaly. Extremities: atraumatic, no cyanosis or edema  Skin: warm and dry, no jaundice  Neuro: Grossly intact, A&OX3    Intake and output:   I/O last 3 completed shifts: In: 340 [P.O.:340]  Out: 200 [Urine:200]    Lab Data:      CBC: No results for input(s): WBC, RBC, HGB, HCT, PLT, MCV, MCH, MCHC, RDW, NRBC, SEGSPCT, BANDSPCT in the last 72 hours. BMP:  Recent Labs     06/13/19  0651 06/14/19  0631 06/15/19  0639    141 139   K 3.9 3.7 3.9   CL 98* 102 99   CO2 29 28 27   BUN 26* 24* 24*   CREATININE 1.5* 1.4* 1.4*   CALCIUM 9.0 8.8 9.2   GLUCOSE 92 98 80        Hepatic Function Panel:   Lab Results   Component Value Date    ALKPHOS 101 06/08/2019    ALT 22 06/08/2019    AST 20 06/08/2019    PROT 6.2 06/08/2019    PROT 6.8 10/26/2012    BILITOT 0.5 06/08/2019    LABALBU 2.6 06/08/2019       No results for input(s): LIPASE, AMYLASE in the last 72 hours. No results for input(s): PROTIME, INR in the last 72 hours. No results for input(s): PTT in the last 72 hours. No results for input(s): OCCULTBLD in the last 72 hours.       Imaging:    XR CHEST STANDARD (2 VW)   Final Result   Slight interval improvement in pulmonary edema. Persistent right sided effusion. Known drug Allergies: Allergies   Allergen Reactions    Ace Inhibitors      coughing    Aricept [Donepezil Hydrochloride]      Can not recall    Benicar [Olmesartan Medoxomil]      Can not recall      Exelon [Rivastigmine Tartrate]      Can not recall      Pcn [Penicillins] Hives and Swelling    Quinapril Hcl      Can not recall      Doxycycline Nausea And Vomiting           Assessment:   The patient is a 80 y.o. old female  with following problems:    Principal Problem:    Chronic diastolic congestive heart failure (HCC)  Active Problems:    Paroxysmal atrial fibrillation (HCC)    Non-rheumatic mitral regurgitation    CKD (chronic kidney disease) stage 3, GFR 30-59 ml/min (HCC)    Acute blood loss anemia    Disuse muscle atrophy  Resolved Problems:    * No resolved hospital problems. *    Acute worsening of chronic dysphagia. This is likely due to esophagitis from her hiatal hernia. Less likely gastric volvulus  Recent acute GI bleeding from SB source. S/p EGD, colonoscopy, capsule endoscopy and push enteroscopy. Hb stable 9+ range.    Atrial fibrillation   CHF   CKD       Recommendations:   Change diet to full liquids   Change oral PPI to IV PPI BID  Add carafate 1 gram slurry 4x/day  IV zofran PRN    Barium esophagram     Whitney Lemus MD, [address]

## 2019-06-15 NOTE — PROGRESS NOTES
IM Progress Note          CC: CHF    Interval history:  Martha respiratory status is stable/improving. Her major complaint today is dysphagia \"with almost anything\". She has been scoped and found to have a hiatal hernia. Review of Systems:  Review of Systems   Constitutional: Negative for chills and fever. Respiratory: Positive for cough. +BEE. Cardiovascular: Negative for chest pain, palpitations and leg swelling. Gastrointestinal:        +dysphagia. Neurological: Positive for weakness. 14 point ros otherwise negative. Objective:    BP (!) 100/56   Pulse 97   Temp 97.8 °F (36.6 °C) (Oral)   Resp 15   Ht 5' (1.524 m)   Wt 101 lb 10.1 oz (46.1 kg)   SpO2 92%   BMI 19.85 kg/m²     Intake/Output Summary (Last 24 hours) at 6/15/2019 1034  Last data filed at 6/15/2019 0610  Gross per 24 hour   Intake 100 ml   Output 200 ml   Net -100 ml        Physical Exam   Constitutional: She is oriented to person, place, and time. Thin W/F in NAD. Neck: No JVD present. Cardiovascular: Normal rate. Rhythm irregular with murmur. Pulmonary/Chest:   Rales right base. Abdominal: Soft. Bowel sounds are normal. There is no tenderness. Musculoskeletal: She exhibits no edema. Neurological: She is alert and oriented to person, place, and time. Skin: Skin is warm and dry. CBC: No results for input(s): WBC, HGB, PLT in the last 72 hours. BMP:    Recent Labs     06/14/19  0631 06/15/19  0639    139   K 3.7 3.9    99   CO2 28 27   BUN 24* 24*   CREATININE 1.4* 1.4*   GLUCOSE 98 80     Hepatic: No results for input(s): AST, ALT, ALB, BILITOT, ALKPHOS in the last 72 hours. Troponin: No results for input(s): TROPONINI in the last 72 hours. BNP: No results for input(s): BNP in the last 72 hours. Lipids: No results for input(s): CHOL, HDL in the last 72 hours. Invalid input(s): LDLCALCU  INR: No results for input(s): INR in the last 72 hours.      Glucose:    Recent Labs     06/13/19  0651 06/14/19  0631 06/15/19  0639   GLUCOSE 92 98 80            Assessment/Plan:    Active Hospital Problems    Diagnosis Date Noted    Chronic diastolic congestive heart failure (HCC) [I50.32] - stable/improving. Continue Metoprolol/Lasix. 07/10/2014     Priority: High    Paroxysmal atrial fibrillation ([facility] [address] [address] [I48.0] 08/22/2011     Priority: High - Metoprolol/Amiodarone    Disuse muscle atrophy [M62.50] - rehab for PT/OT 06/06/2019    Acute blood loss anemia [D62] - last Hgb was 9.4 - monitor.  CKD (chronic kidney disease) stage 3, GFR 30-59 ml/min ([facility]) [N18.3] - creatinine is stable. 02/11/2019    Non-rheumatic mitral regurgitation [I34.0] 07/10/2014       Main complaint is dysphagia - ? spasm. Will ask GI to see again.     Electronically signed by Cris Agosto MD on 6/15/2019 at 10:34 AM

## 2019-06-15 NOTE — PLAN OF CARE
Problem: Risk for Impaired Skin Integrity  Goal: Tissue integrity - skin and mucous membranes  Description  Structural intactness and normal physiological function of skin and  mucous membranes. 6/8/2019 1004 by Demi Crowley RN  Outcome: Ongoing  Note:   Skin assessment completed on admission and every shift. Barrier wipes used in the event of incontinence. Pressure relief techniques used as needed while in chair and in bed. Position changes encouraged at least every two hours while in bed. Problem: Falls - Risk of:  Goal: Will remain free from falls  Description  Will remain free from falls  6/8/2019 1004 by Demi Crowley RN  Outcome: Ongoing  Note:   Orange band on patient. Orange light on outside of room. Non skid footwear in place. Alarms used appropriately. Patient instructed to call and wait for staff before getting up. Rounding done to anticipate needs. Appropriate safety devices used for transfers. Problem: ACTIVITY INTOLERANCE/IMPAIRED MOBILITY  Goal: Mobility/activity is maintained at optimum level for patient  6/8/2019 1004 by Demi Crowley RN  Outcome: Ongoing  Note:   Patient working with therapy at least 3 hours/day to obtain maximal mobility. Safety devices used.

## 2019-06-15 NOTE — PLAN OF CARE
Problem: Risk for Impaired Skin Integrity  Goal: Tissue integrity - skin and mucous membranes  Description  Structural intactness and normal physiological function of skin and  mucous membranes. Outcome: Ongoing  Note:   Able to change positions in bed without assist, no evidence of skin breakdown noted. Waffle cushion in place to chair. Problem: Falls - Risk of:  Goal: Will remain free from falls  Description  Will remain free from falls  Outcome: Ongoing  Note:   Pt educated on falls precautions and safety. Call light and personal belongings within reach at all times. Non skid socks in use when up. Hourly rounding and alarms active. Problem: IP BLADDER/VOIDING  Goal: LTG - patient will complete bladder elimination  Outcome: Ongoing  Note:   Toileting encouraged. Problem: OXYGENATION/RESPIRATORY FUNCTION  Goal: Patient will maintain patent airway  Outcome: Ongoing  Note:   Respiratory assessment every shift. On RA.  O2 sat monitored

## 2019-06-16 ENCOUNTER — DIRECT ADMIT ORDERS (OUTPATIENT)
Dept: INTERNAL MEDICINE CLINIC | Age: 84
End: 2019-06-16

## 2019-06-16 ENCOUNTER — APPOINTMENT (OUTPATIENT)
Dept: GENERAL RADIOLOGY | Age: 84
DRG: 369 | End: 2019-06-16
Attending: INTERNAL MEDICINE
Payer: MEDICARE

## 2019-06-16 ENCOUNTER — HOSPITAL ENCOUNTER (INPATIENT)
Age: 84
LOS: 5 days | Discharge: NURSING, REHAB OR LONG-TERM CARE | DRG: 369 | End: 2019-06-21
Attending: INTERNAL MEDICINE | Admitting: INTERNAL MEDICINE
Payer: MEDICARE

## 2019-06-16 VITALS
WEIGHT: 98.11 LBS | BODY MASS INDEX: 19.26 KG/M2 | HEIGHT: 60 IN | RESPIRATION RATE: 18 BRPM | SYSTOLIC BLOOD PRESSURE: 125 MMHG | DIASTOLIC BLOOD PRESSURE: 71 MMHG | OXYGEN SATURATION: 96 % | HEART RATE: 113 BPM | TEMPERATURE: 97.6 F

## 2019-06-16 PROBLEM — R13.10 DYSPHAGIA: Status: ACTIVE | Noted: 2019-06-16

## 2019-06-16 PROBLEM — E46 PROTEIN CALORIE MALNUTRITION (HCC): Status: ACTIVE | Noted: 2019-06-04

## 2019-06-16 PROBLEM — R13.19 ESOPHAGEAL DYSPHAGIA: Status: ACTIVE | Noted: 2019-06-16

## 2019-06-16 LAB
ANION GAP SERPL CALCULATED.3IONS-SCNC: 14 MMOL/L (ref 3–16)
BUN BLDV-MCNC: 22 MG/DL (ref 7–20)
CALCIUM SERPL-MCNC: 9.1 MG/DL (ref 8.3–10.6)
CHLORIDE BLD-SCNC: 102 MMOL/L (ref 99–110)
CO2: 26 MMOL/L (ref 21–32)
CREAT SERPL-MCNC: 1.4 MG/DL (ref 0.6–1.2)
GFR AFRICAN AMERICAN: 43
GFR NON-AFRICAN AMERICAN: 36
GLUCOSE BLD-MCNC: 78 MG/DL (ref 70–99)
POTASSIUM SERPL-SCNC: 4.2 MMOL/L (ref 3.5–5.1)
PRO-BNP: 4306 PG/ML (ref 0–449)
SODIUM BLD-SCNC: 142 MMOL/L (ref 136–145)

## 2019-06-16 PROCEDURE — 94760 N-INVAS EAR/PLS OXIMETRY 1: CPT

## 2019-06-16 PROCEDURE — 93005 ELECTROCARDIOGRAM TRACING: CPT

## 2019-06-16 PROCEDURE — 3609012400 HC EGD TRANSORAL BIOPSY SINGLE/MULTIPLE: Performed by: INTERNAL MEDICINE

## 2019-06-16 PROCEDURE — 99152 MOD SED SAME PHYS/QHP 5/>YRS: CPT | Performed by: INTERNAL MEDICINE

## 2019-06-16 PROCEDURE — 6370000000 HC RX 637 (ALT 250 FOR IP): Performed by: INTERNAL MEDICINE

## 2019-06-16 PROCEDURE — 2580000003 HC RX 258: Performed by: INTERNAL MEDICINE

## 2019-06-16 PROCEDURE — 88312 SPECIAL STAINS GROUP 1: CPT

## 2019-06-16 PROCEDURE — 71045 X-RAY EXAM CHEST 1 VIEW: CPT

## 2019-06-16 PROCEDURE — 2700000000 HC OXYGEN THERAPY PER DAY

## 2019-06-16 PROCEDURE — 7100000010 HC PHASE II RECOVERY - FIRST 15 MIN: Performed by: INTERNAL MEDICINE

## 2019-06-16 PROCEDURE — 0DJ08ZZ INSPECTION OF UPPER INTESTINAL TRACT, VIA NATURAL OR ARTIFICIAL OPENING ENDOSCOPIC: ICD-10-PCS | Performed by: INTERNAL MEDICINE

## 2019-06-16 PROCEDURE — 2060000000 HC ICU INTERMEDIATE R&B

## 2019-06-16 PROCEDURE — 2709999900 HC NON-CHARGEABLE SUPPLY: Performed by: INTERNAL MEDICINE

## 2019-06-16 PROCEDURE — 99233 SBSQ HOSP IP/OBS HIGH 50: CPT | Performed by: INTERNAL MEDICINE

## 2019-06-16 PROCEDURE — 7100000011 HC PHASE II RECOVERY - ADDTL 15 MIN: Performed by: INTERNAL MEDICINE

## 2019-06-16 PROCEDURE — 6360000002 HC RX W HCPCS: Performed by: INTERNAL MEDICINE

## 2019-06-16 PROCEDURE — 36415 COLL VENOUS BLD VENIPUNCTURE: CPT

## 2019-06-16 PROCEDURE — C9113 INJ PANTOPRAZOLE SODIUM, VIA: HCPCS | Performed by: INTERNAL MEDICINE

## 2019-06-16 PROCEDURE — 94640 AIRWAY INHALATION TREATMENT: CPT

## 2019-06-16 PROCEDURE — 3609012900 HC EGD FOREIGN BODY REMOVAL: Performed by: INTERNAL MEDICINE

## 2019-06-16 PROCEDURE — 83880 ASSAY OF NATRIURETIC PEPTIDE: CPT

## 2019-06-16 PROCEDURE — 0DC57ZZ EXTIRPATION OF MATTER FROM ESOPHAGUS, VIA NATURAL OR ARTIFICIAL OPENING: ICD-10-PCS | Performed by: INTERNAL MEDICINE

## 2019-06-16 PROCEDURE — 80048 BASIC METABOLIC PNL TOTAL CA: CPT

## 2019-06-16 PROCEDURE — 88305 TISSUE EXAM BY PATHOLOGIST: CPT

## 2019-06-16 PROCEDURE — 94669 MECHANICAL CHEST WALL OSCILL: CPT

## 2019-06-16 PROCEDURE — 51798 US URINE CAPACITY MEASURE: CPT

## 2019-06-16 PROCEDURE — 99153 MOD SED SAME PHYS/QHP EA: CPT | Performed by: INTERNAL MEDICINE

## 2019-06-16 RX ORDER — LATANOPROST 50 UG/ML
1 SOLUTION/ DROPS OPHTHALMIC NIGHTLY
Status: DISCONTINUED | OUTPATIENT
Start: 2019-06-16 | End: 2019-06-21 | Stop reason: HOSPADM

## 2019-06-16 RX ORDER — SODIUM CHLORIDE 0.9 % (FLUSH) 0.9 %
10 SYRINGE (ML) INJECTION PRN
Status: CANCELLED | OUTPATIENT
Start: 2019-06-16

## 2019-06-16 RX ORDER — FLUCONAZOLE 100 MG/1
200 TABLET ORAL ONCE
Status: COMPLETED | OUTPATIENT
Start: 2019-06-16 | End: 2019-06-16

## 2019-06-16 RX ORDER — ONDANSETRON 2 MG/ML
4 INJECTION INTRAMUSCULAR; INTRAVENOUS EVERY 6 HOURS PRN
Status: CANCELLED | OUTPATIENT
Start: 2019-06-16

## 2019-06-16 RX ORDER — FLUCONAZOLE 100 MG/1
100 TABLET ORAL DAILY
Status: DISCONTINUED | OUTPATIENT
Start: 2019-06-17 | End: 2019-06-21 | Stop reason: HOSPADM

## 2019-06-16 RX ORDER — DEXTROSE, SODIUM CHLORIDE, AND POTASSIUM CHLORIDE 5; .45; .15 G/100ML; G/100ML; G/100ML
INJECTION INTRAVENOUS CONTINUOUS
Status: DISCONTINUED | OUTPATIENT
Start: 2019-06-16 | End: 2019-06-16 | Stop reason: HOSPADM

## 2019-06-16 RX ORDER — MIDAZOLAM HYDROCHLORIDE 1 MG/ML
INJECTION INTRAMUSCULAR; INTRAVENOUS
Status: COMPLETED | OUTPATIENT
Start: 2019-06-16 | End: 2019-06-16

## 2019-06-16 RX ORDER — FENTANYL CITRATE 50 UG/ML
INJECTION, SOLUTION INTRAMUSCULAR; INTRAVENOUS
Status: COMPLETED | OUTPATIENT
Start: 2019-06-16 | End: 2019-06-16

## 2019-06-16 RX ORDER — LEVOTHYROXINE SODIUM 0.07 MG/1
75 TABLET ORAL DAILY
Status: DISCONTINUED | OUTPATIENT
Start: 2019-06-16 | End: 2019-06-21 | Stop reason: HOSPADM

## 2019-06-16 RX ORDER — ONDANSETRON 2 MG/ML
4 INJECTION INTRAMUSCULAR; INTRAVENOUS EVERY 6 HOURS PRN
Status: DISCONTINUED | OUTPATIENT
Start: 2019-06-16 | End: 2019-06-21 | Stop reason: HOSPADM

## 2019-06-16 RX ORDER — FUROSEMIDE 10 MG/ML
20 INJECTION INTRAMUSCULAR; INTRAVENOUS DAILY
Status: DISCONTINUED | OUTPATIENT
Start: 2019-06-16 | End: 2019-06-16 | Stop reason: HOSPADM

## 2019-06-16 RX ORDER — METOPROLOL TARTRATE 5 MG/5ML
5 INJECTION INTRAVENOUS EVERY 6 HOURS
Status: DISCONTINUED | OUTPATIENT
Start: 2019-06-16 | End: 2019-06-16 | Stop reason: HOSPADM

## 2019-06-16 RX ORDER — AMIODARONE HYDROCHLORIDE 200 MG/1
200 TABLET ORAL DAILY
Status: DISCONTINUED | OUTPATIENT
Start: 2019-06-16 | End: 2019-06-21

## 2019-06-16 RX ORDER — METOPROLOL TARTRATE 5 MG/5ML
5 INJECTION INTRAVENOUS EVERY 12 HOURS
Status: DISCONTINUED | OUTPATIENT
Start: 2019-06-16 | End: 2019-06-16 | Stop reason: HOSPADM

## 2019-06-16 RX ORDER — PANTOPRAZOLE SODIUM 40 MG/10ML
40 INJECTION, POWDER, LYOPHILIZED, FOR SOLUTION INTRAVENOUS 2 TIMES DAILY
Status: DISCONTINUED | OUTPATIENT
Start: 2019-06-16 | End: 2019-06-21 | Stop reason: HOSPADM

## 2019-06-16 RX ORDER — METOPROLOL SUCCINATE 50 MG/1
50 TABLET, EXTENDED RELEASE ORAL DAILY
Status: DISCONTINUED | OUTPATIENT
Start: 2019-06-16 | End: 2019-06-17

## 2019-06-16 RX ORDER — SODIUM CHLORIDE 0.9 % (FLUSH) 0.9 %
10 SYRINGE (ML) INJECTION EVERY 12 HOURS SCHEDULED
Status: CANCELLED | OUTPATIENT
Start: 2019-06-16

## 2019-06-16 RX ORDER — ALBUTEROL SULFATE 90 UG/1
2 AEROSOL, METERED RESPIRATORY (INHALATION) EVERY 4 HOURS PRN
Status: DISCONTINUED | OUTPATIENT
Start: 2019-06-16 | End: 2019-06-21 | Stop reason: HOSPADM

## 2019-06-16 RX ORDER — IPRATROPIUM BROMIDE 42 UG/1
2 SPRAY, METERED NASAL 4 TIMES DAILY
Status: DISCONTINUED | OUTPATIENT
Start: 2019-06-16 | End: 2019-06-21 | Stop reason: HOSPADM

## 2019-06-16 RX ADMIN — LEVOTHYROXINE SODIUM 75 MCG: 75 TABLET ORAL at 17:37

## 2019-06-16 RX ADMIN — SUCRALFATE 1 G: 1 TABLET ORAL at 05:21

## 2019-06-16 RX ADMIN — IPRATROPIUM BROMIDE AND ALBUTEROL SULFATE 1 AMPULE: .5; 3 SOLUTION RESPIRATORY (INHALATION) at 11:51

## 2019-06-16 RX ADMIN — AMIODARONE HYDROCHLORIDE 200 MG: 200 TABLET ORAL at 17:36

## 2019-06-16 RX ADMIN — LATANOPROST 1 DROP: 50 SOLUTION OPHTHALMIC at 21:20

## 2019-06-16 RX ADMIN — IPRATROPIUM BROMIDE AND ALBUTEROL SULFATE 1 AMPULE: .5; 3 SOLUTION RESPIRATORY (INHALATION) at 07:47

## 2019-06-16 RX ADMIN — MOMETASONE FUROATE AND FORMOTEROL FUMARATE DIHYDRATE 2 PUFF: 200; 5 AEROSOL RESPIRATORY (INHALATION) at 21:34

## 2019-06-16 RX ADMIN — MOMETASONE FUROATE AND FORMOTEROL FUMARATE DIHYDRATE 2 PUFF: 200; 5 AEROSOL RESPIRATORY (INHALATION) at 07:48

## 2019-06-16 RX ADMIN — METOPROLOL SUCCINATE 50 MG: 50 TABLET, EXTENDED RELEASE ORAL at 21:20

## 2019-06-16 RX ADMIN — CEFTRIAXONE 1 G: 1 INJECTION, POWDER, FOR SOLUTION INTRAMUSCULAR; INTRAVENOUS at 16:03

## 2019-06-16 RX ADMIN — PANTOPRAZOLE SODIUM 40 MG: 40 INJECTION, POWDER, FOR SOLUTION INTRAVENOUS at 10:44

## 2019-06-16 RX ADMIN — ONDANSETRON 4 MG: 2 INJECTION INTRAMUSCULAR; INTRAVENOUS at 09:05

## 2019-06-16 RX ADMIN — SODIUM CHLORIDE: 9 INJECTION, SOLUTION INTRAVENOUS at 09:00

## 2019-06-16 RX ADMIN — PANTOPRAZOLE SODIUM 40 MG: 40 INJECTION, POWDER, FOR SOLUTION INTRAVENOUS at 21:20

## 2019-06-16 RX ADMIN — LEVOTHYROXINE SODIUM 75 MCG: 75 TABLET ORAL at 05:21

## 2019-06-16 RX ADMIN — IPRATROPIUM BROMIDE 2 SPRAY: 42 SPRAY NASAL at 21:20

## 2019-06-16 RX ADMIN — Medication 10 ML: at 10:45

## 2019-06-16 RX ADMIN — FLUCONAZOLE 200 MG: 100 TABLET ORAL at 17:31

## 2019-06-16 ASSESSMENT — PAIN SCALES - GENERAL
PAINLEVEL_OUTOF10: 0

## 2019-06-16 NOTE — PROGRESS NOTES
Pt arrived back from endoscopy, a/ox4. She denies pain or discomfort. NG tube in place. Radiology notified for CXR to be obtained.

## 2019-06-16 NOTE — PROGRESS NOTES
Clinical Pharmacy Note  IV Levothyroxine Protocol    Due to the long half-life of levothyroxine (up to 10 days) and cost of the IV formulation, IV levothyroxine will be held for 5 days from the patient's last reported dose. Patient's excluded from this automatic hold are those with myxedema coma, TSH > 10 (if available), age < 25, or receiving a continuous IV infusion of levothyroxine for cadaveric organ recovery. The patient will be followed daily and changed to oral therapy if they are able to tolerate other oral medications. This has been approved by the . [address] [facility] with [facility].   Teri Rosario [facility]  6/16/2019  3:06 PM

## 2019-06-16 NOTE — PLAN OF CARE
Problem: Risk for Impaired Skin Integrity  Goal: Tissue integrity - skin and mucous membranes  Description  Structural intactness and normal physiological function of skin and  mucous membranes. Outcome: Ongoing  Note:   Patient is at risk for impaired skin integrity. Offered to turn and reposition patient every two hours and as needed. Skin is assessed every shift and prn. Patient has pillow support, to help reduce skin breakdown. Barrier cream applied for incontinence as needed. Will continue to monitor. Problem: Falls - Risk of:  Goal: Will remain free from falls  Description  Will remain free from falls  Outcome: Ongoing  Note:   Patient is a high fall risk. Patient free of falls this shift. Bed low, locked and alarmed at all times. Call light and bedside table is within reach. Arm band on wrist, fall light on outside of room, and nonskid socks are on patient. Notified patient to ask for assistance when needed. Patient verbalized understanding. Problem: IP BOWEL ELIMINATION  Goal: STG - patient will be accident free  Outcome: Ongoing  Note:   Pt is continent of bowels and calls approprietly. Problem: IP BLADDER/VOIDING  Goal: LTG - patient will achieve acceptable level of continence  Note:   Pt is continent of bladder and calls approprietly. Problem: Pain:  Goal: Pain level will decrease  Description  Pain level will decrease  Outcome: Ongoing  Note:   Patient denies any pain at this time. Will continue to monitor.

## 2019-06-16 NOTE — PROGRESS NOTES
Patient recovered in endoscopy. Patient sleepy but arouses easily with no complaint of pain or nausea. Abdomen soft. NG Tube in place and capped.

## 2019-06-16 NOTE — PROGRESS NOTES
Orders in chart for pharmacy to switch multiple medications to IV because pt was previously unable to swallow pills. Pt now has NG tube. Call placed to Dr. Sudhir Trevino regarding these medications and NG tube for meds.  She stated to resume medications per NG tube

## 2019-06-16 NOTE — PROGRESS NOTES
Report given to floor RN Quinn.Patient sleepy but awakens easily. NG tube in place No complaints of any pain.

## 2019-06-16 NOTE — OP NOTE
[address] [place] [address]  Endoscopy Note    Patient: Mireya Rosenbaum  : 1935  Acct#:     Procedure: Esophagogastroduodenoscopy with FB disimpaction     Date:  2019     Surgeon:  Becki Nogueira MD    Referring Physician:  Benjamín Matos MD    Anesthesia:    3 mg IV versed  25 mcg IV fentanyl    EBL: <50 mL    Indications: This is a 80y.o. year old female who presents today with severe solid and liquid food dysphagia. Description of Procedure & Findings  Informed consent was obtained from the patient after explanation of indications, benefits and possible risks and complications of the procedure. The patient was then taken to the endoscopy suite, placed in the left lateral decubitus position and the above IV sedation was administrered. The Olympus videoendoscope (GIF-H190) was placed in the patient's mouth and under direct visualization passed into the esophagus. Large amounts of food bolus was seen in the proximal esophagus. A rat tooth forceps was used to tunnel through the middle of the food bolus, collapsing the food bolus into the GEJ and stomach. The esophagus appeared dilated with multiple white curd material in the esophagus, suspicious for candida esophagitis. The esophagus was moderately inflamed. A 3 cm hiatal hernia was seen. The gastric mucosa appeared normal. The pylorus was patent and the duodenum up to D2 appeared normal.  We then inserted a 14 Serbian NGT through the left nostril and endoscopically guided it into the GEJ and down into the stomach. The scope was then withdrawn back into the stomach, it was decompressed, and the scope was completely withdrawn. Biopsies: Yes.      Impression:    Food bolus impaction, endoscopically disimpacted  Moderate esophagitis with candida, biopsied  Dilated esophagus, possible achalasia  3 cm hiatal hernia   S/p NGT insertion through left nostril     Recommendations:   Await pathology  IV PPI BID   Clear liquid diet   Diflucan 200 mg on day 1 and 100 mg day 2-14  May use NGT for medications      Magdalena Loya MD, MSc  [facility]

## 2019-06-16 NOTE — PROGRESS NOTES
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 Page in to Dr. Lyric Taylor, return call, starts he will round some time today, informed of emesis of pills, states to call PCP for change of pills to intravenous administration. Call in to Dr. Rivas Coker, will convert BP meds to IV, states for this writer to call pharmacy to convert Amioderone and synthroid. Electronically signed by Rachana Austin RN on 6/16/2019 at 12:21 PM    Call in from pharmacy, pt needs Telemetry for IV amioderone, and would need to be monitored by PCU. Electronically signed by Rachana Austin RN on 6/16/2019 at 12:22 PM     Call in to Dr. Joseph Mathis, informed of status, order obtained to move pt to PCU. Will place. Call in to clinical , will move pt and get bed. Electronically signed by Rachana Austin RN on 6/16/2019 at 12:22 PM    Call back, pt going to [phone], d/c order placing. Electronically signed by Rachana Austin RN on 6/16/2019 at 12:24 PM     Call in to Dr. Ene Sanchez per Dr. Joseph Mathis instructions, to receive pt on acute 5W, PCU. Awaiting call back. Electronically signed by Rachana Austin RN on 6/16/2019 at 12:30 PM    Call in from Dr. Ene Sanchez, will receive [phone] , 5W. Will move pt now.  Electronically signed by Rachana Austin RN on 6/16/2019 at 12:36 PM

## 2019-06-16 NOTE — H&P
Department of Internal Medicine H and P  General Internal Medicine  Attending Progress Note    Chief Complaint:cant eat      HPI: (Location/sx,timing/onset, context/setting,quality,duration,modifying factors, severity)   SUBJECTIVE:  81 yo female with history of cad, caf and dysphagia has not been able to even swallow pills since developing dysphagia mid esophagus. She is getting weaker, having trouble with therapy, unable to eat any food. Despite changing to full liquid diet no improvement in intake. Breathing currently ok, no cp or palpitations  Asking about being able to have NG tube.     Recent UTI actually result on cx pending  Allergies   Allergen Reactions    Ace Inhibitors      coughing    Aricept [Donepezil Hydrochloride]      Can not recall    Benicar [Olmesartan Medoxomil]      Can not recall      Exelon [Rivastigmine Tartrate]      Can not recall      Pcn [Penicillins] Hives and Swelling    Quinapril Hcl      Can not recall      Doxycycline Nausea And Vomiting     Current Facility-Administered Medications   Medication Dose Route Frequency Provider Last Rate Last Dose    albuterol sulfate  (90 Base) MCG/ACT inhaler 2 puff  2 puff Inhalation Q4H PRN Lenin Avitia MD        mometasone-formoterol [facility]) 200-5 MCG/ACT inhaler 2 puff  2 puff Inhalation BID Lenin Avitia MD        ipratropium (ATROVENT) 0.06 % nasal spray 2 spray  2 spray Nasal 4x Daily Lenin Avitia MD        latanoprost (XALATAN) 0.005 % ophthalmic solution 1 drop  1 drop Both Eyes Nightly Lenin Avitia MD        fluconazole (DIFLUCAN) tablet 200 mg  200 mg Oral Once Adelso Fernandes MD       [facility] [START ON 6/17/2019] fluconazole (DIFLUCAN) tablet 100 mg  100 mg Oral Daily Adelso Fernandes MD        pantoprazole (PROTONIX) injection 40 mg  40 mg Intravenous BID Adelos Fernandes MD        ondansetron [facility]) injection 4 mg  4 mg Intravenous Q6H PRN Lenin Avitia MD         Past Medical History:   Diagnosis Date    Anemia     Anticoagulant comprehensive review of systems was negative except for: weakness dysphagia    OBJECTIVE:      PHYSICAL:  VITALS:  BP (!) 147/73   Pulse 110   Temp 97.8 °F (36.6 °C) (Temporal)   Resp 17   SpO2 98%   24HR INTAKE/OUTPUT:  No intake or output data in the 24 hours ending 06/16/19 1515  General Appearance: alert and oriented to person, place and time, well-developed and well-nourished, in no acute distress  Skin: warm and dry, no rash or erythema  Head: normocephalic and atraumatic  Eyes: conjunctivae normal and sclera anicteric  ENT: hearing grossly normal bilaterally and sinuses non-tender  Neck: neck supple and non tender without mass, no thyromegaly or thyroid nodules, no cervical lymphadenopathy   Pulmonary/Chest: right insp crackles ow clear  Cardiovascular: normal rate, normal S1 and S2 and irreg irreg , jvp 7  Abdomen: soft, non-tender, non-distended, normal bowel sounds, no masses or organomegaly  Extremities: no cyanosis, clubbing or edema  Musculoskeletal: normal range of motion, no joint swelling, deformity or tenderness  Neurologic: speech normal and looks generally weak but is cooperative    DATA:   CBC with Differential:    Lab Results   Component Value Date    WBC 6.8 06/11/2019    RBC 3.24 06/11/2019    HGB 9.4 06/11/2019    HCT 29.0 06/11/2019     06/11/2019    MCV 89.4 06/11/2019    MCH 29.2 06/11/2019    MCHC 32.6 06/11/2019    RDW 15.9 06/11/2019    SEGSPCT 69.3 04/26/2013    LYMPHOPCT 11.7 06/11/2019    MONOPCT 4.8 06/11/2019    EOSPCT 3.1 04/27/2012    BASOPCT 1.4 06/11/2019    MONOSABS 0.3 06/11/2019    LYMPHSABS 0.8 06/11/2019    EOSABS 0.4 06/11/2019    BASOSABS 0.1 06/11/2019    DIFFTYPE Auto-K 04/26/2013     BMP:    Lab Results   Component Value Date     06/16/2019    K 4.2 06/16/2019    K 3.8 06/07/2019     06/16/2019    CO2 26 06/16/2019    BUN 22 06/16/2019    LABALBU 2.6 06/08/2019    CREATININE 1.4 06/16/2019    CALCIUM 9.1 06/16/2019    GFRAA 43 06/16/2019 GFRAA >60 04/26/2013    LABGLOM 36 06/16/2019    GLUCOSE 78 06/16/2019       All labs and radiology reports  reviewed  ASSESSMENT AND PLAN    (>=4 review and or order labs radiology treatment)  Patient Active Hospital Problem List:   Chronic diastolic congestive heart failure ([facility] [address] (7/10/2014)    Assessment: history not in active heart failure currently    Plan: observe on current meds   Paroxysmal atrial fibrillation ([facility] [address] (8/22/2011)    Assessment: sounds like she is in afib to me now    Plan: check ekg   Non-rheumatic mitral regurgitation (7/10/2014)    Assessment: ongoing    Plan: should get periodic echo   CKD (chronic kidney disease) stage 3, GFR 30-59 ml/min ([facility]) (2/11/2019)    Assessment: stable    Plan: cont close fu   Acute blood loss anemia ()    Assessment: stable    Plan: close fu   Protein calorie malnutrition ([facility] [address] (6/4/2019)    Assessment: problem with her decreased intake    Plan: consider NG tube   Disuse muscle atrophy (6/6/2019)    Assessment: ongoing issue    Plan: cont PT/OT keep on rehab floor if able   Esophageal dysphagia (6/16/2019)    Assessment: new complaint had before    Plan: to get ba swallow tomorrow         Appropriate diagnostic studies and consults ordered  Lexi Araya MD  Addendum: patient being moved to acute care PCU because unable to hold down po meds will need to be reassess for her nausea and vomiting and switched to all iv meds    Emergent EGD revealed food impaction which was disimpacted successfully with forceps, also signs of candidiasis

## 2019-06-16 NOTE — PROGRESS NOTES
Pt arrived to PCU, alert and oriented. She denied nausea or pain She states she just has difficulty swallowing. Pt oriented to room, call light, telemetry, and PCU routine. Telemetry verified with [facility], 112 a.fib. VS otherwise stable. Dr. Sander Osborn in ot see pt, stated he will scope her today.

## 2019-06-16 NOTE — PROGRESS NOTES
Department of Internal Medicine  General Internal Medicine  Attending Progress Note    Chief Complaint:cant eat      HPI: (Location/sx,timing/onset, context/setting,quality,duration,modifying factors, severity)   SUBJECTIVE:  79 yo female with history of cad, caf and dysphagia has not been able to even swallow pills since developing dysphagia mid esophagus. She is getting weaker, having trouble with therapy, unable to eat any food. Despite changing to full liquid diet no improvement in intake. Breathing currently ok, no cp or palpitations  Asking about being able to have NG tube.     Recent UTI actually result on cx pending  Allergies   Allergen Reactions    Ace Inhibitors      coughing    Aricept [Donepezil Hydrochloride]      Can not recall    Benicar [Olmesartan Medoxomil]      Can not recall      Exelon [Rivastigmine Tartrate]      Can not recall      Pcn [Penicillins] Hives and Swelling    Quinapril Hcl      Can not recall      Doxycycline Nausea And Vomiting     Current Facility-Administered Medications   Medication Dose Route Frequency Provider Last Rate Last Dose    metoprolol (LOPRESSOR) injection 5 mg  5 mg Intravenous Q12H Jessica Corbett MD        furosemide (LASIX) injection 20 mg  20 mg Intravenous Daily Jessica Corbett MD        0.9 % sodium chloride infusion   Intravenous Continuous Lovami Anderson MD 50 mL/hr at 06/16/19 0900      sucralfate (CARAFATE) tablet 1 g  1 g Oral 4 times per day Becki Nogueira MD   1 g at 06/16/19 0521    pantoprazole (PROTONIX) injection 40 mg  40 mg Intravenous BID Becki Nogueira MD   40 mg at 06/16/19 1044    And    sodium chloride (PF) 0.9 % injection 10 mL  10 mL Intravenous BID Becki Nogueira MD   10 mL at 06/16/19 1045    ondansetron (ZOFRAN) injection 4 mg  4 mg Intravenous Q6H PRN Becki Nogueira MD   4 mg at 06/16/19 0905    ciprofloxacin (CIPRO) IVPB 400 mg  400 mg Intravenous Q24H Jessica Corbett MD   Stopped at 06/15/19 2343    cetirizine (ZYRTEC) tablet 5 mg  5 mg Oral Daily PRN Enrique Jean MD   5 mg at 06/13/19 2106    betamethasone (augmented) (DIPROLENE) 0.05 % lotion   Topical BID Enrique Jean MD        acetaminophen (TYLENOL) tablet 650 mg  650 mg Oral Q4H PRN Wendy Kimball MD   650 mg at 06/14/19 2112    albuterol sulfate  (90 Base) MCG/ACT inhaler 2 puff  2 puff Inhalation Q4H PRN Carvel Nyhan, MD        amiodarone (CORDARONE) tablet 200 mg  200 mg Oral BID Carvel Nyhan, MD   200 mg at 06/15/19 2138    benzocaine-menthol (CEPACOL SORE THROAT) lozenge 1 lozenge  1 lozenge Oral Q2H PRN Carvel Nyhan, MD   1 lozenge at 06/13/19 2106    ipratropium-albuterol (DUONEB) nebulizer solution 1 ampule  1 ampule Inhalation Q4H [place] Carvel Nyhan, MD   1 ampule at 06/16/19 1151    latanoprost (XALATAN) 0.005 % ophthalmic solution 1 drop  1 drop Both Eyes Nightly Carvel Nyhan, MD   1 drop at 06/15/19 2243    levothyroxine (SYNTHROID) tablet 75 mcg  75 mcg Oral Daily Carvel Nyhan, MD   75 mcg at 06/16/19 0521    melatonin tablet 3 mg  3 mg Oral Nightly PRN Carvel Nyhan, MD   3 mg at 06/14/19 2349    memantine (NAMENDA) tablet 10 mg  10 mg Oral BID Carvel Nyhan, MD   10 mg at 06/14/19 2112    mometasone-formoterol (Yovany Perks) 200-5 MCG/ACT inhaler 2 puff  2 puff Inhalation BID Carvel Nyhan, MD   2 puff at 06/16/19 3193     Past Medical History:   Diagnosis Date    Anemia     Anticoagulant long-term use     Atrial fibrillation ([facility] [address] 6/2011    Sariah Newman CHF (congestive heart failure) ([facility] [address]     CKD (chronic kidney disease) stage 3, GFR 30-59 ml/min ([facility] [address] 2/11/2019    Clostridium difficile diarrhea 09/14/2016    Depression     Humerus fracture     Hyperlipidemia     Hypertension     Hypothyroidism     Mitral regurgitation     Optic neuritis     Osteopenia     Fosamax stopped 2/2014, had been treated at least since 2004    Polymyalgia rheumatica ([address]     Pulmonary embolus ([address] 6/2011    Right pulmonary obstruction suspected to be possible chronic pulmonary embolus    Thyroid disease     Vitamin D deficiency      Past Surgical History:   Procedure Laterality Date    [facility]    COLONOSCOPY N/A 2019    COLONOSCOPY performed by Cesario Espinoza MD at [facility]  2019    BOWEL SMALL CAPSULE ENDOSCOPY DEPLOYED VIA EGD performed by Cesario Espinoza MD at [address] ENTEROSCOPY N/A 6/3/2019    ENTEROSCOPY PUSH DIAGNOSTIC performed by Maritza Rodriguez DO at [address] MALIGNANT SKIN LESION EXCISION      UPPER GASTROINTESTINAL ENDOSCOPY  2016    [facility]    UPPER GASTROINTESTINAL ENDOSCOPY N/A 2019    EGD POLYP ABLATION/OTHER performed by Cesario Espinoza MD at [place] [address] N/A 2019    ESOPHAGOGASTRODUODENOSCOPY WITH CAPSULE PLACEMENT performed by Cesario Espinoza MD at [address] [place] [address] History     Tobacco Use    Smoking status: Former Smoker     Last attempt to quit: 1965     Years since quittin.4    Smokeless tobacco: Never Used   Substance Use Topics    Alcohol use: Yes     Comment: occ    Drug use: No     Family History   Problem Relation Age of Onset    Cancer Mother         unknown primary    Other Father         Tuberculosis    Lung Cancer Sister     Tuberculosis Brother     Diabetes Brother         ROS:  A comprehensive review of systems was negative except for: weakness dysphagia    OBJECTIVE:      PHYSICAL:  VITALS:  /71   Pulse 113 Comment: 104-118  Temp 97.6 °F (36.4 °C) (Oral)   Resp 18   Ht 5' (1.524 m)   Wt 98 lb 1.7 oz (44.5 kg)   SpO2 96%   BMI 19.16 kg/m²   24HR INTAKE/OUTPUT:    Intake/Output Summary (Last 24 hours) at 2019 1212  Last data filed at 2019 1045  Gross per 24 hour   Intake 1020.73 ml   Output 580 ml   Net 440.73 ml     General Appearance: alert and oriented to person, place and time, well-developed and well-nourished, in no acute distress  Skin: warm and dry, no rash or erythema  Head: normocephalic and atraumatic  Eyes: conjunctivae normal and sclera anicteric  ENT: hearing grossly normal bilaterally and sinuses non-tender  Neck: neck supple and non tender without mass, no thyromegaly or thyroid nodules, no cervical lymphadenopathy   Pulmonary/Chest: right insp crackles ow clear  Cardiovascular: normal rate, normal S1 and S2 and irreg irreg , jvp 7  Abdomen: soft, non-tender, non-distended, normal bowel sounds, no masses or organomegaly  Extremities: no cyanosis, clubbing or edema  Musculoskeletal: normal range of motion, no joint swelling, deformity or tenderness  Neurologic: speech normal and looks generally weak but is cooperative    DATA:   CBC with Differential:    Lab Results   Component Value Date    WBC 6.8 06/11/2019    RBC 3.24 06/11/2019    HGB 9.4 06/11/2019    HCT 29.0 06/11/2019     06/11/2019    MCV 89.4 06/11/2019    MCH 29.2 06/11/2019    MCHC 32.6 06/11/2019    RDW 15.9 06/11/2019    SEGSPCT 69.3 04/26/2013    LYMPHOPCT 11.7 06/11/2019    MONOPCT 4.8 06/11/2019    EOSPCT 3.1 04/27/2012    BASOPCT 1.4 06/11/2019    MONOSABS 0.3 06/11/2019    LYMPHSABS 0.8 06/11/2019    EOSABS 0.4 06/11/2019    BASOSABS 0.1 06/11/2019    DIFFTYPE Auto-K 04/26/2013     BMP:    Lab Results   Component Value Date     06/16/2019    K 4.2 06/16/2019    K 3.8 06/07/2019     06/16/2019    CO2 26 06/16/2019    BUN 22 06/16/2019    LABALBU 2.6 06/08/2019    CREATININE 1.4 06/16/2019    CALCIUM 9.1 06/16/2019    GFRAA 43 06/16/2019    GFRAA >60 04/26/2013    LABGLOM 36 06/16/2019    GLUCOSE 78 06/16/2019       All labs and radiology reports  reviewed  ASSESSMENT AND PLAN    (>=4 review and or order labs radiology treatment)  Patient Active Hospital Problem List:   Chronic diastolic congestive heart failure ([facility] [address] (7/10/2014)    Assessment: history not in active heart failure

## 2019-06-16 NOTE — PROGRESS NOTES
[facility] GI  Gastroenterology Progress Note    Elsie Bellamy is a 80 y.o. female patient. No diagnosis found. SUBJECTIVE:    She feels fine at baseline. Able to tolerate her secretions. Patient cannot swallow anything - liquids, solids, pills. Feels everything gets stuck in mid to distal esophagus. She has been regurgitating    Patient transferred to inpatient from rehab     ROS:  Cardiovascular ROS: no chest pain or dyspnea on exertion  Gastrointestinal ROS: see above  Respiratory ROS: no cough, shortness of breath, or wheezing    Physical    VITALS:  /71   Pulse 113 Comment: 104-118  Temp 97.6 °F (36.4 °C) (Oral)   Resp 18   Ht 5' (1.524 m)   Wt 98 lb 1.7 oz (44.5 kg)   SpO2 96%   BMI 19.16 kg/m²   TEMPERATURE:  Current - Temp: 97.6 °F (36.4 °C); Max - Temp  Av.8 °F (36.6 °C)  Min: 97.6 °F (36.4 °C)  Max: 98 °F (36.7 °C)    NAD  IRRR, Nl s1s2  Lungs CTA Bilaterally, normal effort  Abdomen soft, ND, NT, no HSM, Bowel sounds normal  AAOx3, No asterixis     Data      CBC: No results for input(s): WBC, RBC, HGB, HCT, PLT, MCV, MCH, MCHC, RDW, NRBC, SEGSPCT, BANDSPCT in the last 72 hours. BMP:  Recent Labs     19  0631 06/15/19  0639 19  0654    139 142   K 3.7 3.9 4.2    99 102   CO2 28 27 26   BUN 24* 24* 22*   CREATININE 1.4* 1.4* 1.4*   CALCIUM 8.8 9.2 9.1   GLUCOSE 98 80 78        Hepatic Function Panel:   Lab Results   Component Value Date    ALKPHOS 101 2019    ALT 22 2019    AST 20 2019    PROT 6.2 2019    PROT 6.8 10/26/2012    BILITOT 0.5 2019    LABALBU 2.6 2019       No results for input(s): LIPASE, AMYLASE in the last 72 hours. No results for input(s): PROTIME, INR in the last 72 hours. No results for input(s): PTT in the last 72 hours. No results for input(s): OCCULTBLD in the last 72 hours.       Assessment:   The patient is a 80 y.o. old female  with following problems:     Principal Problem:    Chronic diastolic congestive heart failure (HCC)  Active Problems:    Paroxysmal atrial fibrillation (HCC)    Non-rheumatic mitral regurgitation    CKD (chronic kidney disease) stage 3, GFR 30-59 ml/min (HCC)    Acute blood loss anemia    Disuse muscle atrophy  Resolved Problems:    * No resolved hospital problems. *     Acute worsening of chronic dysphagia. This is likely due to esophagitis from her hiatal hernia. Less likely gastric volvulus. However, she is not tolerating anything since yesterday- pills, solids, and liquids. Possible FB impaction      Recent acute GI bleeding from SB source. S/p EGD, colonoscopy, capsule endoscopy and push enteroscopy. Hb stable 9+ range.    Atrial fibrillation - HR uncontrolled   CHF   CKD        Recommendations:   Switch all her meds from oral to IV   EGD later today     Daniel Varela MD, MSc  [address] [place] [address]

## 2019-06-17 LAB
EKG ATRIAL RATE: 108 BPM
EKG DIAGNOSIS: NORMAL
EKG Q-T INTERVAL: 364 MS
EKG QRS DURATION: 92 MS
EKG QTC CALCULATION (BAZETT): 499 MS
EKG R AXIS: -51 DEGREES
EKG T AXIS: -4 DEGREES
EKG VENTRICULAR RATE: 113 BPM

## 2019-06-17 PROCEDURE — 6370000000 HC RX 637 (ALT 250 FOR IP): Performed by: INTERNAL MEDICINE

## 2019-06-17 PROCEDURE — 94761 N-INVAS EAR/PLS OXIMETRY MLT: CPT

## 2019-06-17 PROCEDURE — 6360000002 HC RX W HCPCS: Performed by: INTERNAL MEDICINE

## 2019-06-17 PROCEDURE — 2700000000 HC OXYGEN THERAPY PER DAY

## 2019-06-17 PROCEDURE — C9113 INJ PANTOPRAZOLE SODIUM, VIA: HCPCS | Performed by: INTERNAL MEDICINE

## 2019-06-17 PROCEDURE — 2580000003 HC RX 258: Performed by: INTERNAL MEDICINE

## 2019-06-17 PROCEDURE — 94640 AIRWAY INHALATION TREATMENT: CPT

## 2019-06-17 PROCEDURE — 99233 SBSQ HOSP IP/OBS HIGH 50: CPT | Performed by: INTERNAL MEDICINE

## 2019-06-17 PROCEDURE — 2060000000 HC ICU INTERMEDIATE R&B

## 2019-06-17 RX ORDER — METOPROLOL TARTRATE 50 MG/1
50 TABLET, FILM COATED ORAL 2 TIMES DAILY
Status: DISCONTINUED | OUTPATIENT
Start: 2019-06-17 | End: 2019-06-21 | Stop reason: HOSPADM

## 2019-06-17 RX ADMIN — METOPROLOL TARTRATE 50 MG: 50 TABLET ORAL at 11:10

## 2019-06-17 RX ADMIN — MOMETASONE FUROATE AND FORMOTEROL FUMARATE DIHYDRATE 2 PUFF: 200; 5 AEROSOL RESPIRATORY (INHALATION) at 09:18

## 2019-06-17 RX ADMIN — MOMETASONE FUROATE AND FORMOTEROL FUMARATE DIHYDRATE 2 PUFF: 200; 5 AEROSOL RESPIRATORY (INHALATION) at 20:03

## 2019-06-17 RX ADMIN — LEVOTHYROXINE SODIUM 75 MCG: 75 TABLET ORAL at 07:12

## 2019-06-17 RX ADMIN — AMIODARONE HYDROCHLORIDE 200 MG: 200 TABLET ORAL at 09:01

## 2019-06-17 RX ADMIN — IPRATROPIUM BROMIDE 2 SPRAY: 42 SPRAY NASAL at 13:30

## 2019-06-17 RX ADMIN — FLUCONAZOLE 100 MG: 100 TABLET ORAL at 09:02

## 2019-06-17 RX ADMIN — IPRATROPIUM BROMIDE 2 SPRAY: 42 SPRAY NASAL at 09:03

## 2019-06-17 RX ADMIN — PANTOPRAZOLE SODIUM 40 MG: 40 INJECTION, POWDER, FOR SOLUTION INTRAVENOUS at 20:46

## 2019-06-17 RX ADMIN — IPRATROPIUM BROMIDE 2 SPRAY: 42 SPRAY NASAL at 18:02

## 2019-06-17 RX ADMIN — IPRATROPIUM BROMIDE 2 SPRAY: 42 SPRAY NASAL at 20:47

## 2019-06-17 RX ADMIN — LATANOPROST 1 DROP: 50 SOLUTION OPHTHALMIC at 20:46

## 2019-06-17 RX ADMIN — PANTOPRAZOLE SODIUM 40 MG: 40 INJECTION, POWDER, FOR SOLUTION INTRAVENOUS at 09:02

## 2019-06-17 RX ADMIN — CEFTRIAXONE 1 G: 1 INJECTION, POWDER, FOR SOLUTION INTRAMUSCULAR; INTRAVENOUS at 15:30

## 2019-06-17 RX ADMIN — METOPROLOL TARTRATE 50 MG: 50 TABLET ORAL at 20:46

## 2019-06-17 ASSESSMENT — PAIN SCALES - GENERAL
PAINLEVEL_OUTOF10: 0

## 2019-06-17 NOTE — CARE COORDINATION
Chart reviewed. Pt admitted from [address] [place] [address]. Spoke to [facility] admissions who is following for possible return to their unit. Met w/pt. Known to me from previous assessment last admit. Pt states there are no changes to previous assessment. Pt states she was working hard in ARU and sometimes it was just exhausting. We discussed ARU vs SNF. Pt states she wants to think about it and if she decides snf she would like a referral to [facility]. She is agreeable to a referral.  I explained that the NG will need to be out and she must be able to tolerate oral diet/meds. I asked the pt what the doctors are saying about her problem and her NG tube and she said \"nothing. \"     Pt will need to sustain nutrition orally or via peg in order to move to SNF. ARU will follow. Called [facility]. [phone]. Left message. Faxed face sheet. Will meet with pt again on Tuesday to see her thoughts on disposition. Left message at Dr Macho Lentz office requesting pt/ot orders    [phone]: Rec'd c/b from LEONID CHRISTIAN at [facility] who states they will have some open beds on Friday but wouldn't be able to accept with NG in place. Also, needing PT/OT evals.  Will f/u with pt and MD tomorrow    Radha Melendrez, RN  Case management  [phone]

## 2019-06-17 NOTE — PROGRESS NOTES
Laboratory   No results for input(s): WBC, HGB, HCT, MCV, PLT in the last 72 hours. Recent Labs     06/15/19  0639 06/16/19  0654    142   K 3.9 4.2   CL 99 102   CO2 27 26   BUN 24* 22*   CREATININE 1.4* 1.4*     No results for input(s): AST, ALT, ALB, BILIDIR, BILITOT, ALKPHOS in the last 72 hours. No results for input(s): LIPASE, AMYLASE in the last 72 hours. No results for input(s): PROTIME, INR in the last 72 hours. Imaging  XR CHEST PORTABLE   Final Result   Nasogastric tube in good position extending into the distal stomach. No significant change of right-sided pleural effusion, right basilar   consolidation, and bilateral pulmonary disease including interstitial disease   over the past 1 week. Endoscopy  EGD 6/16/19  Food bolus impaction, endoscopically disimpacted  Moderate esophagitis with candida, biopsied  Dilated esophagus, possible achalasia  3 cm hiatal hernia   S/p NGT insertion through left nostril     EGD 5/30: 3 cm sliding HH, 2mm nonbleeding AVM in D2 s/p APC  Colonoscopy 5/31: 2 mm transverse colon polyp, left alone; diverticulosis   Capsule endoscopy: possible SB bleeding source 29:00 (prominent red spot of clot), scattered AVMs   Push Enteroscopy 6/3: the colonoscope was inserted 65 cm post pylorus. The bleeding lesion seen in capsule endoscopy not endoscopically visualized               ASSESSMENT AND RECOMMENDATIONS   Consuello Favor is a 80 y.o. female with PMH of Afib,  CHF, HTN, HLD, CKD, thyroid disease, polymyalgia rheumatica who presents with dysphagia, food getting stuck in her mid chest and unable to swallow pills. Underwent EGD which revealed food impaction, mod esophagitis with candida, possible achalasia, 3 cm hiatal hernia. Recent admission last month with acute blood loss anemia, extensive endoscopic evaluation completed with EGD/Colonoscopy, capsule endoscopy and push enteroscopy.   Pt with small bowel lesion in prox jejunum seen on capsule study but unable to be reached with push enteroscopy. 1. Food impaction: s/p disimpaction. Possibly due to achalasia. 2. Esophagitis: Suspected candida infection. Path report pending. On diflucan and PPI  3. Failure to Thrive  4. Blood loss anemia 2/2 small bowel lesion  unable to be reached with push enteroscopy. Appears stable. Coumadin stopped. No overt bleeding. RECOMMENDATIONS:    Clear liquid diet. If tolerates liquids and pills today, ok to d/c NGT. Follow up on path report. Continue diflucan 100 mg daily for 2 weeks  Continue PPI BID    If you have any questions or need any further information, please feel free to contact us [phone]. Thank you for allowing us to participate in the care of Mireya Rosenbaum. Lakeisha MAYFIELD    Attending physician addendum:    I have personally seen and examined the patient, reviewed the patient's medical record and pertinent labs and clinical imaging. I have personally staffed the case with Lakeisha MAYFIELD. I agree with her consultation note, exam findings, assessment and plans  as written above. I have made appropriate modifications and edited her assessment and plan where needed to reflect my impression and plans for this patient. Mireya Rosenbaum is a 80 y.o.  female patient who is currently receiving rehab for recent GI bleed and respiratory failure. We have been consulted regarding dysphagia. Patient had an EGD on 6/16 with removal of food bolus. Candida esophagitis also noted. Recommend treating with Fluconazole 100 mg daily x 14 days. If tolerates swallowing liquids/pills can DC NG tube. Keep on BID PPI x 8 weeks. Thank you for allowing me to participate in this patient's care. If there are any questions or concerns regarding this patient, or the plan we have set in place, please feel free to contact me at [phone].      Link MD Raysa

## 2019-06-17 NOTE — CONSULTS
Nutrition Assessment (Enteral Nutrition)    Type and Reason for Visit: Initial, Consult, Positive Nutrition Screen    Nutrition Recommendations:   Clear liquids diet per provider   Will trial Ensure Clear TID for additional nutrition   Calorie count started today per MD order  If TF is required, recommend Jevity 1.5 with goal rate of 40 ml/hr (calculated over 20 hr to allow for routine nsg care)    Nutrition Assessment: Pt with nutrition risk r/t acute dysphagia preventing optional po intake. Familiar with patient from ARU, was eating better and tolerating diet and then unable to tolerate liquids/solids over the weekend. Currently on Clears with NG in place, pt reports still with swallowing trouble but improved. Willing to try Ensure Clear. Further nutrition compromise possible r/t dysphagia and poor diet tolerance,  renal/cardiac dysfunction, increased nutrition needs. Malnutrition Assessment:  · Malnutrition Status: At risk for malnutrition  · Context: Acute illness or injury  · Findings of the 6 clinical characteristics of malnutrition (Minimum of 2 out of 6 clinical characteristics is required to make the diagnosis of moderate or severe Protein Calorie Malnutrition based on AND/ASPEN Guidelines):  1. Energy Intake-Less than or equal to 75% of estimated energy requirement, (3 days )    2. Weight Loss-Unable to assess,    3. Fat Loss-No significant subcutaneous fat loss,    4. Muscle Loss-No significant muscle mass loss,    5. Fluid Accumulation-No significant fluid accumulation,    6.   Strength-Not measured    Nutrition Risk Level: High    Nutrition Needs:  · Estimated Daily Total Kcal: 9953-9406 kcal (25-30 kcal/kg ABW)  · Estimated Daily Protein (g): 45-54 gm (1-1.2 gm/kg ABW)  · Estimated Daily Fluid (ml/day): 1 ml/kcal     Nutrition Diagnosis:   · Problem: Inadequate oral intake  · Etiology: related to Difficulty swallowing, Insufficient energy/nutrient consumption     Signs and

## 2019-06-17 NOTE — PROGRESS NOTES
Wt 99 lb 3.3 oz (45 kg)   SpO2 96%   BMI 19.38 kg/m²     General appearance: alert, appears stated age and cooperative  Lungs: clear to auscultation bilaterally  Heart: irregularly irregular rhythm  Abdomen: soft, non-tender; bowel sounds normal; no masses,  no organomegaly and NG tube in place   Extremities: extremities normal, atraumatic, no cyanosis or edema  Neurologic: Mental status: Alert, oriented, thought content appropriate    Assessment/Plan:     Patient Active Hospital Problem List:     Esophagitis  -Diflucan  -GI following    Dysphagia (6/16/2019); concerns for achalasia    Plan: -GI following  -Clear liquid diet  -PPI    UTI 2/2 Klebsiella pneumoniae  -Sensitivities available  -Continue Rocephin    Paroxysmal atrial fibrillation ([facility] [address] (8/22/2011)    Assessment: - stable    Plan: - Stable; continue current management. Chronic diastolic congestive heart failure ([facility] [address] (7/10/2014)    Assessment: stable    Plan: - Stable; continue current management. Essential hypertension, benign ()    Assessment: stable    Plan: - Stable; continue current management. Hypothyroidism  - Stable; continue current management. Protein calorie malnutrition ([facility] [address] (6/4/2019)    Plan: dietary following      F/E/N: Clear liquid diet  PPx: Protonix  Dispo: Nicki Pendleton MD  7:23 AM    Documentation was done using voice recognition dragon software. Every effort was made to ensure accuracy; however, inadvertent unintentional computerized transcription errors may be present.

## 2019-06-17 NOTE — PROGRESS NOTES
Estefani Franks MD  6/17/2019,   Cosme Phoenix MD    1935  No chief complaint on file. Active Problems:    Paroxysmal atrial fibrillation (HCC)    Chronic diastolic congestive heart failure (HCC)    Edema    Essential hypertension, benign    Non-rheumatic mitral regurgitation    Protein calorie malnutrition (HCC)    Dysphagia  Resolved Problems:    * No resolved hospital problems. *     has a past medical history of Anemia, Anticoagulant long-term use, Atrial fibrillation (HCC), CHF (congestive heart failure) ([address] CKD (chronic kidney disease) stage 3, GFR 30-59 ml/min (HCC), Clostridium difficile diarrhea, Depression, Humerus fracture, Hyperlipidemia, Hypertension, Hypothyroidism, Mitral regurgitation, Optic neuritis, Osteopenia, Polymyalgia rheumatica ([address] Pulmonary embolus ([address] Thyroid disease, and Vitamin D deficiency. Past Surgical History:     has a past surgical history that includes Lung surgery; malignant skin lesion excision; Colonoscopy (09/14/2016); Upper gastrointestinal endoscopy (09/14/2016); Upper gastrointestinal endoscopy (N/A, 5/30/2019); Colonoscopy (N/A, 5/31/2019); Upper gastrointestinal endoscopy (N/A, 5/31/2019); Endoscopy, small bowel with ileum (5/31/2019); Enteroscopy (N/A, 6/3/2019); Upper gastrointestinal endoscopy (N/A, 6/16/2019); and Upper gastrointestinal endoscopy (N/A, 6/16/2019). CURRENT STATUS:Chief Complaint:cant eat        HPI: (Location/sx,timing/onset, context/setting,quality,duration,modifying factors, severity)   SUBJECTIVE:  79 yo female with history of cad, caf and dysphagia has not been able to even swallow pills since developing dysphagia mid esophagus. She is getting weaker, having trouble with therapy, unable to eat any food. Despite changing to full liquid diet no improvement in intake. Breathing currently ok, no cp or palpitations  Asking about being able to have NG tube.     Recent UTI actually result on cx pending        Allergies   Principal Problem:    Chronic diastolic congestive heart failure (HCC)  Active Problems:    Paroxysmal atrial fibrillation (HCC)    Non-rheumatic mitral regurgitation    CKD (chronic kidney disease) stage 3, GFR 30-59 ml/min (HCC)    Acute blood loss anemia    Disuse muscle atrophy            IMAGING-Relevant:    MY REVIEW:  80 female  DYSPHAGIA  Esophagitis  Hiatal hernia  Esophageal obstruction  Prep and ?scope  NPO    GI bleeding  Weakness  Renal dysfunction 1.4  Arrythmia    The Utilization Review Committee members, including its physician members, have reviewed this case and agree that this patient does meet evidence based criteria for inpatient services,  to ADMISSION =\"admit as inpatient\"  Medical care will continue to be provided by Lily Ramos MD, and GI Dr Tuan Giraldo who concurs with this decision and has documented his/her concurrence in the patient's medical record. Eddi Johnson MD, ZHANE, [address]  Cardiac, Vascular & Thoracic Surgeon  [address] [place] [address]    Office [phone]   [phone]  [email or web address]    6/17/2019  10:28 AM

## 2019-06-18 LAB
ORGANISM: ABNORMAL
ORGANISM: ABNORMAL
URINE CULTURE, ROUTINE: ABNORMAL

## 2019-06-18 PROCEDURE — 97530 THERAPEUTIC ACTIVITIES: CPT

## 2019-06-18 PROCEDURE — 94640 AIRWAY INHALATION TREATMENT: CPT

## 2019-06-18 PROCEDURE — 99233 SBSQ HOSP IP/OBS HIGH 50: CPT | Performed by: INTERNAL MEDICINE

## 2019-06-18 PROCEDURE — 6360000002 HC RX W HCPCS: Performed by: INTERNAL MEDICINE

## 2019-06-18 PROCEDURE — 6370000000 HC RX 637 (ALT 250 FOR IP): Performed by: INTERNAL MEDICINE

## 2019-06-18 PROCEDURE — 97162 PT EVAL MOD COMPLEX 30 MIN: CPT

## 2019-06-18 PROCEDURE — C9113 INJ PANTOPRAZOLE SODIUM, VIA: HCPCS | Performed by: INTERNAL MEDICINE

## 2019-06-18 PROCEDURE — 94760 N-INVAS EAR/PLS OXIMETRY 1: CPT

## 2019-06-18 PROCEDURE — 2580000003 HC RX 258: Performed by: INTERNAL MEDICINE

## 2019-06-18 PROCEDURE — 2060000000 HC ICU INTERMEDIATE R&B

## 2019-06-18 PROCEDURE — 2700000000 HC OXYGEN THERAPY PER DAY

## 2019-06-18 RX ORDER — CITALOPRAM 10 MG/1
10 TABLET ORAL DAILY
Status: DISCONTINUED | OUTPATIENT
Start: 2019-06-18 | End: 2019-06-20

## 2019-06-18 RX ORDER — CLOTRIMAZOLE AND BETAMETHASONE DIPROPIONATE 10; .64 MG/G; MG/G
CREAM TOPICAL 2 TIMES DAILY
Status: DISCONTINUED | OUTPATIENT
Start: 2019-06-18 | End: 2019-06-21 | Stop reason: HOSPADM

## 2019-06-18 RX ADMIN — MOMETASONE FUROATE AND FORMOTEROL FUMARATE DIHYDRATE 2 PUFF: 200; 5 AEROSOL RESPIRATORY (INHALATION) at 21:17

## 2019-06-18 RX ADMIN — ANORECTAL OINTMENT: 15.7; .44; 24; 20.6 OINTMENT TOPICAL at 21:31

## 2019-06-18 RX ADMIN — CEFTRIAXONE 1 G: 1 INJECTION, POWDER, FOR SOLUTION INTRAMUSCULAR; INTRAVENOUS at 16:00

## 2019-06-18 RX ADMIN — PANTOPRAZOLE SODIUM 40 MG: 40 INJECTION, POWDER, FOR SOLUTION INTRAVENOUS at 21:32

## 2019-06-18 RX ADMIN — ANORECTAL OINTMENT: 15.7; .44; 24; 20.6 OINTMENT TOPICAL at 13:56

## 2019-06-18 RX ADMIN — LEVOTHYROXINE SODIUM 75 MCG: 75 TABLET ORAL at 05:58

## 2019-06-18 RX ADMIN — METOPROLOL TARTRATE 50 MG: 50 TABLET ORAL at 21:32

## 2019-06-18 RX ADMIN — IPRATROPIUM BROMIDE 2 SPRAY: 42 SPRAY NASAL at 12:27

## 2019-06-18 RX ADMIN — CLOTRIMAZOLE AND BETAMETHASONE DIPROPIONATE: 10; .5 CREAM TOPICAL at 13:55

## 2019-06-18 RX ADMIN — FLUCONAZOLE 100 MG: 100 TABLET ORAL at 09:33

## 2019-06-18 RX ADMIN — METOPROLOL TARTRATE 50 MG: 50 TABLET ORAL at 09:34

## 2019-06-18 RX ADMIN — IPRATROPIUM BROMIDE 2 SPRAY: 42 SPRAY NASAL at 17:01

## 2019-06-18 RX ADMIN — CLOTRIMAZOLE AND BETAMETHASONE DIPROPIONATE: 10; .5 CREAM TOPICAL at 21:32

## 2019-06-18 RX ADMIN — AMIODARONE HYDROCHLORIDE 200 MG: 200 TABLET ORAL at 09:34

## 2019-06-18 RX ADMIN — CITALOPRAM HYDROBROMIDE 10 MG: 10 TABLET ORAL at 12:27

## 2019-06-18 RX ADMIN — IPRATROPIUM BROMIDE 2 SPRAY: 42 SPRAY NASAL at 21:31

## 2019-06-18 RX ADMIN — PANTOPRAZOLE SODIUM 40 MG: 40 INJECTION, POWDER, FOR SOLUTION INTRAVENOUS at 09:33

## 2019-06-18 RX ADMIN — LATANOPROST 1 DROP: 50 SOLUTION OPHTHALMIC at 21:32

## 2019-06-18 RX ADMIN — IPRATROPIUM BROMIDE 2 SPRAY: 42 SPRAY NASAL at 09:34

## 2019-06-18 ASSESSMENT — PAIN SCALES - GENERAL
PAINLEVEL_OUTOF10: 0

## 2019-06-18 NOTE — PROGRESS NOTES
Physical Therapy    Facility/Department: [address] PROGRESSIVE CARE  Initial Assessment/Treatment Session  This note serves as D/C summary if patient is discharged prior to next treatment session. NAME: Melita Grider  : 1935  MRN: [id number]    Date of Service: 2019    Discharge Recommendations:  3-5 sessions per week, Patient would benefit from continued therapy after discharge        Assessment   Body structures, Functions, Activity limitations: Decreased functional mobility ; Decreased strength;Decreased endurance;Decreased balance;Decreased ADL status  Assessment: Pt able to tolerate assessment this afternoon after being transferred from rehab to acute care. She presents with (B) LE weakness, and required Min A to stand and ambulate short distance with walker support. PT noted intermittent (B) knee buckling, which put pt as significant risk for fall. She was unsafe to attempt further ambulation. She would benefit from continued therapy to improve her LE strength, balance, and independence performing functional mobility tasks. Pt does not wish to return to rehab as it was \"too much for her. \"  Instead, she prefers to continue therapy at SNF (requested [facility]). Recommend low-moderate frequency therapy upon D/C to address her deficits. Melita Grider scored a 15/24 on the AM-PAC short mobility form. Current research shows that an AM-PAC score of 17 or less is typically not associated with a discharge to the patient's home setting. Based on the patients AM-PAC score and their current functional mobility deficits, it is recommended that the patient have 3-5 sessions per week of Physical Therapy at d/c to increase the patients independence. Treatment Diagnosis: Impaired functional mobility  Prognosis: Fair  Decision Making: Medium Complexity  History: 79 y/o female admit 19 with GI Bleed, Respiratory Failure.   19 S/P EGD with Argon Plasms Coag.  19 S/P EGD with Placement Video Capsule. PMH includes Mitral Regurg, A-Fib, CHF, CKD, PE, Lung Surg, Humerus Fx, Osteopenia, Polymyalgia Rheumatica. Exam: Strength; ROM; Balance; Ambulation  Clinical Presentation: Evolving  Patient Education: Goals of PT; benefits of continued therapy at Rehab vs SNF  REQUIRES PT FOLLOW UP: Yes  Activity Tolerance  Activity Tolerance: Patient limited by fatigue;Patient limited by endurance; Patient Tolerated treatment well       Patient Diagnosis(es): There were no encounter diagnoses. has a past medical history of Anemia, Anticoagulant long-term use, Atrial fibrillation (HCC), CHF (congestive heart failure) ([address] CKD (chronic kidney disease) stage 3, GFR 30-59 ml/min (HCC), Clostridium difficile diarrhea, Depression, Humerus fracture, Hyperlipidemia, Hypertension, Hypothyroidism, Mitral regurgitation, Optic neuritis, Osteopenia, Polymyalgia rheumatica ([address] Pulmonary embolus ([address] Thyroid disease, and Vitamin D deficiency. has a past surgical history that includes Lung surgery; malignant skin lesion excision; Colonoscopy (09/14/2016); Upper gastrointestinal endoscopy (09/14/2016); Upper gastrointestinal endoscopy (N/A, 5/30/2019); Colonoscopy (N/A, 5/31/2019); Upper gastrointestinal endoscopy (N/A, 5/31/2019); Endoscopy, small bowel with ileum (5/31/2019); Enteroscopy (N/A, 6/3/2019); Upper gastrointestinal endoscopy (N/A, 6/16/2019); and Upper gastrointestinal endoscopy (N/A, 6/16/2019). Restrictions  Restrictions/Precautions  Restrictions/Precautions: Fall Risk  Position Activity Restriction  Other position/activity restrictions: 1.5 L. O2 per nc     Vision/Hearing  Vision: Impaired  Vision Exceptions: Wears glasses at all times  Hearing: Within functional limits       Subjective  General  Chart Reviewed: Yes  Patient assessed for rehabilitation services?: Yes  Additional Pertinent Hx: 79 y/o female admit 5/28/19 with GI Bleed, Respiratory Failure. 5/30/19 S/P EGD with Argon Plasma Coag. 5/31/19 S/P EGD with Placement Video Capsule. 6/3/19  Push Enteroscopy - no lesions. 6/3/19 Nasogastric Tube Feed placed. PMH includes Mitral Regurg, A-Fib, CHF, CKD, PE, Lung Surg, Humerus Fx, Osteopenia, Polymyalgia Rheumatica. Response To Previous Treatment: Patient with no complaints from previous session. Family / Caregiver Present: No  Referring Practitioner: Dr. Sergio Benz  Referral Date : 06/18/19  Diagnosis: Paroxysmal atrial fibrillation, on acute GI bleed and respiratory failure  Follows Commands: Within Functional Limits  Subjective  Subjective: Pt denies pain but does c/o fatigue. Agreeable to assessment. Pain Screening  Patient Currently in Pain: Denies  Vital Signs  Patient Currently in Pain: Denies       Orientation  Orientation  Overall Orientation Status: Within Functional Limits     Social/Functional History  Social/Functional History  Lives With: Alone(2 children who live in [place])  Type of Home: (3rd floor condo, laundry in [facility])  Home Layout: One level  Home Access: Elevator, Level entry  Bathroom Shower/Tub: Walk-in shower  Bathroom Toilet: Standard(sink nearby)  Bathroom Equipment: Grab bars in shower  Bathroom Accessibility: [employer] accessible  Home Equipment: (no DME)  ADL Assistance: Independent  Homemaking Assistance: Independent(cleaning lady every 3 weeks, pt independent grocery shopping, laundry, cooking)  Homemaking Responsibilities: Yes  Ambulation Assistance: Independent(with no AD)  Transfer Assistance: Independent  Active : Yes  Mode of Transportation: Car  Occupation: Retired  Type of occupation:   Leisure & Hobbies: reading, out to eat with friends - they pick her up  Additional Comments: Pt denies recent falls.      Objective    AROM RLE (degrees)  RLE AROM: WFL  RLE General AROM: Hip Flex, Knee Flex/Ext, and Ankle PF/DF WFL  AROM LLE (degrees)  LLE AROM : WFL  LLE General AROM: Hip Flex, Knee Flex/Ext, and Ankle PF/DF WFL  AROM RUE (degrees)  RUE AROM : WFL  RUE General AROM: Shoulder Flex, Elbow Flex/Ext WFL  AROM LUE (degrees)  LUE AROM : WFL  LUE General AROM: Shoulder Flex, Elbow Flex/Ext WFL     Strength RLE  Strength RLE: Exception  R Hip Flexion: 3+/5  R Knee Extension: 3+/5  R Ankle Dorsiflexion: 3+/5  Strength LLE  Strength LLE: Exception  L Hip Flexion: 3+/5  L Knee Extension: 3+/5  L Ankle Dorsiflexion: 3+/5     Tone RLE  RLE Tone: Normotonic  Tone LLE  LLE Tone: Normotonic  Motor Control  Gross Motor?: WFL     Sensation  Overall Sensation Status: [facility]     Bed mobility  Rolling to Right: Minimal assistance  Sit to Supine: Moderate assistance(Mod A for (B) LE control)     Transfers  Sit to Stand: Minimal Assistance(Heavy posterior lean; cues to correct)  Stand to sit: Minimal Assistance     Ambulation  Ambulation?: Yes  Ambulation 1  Surface: level tile  Device: Rolling Walker  Assistance: Minimal assistance  Quality of Gait: Pt moved slowly with walker support, leaning heavily posteriorly, experiencing intermittent (B) knee buckling requiring Min A to correct. Pt limited in standing tolerance d/t fatigue. Distance: 4', 6'    Plan   Plan  Times per week: 5-6x   Times per day: Twice a day  Plan weeks: 2-3 weeks  Current Treatment Recommendations: Strengthening, Balance Training, Functional Mobility Training, Transfer Training, ADL/Self-care Training, Endurance Training, Gait Training, Stair training, Home Exercise Program, Safety Education & Training, Patient/Caregiver Education & Training, Equipment Evaluation, Education, & procurement, Positioning  Plan Comment: D/C Friday, 6/21  Safety Devices  Type of devices:  All fall risk precautions in place, Call light within reach, Left in bed, Nurse notified, Gait belt, Bed alarm in place  Restraints  Initially in place: No    AM-PAC Score  AM-PAC Inpatient Mobility Raw Score : 15 (06/18/19 1459)  AM-PAC Inpatient T-Scale Score : 39.45 (06/18/19 1459)  Mobility Inpatient CMS 0-100% Score: 57.7 (06/18/19

## 2019-06-18 NOTE — PLAN OF CARE
Problem: Falls - Risk of:  Goal: Will remain free from falls  Description  Will remain free from falls  6/18/2019 0304 by Bennie Mcdaniel RN  Outcome: Ongoing  6/17/2019 2139 by Jo-Ann Jamil RN  Outcome: Ongoing  Goal: Absence of physical injury  Description  Absence of physical injury  6/18/2019 0304 by Bennie Mcdaniel RN  Outcome: Ongoing  6/17/2019 2139 by Jo-Ann Jamli RN  Outcome: Ongoing     Problem: Risk for Impaired Skin Integrity  Goal: Tissue integrity - skin and mucous membranes  Description  Structural intactness and normal physiological function of skin and  mucous membranes.   6/18/2019 0304 by Bennie Mcdaniel RN  Outcome: Ongoing  6/17/2019 2139 by Jo-Ann Jamil RN  Outcome: Ongoing     Problem: Pain:  Goal: Pain level will decrease  Description  Pain level will decrease  6/18/2019 0304 by Bennie Mcdaniel RN  Outcome: Ongoing  6/17/2019 2139 by Jo-Ann Jamil RN  Outcome: Ongoing  Goal: Control of acute pain  Description  Control of acute pain  6/18/2019 0304 by Bennie Mcdaniel RN  Outcome: Ongoing  6/17/2019 2139 by Jo-Ann Jamil RN  Outcome: Ongoing  Goal: Control of chronic pain  Description  Control of chronic pain  6/18/2019 0304 by Bennie Mcdaniel RN  Outcome: Ongoing  6/17/2019 2139 by Jo-Ann Jamil RN  Outcome: Ongoing     Problem: Nutrition  Goal: Optimal nutrition therapy  6/18/2019 0304 by Bennie Mcdaniel RN  Outcome: Ongoing  6/17/2019 1451 by Marylou Garrison RD, LD  Outcome: Ongoing     Problem: OXYGENATION/RESPIRATORY FUNCTION  Goal: Patient will maintain patent airway  Outcome: Ongoing  Goal: Patient will achieve/maintain normal respiratory rate/effort  Description  Respiratory rate and effort will be within normal limits for the patient  Outcome: Ongoing     Problem: HEMODYNAMIC STATUS  Goal: Patient has stable vital signs and fluid balance  Outcome: Ongoing     Problem: FLUID AND ELECTROLYTE IMBALANCE  Goal: Fluid and electrolyte balance are achieved/maintained  Outcome: Ongoing Problem: ACTIVITY INTOLERANCE/IMPAIRED MOBILITY  Goal: Mobility/activity is maintained at optimum level for patient  Outcome: Ongoing

## 2019-06-18 NOTE — CARE COORDINATION
[id number] [facility] nurse assessed sacrococcygeal areas. Ruptured blisters with a combination of Moisture Associated Skin Damage (MASD) and red raised erythema or rash areas. NO deep tissue pressure injury noted. Recommendations for moisture barrier cream Calmoseptine Ointment to be applied bid and prn with maddy-care to affected areas. Spoke with bedside nurse Tran Alcala about plan of care.      Electronically signed by Brda Glez RN on 6/18/2019 at 12:35 PM

## 2019-06-18 NOTE — PROGRESS NOTES
or edema  Skin: warm and dry, no jaundice  Neuro: Grossly intact, A&OX3    LABS AND IMAGING   Laboratory   No results for input(s): WBC, HGB, HCT, MCV, PLT in the last 72 hours. Recent Labs     06/16/19  0654      K 4.2      CO2 26   BUN 22*   CREATININE 1.4*     No results for input(s): AST, ALT, ALB, BILIDIR, BILITOT, ALKPHOS in the last 72 hours. No results for input(s): LIPASE, AMYLASE in the last 72 hours. No results for input(s): PROTIME, INR in the last 72 hours. Imaging  XR CHEST PORTABLE   Final Result   Nasogastric tube in good position extending into the distal stomach. No significant change of right-sided pleural effusion, right basilar   consolidation, and bilateral pulmonary disease including interstitial disease   over the past 1 week. Endoscopy  EGD 6/16/19  Food bolus impaction, endoscopically disimpacted  Moderate esophagitis with candida, biopsied  Dilated esophagus, possible achalasia  3 cm hiatal hernia   S/p NGT insertion through left nostril     EGD 5/30: 3 cm sliding HH, 2mm nonbleeding AVM in D2 s/p APC  Colonoscopy 5/31: 2 mm transverse colon polyp, left alone; diverticulosis   Capsule endoscopy: possible SB bleeding source 29:00 (prominent red spot of clot), scattered AVMs   Push Enteroscopy 6/3: the colonoscope was inserted 65 cm post pylorus. The bleeding lesion seen in capsule endoscopy not endoscopically visualized               ASSESSMENT AND RECOMMENDATIONS   Gracie Henry is a 80 y.o. female with PMH of Afib,  CHF, HTN, HLD, CKD, thyroid disease, polymyalgia rheumatica who presents with dysphagia, food getting stuck in her mid chest and unable to swallow pills. Underwent EGD which revealed food impaction, mod esophagitis with candida, possible achalasia, 3 cm hiatal hernia. Recent admission last month with acute blood loss anemia, extensive endoscopic evaluation completed with EGD/Colonoscopy, capsule endoscopy and push enteroscopy.   Pt with small bowel lesion in prox jejunum seen on capsule study but unable to be reached with push enteroscopy. 1. Food impaction: s/p disimpaction. Possibly due to achalasia. 2. Esophagitis: Suspected candida infection. Path report pending. On diflucan and PPI  3. Failure to Thrive  4. Blood loss anemia 2/2 small bowel lesion  unable to be reached with push enteroscopy. Appears stable. Coumadin stopped. No overt bleeding. RECOMMENDATIONS:    D/c NGT. Adv diet to full liquids. Monitor tolerance and adequacy of oral intake  Follow up path report  Continue diflucan 100 mg daily for 2 weeks  Continue PPI BID    If you have any questions or need any further information, please feel free to contact us [phone]. Thank you for allowing us to participate in the care of Nj Worrell. Evan MAYFIELD    Attending physician addendum:    I have personally seen and examined the patient, reviewed the patient's medical record and pertinent labs and clinical imaging. I have personally staffed the case with Evan MAYFIELD. I agree with her consultation note, exam findings, assessment and plans  as written above. I have made appropriate modifications and edited her assessment and plan where needed to reflect my impression and plans for this patient. Dellar Severance Chadburn is a 80 y.o.  female patient who is currently receiving rehab for recent GI bleed and respiratory failure. We have been consulted regarding dysphagia. Patient had an EGD on 6/16 with removal of food bolus. Candida esophagitis also noted. Recommend treating with Fluconazole 100 mg daily x 14 days and keeping on BID PPI x 8 weeks. Patient tolerated liquids. Will DC NG tube. Advance diet to full liquids. Diet can continue to be advanced as tolerated. Will sign off. Please call with questions. Thank you for allowing me to participate in this patient's care.   If there are any questions or concerns regarding this patient, or the plan we have set in place, please feel free to contact me at [phone].      Mansi Mariscal MD

## 2019-06-18 NOTE — DISCHARGE INSTR - COC
Continuity of Care Form    Patient Name: Anderson Mccormick   :  1935  MRN:  [id number]    Admit date:  2019  Discharge date:  ***    Code Status Order: DNR-CCA   Advance Directives:   [facility] Documentation     Date/Time Healthcare Directive Type of Healthcare Directive Copy in [facility] [address] Agent's Name Healthcare Agent's Phone Number    [phone]  --  --  -- --  --  [phone]    [phone]  Yes, patient has an advance directive for healthcare treatment  Durable power of  for health care;Living will  Yes, copy in chart --  Treva Medley  --          Admitting Physician:  Dayan Finn MD  PCP: Dominic Sykes MD    Discharging Nurse: [facility] Unit/Room#: Q2Y-0137/5057-77  Discharging Unit Phone Number: ***    Emergency Contact:   Extended Emergency Contact Information  [facility] [address] Phone: [phone]  Relation: Other  Secondary Emergency Contact: [address] Phone: [phone]  Mobile Phone: [phone]  Relation: Child    Past Surgical History:  Past Surgical History:   Procedure Laterality Date    COLONOSCOPY  2016    Ilya    COLONOSCOPY N/A 2019    COLONOSCOPY performed by Johnny Cates MD at [place] [address], [facility] 1  2019    BOWEL SMALL CAPSULE ENDOSCOPY DEPLOYED VIA EGD performed by Johnny Cates MD at [address] ENTEROSCOPY N/A 6/3/2019    ENTEROSCOPY PUSH DIAGNOSTIC performed by Ruby Floyd DO at [facility] GASTROINTESTINAL ENDOSCOPY  2016    Ilya    UPPER GASTROINTESTINAL ENDOSCOPY N/A 2019    EGD POLYP ABLATION/OTHER performed by Johnny Cates MD at [address] [place] [address],[facility] N/A 2019    ESOPHAGOGASTRODUODENOSCOPY WITH CAPSULE PLACEMENT performed by Donald Best MD at [address] N/A 6/16/2019    [facility] [address] [facility] 6/16/2019    EGD FOREIGN BODY REMOVAL performed by Louis Goodman MD at [facility]       Immunization History:   Immunization History   Administered Date(s) Administered    Influenza Virus Vaccine 10/14/2011, 11/04/2014, 08/14/2015    Influenza Whole 10/23/2012    Influenza, High Dose (Fluzone 65 yrs and older) 10/03/2017, 10/09/2018    Influenza, Maddie Hole, 3 yrs and older, IM, PF (Fluzone 3 yrs and older or Afluria 5 yrs and older) 08/15/2016    Pneumococcal 13-valent Conjugate ([id number]) 11/04/2014    Pneumococcal Polysaccharide ([id number]) 07/27/2017       Active Problems:  Patient Active Problem List   Diagnosis Code    Disorder of bone and cartilage M89.9, M94.9    Edema R60.9    Essential hypertension, benign I10    Other and unspecified hyperlipidemia E78.5    Hypothyroidism due to medication E03.2    Paroxysmal atrial fibrillation (HCC) I48.0    Anemia D64.9    Vitamin D deficiency E55.9    Fatigue R53.83    Osteopenia M85.80    Chronic diastolic congestive heart failure (HCC) I50.32    Non-rheumatic mitral regurgitation I34.0    Pulmonary hypertension I27.20    Weight loss R63.4    Abnormal chest x-ray R93.89    S/P right heart catheterization Z98.890    Chronic atrial fibrillation (HCC) I48.2    Dyspnea R06.00    Hx of venous thromboembolic disease L24.538    CKD (chronic kidney disease) stage 3, GFR 30-59 ml/min (HCC) N18.3    UGI bleed K92.2    Supratherapeutic INR R79.1    Acute blood loss anemia D62    Coagulopathy (HCC) D68.9    Acute pulmonary edema (HCC) J81.0    Acute respiratory failure with hypoxia and hypercapnia (HCC) J96.01, J96.02    Lightheaded R42    Protein calorie malnutrition (HCC) E46    Disuse muscle atrophy M62.50    Esophageal oxygen:70280}  Ventilator:    {[facility] Vent XPAV:[id number]}    Rehab Therapies: {THERAPEUTIC INTERVENTION:4384915785}  Weight Bearing Status/Restrictions: { CC Weight Bearin}  Other Medical Equipment (for information only, NOT a DME order):  {EQUIPMENT:151649450}  Other Treatments: ***    HEART FAILURE:  Pt has history of diastolic HF   (was compensated during this admission). Please continue HF education. Pt will need DAILY WEIGHTS. Notify MD if > 3lbs overnight or 5 lbs in a week. Monitor for S&S of HF/ fluid overload and report accordingly. Pt follows with Dr Sophie Roman for PCP and Dr Kailyn Donahue at [facility] for Cardiology. See AVS for scheduled follow ups. Patient's personal belongings (please select all that are sent with patient):  {CHP DME Belongings:360715180}    RN SIGNATURE:  {Esignature:[id number]}    CASE MANAGEMENT/SOCIAL WORK SECTION    Inpatient Status Date: 19    Readmission Risk Assessment Score:17    Discharging to Facility/ Agency   · Name: [address]  · [facility], [facility]  · OSJQD:[phone]  · TAJ:[phone]    / signature: Electronically signed by Melinda Wood RN on 19 at 9:46 AM    PHYSICIAN SECTION    Prognosis: Guarded    Condition at Discharge: Stable    Rehab Potential (if transferring to Rehab): Guarded    Recommended Labs or Other Treatments After Discharge: BMP, CBC     Physician Certification: I certify the above information and transfer of Omari Chisholm  is necessary for the continuing treatment of the diagnosis listed and that she requires Jose Wally for greater 30 days.      Update Admission H&P: No change in H&P    PHYSICIAN SIGNATURE:  Electronically signed by Fatemeh Auguste MD on 19 at 8:30 AM

## 2019-06-18 NOTE — PROGRESS NOTES
05/29/2019    [id number] 38.3 05/29/2019     INR: No results for input(s): INR in the last 72 hours. Objective:   Vitals: /63   Pulse 87   Temp 98.3 °F (36.8 °C) (Oral)   Resp 18   Ht 5' (1.524 m)   Wt 102 lb 4.7 oz (46.4 kg)   SpO2 96%   BMI 19.98 kg/m²     General appearance: alert, appears stated age and cooperative  Lungs: clear to auscultation bilaterally  Heart: irregularly irregular rhythm  Abdomen: soft, non-tender; bowel sounds normal; no masses,  no organomegaly and NG tube in place   Extremities: extremities normal, atraumatic, no cyanosis or edema  Neurologic: Mental status: Alert, oriented, thought content appropriate    Assessment/Plan:     Patient Active Hospital Problem List:     Esophagitis  -Diflucan  -GI following    Dysphagia (6/16/2019); concerns for achalasia    Plan: -GI following  -Clear liquid diet  -PPI    UTI 2/2 Klebsiella pneumoniae  -Sensitivities available  -Continue Rocephin    Paroxysmal atrial fibrillation (HCC) (8/22/2011)    Assessment: - stable    Plan: - Stable; continue current management. Chronic diastolic congestive heart failure ([facility] (7/10/2014)    Assessment: stable    Plan: - Stable; continue current management. Essential hypertension, benign ()    Assessment: stable    Plan: - Stable; continue current management. Hypothyroidism  - Stable; continue current management. Protein calorie malnutrition ([facility] [address] [facility] (6/4/2019)    Plan: dietary following      F/E/N: Clear liquid diet  PPx: Protonix  Dispo: Leigh Villasenor MD  9:06 AM    Documentation was done using voice recognition dragon software. Every effort was made to ensure accuracy; however, inadvertent unintentional computerized transcription errors may be present.

## 2019-06-18 NOTE — CARE COORDINATION
Met w/pt about dc plan and to answer her questions about Medicare. Pt decided she would like a slower paced rehab program at [facility]. Message left for admissions Ruben Echols, who yesterday reported they will have beds opening on Friday. Message left for [facility] admissions.     DC PLAN: [facility]    NAHUN needed at [address], RN  Case management  [phone]

## 2019-06-18 NOTE — PLAN OF CARE
Problem: Falls - Risk of:  Goal: Will remain free from falls  Description  Will remain free from falls  Outcome: Ongoing  Goal: Absence of physical injury  Description  Absence of physical injury  Outcome: Ongoing     Problem: Risk for Impaired Skin Integrity  Goal: Tissue integrity - skin and mucous membranes  Description  Structural intactness and normal physiological function of skin and  mucous membranes.   Outcome: Ongoing     Problem: Pain:  Goal: Pain level will decrease  Description  Pain level will decrease  Outcome: Ongoing  Goal: Control of acute pain  Description  Control of acute pain  Outcome: Ongoing  Goal: Control of chronic pain  Description  Control of chronic pain  Outcome: Ongoing     Problem: Nutrition  Goal: Optimal nutrition therapy  6/17/2019 1451 by Rodrigo Woodson RD, LD  Outcome: Ongoing

## 2019-06-19 PROBLEM — B37.81 CANDIDA ESOPHAGITIS (HCC): Status: ACTIVE | Noted: 2019-06-19

## 2019-06-19 LAB
ANION GAP SERPL CALCULATED.3IONS-SCNC: 11 MMOL/L (ref 3–16)
BASOPHILS ABSOLUTE: 0.1 K/UL (ref 0–0.2)
BASOPHILS RELATIVE PERCENT: 0.7 %
BUN BLDV-MCNC: 13 MG/DL (ref 7–20)
CALCIUM SERPL-MCNC: 8.9 MG/DL (ref 8.3–10.6)
CHLORIDE BLD-SCNC: 102 MMOL/L (ref 99–110)
CO2: 26 MMOL/L (ref 21–32)
CREAT SERPL-MCNC: 0.9 MG/DL (ref 0.6–1.2)
EOSINOPHILS ABSOLUTE: 0.7 K/UL (ref 0–0.6)
EOSINOPHILS RELATIVE PERCENT: 7.4 %
GFR AFRICAN AMERICAN: >60
GFR NON-AFRICAN AMERICAN: 60
GLUCOSE BLD-MCNC: 135 MG/DL (ref 70–99)
HCT VFR BLD CALC: 30.1 % (ref 36–48)
HEMOGLOBIN: 9.8 G/DL (ref 12–16)
LYMPHOCYTES ABSOLUTE: 0.9 K/UL (ref 1–5.1)
LYMPHOCYTES RELATIVE PERCENT: 10.2 %
MCH RBC QN AUTO: 29.5 PG (ref 26–34)
MCHC RBC AUTO-ENTMCNC: 32.5 G/DL (ref 31–36)
MCV RBC AUTO: 90.6 FL (ref 80–100)
MONOCYTES ABSOLUTE: 0.5 K/UL (ref 0–1.3)
MONOCYTES RELATIVE PERCENT: 5.7 %
NEUTROPHILS ABSOLUTE: 6.8 K/UL (ref 1.7–7.7)
NEUTROPHILS RELATIVE PERCENT: 76 %
PDW BLD-RTO: 17 % (ref 12.4–15.4)
PLATELET # BLD: 254 K/UL (ref 135–450)
PMV BLD AUTO: 8.6 FL (ref 5–10.5)
POTASSIUM SERPL-SCNC: 3.3 MMOL/L (ref 3.5–5.1)
RBC # BLD: 3.32 M/UL (ref 4–5.2)
SODIUM BLD-SCNC: 139 MMOL/L (ref 136–145)
WBC # BLD: 8.9 K/UL (ref 4–11)

## 2019-06-19 PROCEDURE — 6370000000 HC RX 637 (ALT 250 FOR IP): Performed by: INTERNAL MEDICINE

## 2019-06-19 PROCEDURE — 36415 COLL VENOUS BLD VENIPUNCTURE: CPT

## 2019-06-19 PROCEDURE — C9113 INJ PANTOPRAZOLE SODIUM, VIA: HCPCS | Performed by: INTERNAL MEDICINE

## 2019-06-19 PROCEDURE — 2580000003 HC RX 258: Performed by: INTERNAL MEDICINE

## 2019-06-19 PROCEDURE — 85025 COMPLETE CBC W/AUTO DIFF WBC: CPT

## 2019-06-19 PROCEDURE — 97535 SELF CARE MNGMENT TRAINING: CPT

## 2019-06-19 PROCEDURE — 97166 OT EVAL MOD COMPLEX 45 MIN: CPT

## 2019-06-19 PROCEDURE — 6360000002 HC RX W HCPCS: Performed by: INTERNAL MEDICINE

## 2019-06-19 PROCEDURE — 80048 BASIC METABOLIC PNL TOTAL CA: CPT

## 2019-06-19 PROCEDURE — 6370000000 HC RX 637 (ALT 250 FOR IP)

## 2019-06-19 PROCEDURE — 2700000000 HC OXYGEN THERAPY PER DAY

## 2019-06-19 PROCEDURE — 2060000000 HC ICU INTERMEDIATE R&B

## 2019-06-19 PROCEDURE — 99232 SBSQ HOSP IP/OBS MODERATE 35: CPT | Performed by: INTERNAL MEDICINE

## 2019-06-19 PROCEDURE — 97110 THERAPEUTIC EXERCISES: CPT

## 2019-06-19 PROCEDURE — 97530 THERAPEUTIC ACTIVITIES: CPT

## 2019-06-19 PROCEDURE — 94760 N-INVAS EAR/PLS OXIMETRY 1: CPT

## 2019-06-19 PROCEDURE — 94640 AIRWAY INHALATION TREATMENT: CPT

## 2019-06-19 RX ORDER — BISACODYL 10 MG
10 SUPPOSITORY, RECTAL RECTAL DAILY PRN
Status: DISCONTINUED | OUTPATIENT
Start: 2019-06-19 | End: 2019-06-21 | Stop reason: HOSPADM

## 2019-06-19 RX ORDER — SENNA PLUS 8.6 MG/1
1 TABLET ORAL 2 TIMES DAILY
Status: DISCONTINUED | OUTPATIENT
Start: 2019-06-19 | End: 2019-06-21 | Stop reason: HOSPADM

## 2019-06-19 RX ADMIN — SENNOSIDES 8.6 MG: 8.6 TABLET, FILM COATED ORAL at 22:37

## 2019-06-19 RX ADMIN — LEVOTHYROXINE SODIUM 75 MCG: 75 TABLET ORAL at 06:32

## 2019-06-19 RX ADMIN — CLOTRIMAZOLE AND BETAMETHASONE DIPROPIONATE: 10; .5 CREAM TOPICAL at 22:37

## 2019-06-19 RX ADMIN — BISACODYL 10 MG: 10 SUPPOSITORY RECTAL at 15:39

## 2019-06-19 RX ADMIN — CEFTRIAXONE 1 G: 1 INJECTION, POWDER, FOR SOLUTION INTRAMUSCULAR; INTRAVENOUS at 15:32

## 2019-06-19 RX ADMIN — PANTOPRAZOLE SODIUM 40 MG: 40 INJECTION, POWDER, FOR SOLUTION INTRAVENOUS at 22:37

## 2019-06-19 RX ADMIN — MOMETASONE FUROATE AND FORMOTEROL FUMARATE DIHYDRATE 2 PUFF: 200; 5 AEROSOL RESPIRATORY (INHALATION) at 21:03

## 2019-06-19 RX ADMIN — MOMETASONE FUROATE AND FORMOTEROL FUMARATE DIHYDRATE 2 PUFF: 200; 5 AEROSOL RESPIRATORY (INHALATION) at 09:35

## 2019-06-19 RX ADMIN — IPRATROPIUM BROMIDE 2 SPRAY: 42 SPRAY NASAL at 17:54

## 2019-06-19 RX ADMIN — SENNOSIDES 8.6 MG: 8.6 TABLET, FILM COATED ORAL at 09:56

## 2019-06-19 RX ADMIN — POTASSIUM BICARBONATE 40 MEQ: 782 TABLET, EFFERVESCENT ORAL at 15:31

## 2019-06-19 RX ADMIN — FLUCONAZOLE 100 MG: 100 TABLET ORAL at 09:56

## 2019-06-19 RX ADMIN — IPRATROPIUM BROMIDE 2 SPRAY: 42 SPRAY NASAL at 12:30

## 2019-06-19 RX ADMIN — IPRATROPIUM BROMIDE 2 SPRAY: 42 SPRAY NASAL at 09:56

## 2019-06-19 RX ADMIN — PANTOPRAZOLE SODIUM 40 MG: 40 INJECTION, POWDER, FOR SOLUTION INTRAVENOUS at 09:57

## 2019-06-19 RX ADMIN — IPRATROPIUM BROMIDE 2 SPRAY: 42 SPRAY NASAL at 22:37

## 2019-06-19 RX ADMIN — ANORECTAL OINTMENT: 15.7; .44; 24; 20.6 OINTMENT TOPICAL at 09:55

## 2019-06-19 RX ADMIN — CITALOPRAM HYDROBROMIDE 10 MG: 10 TABLET ORAL at 09:56

## 2019-06-19 RX ADMIN — MAGNESIUM HYDROXIDE 30 ML: 400 SUSPENSION ORAL at 09:56

## 2019-06-19 RX ADMIN — LATANOPROST 1 DROP: 50 SOLUTION OPHTHALMIC at 22:38

## 2019-06-19 RX ADMIN — AMIODARONE HYDROCHLORIDE 200 MG: 200 TABLET ORAL at 09:57

## 2019-06-19 RX ADMIN — ANORECTAL OINTMENT: 15.7; .44; 24; 20.6 OINTMENT TOPICAL at 22:37

## 2019-06-19 RX ADMIN — CLOTRIMAZOLE AND BETAMETHASONE DIPROPIONATE: 10; .5 CREAM TOPICAL at 09:56

## 2019-06-19 RX ADMIN — METOPROLOL TARTRATE 50 MG: 50 TABLET ORAL at 22:36

## 2019-06-19 ASSESSMENT — PAIN SCALES - GENERAL
PAINLEVEL_OUTOF10: 0

## 2019-06-19 NOTE — PROGRESS NOTES
Calorie Count Note    Type and Reason for Visit: Calorie Count    Current diet and supplement order:  Diet:  DIET FULL LIQUID;  Dietary Nutrition Supplements: Clear Liquid Oral Supplement, Frozen Oral Supplement  Dietary Nutrition Supplements:      Comparative Standards (Estimated Nutrition Needs):   · Estimated Daily Total Kcal: 9286-2479 kcal (25-30 kcal/kg ABW)  · Estimated Daily Protein (g): 45-54 gm (1-1.2 gm/kg ABW)    Date Consumed PO Intake Kcal %   Kcal met PO Intake grams protein %  Protein met   Comments   6/17     No intakes recorded   6/18 ~400 kcal  35% ~10 gm  22% 2 meals recorded                                    **Results will be posted as available. Intervention & Recommendations:   1. Continue Calorie Count  2.   Ensure Clear TID    Electronically signed by Nicole Penny RD, LD on 6/19/2019 at 12:16 PM    Contact Number: [phone]

## 2019-06-19 NOTE — PROGRESS NOTES
[facility] Internal Medicine Note      Chief Complaint:  I am doing ok    Subjective/Interval History:    Events of the last week reviewed. The patient is swallowing better. Denies pain. Doing better with getting pills down. Bx confirmed candida esophagitis. Breathing is \"much better\". Overall feels better. Planning on going to [facility] this week for rehab. No chest pain or shortness breath. No cough or sputum. No nausea, vomiting, diarrhea. No abdominal pain. No dysuria. The remainder of the review of systems is negative. PMH, PSH, FH/SH reviewed and unchanged as documented in the EMR    Medication list reviewed    Objective:    BP (!) 97/55   Pulse 103   Temp 97.8 °F (36.6 °C) (Oral)   Resp 18   Ht 5' (1.524 m)   Wt 90 lb 9.7 oz (41.1 kg) Comment: RN notified, weighed patient in bed with nothing on the bed  SpO2 95%   BMI 17.70 kg/m²   Temp  Av °F (36.7 °C)  Min: 97.7 °F (36.5 °C)  Max: 98.5 °F (36.9 °C)    CV-  Irreg Irreg, rate controlled  Pulm- chest clear mostly, with only a few bibasilar crackles, respirations easy  Abd- BS+, soft, NTND  Ext- no ankle edema    The Following Labs Were Reviewed Today:    CBC, BMP pending for today    No results found for this or any previous visit (from the past 24 hour(s)). ASSESSMENT/PLAN:      Principal Problem:    Candida esophagitis ([address]- improving and doing better with the diflucan. Continue diflucan BID for 2 weeks and 8 weeks of BID PPI. Active Problems:    Paroxysmal atrial fibrillation ([address]- rate controlled, unable to anticoagulate moving forward due to GI bleeding    Chronic diastolic congestive heart failure ([address]- seemingly well compensated. Follow. Check BMP today. Non-rheumatic mitral regurgitation- stable, but advanced. Follow. Essential hypertension, benign- BP is very low but well tolerated. Follow      Protein calorie malnutrition ([address]- continue to follow      Disposition- plan [facility] later this week.       REJI Vaishnavi Perrin MD, [facility]  9:17 AM  6/19/2019

## 2019-06-19 NOTE — PROGRESS NOTES
Occupational Therapy   Occupational Therapy Initial Assessment and Tentative D/C    This note to serve as d/c summary should pt d/c prior to next session. Date: 2019   Patient Name: Rose Mary Steinberg  MRN: [id number]     : 1935    Date of Service: 2019    Discharge Recommendations: Rose Mary Steinberg scored a 16/24 on the AM-PAC ADL Inpatient form. Current research shows that an AM-PAC score of 17 or less is typically not associated with a discharge to the patient's home setting. Based on the patients AM-PAC score and their current ADL deficits, it is recommended that the patient have 3-5 sessions per week of Occupational Therapy at d/c to increase the patients independence. 3-5 sessions per week, Continue to assess pending progress  OT Equipment Recommendations  Equipment Needed: No  Other: defer to next level of care    Assessment   Performance deficits / Impairments: Decreased functional mobility ; Decreased ADL status; Decreased strength;Decreased endurance;Decreased high-level IADLs;Decreased balance;Decreased safe awareness  Assessment: Pt with recent transfer from IPR unit. Pt tolerated todays OT session well. Pt completes functional mobility and transfers with CGA and use of RW. Anticipate pt needing up to [address] for ADLs in stance. Pt from home alone with limited support from family. Pt currently limited by decreased overall strength, endurance, and balance with mobility and completing ADLs. Pt will benefit from skilled OT services at this time. Prognosis: Good  Decision Making: Medium Complexity  History: 80 y. o. female who has a past medical history of Anemia, Anticoagulant long-term use, Atrial fibrillation ([facility]), CHF (congestive heart failure) ([facility] [address] CKD (chronic kidney disease) stage 3, GFR 30-59 ml/min ([facility]), hx of Clostridium difficile diarrhea, Depression, Humerus fracture, Hyperlipidemia, Hypertension, Hypothyroidism, Mitral regurgitation, Optic neuritis, Osteopenia, 5/31/2019); Endoscopy, small bowel with ileum (5/31/2019); Enteroscopy (N/A, 6/3/2019); Upper gastrointestinal endoscopy (N/A, 6/16/2019); and Upper gastrointestinal endoscopy (N/A, 6/16/2019). Restrictions  Restrictions/Precautions  Restrictions/Precautions: Fall Risk  Position Activity Restriction  Other position/activity restrictions: .5 L. O2 per nc    Subjective   General  Chart Reviewed: Yes  Patient assessed for rehabilitation services?: Yes  Additional Pertinent Hx: 81 y/o female admit 5/28/19 with GI Bleed, Respiratory Failure. 5/30/19 S/P EGD with Argon Plasma Coag.  5/31/19 S/P EGD with Placement Video Capsule. 6/3/19  Push Enteroscopy - no lesions. 6/3/19 Nasogastric Tube Feed placed. PMH includes Mitral Regurg, A-Fib, CHF, CKD, PE, Lung Surg, Humerus Fx, Osteopenia, Polymyalgia Rheumatica. Family / Caregiver Present: No  Referring Practitioner: HARLEY SERRATO  Subjective  Subjective: Pt up in chair upon arrival and agreeable to OT services. Pt reports no pain prior to session. Pt reports she is feeling better this date. General Comment  Comments: okay for therapy per RN.    Oxygen Therapy  SpO2: 92 %  Pulse Oximeter Device Mode: Intermittent  Pulse Oximeter Device Location: Finger  O2 Device: Nasal cannula  O2 Flow Rate (L/min): 1 L/min  Social/Functional History  Social/Functional History  Lives With: Alone(2 children who live in [place])  Type of Home: (3rd floor condo, laundry in [facility])  Home Layout: One level  Home Access: Elevator, Level entry  Bathroom Shower/Tub: Walk-in shower  Bathroom Toilet: Standard(sink nearby)  Bathroom Equipment: Grab bars in shower  Bathroom Accessibility: Walker accessible  Home Equipment: (no DME)  ADL Assistance: [address]: Independent(cleaning lady every 3 weeks, pt independent grocery shopping, laundry, cooking)  Homemaking Responsibilities: Yes  Ambulation Assistance: Independent(with no AD)  Transfer Assistance: Strength  Gross LUE Strength: WFL  L Hand General: 4/5  RUE Strength  Gross RUE Strength: WFL  R Hand General: 4/5                   Plan   Plan  Times per week: 3-5x  Current Treatment Recommendations: Strengthening, Balance Training, Endurance Training, Gait Training, Patient/Caregiver Education & Training, Functional Mobility Training, Safety Education & Training, Equipment Evaluation, Education, & procurement, Self-Care / ADL      AM-PAC Score        AM-PAC Inpatient Daily Activity Raw Score: 16 (06/19/19 1105)  AM-PAC Inpatient ADL T-Scale Score : 35.96 (06/19/19 1105)  ADL Inpatient CMS 0-100% Score: 53.32 (06/19/19 1105)  ADL Inpatient CMS G-Code Modifier : CK (06/19/19 1105)    Goals  Short term goals  Time Frame for Short term goals: prior to D/C  Short term goal 1: complete functional mobility and transfers with supervision and use of RW  Short term goal 2: complete toileting with supervision  Short term goal 3: complete LB dressing with supervision  Short term goal 4: tolerate B UE exercises x10 reps for increased strength and endurance with ADLs  Long term goals  Time Frame for Long term goals : STG=LTG  Patient Goals   Patient goals : to get better       Therapy Time   Individual Concurrent Group Co-treatment   Time In 1019         Time Out 1100         Minutes 41         Timed Code Treatment Minutes: [address] Pamela Mcfarland Speaker, OTR/L

## 2019-06-19 NOTE — PLAN OF CARE
Pt denies pain/discomfort this shift. Skin continues to improve with resolution of purple area to coccyx resolved. Pt remains on specialty low air loss bed and Q2hr side to side turns when in bed with chair time limited. Pt up in chair for all meals with small improvement in oral intake. Pt c/o constipation with last BM 6/15. New orders received for MOM X1, dulcolax r/s prn and stool softeners given and not yet effective.

## 2019-06-19 NOTE — DISCHARGE SUMMARY
Department of [facility] Discharge Summary     Patient Identification:  Jackie Ramsey  : 1935  Admit date: 2019  Discharge date: 2019   Attending provider: No att. providers found        Primary care provider: Roxana Russell MD     Discharge Diagnoses:   Patient Active Problem List   Diagnosis    Disorder of bone and cartilage    Edema    Essential hypertension, benign    Other and unspecified hyperlipidemia    Hypothyroidism due to medication    Paroxysmal atrial fibrillation (HCC)    Anemia    Vitamin D deficiency    Fatigue    Osteopenia    Chronic diastolic congestive heart failure ([address]    Non-rheumatic mitral regurgitation    Pulmonary hypertension    Weight loss    Abnormal chest x-ray    S/P right heart catheterization    Chronic atrial fibrillation (HCC)    Dyspnea    Hx of venous thromboembolic disease    CKD (chronic kidney disease) stage 3, GFR 30-59 ml/min (HCC)    UGI bleed    Supratherapeutic INR    Acute blood loss anemia    Coagulopathy (HCC)    Acute pulmonary edema (HCC)    Acute respiratory failure with hypoxia and hypercapnia (HCC)    Lightheaded    Protein calorie malnutrition ([address]    Disuse muscle atrophy    Esophageal dysphagia    Dysphagia         Discharge Functional Status:    Physical therapy:  Bed Mobility: Scooting: Supervision  Transfers: Sit to Stand: Contact guard assistance, Stand by assistance  Stand to sit: Contact guard assistance, Stand by assistance  Bed to Chair: Unable to assess  Comment: Returned patient to her room via [facility] at the end of the session. Assisted pt with toileting, able to manage pants up/down, but dependent to change slightly soiled brief, pt performed own maddy care. Stood to wash hands with SBA for standing balance.  Returned patient to recliner and ensured call light in reach and all needs met.  , Ambulation 1  Surface: level tile  Device: Rolling Walker  Other Apparatus: O2(1L/min via NC)  Assistance: Contact guard assistance, Stand by assistance  Quality of Gait: flexed posture with decreased clearance BLE's, slow pace  Gait Deviations: Slow Minda, Decreased step height, Decreased step length  Distance: 125', 130'  Comments: O2 sats 88%  HR 91after amb and remained fairly steady , Stairs  # Steps : 4  Stairs Height: 6\"  Rails: Bilateral  Curbs: 6\"  Device: Rolling walker(with curb step)  Other Apparatus: (O2 1L)  Assistance: Minimal assistance, Contact guard assistance  Comment: CGA/min A with cues for sequencing and safe wheeled walker management with curb; CGA for steps using bilateral rails - O2 sats to 92% after curb and steps  Mobility: Bed, Chair, Wheel Chair: 4 - Requires steadying assistance only <25% assist  and/or requires assist with one leg only  Walk: 2 - Maximal Assistance Requires up to Maximal Assistance AND requires assistance of one person to walk between  feet (Patient performs 25-49% of locomotion effort or goes between  feet)  Distance Walked: [address] [place] [address]: 1 - Total Assistance Operates wheelchair < 50 feet OR requires two or more people OR patient performs < 25% of locomotion effort  Stairs: 2- Maximal Assistance Performs 25-49% of the effort to go up and down 4 to 6 stairs and requires the assistance of one person only, PT Equipment Recommendations  Equipment Needed: Yes  Mobility Devices: [place]: Rolling  Other: may need wheeled walker - will continue to assess, Assessment: Prior to admit she was living alone in [facility] with elevator access; she was very independent with daily care and functional mobility (without assist device). Patient is progressing steadily,  has met 4/4 STGs and is working towards her LTGs. She required CGA/SBA for transfers and ambulation with wheeled walker, was able to ambulate 125' with a wheeled walker and ascend/descend curb and steps with CGA/min A.  Patient is progressing well but still functioning below baseline and will benefit from continued skilled PT intervention to maximize safety, endurance, strength, balance and independence with functional mobility prior to returning home. Anticipate D/C home, Friday,  . Occupational therapy: Eatin - Patient feeds self  Groomin - Requires setup/cues to do all tasks  Bathin - Able to bathe all 10 areas with setup/sup/cues  Dressing-Upper: 5 - Requires setup/supervision/cues and/or requires assist with presthesis/brace only  Dressing-Lower: 5 - Requires setup/supervision/cues and/or staff applies TEDS/prosthesis/brace only  Toiletin - Requires setup/supervision/cues  Toilet Transfer: 4 - Requires steadying assistance only < 25% assist  Shower Transfer: 4 - Minimal contact assistance, pt. expends 75% or more effort,  , Assessment: Pt continues to improve with balance, transfers and mobility, limited by decreased activity tolerance and higher level cognition. Trailmaking A and B demonstrate she has slower processing time which would limit her ability to drive. She stated she does not plan to drive initially at discharge    Speech therapy:  Comprehension: 6 - Complex ideas 90% or device (hearing aid or glasses- if patient is primarily a visual learner)  Expression: 6 - Device used to express complex ideas/needs  Social Interaction: 6 - Patient requires medication for mood and/or effect  Problem Solvin - Patient solves simple/routine tasks 75-90%+   Memory: 4 - Patient remembers 75-90%+ of the time      Inpatient Rehabilitation Course:   Melita Grider is a 80 y.o. 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 The patient participated in an aggressive multidisciplinary inpatient rehabilitation program involving 3 hours per day, 5 days per week of rehabilitation. 80 y. o. female who has a past medical history of Anemia, Anticoagulant long-term use, Atrial fibrillation ([address] CHF (congestive heart failure) ([address] CKD (chronic kidney disease) stage 3, GFR 30-59 ml/min ([facility]), hx of Clostridium difficile diarrhea, Depression, Humerus fracture, Hyperlipidemia, Hypertension, Hypothyroidism, Mitral regurgitation, Optic neuritis, Osteopenia, Polymyalgia rheumatica ([address] Pulmonary embolus ([address] Thyroid disease, and Vitamin D deficiency. Patient came into our hospital with bloody stools.  Patient reported dark stools today and some fatigue and dizziness.  Patient denied abdominal pain.  Patient was lightheaded. Patient evaluated by GI, and workup was done. EGD and colonoscopy revealed no evidence of active bleeding source. GI bleeding stopped after patient taken off Coumadin. H&H has remained stable. 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 GI is now sound off. Patient has been in bed for the past week. Patient started therapies, but is very weak and not safe to return home. Patient needs more therapy before discharged to home safely. Patient lives at home alone in a condo.   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 After admission to the Rehab Unit, she made steady progress in her therapies as listed above under each therapy section. Her upper and lower extremity coordination and strength did improve in therapy, and she was starting to improve with her endurance and functional status as she  participated in her rehab therapies. Her balance, labs and blood pressure were monitored while on the rehabilitation unit. Patient has not been able to even swallow pills since developing dysphagia mid esophagus while on the Rehab Unit. She is getting weaker, having trouble with therapy, unable to eat any food, despite changing to full liquid diet, there has been no improvement in intake.  Patient seen on 6/16, and patient being moved to acute care [facility] 6/16/19 because unable to hold down po meds, and she will need to be reassess for her nausea and vomiting and switched to all iv meds. GI consult done, and emergent EGD revealed food impaction, which was disimpacted successfully with forceps, also signs of candidiasis. She will continue treatment in the acute care bed per GI and Medicine.          Condition at Discharge: Good    Significant Diagnostics:   Lab Results   Component Value Date     06/16/2019    K 4.2 06/16/2019     06/16/2019    BUN 22 (H) 06/16/2019    CREATININE 1.4 (H) 06/16/2019    GLUCOSE 78 06/16/2019    CALCIUM 9.1 06/16/2019     Lab Results   Component Value Date    WBC 6.8 06/11/2019    RBC 3.24 (L) 06/11/2019    HGB 9.4 (L) 06/11/2019    HCT 29.0 (L) 06/11/2019    MCV 89.4 06/11/2019    MCH 29.2 06/11/2019    MCHC 32.6 06/11/2019    RDW 15.9 (H) 06/11/2019     06/11/2019    MPV 8.2 06/11/2019     Lab Results   Component Value Date    PROTIME 16.3 (H) 05/30/2019    INR 1.43 (H) 05/30/2019     Lab Results   Component Value Date    APTT 35.0 05/30/2019     Lab Results   Component Value Date    COLORU YELLOW 06/15/2019    CLARITYU CLOUDY (A) 06/15/2019    GLUCOSEU Negative 06/15/2019    BILIRUBINUR Negative 06/15/2019    KETUA Negative 06/15/2019    SPECGRAV 1.012 06/15/2019    BLOODU TRACE (A) 06/15/2019    PHUR 5.5 06/15/2019    PROTEINU Negative 06/15/2019    UROBILINOGEN 0.2 06/15/2019    NITRU Negative 06/15/2019    LEUKOCYTESUR LARGE (A) 06/15/2019    LABMICR YES 06/15/2019    URRFLXCULT Yes 06/15/2019    URINETYPE Not Specified 06/15/2019     Lab Results   Component Value Date    WBCUA 113 (H) 06/15/2019    EPIU 1 06/15/2019    BACTERIA 3+ (A) 06/15/2019     Lab Results   Component Value Date    LABURIN >100,000 CFU/ml 06/15/2019    LABURIN 50,000 CFU/ml 06/15/2019    ORG Klebsiella pneumoniae (A) 06/15/2019    ORG Escherichia coli (A) 06/15/2019       Xr Chest Standard (2 Vw)    Result Date: 6/9/2019  EXAMINATION: TWO XRAY VIEWS OF THE CHEST 6/9/2019 10:43 am COMPARISON: Radiograph 05/29/2019 HISTORY: ORDERING SYSTEM PROVIDED HISTORY: Follow-up congestive heart failure TECHNOLOGIST PROVIDED HISTORY: Reason for exam:->Follow-up congestive heart failure Ordering Physician Provided Reason for Exam: Follow-up congestive heart failure Acuity: Acute Type of Exam: Ongoing Relevant Medical/Surgical History: Chronic diastolic congestive heart failure FINDINGS: Cardiomediastinal silhouette is unchanged. No pneumothorax. Slight interval improvement in bilateral interstitial opacities. Persistent right-sided effusion. No acute osseous abnormality. Slight interval improvement in pulmonary edema. Persistent right sided effusion. Xr Chest Portable    Result Date: 6/16/2019  EXAMINATION: ONE XRAY VIEW OF THE CHEST 6/16/2019 4:20 pm COMPARISON: June 9, 2019 HISTORY: ORDERING SYSTEM PROVIDED HISTORY: sob call stat reading TECHNOLOGIST PROVIDED HISTORY: Reason for exam:->sob call stat reading Reason for exam:->Also verify NG tube placement Ordering Physician Provided Reason for Exam: sob Acuity: Acute Type of Exam: Initial FINDINGS: Nasogastric tube courses into the distal stomach. Right-sided pleural effusion and bilateral pulmonary disease, with interstitial opacity is redemonstrated, no significant change. Nasogastric tube in good position extending into the distal stomach. No significant change of right-sided pleural effusion, right basilar consolidation, and bilateral pulmonary disease including interstitial disease over the past 1 week. Xr Chest Portable    Result Date: 5/29/2019  EXAMINATION: ONE XRAY VIEW OF THE CHEST 5/29/2019 12:09 pm COMPARISON: 01/15/2019 HISTORY: ORDERING SYSTEM PROVIDED HISTORY: CHF TECHNOLOGIST PROVIDED HISTORY: Reason for exam:->CHF Ordering Physician Provided Reason for Exam: CHF Acuity: Acute Type of Exam: Initial FINDINGS: Cardiomegaly.   Increased pulmonary vascular congestion and edema. Right-sided pleural effusion. No new pulmonary consolidation. Congestive heart failure with increased pulmonary vascular congestion and edema       Patient Instructions: Follow-up visits: Continue treatment on the acute floor per Dr. Sunshine De La [place] and GI. Discharge Medications:     Medication List      ASK your doctor about these medications    albuterol sulfate  (90 Base) MCG/ACT inhaler  Commonly known as:  PROAIR HFA  Inhale 2 puffs into the lungs every 4 hours as needed for Wheezing or Shortness of Breath     amiodarone 200 MG tablet  Commonly known as:  CORDARONE  Take 1 tablet by mouth daily     calcium carbonate 1250 (500 Ca) MG chewable tablet  Commonly known as:  OS-RC     Fluticasone Furoate-Vilanterol 200-25 MCG/INH Aepb  Commonly known as:  BREO ELLIPTA  Inhale 1 puff into the lungs daily     furosemide 40 MG tablet  Commonly known as:  LASIX  Take 1 tablet by mouth daily     ipratropium 0.06 % nasal spray  Commonly known as:  ATROVENT  2 sprays by Nasal route 4 times daily     latanoprost 0.005 % ophthalmic solution  Commonly known as:  XALATAN     levothyroxine 75 MCG tablet  Commonly known as:  SYNTHROID  Take 1 tablet by mouth Daily     memantine 10 MG tablet  Commonly known as:  NAMENDA  TAKE 1 TABLET TWICE DAILY     metoprolol succinate 50 MG extended release tablet  Commonly known as:  TOPROL XL  TAKE 1 TABLET BY MOUTH 2 TIMES DAILY     PRESERVISION AREDS 2+MULTI VIT PO     simvastatin 20 MG tablet  Commonly known as:  ZOCOR  TAKE 1 TABLET EVERY DAY     * vitamin D 400 units Tabs tablet  Commonly known as:  CHOLECALCIFEROL     * Vitamin D (Cholecalciferol) 400 units Tabs     warfarin 5 MG tablet  Commonly known as:  COUMADIN  Take as directed. If you are unsure how to take this medication, talk to your nurse or doctor.   Original instructions:  TAKE 2 TO 3 TABLETS EVERY DAY AS DIRECTED         * This list has 2 medication(s) that are the same as other medications prescribed for you.  Read the directions carefully, and ask your doctor or other care provider to review them with you.                   I spent over 35 minutes on this discharge encounter between counseling, coordination of care, and medication reconciliation.         To comply with [facility] jero HOGANII.4.1:   Discharge order placed in advance to facilitate patients discharge needs      Colby Wright MD, 6/19/2019, 9:03 AM

## 2019-06-19 NOTE — PROGRESS NOTES
Physical Therapy  Facility/Department: [address] PROGRESSIVE CARE  Daily Treatment Note  This note serves as D/C summary if patient is discharged prior to next treatment session. NAME: Janelle Anderson  : 1935  MRN: [id number]    Date of Service: 2019    Discharge Recommendations:  3-5 sessions per week, Patient would benefit from continued therapy after discharge        Assessment   Body structures, Functions, Activity limitations: Decreased functional mobility ; Decreased strength;Decreased endurance;Decreased balance;Decreased ADL status  Assessment: Pt agreeable to session, and demonstrated improved strength and activity tolerance this morning. She initially stood to stedy with CGA, then later in trial was able to stand to walker with CGA, and ambulate x 3 trials in room without LOB. She did require Min A to sit on commode (LE weakness), and intermittent cueing to avoid excess posterior lean when turning with walker support. She would benefit from continued therapy to maximize her activity tolerance, strength, and independence performing all mobility tasks. Continue to recommend low-moderate frequency therapy upon D/C. Janelle Anderson scored a 17/24 on the AM-PAC short mobility form. Current research shows that an AM-PAC score of 17 or less is typically not associated with a discharge to the patient's home setting. Based on the patients AM-PAC score and their current functional mobility deficits, it is recommended that the patient have 3-5 sessions per week of Physical Therapy at d/c to increase the patients independence. Treatment Diagnosis: Impaired functional mobility  Activity Tolerance  Activity Tolerance: Patient limited by fatigue;Patient limited by endurance; Patient Tolerated treatment well  Activity Tolerance: Requires intermittent rest periods throughout session. Patient Diagnosis(es): There were no encounter diagnoses.      has a past medical history of Anemia, Anticoagulant long-term use, Atrial fibrillation (HCC), CHF (congestive heart failure) ([facility] [address] CKD (chronic kidney disease) stage 3, GFR 30-59 ml/min ([facility]), Clostridium difficile diarrhea, Depression, Humerus fracture, Hyperlipidemia, Hypertension, Hypothyroidism, Mitral regurgitation, Optic neuritis, Osteopenia, Polymyalgia rheumatica ([address] [facility] [address] [facility] Pulmonary embolus ([address] Thyroid disease, and Vitamin D deficiency. has a past surgical history that includes Lung surgery; malignant skin lesion excision; Colonoscopy (09/14/2016); Upper gastrointestinal endoscopy (09/14/2016); Upper gastrointestinal endoscopy (N/A, 5/30/2019); Colonoscopy (N/A, 5/31/2019); Upper gastrointestinal endoscopy (N/A, 5/31/2019); Endoscopy, small bowel with ileum (5/31/2019); Enteroscopy (N/A, 6/3/2019); Upper gastrointestinal endoscopy (N/A, 6/16/2019); and Upper gastrointestinal endoscopy (N/A, 6/16/2019). Restrictions  Restrictions/Precautions  Restrictions/Precautions: Fall Risk  Position Activity Restriction  Other position/activity restrictions: .5 L. O2 per nc     Subjective   Subjective  Subjective: Pt awake, and agreeable to OOB activity. States she did get some rest last night. Pain Screening  Patient Currently in Pain: Denies  Vital Signs  Patient Currently in Pain: Denies       Objective      Bed mobility  Supine to Sit: Contact guard assistance(HOB slightly elevated; increased time to complete d/t weakness)     Transfers  Sit to Stand: Contact guard assistance(CGA to stedy; CGA to RW)  Stand to sit: Minimal Assistance;Contact guard assistance(CGA to chair; Min A to commode)  Bed to Chair: Contact guard assistance;Dependent/Total(Initially CGA to stand to stedy; total assist to transfer from bed to chair.  After this, pt trialed ambulation with walker (See below))    Ambulation  Ambulation?: Yes  Ambulation 1  Surface: level tile  Device: Rolling Walker  Assistance: Contact guard assistance  Quality of Gait: Pt demonstrated precautions in place, Call light within reach, Nurse notified, Gait belt, Chair alarm in place, Left in chair  Restraints  Initially in place: No     Therapy Time   Individual Concurrent Group Co-treatment   Time In 0807         Time Out 0832         Minutes 25         Timed Code Treatment Minutes: Refugio Tran PT    Electronically signed by Dano Hill, [place] [id number] on 6/19/2019 at 8:42 AM

## 2019-06-20 LAB
ANION GAP SERPL CALCULATED.3IONS-SCNC: 7 MMOL/L (ref 3–16)
BUN BLDV-MCNC: 11 MG/DL (ref 7–20)
CALCIUM SERPL-MCNC: 8.7 MG/DL (ref 8.3–10.6)
CHLORIDE BLD-SCNC: 104 MMOL/L (ref 99–110)
CO2: 29 MMOL/L (ref 21–32)
CREAT SERPL-MCNC: 0.9 MG/DL (ref 0.6–1.2)
EKG DIAGNOSIS: NORMAL
EKG Q-T INTERVAL: 392 MS
EKG QRS DURATION: 92 MS
EKG QTC CALCULATION (BAZETT): 492 MS
EKG R AXIS: 8 DEGREES
EKG T AXIS: 21 DEGREES
EKG VENTRICULAR RATE: 95 BPM
GFR AFRICAN AMERICAN: >60
GFR NON-AFRICAN AMERICAN: 60
GLUCOSE BLD-MCNC: 95 MG/DL (ref 70–99)
POTASSIUM SERPL-SCNC: 4.3 MMOL/L (ref 3.5–5.1)
SODIUM BLD-SCNC: 140 MMOL/L (ref 136–145)

## 2019-06-20 PROCEDURE — 36415 COLL VENOUS BLD VENIPUNCTURE: CPT

## 2019-06-20 PROCEDURE — C9113 INJ PANTOPRAZOLE SODIUM, VIA: HCPCS | Performed by: INTERNAL MEDICINE

## 2019-06-20 PROCEDURE — 2700000000 HC OXYGEN THERAPY PER DAY

## 2019-06-20 PROCEDURE — 2060000000 HC ICU INTERMEDIATE R&B

## 2019-06-20 PROCEDURE — 80048 BASIC METABOLIC PNL TOTAL CA: CPT

## 2019-06-20 PROCEDURE — 93010 ELECTROCARDIOGRAM REPORT: CPT | Performed by: INTERNAL MEDICINE

## 2019-06-20 PROCEDURE — 94640 AIRWAY INHALATION TREATMENT: CPT

## 2019-06-20 PROCEDURE — 6370000000 HC RX 637 (ALT 250 FOR IP): Performed by: INTERNAL MEDICINE

## 2019-06-20 PROCEDURE — 93005 ELECTROCARDIOGRAM TRACING: CPT | Performed by: INTERNAL MEDICINE

## 2019-06-20 PROCEDURE — 97530 THERAPEUTIC ACTIVITIES: CPT

## 2019-06-20 PROCEDURE — 99233 SBSQ HOSP IP/OBS HIGH 50: CPT | Performed by: INTERNAL MEDICINE

## 2019-06-20 PROCEDURE — 6360000002 HC RX W HCPCS: Performed by: INTERNAL MEDICINE

## 2019-06-20 PROCEDURE — 2580000003 HC RX 258: Performed by: INTERNAL MEDICINE

## 2019-06-20 PROCEDURE — 94760 N-INVAS EAR/PLS OXIMETRY 1: CPT

## 2019-06-20 PROCEDURE — 97535 SELF CARE MNGMENT TRAINING: CPT

## 2019-06-20 RX ADMIN — AMIODARONE HYDROCHLORIDE 200 MG: 200 TABLET ORAL at 09:32

## 2019-06-20 RX ADMIN — METOPROLOL TARTRATE 50 MG: 50 TABLET ORAL at 21:00

## 2019-06-20 RX ADMIN — ANORECTAL OINTMENT: 15.7; .44; 24; 20.6 OINTMENT TOPICAL at 21:00

## 2019-06-20 RX ADMIN — IPRATROPIUM BROMIDE 2 SPRAY: 42 SPRAY NASAL at 09:32

## 2019-06-20 RX ADMIN — CEFTRIAXONE 1 G: 1 INJECTION, POWDER, FOR SOLUTION INTRAMUSCULAR; INTRAVENOUS at 16:38

## 2019-06-20 RX ADMIN — PANTOPRAZOLE SODIUM 40 MG: 40 INJECTION, POWDER, FOR SOLUTION INTRAVENOUS at 09:32

## 2019-06-20 RX ADMIN — MOMETASONE FUROATE AND FORMOTEROL FUMARATE DIHYDRATE 2 PUFF: 200; 5 AEROSOL RESPIRATORY (INHALATION) at 22:44

## 2019-06-20 RX ADMIN — LEVOTHYROXINE SODIUM 75 MCG: 75 TABLET ORAL at 06:27

## 2019-06-20 RX ADMIN — MOMETASONE FUROATE AND FORMOTEROL FUMARATE DIHYDRATE 2 PUFF: 200; 5 AEROSOL RESPIRATORY (INHALATION) at 09:23

## 2019-06-20 RX ADMIN — IPRATROPIUM BROMIDE 2 SPRAY: 42 SPRAY NASAL at 12:57

## 2019-06-20 RX ADMIN — FLUCONAZOLE 100 MG: 100 TABLET ORAL at 09:32

## 2019-06-20 RX ADMIN — ANORECTAL OINTMENT: 15.7; .44; 24; 20.6 OINTMENT TOPICAL at 09:33

## 2019-06-20 RX ADMIN — CLOTRIMAZOLE AND BETAMETHASONE DIPROPIONATE: 10; .5 CREAM TOPICAL at 09:32

## 2019-06-20 RX ADMIN — PANTOPRAZOLE SODIUM 40 MG: 40 INJECTION, POWDER, FOR SOLUTION INTRAVENOUS at 21:01

## 2019-06-20 RX ADMIN — SENNOSIDES 8.6 MG: 8.6 TABLET, FILM COATED ORAL at 09:32

## 2019-06-20 RX ADMIN — IPRATROPIUM BROMIDE 2 SPRAY: 42 SPRAY NASAL at 16:38

## 2019-06-20 RX ADMIN — LATANOPROST 1 DROP: 50 SOLUTION OPHTHALMIC at 21:01

## 2019-06-20 RX ADMIN — CITALOPRAM HYDROBROMIDE 10 MG: 10 TABLET ORAL at 09:32

## 2019-06-20 RX ADMIN — CLOTRIMAZOLE AND BETAMETHASONE DIPROPIONATE: 10; .5 CREAM TOPICAL at 21:01

## 2019-06-20 RX ADMIN — SENNOSIDES 8.6 MG: 8.6 TABLET, FILM COATED ORAL at 21:00

## 2019-06-20 RX ADMIN — METOPROLOL TARTRATE 50 MG: 50 TABLET ORAL at 09:32

## 2019-06-20 ASSESSMENT — PAIN SCALES - GENERAL
PAINLEVEL_OUTOF10: 0
PAINLEVEL_OUTOF10: 0

## 2019-06-20 NOTE — PROGRESS NOTES
evidenced by Diet history of poor intake    Objective Information:  · Nutrition-Focused Physical Findings: BM 6/20. (Noted Senokot ordered 6/19). Swallowing improved per pt. Noted no edema. · Wound Type: (Noted blister to left upper back & left buttock)  · Current Nutrition Therapies:  · Oral Diet Orders: Clear Liquid   · Oral Diet intake: 51-75%, %(small meals)  · Oral Nutrition Supplement (ONS) Orders: None  · ONS intake: Unable to assess  · Tube Feeding (TF)  If TF required, recommend Jevity 1.5 with goal rate of 40 ml/hr (calculated over 20 hr to allow for routine nsg care). This provides 800 ml TV, 1200 kcal, 51 gm of protein, and 608 ml of free fluid. · Anthropometric Measures:  · Ht: 5' (152.4 cm)   · Current Body Wt: 106 lb (48.1 kg)  · Weight Change: wt trending down overall, however pt was CHF and was diuresed last admission    · Ideal Body Wt:  , % Ideal Body    · BMI Classification: BMI 18.5 - 24.9 Normal Weight    Nutrition Interventions:   Continue current diet, Modify current ONS(refer to MD for possible need for tube feeding)  Continued Inpatient Monitoring    Nutrition Evaluation:   · Evaluation: Progressing toward goals(very slowly)   · Goals:  Tolerate diet and consume greater than 50% of meals and supplements    · Monitoring: Nutrition Progression, Meal Intake, Supplement Intake, Diet Tolerance, Skin Integrity, Weight, Pertinent Labs, Chewing/Swallowing, Diarrhea, Constipation      Electronically signed by Garo Heller RD, LD on 6/20/19 at 2:24 PM    Contact Number: [phone]

## 2019-06-20 NOTE — PLAN OF CARE
Problem: Falls - Risk of:  Goal: Will remain free from falls  Description  Will remain free from falls  6/20/2019 1148 by Jennifer Crowley RN  Outcome: Ongoing     Problem: Risk for Impaired Skin Integrity  Goal: Tissue integrity - skin and mucous membranes  Description  Structural intactness and normal physiological function of skin and  mucous membranes. 6/20/2019 1148 by Jennifer Crowley RN  Outcome: Ongoing     Problem: Pain:  Goal: Pain level will decrease  Description  Pain level will decrease  6/20/2019 1148 by Jennifer Crowley RN  Outcome: Ongoing     Problem: OXYGENATION/RESPIRATORY FUNCTION  Goal: Patient will maintain patent airway  Outcome: Ongoing   Patient remains free of falls or new/worsening skin injury. Fall prevention and skin preservation measures in place. Patient denies pain or discomfort to this nurse. Sp02 remains WNL and patient has no c/o SOB.

## 2019-06-20 NOTE — PROGRESS NOTES
Calorie Count Note    Type and Reason for Visit: Calorie Count    Current diet and supplement order:  Diet:  DIET FULL LIQUID;  Dietary Nutrition Supplements: Clear Liquid Oral Supplement  Dietary Nutrition Supplements:      Comparative Standards (Estimated Nutrition Needs):   · Estimated Daily Total Kcal: 7473-4329 kcal (25-30 kcal/kg ABW)  · Estimated Daily Protein (g): 45-54 gm (1-1.2 gm/kg ABW)    Date Consumed PO Intake Kcal %   Kcal met PO Intake grams protein %  Protein met   Comments   6/17     No intakes recorded   6/18 ~400 kcal  35% ~10 gm  22% 2 meals recorded    6/19 367 33% 18 40% 1 meal recorded                           **Results will be posted as available. Intervention & Recommendations:   1. Continue Calorie Count  2. Per pt request, decrease Ensure Clear to one time per day adding 240 kcals & 8 gms protein per serving  3.   Refer to MD for TF recommendations to help ensure adequacy    Electronically signed by Marita Landry RD, LD on 6/20/2019 at 1:56 PM    Contact Number: [phone]

## 2019-06-20 NOTE — PLAN OF CARE
IMBALANCE  Goal: Fluid and electrolyte balance are achieved/maintained  6/19/2019 1725 by Eveline Landa RN  Outcome: Ongoing     Problem: ACTIVITY INTOLERANCE/IMPAIRED MOBILITY  Goal: Mobility/activity is maintained at optimum level for patient  6/19/2019 1725 by Eveline Landa RN  Outcome: Ongoing     Problem: Nutrition Deficit:  Goal: Ability to achieve adequate nutritional intake will improve  Description  Ability to achieve adequate nutritional intake will improve  6/19/2019 1725 by Eveline Landa RN  Outcome: Ongoing

## 2019-06-20 NOTE — PROGRESS NOTES
[facility] Internal Medicine Note      Chief Complaint:  I am weak    Subjective/Interval History:    Stable today. Swallowing doing better. Tired and weak. SOB at baseline. No new problems noted. Son at the bedside. Discussed health problems in detail, at length. Discussed the plan to transfer tomorrow. No chest pain. No cough or sputum. No nausea, vomiting, diarrhea. No abdominal pain. No dysuria. The remainder of the review of systems is negative. PMH, PSH, FH/SH reviewed and unchanged as documented in the EMR    Medication list reviewed    Objective:    /66   Pulse 85   Temp 97.6 °F (36.4 °C) (Oral)   Resp 22   Ht 5' (1.524 m)   Wt 105 lb 14.4 oz (48 kg)   SpO2 95%   BMI 20.68 kg/m²   Temp  Av.9 °F (36.6 °C)  Min: 97.6 °F (36.4 °C)  Max: 98.5 °F (36.9 °C)    CV-  Irreg Irreg, rate controlled. Telemetry reviewed. Pulm- unchanged.   Chest clear mostly, with only a few bibasilar crackles, respirations easy  Abd- BS+, soft, NTND  Ext- no ankle edema    The Following Labs Were Reviewed Today:      Recent Results (from the past 24 hour(s))   Basic Metabolic Panel    Collection Time: 19 10:08 AM   Result Value Ref Range    Sodium 139 136 - 145 mmol/L    Potassium 3.3 (L) 3.5 - 5.1 mmol/L    Chloride 102 99 - 110 mmol/L    CO2 26 21 - 32 mmol/L    Anion Gap 11 3 - 16    Glucose 135 (H) 70 - 99 mg/dL    BUN 13 7 - 20 mg/dL    CREATININE 0.9 0.6 - 1.2 mg/dL    GFR Non-African American 60 (A) >60    GFR African American >60 >60    Calcium 8.9 8.3 - 10.6 mg/dL   CBC Auto Differential    Collection Time: 19 10:08 AM   Result Value Ref Range    WBC 8.9 4.0 - 11.0 K/uL    RBC 3.32 (L) 4.00 - 5.20 M/uL    Hemoglobin 9.8 (L) 12.0 - 16.0 g/dL    Hematocrit 30.1 (L) 36.0 - 48.0 %    MCV 90.6 80.0 - 100.0 fL    MCH 29.5 26.0 - 34.0 pg    MCHC 32.5 31.0 - 36.0 g/dL    RDW 17.0 (H) 12.4 - 15.4 %    Platelets 263 972 - 478 K/uL    MPV 8.6 5.0 - 10.5 fL    Neutrophils % 76.0 % Lymphocytes % 10.2 %    Monocytes % 5.7 %    Eosinophils % 7.4 %    Basophils % 0.7 %    Neutrophils # 6.8 1.7 - 7.7 K/uL    Lymphocytes # 0.9 (L) 1.0 - 5.1 K/uL    Monocytes # 0.5 0.0 - 1.3 K/uL    Eosinophils # 0.7 (H) 0.0 - 0.6 K/uL    Basophils # 0.1 0.0 - 0.2 K/uL   Basic Metabolic Panel    Collection Time: 06/20/19  7:07 AM   Result Value Ref Range    Sodium 140 136 - 145 mmol/L    Potassium 4.3 3.5 - 5.1 mmol/L    Chloride 104 99 - 110 mmol/L    CO2 29 21 - 32 mmol/L    Anion Gap 7 3 - 16    Glucose 95 70 - 99 mg/dL    BUN 11 7 - 20 mg/dL    CREATININE 0.9 0.6 - 1.2 mg/dL    GFR Non-African American 60 (A) >60    GFR African American >60 >60    Calcium 8.7 8.3 - 10.6 mg/dL       ASSESSMENT/PLAN:      Principal Problem:    Candida esophagitis (HCC)- improving and doing better with the diflucan. Continue diflucan BID for 2 weeks and 8 weeks of BID PPI. Check EKG today given her meds (celexa/amio/diflucan)  Active Problems:    Paroxysmal atrial fibrillation ([address]- rate controlled, unable to anticoagulate moving forward due to GI bleeding. Chronic diastolic congestive heart failure ([address]- seemingly well compensated. Follow. Consider prn diuretics. Non-rheumatic mitral regurgitation- stable, but advanced. Follow. Essential hypertension, benign- BP is very low but well tolerated. Follow. Needs low BP due to MRKerwin     Protein calorie malnutrition ([address]- continue to follow      Disposition- plan [facility] tomorrow       Time > 35 minutes reviewing chart and patient data, examining and interviewing patient, and discussing with nursing staff, family, etc.     Emily Underwood MD, [address]  9:21 AM  6/20/2019

## 2019-06-20 NOTE — PROGRESS NOTES
Occupational Therapy  Attempt Note    Consuello Favor  [phone]  6/20/2019  800    OT tx attempted bedside. Pt politely declined, requesting therapy to return later. Will re-attempt later this date as therapy schedule permits. Please see last daily note for progress and d/c recommendations.       Electronically signed by Mariola House OTR/L 21  on 6/20/2019 at 8:04 AM

## 2019-06-20 NOTE — CARE COORDINATION
Discharge Planning:  HENS completed on this date for [facility] [address].   Electronically signed by Maribell Ennis on 6/20/2019 at 4:10 PM

## 2019-06-20 NOTE — CARE COORDINATION
Chart review indicates plan for dc tomorrow. IMM signed, copy given to pt, other copy placed in soft chart    Confirmed plan is [facility]. Message left for Heidy, [phone], advising of possible dc tomorrow    1322: Rec'd message from Heidy at [facility] stating they will be able to accept pt tomorrow. She is requesting transfer about 12-1 so they have time to get the room cleaned.       Will follow for dc order    NAHUN and HENS needed    Radha Melendrez RN  Case management  [phone]

## 2019-06-20 NOTE — PROGRESS NOTES
Occupational Therapy  Facility/Department: [address] PROGRESSIVE CARE  Daily Treatment Note  NAME: Javier Cooley  : 1935  MRN: [id number]    Date of Service: 2019    Assessment: Pt tolerated therapy session well this date. She performed sponge bath at [facility] and toileting. She did require up to [address] for LB dressing tasks. Pt was able to complete fxl mobility with use of RW and CGA. She does fatigue easily and requires seated rest breaks. She remains limited by decreased activity tolerance and generalized weakness. She will benefit from continued therapy while in the hospital in order to maximize function. Recommend continued therapy at d/c. Discharge Recommendations:  3-5 sessions per week, Patient would benefit from continued therapy after discharge     Javier Cooley scored a 17/24 on the AM-PAC ADL Inpatient form. Current research shows that an AM-PAC score of 17 or less is typically not associated with a discharge to the patient's home setting. Based on the patients AM-PAC score and their current ADL deficits, it is recommended that the patient have 3-5 sessions per week of Occupational Therapy at d/c to increase the patients independence. Assessment   Performance deficits / Impairments: Decreased functional mobility ; Decreased ADL status; Decreased strength;Decreased endurance;Decreased high-level IADLs;Decreased balance;Decreased safe awareness  Prognosis: Good  Patient Education: role of OT, POC, transfer training, ADL retraining  REQUIRES OT FOLLOW UP: Yes  Activity Tolerance  Activity Tolerance: Patient Tolerated treatment well  Safety Devices  Safety Devices in place: Yes  Type of devices: Call light within reach; Chair alarm in place; Left in chair;Gait belt         Patient Diagnosis(es): There were no encounter diagnoses.       has a past medical history of Anemia, Anticoagulant long-term use, Atrial fibrillation ([address] CHF (congestive heart failure) ([address] CKD (chronic kidney disease) stage 3, GFR 30-59 ml/min (HCC), Clostridium difficile diarrhea, Depression, Humerus fracture, Hyperlipidemia, Hypertension, Hypothyroidism, Mitral regurgitation, Optic neuritis, Osteopenia, Polymyalgia rheumatica ([address] Pulmonary embolus ([address] Thyroid disease, and Vitamin D deficiency. has a past surgical history that includes Lung surgery; malignant skin lesion excision; Colonoscopy (09/14/2016); Upper gastrointestinal endoscopy (09/14/2016); Upper gastrointestinal endoscopy (N/A, 5/30/2019); Colonoscopy (N/A, 5/31/2019); Upper gastrointestinal endoscopy (N/A, 5/31/2019); Endoscopy, small bowel with ileum (5/31/2019); Enteroscopy (N/A, 6/3/2019); Upper gastrointestinal endoscopy (N/A, 6/16/2019); and Upper gastrointestinal endoscopy (N/A, 6/16/2019). Restrictions  Restrictions/Precautions  Restrictions/Precautions: Fall Risk    Subjective   General  Chart Reviewed: Yes  Patient assessed for rehabilitation services?: Yes  Additional Pertinent Hx: 79 y/o female admit 5/28/19 with GI Bleed, Respiratory Failure. 5/30/19 S/P EGD with Argon Plasma Coag.  5/31/19 S/P EGD with Placement Video Capsule. 6/3/19  Push Enteroscopy - no lesions. 6/3/19 Nasogastric Tube Feed placed. PMH includes Mitral Regurg, A-Fib, CHF, CKD, PE, Lung Surg, Humerus Fx, Osteopenia, Polymyalgia Rheumatica. Response to previous treatment: Patient with no complaints from previous session  Family / Caregiver Present: No  Referring Practitioner: HARLEY SERRATO  Diagnosis: disuse myopathy  (GI bleeding, acute blood loss anemia, acute gypoxic repiratory failure, acute pulmonary edema  Subjective  Subjective: Pt met bedside for OT tx. Pt seated in bedside chair upon entering, agreeable to participate in therapy. Pt denies pain.       Orientation  Orientation  Overall Orientation Status: Within Functional Limits     Objective    ADL  Grooming: Setup;Contact guard assistance(standing to wash hands with CGA; seated to comb hair independently)  UE Bathing: Supervision;Setup(sponge bath at sink)  LE Bathing: Setup;Contact guard assistance(sponge bath in sitting and standing)  UE Dressing: (hospital IV gown)  LE Dressing: Minimal assistance(don socks and briefs)  Toileting: Contact guard assistance     Balance  Sitting Balance: Supervision  Standing Balance: Contact guard assistance    Standing Balance  Activity: ADLs sinkside  Comment: CGA    Functional Mobility  Functional - Mobility Device: Rolling Walker  Activity: To/from bathroom  Assist Level: Contact guard assistance  Functional Mobility Comments: Pt completed fxl mobility to/from bathroom with RW and CGA. Pt fairly steady. No LOB. She then completed ~60' mobility into hallway with RW.     Toilet Transfers  Toilet - Technique: Ambulating  Equipment Used: Grab bars(comfort height commode)  Toilet Transfer: Contact guard assistance  Toilet Transfers Comments: verbal cues for hand placement     Transfers  Sit to stand: Contact guard assistance  Stand to sit: Contact guard assistance  Transfer Comments: verbal cues for hand placement     Cognition  Overall Cognitive Status: [facility]        Plan   Plan  Times per week: 3-5x  Current Treatment Recommendations: Strengthening, Endurance Training, Patient/Caregiver Education & Training, Functional Mobility Training, Safety Education & Training, Equipment Evaluation, Education, & procurement, Self-Care / ADL    AM-PAC Score  AM-[facility] Inpatient Daily Activity Raw Score: 17 (06/20/19 1446)  AM-PAC Inpatient ADL T-Scale Score : 37.26 (06/20/19 1446)  ADL Inpatient CMS 0-100% Score: 50.11 (06/20/19 1446)  ADL Inpatient CMS G-Code Modifier : CK (06/20/19 1446)    Goals  Short term goals  Time Frame for Short term goals: prior to D/C: Goals ongoing 6/20  Short term goal 1: complete functional mobility and transfers with supervision and use of RW  Short term goal 2: complete toileting with supervision  Short term goal 3: complete LB dressing with supervision  Short term goal 4: tolerate B UE exercises x10 reps for increased strength and endurance with ADLs  Long term goals  Time Frame for Long term goals : STG=LTG  Patient Goals   Patient goals : to get better       Therapy Time   Individual Concurrent Group Co-treatment   Time In 8698         Time Out 1445         Minutes 50         Timed Code Treatment Minutes: 50 Minutes     This note to serve as d/c summary if pt d/c-ed prior to next therapy session.     Figueroa Ellis, OTR/L [id number]

## 2019-06-21 VITALS
HEART RATE: 91 BPM | RESPIRATION RATE: 18 BRPM | HEIGHT: 60 IN | DIASTOLIC BLOOD PRESSURE: 71 MMHG | TEMPERATURE: 97.6 F | OXYGEN SATURATION: 93 % | WEIGHT: 100.53 LBS | SYSTOLIC BLOOD PRESSURE: 129 MMHG | BODY MASS INDEX: 19.74 KG/M2

## 2019-06-21 PROCEDURE — 94640 AIRWAY INHALATION TREATMENT: CPT

## 2019-06-21 PROCEDURE — C9113 INJ PANTOPRAZOLE SODIUM, VIA: HCPCS | Performed by: INTERNAL MEDICINE

## 2019-06-21 PROCEDURE — 97110 THERAPEUTIC EXERCISES: CPT

## 2019-06-21 PROCEDURE — 6360000002 HC RX W HCPCS: Performed by: INTERNAL MEDICINE

## 2019-06-21 PROCEDURE — 6370000000 HC RX 637 (ALT 250 FOR IP): Performed by: INTERNAL MEDICINE

## 2019-06-21 PROCEDURE — 97535 SELF CARE MNGMENT TRAINING: CPT

## 2019-06-21 PROCEDURE — 97530 THERAPEUTIC ACTIVITIES: CPT

## 2019-06-21 PROCEDURE — 99239 HOSP IP/OBS DSCHRG MGMT >30: CPT | Performed by: INTERNAL MEDICINE

## 2019-06-21 RX ORDER — SENNA PLUS 8.6 MG/1
1 TABLET ORAL 2 TIMES DAILY
Qty: 60 TABLET | Refills: 0 | DISCHARGE
Start: 2019-06-21 | End: 2019-07-16

## 2019-06-21 RX ORDER — PANTOPRAZOLE SODIUM 40 MG/10ML
40 INJECTION, POWDER, LYOPHILIZED, FOR SOLUTION INTRAVENOUS 2 TIMES DAILY
DISCHARGE
Start: 2019-06-21 | End: 2019-06-21 | Stop reason: HOSPADM

## 2019-06-21 RX ORDER — PANTOPRAZOLE SODIUM 40 MG/1
40 TABLET, DELAYED RELEASE ORAL
Qty: 60 TABLET | Refills: 0 | DISCHARGE
Start: 2019-06-21 | End: 2019-07-16 | Stop reason: SDUPTHER

## 2019-06-21 RX ORDER — FLUCONAZOLE 100 MG/1
100 TABLET ORAL 2 TIMES DAILY
Qty: 20 TABLET | Refills: 0 | DISCHARGE
Start: 2019-06-21 | End: 2019-07-01

## 2019-06-21 RX ORDER — TORSEMIDE 20 MG/1
20 TABLET ORAL DAILY
Qty: 30 TABLET | Refills: 3 | DISCHARGE
Start: 2019-06-21 | End: 2019-07-16 | Stop reason: SDUPTHER

## 2019-06-21 RX ORDER — TORSEMIDE 20 MG/1
20 TABLET ORAL DAILY
Status: DISCONTINUED | OUTPATIENT
Start: 2019-06-21 | End: 2019-06-21 | Stop reason: HOSPADM

## 2019-06-21 RX ORDER — METOPROLOL TARTRATE 50 MG/1
50 TABLET, FILM COATED ORAL 2 TIMES DAILY
Qty: 60 TABLET | Refills: 3 | DISCHARGE
Start: 2019-06-21 | End: 2019-08-23 | Stop reason: DRUGHIGH

## 2019-06-21 RX ORDER — BISACODYL 10 MG
10 SUPPOSITORY, RECTAL RECTAL DAILY PRN
DISCHARGE
Start: 2019-06-21 | End: 2019-07-16

## 2019-06-21 RX ADMIN — SENNOSIDES 8.6 MG: 8.6 TABLET, FILM COATED ORAL at 09:32

## 2019-06-21 RX ADMIN — ANORECTAL OINTMENT: 15.7; .44; 24; 20.6 OINTMENT TOPICAL at 09:32

## 2019-06-21 RX ADMIN — IPRATROPIUM BROMIDE 2 SPRAY: 42 SPRAY NASAL at 09:43

## 2019-06-21 RX ADMIN — FLUCONAZOLE 100 MG: 100 TABLET ORAL at 09:32

## 2019-06-21 RX ADMIN — TORSEMIDE 20 MG: 20 TABLET ORAL at 09:32

## 2019-06-21 RX ADMIN — PANTOPRAZOLE SODIUM 40 MG: 40 INJECTION, POWDER, FOR SOLUTION INTRAVENOUS at 09:32

## 2019-06-21 RX ADMIN — LEVOTHYROXINE SODIUM 75 MCG: 75 TABLET ORAL at 07:21

## 2019-06-21 RX ADMIN — CLOTRIMAZOLE AND BETAMETHASONE DIPROPIONATE: 10; .5 CREAM TOPICAL at 09:33

## 2019-06-21 RX ADMIN — MOMETASONE FUROATE AND FORMOTEROL FUMARATE DIHYDRATE 2 PUFF: 200; 5 AEROSOL RESPIRATORY (INHALATION) at 09:25

## 2019-06-21 RX ADMIN — METOPROLOL TARTRATE 50 MG: 50 TABLET ORAL at 09:32

## 2019-06-21 ASSESSMENT — PAIN SCALES - WONG BAKER
WONGBAKER_NUMERICALRESPONSE: 0

## 2019-06-21 ASSESSMENT — PAIN SCALES - GENERAL
PAINLEVEL_OUTOF10: 0
PAINLEVEL_OUTOF10: 0

## 2019-06-21 NOTE — CARE COORDINATION
DC order noted. MD ordered something to help pt have a BM. Met w/pt to inquire on transportation since she is able to sit in a chair and not requiring medical supervision for transport via ambulance. Pt states her son will drive her.     Message left for Vannesa Gregory at [facility] to get report number    Roxann Guzman RN  Case management  [phone]

## 2019-06-21 NOTE — CARE COORDINATION
DISCHARGE SUMMARY     DATE OF DISCHARGE: 6/21/19    [facility]    Level of Care: Skilled    Report Number: [phone]    Fax Number:  [phone]    Precert Obtained: n/a    Hens Completed: yes    PASARR: N/A    Notified: RN, Pt, Facility    Prescriptions Faxed:N/A    HEMODIALYSIS: No    TRANSPORTATION: Private Car    NEW DME ORDERED: N/A    COMMENTS: Need to notify Darreld Congress at [phone] when dc time is solidified    Farzad Srivastava RN  Case management  [phone]

## 2019-06-21 NOTE — CARE COORDINATION
RN states report has been called and pt will be leaving shortly.  Spoke to Romi Madrigal at [facility]  Admissions who states she has gotten the orders    Bette Payton RN  Case management  [phone]

## 2019-06-21 NOTE — PROGRESS NOTES
Calorie Count Note    Type and Reason for Visit: Reassess    Current diet and supplement order:  Diet:  DIET FULL LIQUID;  Dietary Nutrition Supplements: Clear Liquid Oral Supplement  Dietary Nutrition Supplements:      Comparative Standards (Estimated Nutrition Needs):   · Estimated Daily Total Kcal: 3253-8429 kcal (25-30 kcal/kg ABW)  · Estimated Daily Protein (g): 45-54 gm (1-1.2 gm/kg ABW)    Date Consumed PO Intake Kcal %   Kcal met PO Intake grams protein %  Protein met   Comments   6/17     No intakes recorded   6/18 ~400 kcal  35% ~10 gm  22% 2 meals recorded    6/19 367 33% 18 40% 1 meal recorded   6/20 765 68% 18 40% 3 meals recorded                   **Results will be posted as available. Intervention & Recommendations:   1. Continue Calorie Count  2.   Per pt request, decrease Ensure Clear to one time per day adding 240 kcals & 8 gms protein per serving    Electronically signed by Rossy Toribio RD, SHANITA on 6/21/2019 at 2:00 PM    Contact Number: [phone]

## 2019-06-21 NOTE — PROGRESS NOTES
2+MULTI VIT PO     simvastatin 20 MG tablet  Commonly known as:  ZOCOR  TAKE 1 TABLET EVERY DAY            STOP taking these medications      amiodarone 200 MG tablet  Commonly known as:  CORDARONE     calcium carbonate 1250 (500 Ca) MG chewable tablet  Commonly known as:  OS-RC     furosemide 40 MG tablet  Commonly known as:  LASIX     ipratropium 0.06 % nasal spray  Commonly known as:  ATROVENT     metoprolol succinate 50 MG extended release tablet  Commonly known as:  TOPROL XL     Vitamin D (Cholecalciferol) 400 units Tabs     vitamin D 400 units Tabs tablet  Commonly known as:  CHOLECALCIFEROL     warfarin 5 MG tablet  Commonly known as:  COUMADIN               Where to Get Your Medications        Information about where to get these medications is not yet available    Ask your nurse or doctor about these medications  bisacodyl 10 MG suppository  fluconazole 100 MG tablet  menthol-zinc oxide 0.44-20.6 % Oint ointment  metoprolol tartrate 50 MG tablet  pantoprazole 40 MG tablet  senna 8.6 MG tablet  torsemide 20 MG tablet         Monty Quiles, PharmD Candidate [id number]  Heart Failure Discharge Medication Reconciliation Program  [phone]

## 2019-06-21 NOTE — PROGRESS NOTES
Pulmonary embolus ([facility] Thyroid disease, and Vitamin D deficiency. has a past surgical history that includes Lung surgery; malignant skin lesion excision; Colonoscopy (09/14/2016); Upper gastrointestinal endoscopy (09/14/2016); Upper gastrointestinal endoscopy (N/A, 5/30/2019); Colonoscopy (N/A, 5/31/2019); Upper gastrointestinal endoscopy (N/A, 5/31/2019); Endoscopy, small bowel with ileum (5/31/2019); Enteroscopy (N/A, 6/3/2019); Upper gastrointestinal endoscopy (N/A, 6/16/2019); and Upper gastrointestinal endoscopy (N/A, 6/16/2019). Restrictions  Restrictions/Precautions  Restrictions/Precautions: Fall Risk  Position Activity Restriction  Other position/activity restrictions: Room air     Subjective   General  Chart Reviewed: Yes  Additional Pertinent Hx: 81 y/o female admit 5/28/19 with GI Bleed, Respiratory Failure. 5/30/19 S/P EGD with Argon Plasma Coag.  5/31/19 S/P EGD with Placement Video Capsule. 6/3/19  Push Enteroscopy - no lesions. 6/3/19 Nasogastric Tube Feed placed. PMH includes Mitral Regurg, A-Fib, CHF, CKD, PE, Lung Surg, Humerus Fx, Osteopenia, Polymyalgia Rheumatica. Response To Previous Treatment: Patient with no complaints from previous session. Family / Caregiver Present: No  Referring Practitioner: Dr. Bernadine Moritz: Pt awake, and is excited to D/C today. She denies pain this date.   Pain Screening  Patient Currently in Pain: Denies  Vital Signs  Patient Currently in Pain: Denies       Orientation  Orientation  Overall Orientation Status: Within Functional Limits  Cognition      Objective      Bed mobility  Supine to Sit: Stand by assistance     Transfers  Sit to Stand: Contact guard assistance;Stand by assistance  Stand to sit: Contact guard assistance;Stand by assistance    Ambulation  Ambulation?: Yes  Ambulation 1  Surface: level tile  Device: Rolling Walker  Assistance: Contact guard assistance  Quality of Gait: Pt ambulated slowly with walker support, training, Home Exercise Program, Safety Education & Training, Patient/Caregiver Education & Training, Equipment Evaluation, Education, & procurement, Positioning  Plan Comment: D/C Friday, 6/21  Safety Devices  Type of devices:  All fall risk precautions in place, Call light within reach, Chair alarm in place, Gait belt, Left in chair  Restraints  Initially in place: No     Therapy Time   Individual Concurrent Group Co-treatment   Time In 0800         Time Out 0825         Minutes 25         Timed Code Treatment Minutes: Refugio Tran PT    Electronically signed by Sven Murphy, PT [id number] on 6/21/2019 at 8:32 AM

## 2019-06-21 NOTE — PROGRESS NOTES
Discharge order noted. Patient has remained chronic during this hospital stay thus no specific HF metrics are required. HF instructions have been added to Kate Rm. Facility MD will complete 7 day follow up. Pt also has cardiology appt on 7/1 at 10:45am with Dr Tammy Patel. Pharmacy notified of need for discharge med rec.

## 2019-06-21 NOTE — PLAN OF CARE
Nutrition Problem: Inadequate oral intake  Intervention: Food and/or Nutrient Delivery: Continue current diet, Modify current ONS(refer to MD for possible need for tube feeding)  Nutritional Goals:  Tolerate diet and consume greater than 50% of meals and supplements

## 2019-06-21 NOTE — PROGRESS NOTES
Occupational Therapy  [facility]  Daily Treatment Note and Tentative D/C    This note to serve as d/c summary should pt d/c prior to next session. NAME: Jonda Alpers  : 1935  MRN: [id number]    Date of Service: 2019    Discharge Recommendations: Jonda Alpers scored a 17/24 on the AM-PAC ADL Inpatient form. Current research shows that an AM-PAC score of 17 or less is typically not associated with a discharge to the patient's home setting. Based on the patients AM-PAC score and their current ADL deficits, it is recommended that the patient have 3-5 sessions per week of Occupational Therapy at d/c to increase the patients independence. 3-5 sessions per week, Patient would benefit from continued therapy after discharge  OT Equipment Recommendations  Other: defer to next level of care    Assessment   Performance deficits / Impairments: Decreased functional mobility ; Decreased ADL status; Decreased strength;Decreased endurance;Decreased high-level IADLs;Decreased balance;Decreased safe awareness  Assessment: Pt tolerated session well but continues to be limited due to decreased overall strength and endurance with mobility and ADLs. Pt completes functional mobility and transfers with CGA and use of RW. Pt completes toileting with CGA and grooming in stance at sink with CGA/SBA. Pt with one LOB this date with stepping back from sink needing Max A to correct. Continue with POC. Treatment Diagnosis: decreased ADl, IADL, balance, activity tolerance  Prognosis: Good  History: 80 y. o. female who has a past medical history of Anemia, Anticoagulant long-term use, Atrial fibrillation ([facility]), CHF (congestive heart failure) ([facility] [address] CKD (chronic kidney disease) stage 3, GFR 30-59 ml/min ([facility]), hx of Clostridium difficile diarrhea, Depression, Humerus fracture, Hyperlipidemia, Hypertension, Hypothyroidism, Mitral regurgitation, Optic neuritis, Osteopenia, Polymyalgia rheumatica ([address] Pulmonary embolus ([address] Thyroid disease, and Vitamin D deficiency. Patient came into our hospital with bloody stools.  Patient reported dark stools today and some fatigue and dizziness.  Patient denied abdominal pain.  Patient was lightheaded. Patient evaluated by GI, and workup was done. EGD and colonoscopy revealed no evidence of active bleeding source. GI bleeding stopped after patient taken off Coumadin. H&H has remained stable. 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 GI is now sound off. Patient has been in bed for the past week. Patient started therapies, but is very weak and not safe to return home. Patient needs more therapy before discharged to home safely. Patient lives at home alone in a condo. .(copied per note Dr Danny Bravo 6/7/19)  Exam: see above  Assistance / Modification: RW  Patient Education: role of OT, POC, transfer training, ADL retraining  REQUIRES OT FOLLOW UP: Yes  Activity Tolerance  Activity Tolerance: Patient limited by fatigue;Patient Tolerated treatment well  Activity Tolerance: Pt reports increased fatigue after amublation back from bathroom  Safety Devices  Safety Devices in place: Yes  Type of devices: Call light within reach; Chair alarm in place; Left in chair;Gait belt         Patient Diagnosis(es): There were no encounter diagnoses. has a past medical history of Anemia, Anticoagulant long-term use, Atrial fibrillation (H[facility]), CHF (congestive heart failure) ([address] CKD (chronic kidney disease) stage 3, GFR 30-59 ml/min (HCC), Clostridium difficile diarrhea, Depression, Humerus fracture, Hyperlipidemia, Hypertension, Hypothyroidism, Mitral regurgitation, Optic neuritis, Osteopenia, Polymyalgia rheumatica ([address] Pulmonary embolus ([address] Thyroid disease, and Vitamin D deficiency. has a past surgical history that includes Lung surgery; malignant skin lesion excision; Colonoscopy (09/14/2016); Upper gastrointestinal endoscopy (09/14/2016);  Upper gastrointestinal endoscopy (N/A, 5/30/2019); Colonoscopy (N/A, 5/31/2019); Upper gastrointestinal endoscopy (N/A, 5/31/2019); Endoscopy, small bowel with ileum (5/31/2019); Enteroscopy (N/A, 6/3/2019); Upper gastrointestinal endoscopy (N/A, 6/16/2019); and Upper gastrointestinal endoscopy (N/A, 6/16/2019). Restrictions  Restrictions/Precautions  Restrictions/Precautions: Fall Risk  Position Activity Restriction  Other position/activity restrictions: Room air  Subjective   General  Chart Reviewed: Yes  Patient assessed for rehabilitation services?: Yes  Additional Pertinent Hx: 79 y/o female admit 5/28/19 with GI Bleed, Respiratory Failure. 5/30/19 S/P EGD with Argon Plasma Coag.  5/31/19 S/P EGD with Placement Video Capsule. 6/3/19  Push Enteroscopy - no lesions. 6/3/19 Nasogastric Tube Feed placed. PMH includes Mitral Regurg, A-Fib, CHF, CKD, PE, Lung Surg, Humerus Fx, Osteopenia, Polymyalgia Rheumatica. Response to previous treatment: Patient with no complaints from previous session  Family / Caregiver Present: No  Referring Practitioner: HARLEY SERRATO  Diagnosis: disuse myopathy  (GI bleeding, acute blood loss anemia, acute gypoxic repiratory failure, acute pulmonary edema  Subjective  Subjective: Pt seated on toilet upon arrival. Agreeable to OT services at this time. Pt reports no pain prior to session. Pt does report overall fatigue. General Comment  Comments: okay for therapy per RN.        Orientation  Orientation  Overall Orientation Status: Within Functional Limits  Objective    ADL  Grooming: Stand by assistance;Contact guard assistance(to wash hands in stance at sink)  Toileting: Contact guard assistance        Balance  Sitting Balance: Supervision  Standing Balance: Contact guard assistance(RW)  Functional Mobility  Functional - Mobility Device: Rolling Walker  Activity: To/from bathroom  Assist Level: Contact guard assistance  Functional Mobility Comments: Pt with one LOB needing Max A to correct with stepping backwards from sink  Toilet Transfers  Toilet - Technique: Ambulating  Equipment Used: Grab bars  Toilet Transfer: Contact guard assistance  Toilet Transfers Comments: cues for hand placement  Bed mobility  Supine to Sit: Unable to assess  Sit to Supine: Unable to assess  Scooting: Unable to assess  Comment: on toilet upon arrival and up in chair after session  Transfers  Sit to stand: Contact guard assistance  Stand to sit: Contact guard assistance  Transfer Comments: to/from RW                       Cognition  Overall Cognitive Status: WFL                    Type of ROM/Therapeutic Exercise  Type of ROM/Therapeutic Exercise: AROM  Comment: seated; no weight; B UE  Exercises  Shoulder Flexion: x15  Shoulder Extension: x15  Elbow Flexion: x15  Elbow Extension: x15  Supination: x15  Pronation: x15  Grasp/Release: x15  Other: x15 chest press                    Plan   Plan  Times per week: 3-5x  Current Treatment Recommendations: Strengthening, Endurance Training, Patient/Caregiver Education & Training, Functional Mobility Training, Safety Education & Training, Equipment Evaluation, Education, & procurement, Self-Care / ADL    AM-[facility] Score        [facility] Inpatient Daily Activity Raw Score: 17 (06/21/19 1149)  -PAC Inpatient ADL T-Scale Score : 37.26 (06/21/19 1149)  ADL Inpatient CMS 0-100% Score: 50.11 (06/21/19 1149)  ADL Inpatient CMS G-Code Modifier : CK (06/21/19 1149)    Goals  Short term goals  Time Frame for Short term goals: prior to D/C: Goals ongoing 6/20  Short term goal 1: complete functional mobility and transfers with supervision and use of RW  Short term goal 2: complete toileting with supervision  Short term goal 3: complete LB dressing with supervision  Short term goal 4: tolerate B UE exercises x10 reps for increased strength and endurance with ADLs  Long term goals  Time Frame for Long term goals : STG=LTG  Patient Goals   Patient goals : to get better       Therapy Time   Individual Concurrent Group Co-treatment   Time In 6948         Time Out 1148         Minutes 24         Timed Code Treatment Minutes: 24 Minutes       Berna Plunkett, OTR/L

## 2019-06-21 NOTE — DISCHARGE SUMMARY
Discharge Summary Dictated    Dictation #  [id number]    Time > 30 minutes coordinating discharge,  reviewing chart and patient data, examining and interviewing patient, and discussing with nursing staff, case management/social work, family, etc.

## 2019-06-21 NOTE — DISCHARGE SUMMARY
[address]                               DISCHARGE SUMMARY    PATIENT NAME: Drew Sharp                 :        1935  MED REC NO:   [id number]                          ROOM:       9118  ACCOUNT NO:   [de-identified]                           ADMIT DATE: 2019  PROVIDER:     Beny Wang MD                  DISCHARGE DATE:      DISCHARGE DATE:  2019    REASON FOR ADMISSION:  Esophageal food impaction and Candida  esophagitis. HISTORY OF PRESENT ILLNESS:  This is an 80-year-old white female who was  urgently transferred from the acute inpatient rehab unit at [facility]  to the acute care floor due to an esophageal food bolus impaction. She  had an EGD and was found to have Candida esophagitis. She was started  on Diflucan and tube feeds initially. Her diet has been advanced to  full liquids and she is beginning to swallow more effectively at this  point. But she is being able to be discharged to a nursing facility  from here. The patient actually was admitted originally on 2019 through  2019 for an upper GI bleed. The patient was profoundly anemic and  had supratherapeutic INR. She was initially placed in the intensive  care unit and had an EGD and a subsequent colonoscopy and then a push  enteroscopy which did not reveal any obvious source of bleeding. But  given her profound anemia and bleeding, it was felt that she was no  longer safe for anticoagulation. After a period of time in acute care,  she was transferred to the inpatient rehab unit where she then  subsequently developed Candida esophagitis. HOSPITAL COURSE:  1. Dysphagia with Candida esophagitis. The patient had an EGD  performed by GI. They are recommending Diflucan 100 mg twice daily for  14 days and then subsequently remain on a proton pump inhibitor twice  daily for 8 weeks.   Followup should be with [address] and might need a  long-term proton pump inhibitor after that. The patient additionally  has marginal nutritional status at this point. She is tolerating full  liquids and some pills but her nutritional status should be monitored at  the nursing facility. 2.  Upper GI bleed. The patient had an upper GI bleed as mentioned  above in the history of present illness. She is no longer a candidate  for anticoagulation. Her hemoglobin is stable at the time of discharge,  9.8. This should be rechecked next week. 3.  Chronic atrial fibrillation. The patient is again not a candidate  for anticoagulation moving forward. She is on metoprolol for rate  control, but she also was on amiodarone for rate control as well. Her  amiodarone was discontinued on the day of discharge due to a prolonged  QTC on her EKG of 492. Dr. Gilles Gagnon felt this was being used primarily  for rate control and she was still on chronic atrial fibrillation, so it  could be safely held at this point. Consideration should be given as an  outpatient for restarting that if she continues to have problems with  rate control. But again, she is no longer a candidate for  anticoagulation. 4.  Pulmonary hypertension. The patient has longstanding pulmonary  hypertension and in 2011 was found to have no viable pulmonary perfusion  to her right lung. She is primary operating only with perfusion to her  left lung and this has been long term and stable. It is not clear the  exact etiology of this but she was anticoagulated all this time due to  her atrial fibrillation, and there was a consideration that maybe this  was chronic thromboembolic disease into her right lung, but she has been  slowly progressive with her chronic dyspnea over this time. 5.  Mitral valve insufficiency. The patient has profound mitral valve  insufficiency with an echocardiogram that was last done in 09/2017  showing severe mitral regurg.   She is not a candidate for

## 2019-06-21 NOTE — PROGRESS NOTES
[address] [facility] Internal Medicine Note      Chief Complaint:  I am weak    Subjective/Interval History:    The patient c/o weakness and fatigue. \" My breathing is ok\"  Swallowing improved, \"I swallowed a pill today\"  Denies pain. No PND or orthopnea. No other new problems today. No chest pain. No cough or sputum. No nausea, vomiting, diarrhea. No abdominal pain. No dysuria. The remainder of the review of systems is negative. PMH, PSH, FH/SH reviewed and unchanged as documented in the EMR    Medication list reviewed    Objective:    /71   Pulse 91   Temp 97.6 °F (36.4 °C) (Oral)   Resp 18   Ht 5' (1.524 m)   Wt 100 lb 8.5 oz (45.6 kg)   SpO2 96%   BMI 19.63 kg/m²   Temp  Av.8 °F (36.6 °C)  Min: 97.6 °F (36.4 °C)  Max: 98 °F (36.7 °C)    CV-  Irreg Irreg, rate controlled. Telemetry reviewed. Rate controlled. Pulm- unchanged. Chest clear mostly, with only a few bibasilar crackles, respirations easy  Abd- BS+, soft, NTND  Ext- no ankle edema    The Following Labs Were Reviewed Today:    Recent Results (from the past 24 hour(s))   EKG 12 Lead    Collection Time: 19 10:53 AM   Result Value Ref Range    Ventricular Rate 95 BPM    QRS Duration 92 ms    Q-T Interval 392 ms    QTc Calculation (Bazett) 492 ms    R Axis 8 degrees    T Axis 21 degrees    Diagnosis       Atrial fibrillationNonspecific ST and T wave abnormalityProlonged QTAbnormal ECGWhen compared with ECG of 2019 13:41,Left anterior fascicular block is no longer PresentCriteria for Septal infarct are no longer PresentConfirmed by DONAL NULL, Sergio Sifuentes ([id number]) on 2019 1:01:54 PM       ASSESSMENT/PLAN:      Principal Problem:    Candida esophagitis ([facility] [address]- improving and doing better with the diflucan. Continue diflucan BID for 10 more days and 8 weeks of BID PPI.   EKG showed QTc of 492, so citalopram held as well as Amio while on diflucan  Active Problems:    Paroxysmal atrial fibrillation ([address]- rate controlled, unable to anticoagulate moving forward due to GI bleeding. The Amio should be held due to QTc prolongation, and rate can be reevaluated moving forward. D/W Dr Ebonie Posada today    Chronic diastolic congestive heart failure ([facility] [address]- seemingly well compensated. Follow. Add Torsemide 20 mg daily at discharge and follow renal function. Non-rheumatic mitral regurgitation- stable, but advanced. Follow. Essential hypertension, benign- BP is very low but well tolerated. Follow. Needs low BP due to MR.     Protein calorie malnutrition ([facility]- continue to follow ,with dietician support      Disposition- plan [facility] today      Time > 30 minutes reviewing chart and patient data, examining and interviewing patient, and discussing with nursing staff, family, etc.     Alfonso Wu MD, FACP  8:12 AM  6/21/2019

## 2019-06-21 NOTE — PLAN OF CARE
Problem: Falls - Risk of:  Goal: Will remain free from falls  Description  Will remain free from falls  6/21/2019 1131 by Nikole Garza RN  Outcome: Ongoing  Note:   Fall risk assessment completed every shift. All precautions in place. Pt has call light within reach at all times. Room clear of clutter. Pt aware to call for assistance when getting up. Problem: Falls - Risk of:  Goal: Absence of physical injury  Description  Absence of physical injury  6/21/2019 1131 by Nikole Garza RN  Outcome: Ongoing  Note:   No signs of physical injury. Problem: Risk for Impaired Skin Integrity  Goal: Tissue integrity - skin and mucous membranes  Description  Structural intactness and normal physiological function of skin and  mucous membranes. 6/21/2019 1131 by Nikole Garza RN  Outcome: Ongoing  Note:   Skin assessment completed every shift. Pt assessed for incontinence, appropriate barrier cream applied prn. Pt encouraged to turn/rotate every 2 hours. Assistance provided if pt unable to do so themselves. Problem: Pain:  Goal: Pain level will decrease  Description  Pain level will decrease  Outcome: Ongoing  Note:   No complaints of pain     Problem: Pain:  Goal: Control of acute pain  Description  Control of acute pain  Outcome: Ongoing  Note:   Pain/discomfort being managed with PRN analgesics per MD orders. Pt able to express presence and absence of pain and rate pain appropriately using numerical scale. Problem: Pain:  Goal: Control of chronic pain  Description  Control of chronic pain  Outcome: Ongoing  Note:   No complaints of pain     Problem: Nutrition  Goal: Optimal nutrition therapy  Outcome: Ongoing  Note:   Appetite fair to poor. Pt is taking in ensure. Problem: OXYGENATION/RESPIRATORY FUNCTION  Goal: Patient will maintain patent airway  6/21/2019 1131 by Nikole Garza RN  Outcome: Ongoing  Note:   Airway is patent. Occasional cough noted. No sputum seen.      Problem: HEMODYNAMIC STATUS  Goal: Patient has stable vital signs and fluid balance  6/21/2019 1131 by Ronaldo Salas RN  Outcome: Ongoing  Note:   VSS. Labs being monitored. Problem: FLUID AND ELECTROLYTE IMBALANCE  Goal: Fluid and electrolyte balance are achieved/maintained  6/21/2019 1131 by Ronaldo Salas RN  Outcome: Ongoing  Note:   Labs being monitored. Problem: ACTIVITY INTOLERANCE/IMPAIRED MOBILITY  Goal: Mobility/activity is maintained at optimum level for patient  6/21/2019 1131 by Ronaldo Salas RN  Outcome: Ongoing  Note:   Pt is tolerating ambulating in the room and with PT and OT. Problem: Nutrition Deficit:  Goal: Ability to achieve adequate nutritional intake will improve  Description  Ability to achieve adequate nutritional intake will improve  6/21/2019 1131 by Ronaldo Salas RN  Outcome: Ongoing  Note:   Dietician involved.

## 2019-06-21 NOTE — PLAN OF CARE
Problem: Falls - Risk of:  Goal: Will remain free from falls  Description  Will remain free from falls  Outcome: Ongoing  Goal: Absence of physical injury  Description  Absence of physical injury  Outcome: Ongoing     Problem: Risk for Impaired Skin Integrity  Goal: Tissue integrity - skin and mucous membranes  Description  Structural intactness and normal physiological function of skin and  mucous membranes.   Outcome: Ongoing     Problem: OXYGENATION/RESPIRATORY FUNCTION  Goal: Patient will maintain patent airway  Outcome: Ongoing  Goal: Patient will achieve/maintain normal respiratory rate/effort  Description  Respiratory rate and effort will be within normal limits for the patient  Outcome: Ongoing     Problem: HEMODYNAMIC STATUS  Goal: Patient has stable vital signs and fluid balance  Outcome: Ongoing     Problem: FLUID AND ELECTROLYTE IMBALANCE  Goal: Fluid and electrolyte balance are achieved/maintained  Outcome: Ongoing     Problem: ACTIVITY INTOLERANCE/IMPAIRED MOBILITY  Goal: Mobility/activity is maintained at optimum level for patient  Outcome: Ongoing     Problem: Nutrition Deficit:  Goal: Ability to achieve adequate nutritional intake will improve  Description  Ability to achieve adequate nutritional intake will improve  Outcome: Ongoing

## 2019-06-26 NOTE — H&P
ENDOSCOPY    UPPER GASTROINTESTINAL ENDOSCOPY N/A 6/16/2019    EGD FOREIGN BODY REMOVAL performed by Eppie Cogan, MD at [facility]     Medications Prior to Admission:   No current facility-administered medications on file prior to encounter. Current Outpatient Medications on File Prior to Encounter   Medication Sig Dispense Refill    Fluticasone Furoate-Vilanterol (BREO ELLIPTA) 200-25 MCG/INH AEPB Inhale 1 puff into the lungs daily 1 each 1    levothyroxine (SYNTHROID) 75 MCG tablet Take 1 tablet by mouth Daily 90 tablet 3    simvastatin (ZOCOR) 20 MG tablet TAKE 1 TABLET EVERY DAY 90 tablet 3    memantine (NAMENDA) 10 MG tablet TAKE 1 TABLET TWICE DAILY 180 tablet 3    latanoprost (XALATAN) 0.005 % ophthalmic solution Place 1 drop into both eyes nightly.  Multiple Vitamins-Minerals (PRESERVISION AREDS 2+MULTI VIT PO) Take by mouth      albuterol sulfate HFA (PROAIR HFA) 108 (90 Base) MCG/ACT inhaler Inhale 2 puffs into the lungs every 4 hours as needed for Wheezing or Shortness of Breath 3 Inhaler 1     Allergies:  Ace inhibitors; Aricept [donepezil hydrochloride]; Benicar [olmesartan medoxomil]; Exelon [rivastigmine tartrate];  Pcn [penicillins]; Quinapril hcl; and Doxycycline    History of allergic reaction to anesthesia:  No    Social History:   Unremarkable    Family History:   Unremarkable    PHYSICAL EXAM:      /71   Pulse 91   Temp 97.6 °F (36.4 °C) (Oral)   Resp 18   Ht 5' (1.524 m)   Wt 100 lb 8.5 oz (45.6 kg)   SpO2 93%   BMI 19.63 kg/m²  I        Heart:  Normal apical impulse, regular rate and rhythm, normal S1 and S2, no S3 or S4, and no murmur noted    Lungs:  No increased work of breathing, good air exchange, clear to auscultation bilaterally, no crackles or wheezing    Abdomen:  No scars, normal bowel sounds, soft, non-distended, non-tender, no masses palpated, no hepatosplenomegally      ASA Grade:  ASA 3 - Patient with moderate systemic disease with functional limitations    Mallampati Class:  Class I: Soft palate, uvula, fauces, pillars visible  __________  Class II: XSoft palate, uvula, fauces visible  __________   Class III: Soft palate, base of uvula visible  __________  Class IV: Hard palate only visible   __________      ASSESSMENT AND PLAN:    1. Patient is a 80 y.o. female here for EGD with conscious sedation  2. Procedure options, risks and benefits reviewed with patient. Patient expresses understanding.       Reynaldo Hernández MD  [id number]

## 2019-07-01 ENCOUNTER — OFFICE VISIT (OUTPATIENT)
Dept: CARDIOLOGY CLINIC | Age: 84
End: 2019-07-01
Payer: MEDICARE

## 2019-07-01 VITALS
OXYGEN SATURATION: 94 % | HEART RATE: 51 BPM | WEIGHT: 101.12 LBS | BODY MASS INDEX: 19.85 KG/M2 | DIASTOLIC BLOOD PRESSURE: 64 MMHG | SYSTOLIC BLOOD PRESSURE: 110 MMHG | HEIGHT: 60 IN

## 2019-07-01 DIAGNOSIS — I27.29: ICD-10-CM

## 2019-07-01 DIAGNOSIS — I48.0 PAROXYSMAL ATRIAL FIBRILLATION (HCC): Primary | ICD-10-CM

## 2019-07-01 DIAGNOSIS — I34.0 NON-RHEUMATIC MITRAL REGURGITATION: ICD-10-CM

## 2019-07-01 PROCEDURE — G8399 PT W/DXA RESULTS DOCUMENT: HCPCS | Performed by: INTERNAL MEDICINE

## 2019-07-01 PROCEDURE — G8427 DOCREV CUR MEDS BY ELIG CLIN: HCPCS | Performed by: INTERNAL MEDICINE

## 2019-07-01 PROCEDURE — 1036F TOBACCO NON-USER: CPT | Performed by: INTERNAL MEDICINE

## 2019-07-01 PROCEDURE — 1090F PRES/ABSN URINE INCON ASSESS: CPT | Performed by: INTERNAL MEDICINE

## 2019-07-01 PROCEDURE — 1111F DSCHRG MED/CURRENT MED MERGE: CPT | Performed by: INTERNAL MEDICINE

## 2019-07-01 PROCEDURE — 1123F ACP DISCUSS/DSCN MKR DOCD: CPT | Performed by: INTERNAL MEDICINE

## 2019-07-01 PROCEDURE — G8420 CALC BMI NORM PARAMETERS: HCPCS | Performed by: INTERNAL MEDICINE

## 2019-07-01 PROCEDURE — 4040F PNEUMOC VAC/ADMIN/RCVD: CPT | Performed by: INTERNAL MEDICINE

## 2019-07-01 PROCEDURE — 99214 OFFICE O/P EST MOD 30 MIN: CPT | Performed by: INTERNAL MEDICINE

## 2019-07-01 PROCEDURE — 93000 ELECTROCARDIOGRAM COMPLETE: CPT | Performed by: INTERNAL MEDICINE

## 2019-07-01 RX ORDER — MAGNESIUM HYDROXIDE/ALUMINUM HYDROXICE/SIMETHICONE 120; 1200; 1200 MG/30ML; MG/30ML; MG/30ML
5 SUSPENSION ORAL EVERY 4 HOURS PRN
COMMUNITY
End: 2019-07-16

## 2019-07-01 RX ORDER — AMIODARONE HYDROCHLORIDE 200 MG/1
100 TABLET ORAL EVERY OTHER DAY
Qty: 30 TABLET | Refills: 4 | Status: SHIPPED | OUTPATIENT
Start: 2019-07-01 | End: 2019-09-26 | Stop reason: SDUPTHER

## 2019-07-01 RX ORDER — SODIUM PHOSPHATE, DIBASIC AND SODIUM PHOSPHATE, MONOBASIC 7; 19 G/133ML; G/133ML
1 ENEMA RECTAL
COMMUNITY
End: 2019-07-16

## 2019-07-01 RX ORDER — DIMENHYDRINATE 50 MG/1
25 TABLET ORAL NIGHTLY PRN
COMMUNITY
End: 2020-01-01 | Stop reason: ALTCHOICE

## 2019-07-01 RX ORDER — ACETAMINOPHEN 325 MG/1
650 TABLET ORAL EVERY 4 HOURS PRN
COMMUNITY
End: 2019-07-16

## 2019-07-01 RX ORDER — GUAIFENESIN 100 MG/5ML
200 SYRUP ORAL 3 TIMES DAILY PRN
COMMUNITY
End: 2019-07-16

## 2019-07-01 RX ORDER — LOPERAMIDE HYDROCHLORIDE 2 MG/1
2 CAPSULE ORAL 4 TIMES DAILY PRN
COMMUNITY
End: 2019-07-16

## 2019-07-01 NOTE — PROGRESS NOTES
end-diastolic pressure on repeat  of 15 to 20.  7.  Evidence of pulmonary hypertension, possibly pulmonary arterial  hypertension, arteriovenous malformation and prior pneumonectomy. Instantaneous pulmonary artery pressure was 68/19 for a mean pulmonary  arterial pressure of 37 mmHg. 8.  Oxygen step-up in the right pulmonary artery to 90%, pulmonary  artery main was 62% with a right atrial pressure of 52%. Inferior  vena cava was 57%. 9.  Pulmonary angiogram showing minimal pulmonary arterial flow to the  right lung with flow in the right upper lobe but no flow into  the right lower middle lobe. In fact, there appears to be a possible  arteriovenous malformation with backflow of blood towards the pigtail  catheter into the right pulmonary artery and an oxygen saturation of  90% in the right lower lobe. Imaging:     Echo 7/10/14  Normal left ventricle size, wall thickness and systolic function with an  estimated ejection fraction of 55%. No regional wall motion abnormalities are seen. Moderate mitral annular calcification is present. Moderate-to-severe   Mitral regurgitation with eccentric jet directed superiorly and medially. The left atrium is severely dilated. Right ventricular systolic function is mildly reduced . There is moderate tricuspid regurgitation with RVSP estimated at 57 mmHg. This is suggestive of moderate pulmonary hypertension. Patient is in Atrial fibrillation during the study    Echo 7/15/15  Normal LV size and systolic function. Estimated ejection fraction is 60%. Moderate-to-severe mitral regurgitation with eccentric jet directed  superiorly and medially. Biatrial enlargement. There is moderate tricuspid regurgitation  RVSP estimated at 70 mmHg. This suggests severe pulmonary hypertension    Echo 04/2016  Left ventricle size is normal. Normal left ventricular wall thickness.   Ejection fraction is visually estimated to be 60-65 %. LV end diastolic   diameter is 3.9 cm.  No regurgitation. There is no stenosis.   Moderate tricuspid regurgitation.   No shunt at the atrial level by agitated saline contrast study.   No thrombus visualized in the left atrium, left atrial appendage or left   ventricle. Assessment:  1. Atrial fibrillation, paroxysmal  2. Severe mitral regurgitation  3. Pulmonary Venous Hypertension    Plan:  She presents in a regular rhythm today by EKG despite being off amiodarone therapy. This is interesting. She has completed treatment with fluconazole and I will therefore have her resume amiodarone. Due to her bradycardia today in office, I will have her begin the reduced dose of 100 mg every other day. She is not currently anticoagulated due to her history of GI bleeding. I don't know that she is really a good candidate for a Watchman device given the anesthesia, etc.     I will see her in office for follow up in 3 months. This note was scribed in the presence of Ion Fournier MD by [employer], RN. Physician Attestation:  The scribes documentation has been prepared under my direction and personally reviewed by me in its entirety. I, Dr. George Martin personally performed the services described in this documentation as scribed by my RN,  [employer] in my presence, and I confirm that the note above accurately reflects all work, treatment, procedures, and medical decision making performed by me.

## 2019-07-05 NOTE — TELEPHONE ENCOUNTER
Marie w/ Nena Vee at [facility] patient pulse has been running between 47-51 and B/P readings are as follows:  117/51, 112/48, 110/43, 110/52  7/3/19 = 94/41  Torsemide has been decreased to 10mg and Metoprolol has been decreased to 50mg  Please advise.     Last OV note 7/1/19: Atrial fibrillation, paroxysmal  Severe mitral regurgitation, Pulmonary Venous Hypertension

## 2019-07-12 ENCOUNTER — HOSPITAL ENCOUNTER (OUTPATIENT)
Dept: GENERAL RADIOLOGY | Age: 84
Discharge: HOME | End: 2019-07-12
Payer: MEDICARE

## 2019-07-12 DIAGNOSIS — K22.0 ACHALASIA OF ESOPHAGUS: ICD-10-CM

## 2019-07-12 PROCEDURE — 74220 X-RAY XM ESOPHAGUS 1CNTRST: CPT

## 2019-07-24 ENCOUNTER — APPOINTMENT (OUTPATIENT)
Dept: GENERAL RADIOLOGY | Age: 84
End: 2019-07-24
Payer: MEDICARE

## 2019-07-24 ENCOUNTER — HOSPITAL ENCOUNTER (EMERGENCY)
Age: 84
Discharge: HOME | End: 2019-07-24
Attending: EMERGENCY MEDICINE
Payer: MEDICARE

## 2019-07-24 VITALS
DIASTOLIC BLOOD PRESSURE: 49 MMHG | HEART RATE: 53 BPM | TEMPERATURE: 97.5 F | RESPIRATION RATE: 18 BRPM | SYSTOLIC BLOOD PRESSURE: 140 MMHG | OXYGEN SATURATION: 100 %

## 2019-07-24 DIAGNOSIS — R00.1 BRADYCARDIA: Primary | ICD-10-CM

## 2019-07-24 DIAGNOSIS — R53.83 FATIGUE, UNSPECIFIED TYPE: ICD-10-CM

## 2019-07-24 LAB
ANION GAP SERPL CALCULATED.3IONS-SCNC: 11 MMOL/L (ref 3–16)
BASOPHILS ABSOLUTE: 0.1 K/UL (ref 0–0.2)
BASOPHILS RELATIVE PERCENT: 1.4 %
BILIRUBIN URINE: NEGATIVE
BLOOD, URINE: NEGATIVE
BUN BLDV-MCNC: 21 MG/DL (ref 7–20)
CALCIUM SERPL-MCNC: 9.6 MG/DL (ref 8.3–10.6)
CHLORIDE BLD-SCNC: 99 MMOL/L (ref 99–110)
CLARITY: CLEAR
CO2: 32 MMOL/L (ref 21–32)
COLOR: YELLOW
CREAT SERPL-MCNC: 1.2 MG/DL (ref 0.6–1.2)
EOSINOPHILS ABSOLUTE: 0.8 K/UL (ref 0–0.6)
EOSINOPHILS RELATIVE PERCENT: 11 %
GFR AFRICAN AMERICAN: 52
GFR NON-AFRICAN AMERICAN: 43
GLUCOSE BLD-MCNC: 105 MG/DL (ref 70–99)
GLUCOSE URINE: NEGATIVE MG/DL
HCT VFR BLD CALC: 31.1 % (ref 36–48)
HEMOGLOBIN: 10.3 G/DL (ref 12–16)
KETONES, URINE: NEGATIVE MG/DL
LEUKOCYTE ESTERASE, URINE: NEGATIVE
LYMPHOCYTES ABSOLUTE: 1.1 K/UL (ref 1–5.1)
LYMPHOCYTES RELATIVE PERCENT: 14.5 %
MAGNESIUM: 2 MG/DL (ref 1.8–2.4)
MCH RBC QN AUTO: 28.9 PG (ref 26–34)
MCHC RBC AUTO-ENTMCNC: 33 G/DL (ref 31–36)
MCV RBC AUTO: 87.5 FL (ref 80–100)
MICROSCOPIC EXAMINATION: NORMAL
MONOCYTES ABSOLUTE: 0.5 K/UL (ref 0–1.3)
MONOCYTES RELATIVE PERCENT: 7 %
NEUTROPHILS ABSOLUTE: 4.8 K/UL (ref 1.7–7.7)
NEUTROPHILS RELATIVE PERCENT: 66.1 %
NITRITE, URINE: NEGATIVE
PDW BLD-RTO: 16.7 % (ref 12.4–15.4)
PH UA: 5.5 (ref 5–8)
PLATELET # BLD: 207 K/UL (ref 135–450)
PMV BLD AUTO: 9.2 FL (ref 5–10.5)
POTASSIUM REFLEX MAGNESIUM: 3.5 MMOL/L (ref 3.5–5.1)
PRO-BNP: 2523 PG/ML (ref 0–449)
PROTEIN UA: NEGATIVE MG/DL
RBC # BLD: 3.56 M/UL (ref 4–5.2)
SODIUM BLD-SCNC: 142 MMOL/L (ref 136–145)
SPECIFIC GRAVITY UA: 1.01 (ref 1–1.03)
TROPONIN: <0.01 NG/ML
URINE REFLEX TO CULTURE: NORMAL
URINE TYPE: NORMAL
UROBILINOGEN, URINE: 0.2 E.U./DL
WBC # BLD: 7.3 K/UL (ref 4–11)

## 2019-07-24 PROCEDURE — 83735 ASSAY OF MAGNESIUM: CPT

## 2019-07-24 PROCEDURE — 80048 BASIC METABOLIC PNL TOTAL CA: CPT

## 2019-07-24 PROCEDURE — 93005 ELECTROCARDIOGRAM TRACING: CPT | Performed by: EMERGENCY MEDICINE

## 2019-07-24 PROCEDURE — 36415 COLL VENOUS BLD VENIPUNCTURE: CPT

## 2019-07-24 PROCEDURE — 83880 ASSAY OF NATRIURETIC PEPTIDE: CPT

## 2019-07-24 PROCEDURE — 81003 URINALYSIS AUTO W/O SCOPE: CPT

## 2019-07-24 PROCEDURE — 84484 ASSAY OF TROPONIN QUANT: CPT

## 2019-07-24 PROCEDURE — 2580000003 HC RX 258: Performed by: EMERGENCY MEDICINE

## 2019-07-24 PROCEDURE — 85025 COMPLETE CBC W/AUTO DIFF WBC: CPT

## 2019-07-24 PROCEDURE — 99284 EMERGENCY DEPT VISIT MOD MDM: CPT

## 2019-07-24 PROCEDURE — 71046 X-RAY EXAM CHEST 2 VIEWS: CPT

## 2019-07-24 RX ORDER — 0.9 % SODIUM CHLORIDE 0.9 %
1000 INTRAVENOUS SOLUTION INTRAVENOUS ONCE
Status: COMPLETED | OUTPATIENT
Start: 2019-07-24 | End: 2019-07-24

## 2019-07-24 RX ADMIN — SODIUM CHLORIDE 1000 ML: 9 INJECTION, SOLUTION INTRAVENOUS at 16:12

## 2019-07-24 ASSESSMENT — ENCOUNTER SYMPTOMS
EYE REDNESS: 0
SHORTNESS OF BREATH: 0
SORE THROAT: 0
RHINORRHEA: 0
BACK PAIN: 0
ABDOMINAL PAIN: 0

## 2019-07-24 NOTE — ED NOTES
Bed: [facility]  Expected date:   Expected time:   Means of arrival:   Comments:  #1     Swetha Nieto RN  07/24/19 0860

## 2019-07-24 NOTE — ED PROVIDER NOTES
[facility]  eMERGENCY dEPARTMENT eNCOUnter      Pt Name: Fiordaliza Benítez  MRN: [id number]  [place] 1935  Date of evaluation: 7/24/2019  Provider: Leny Mack MD    CHIEF COMPLAINT       Chief Complaint   Patient presents with    Bradycardia     Pt sent by PCP because of low HR and BP, no light headedness, pulse at 50 upon arrival, placed on 2L O2         HISTORY OF PRESENT ILLNESS   (Location/Symptom, Timing/Onset,Context/Setting, Quality, Duration, Modifying Factors, Severity)  Note limiting factors. Fiordaliza Benítez is a 80 y.o. female who presents to the emergency department after concern from the patient's home health care team and that she had a low pulse in the low blood pressure today. The patient states that she recent was admitted to the hospital and had to stay long time because she was on blood thinners and had some internal bleeding. She states she was in the hospital long time and when she went home she had to go into rehab. She states other than feeling fatigued since she left the hospital she really has not had any symptoms. Specifically she denies any history of fall. She denies any chest pain or shortness of breath. She has any belly pain or back pain. No new cough or shortness of breath. No UTI symptoms etc.  She states she feels fine other than feeling little bit tired. HPI    NursingNotes were reviewed. REVIEW OF SYSTEMS    (2-9 systems for level 4, 10 or more for level 5)     Review of Systems   Constitutional: Positive for fatigue. Negative for fever. HENT: Negative for rhinorrhea and sore throat. Eyes: Negative for redness. Respiratory: Negative for shortness of breath. Cardiovascular: Negative for chest pain and leg swelling. Gastrointestinal: Negative for abdominal pain. Genitourinary: Negative for dysuria, flank pain and frequency. Musculoskeletal: Negative for back pain.    Skin: Negative for rash.   Neurological: Negative for headaches. Hematological: Negative for adenopathy. Psychiatric/Behavioral: Negative for confusion. Except as noted above the remainder of the review of systems was reviewed and negative.        PAST MEDICAL HISTORY     Past Medical History:   Diagnosis Date    Anemia     Anticoagulant long-term use     Atrial fibrillation ([address] 6/2011    Katie San CH[facility]congestive [facility] failure) [facility])     CKD (chronic kidney disease) stage 3, GFR 30-59 ml/min ([facility]) 2/11/2019    Clostridium difficile diarrhea 09/14/2016    Depression     Humerus fracture     Hyperlipidemia     Hypertension     Hypothyroidism     Mitral regurgitation     Optic neuritis     Osteopenia     Fosamax stopped 2/2014, had been treated at least since 2004    [facility]     Pulmonary embolus ([facility] 6/2011    Right pulmonary obstruction suspected to be possible chronic pulmonary embolus    Thyroid disease     Vitamin D deficiency          SURGICALHISTORY       Past Surgical History:   Procedure Laterality Date    COLONOSCOPY  09/14/2016    Ilya    COLONOSCOPY N/A 5/31/2019    COLONOSCOPY performed by Jesus Yepez MD at [place] [address]  5/31/2019    BOWEL SMALL CAPSULE ENDOSCOPY DEPLOYED VIA EGD performed by Jesus Yepez MD at [address] ENTEROSCOPY N/A 6/3/2019    ENTEROSCOPY PUSH DIAGNOSTIC performed by Juan Webb DO at [address] [place] [address]      UPPER GASTROINTESTINAL ENDOSCOPY  09/14/2016    Ilya    UPPER GASTROINTESTINAL ENDOSCOPY N/A 5/30/2019    EGD POLYP ABLATION/OTHER performed by Jesus Yepez MD at [address] [place] N/A 5/31/2019    ESOPHAGOGASTRODUODENOSCOPY WITH CAPSULE PLACEMENT performed by Jesus Yepez MD at [address] [place] N/A 6/16/2019    EGD BIOPSY performed by Britta Holter, MD at [address] [place] [address]

## 2019-07-25 LAB
EKG ATRIAL RATE: 49 BPM
EKG DIAGNOSIS: NORMAL
EKG P AXIS: 53 DEGREES
EKG P-R INTERVAL: 218 MS
EKG Q-T INTERVAL: 500 MS
EKG QRS DURATION: 78 MS
EKG QTC CALCULATION (BAZETT): 451 MS
EKG R AXIS: 23 DEGREES
EKG T AXIS: 31 DEGREES
EKG VENTRICULAR RATE: 49 BPM

## 2019-07-25 PROCEDURE — 93010 ELECTROCARDIOGRAM REPORT: CPT | Performed by: INTERNAL MEDICINE

## 2019-09-06 ENCOUNTER — CARE COORDINATION (OUTPATIENT)
Dept: CARE COORDINATION | Age: 84
End: 2019-09-06

## 2019-09-09 ENCOUNTER — HOSPITAL ENCOUNTER (OUTPATIENT)
Dept: CT IMAGING | Age: 84
Discharge: HOME | End: 2019-09-09
Payer: MEDICARE

## 2019-09-09 DIAGNOSIS — R91.1 PULMONARY NODULE: ICD-10-CM

## 2019-09-09 PROCEDURE — 71250 CT THORAX DX C-: CPT

## 2019-09-12 ENCOUNTER — CARE COORDINATION (OUTPATIENT)
Dept: CARE COORDINATION | Age: 84
End: 2019-09-12

## 2019-09-13 ENCOUNTER — CARE COORDINATION (OUTPATIENT)
Dept: CARE COORDINATION | Age: 84
End: 2019-09-13

## 2019-09-13 NOTE — CARE COORDINATION
Met with Dr Shahzad Singletary prior to pt's OV regarding pt's weight. He states pt doesn't have diet restrictions.      Winter HAWK

## 2019-09-14 ENCOUNTER — HOSPITAL ENCOUNTER (OUTPATIENT)
Age: 84
Discharge: HOME | End: 2019-09-14
Payer: MEDICARE

## 2019-09-14 PROCEDURE — 87015 SPECIMEN INFECT AGNT CONCNTJ: CPT

## 2019-09-14 PROCEDURE — 87116 MYCOBACTERIA CULTURE: CPT

## 2019-09-14 PROCEDURE — 87070 CULTURE OTHR SPECIMN AEROBIC: CPT

## 2019-09-14 PROCEDURE — 87205 SMEAR GRAM STAIN: CPT

## 2019-09-14 PROCEDURE — 87206 SMEAR FLUORESCENT/ACID STAI: CPT

## 2019-09-16 LAB
CULTURE, RESPIRATORY: NORMAL
GRAM STAIN RESULT: NORMAL

## 2019-09-25 ENCOUNTER — OFFICE VISIT (OUTPATIENT)
Dept: PULMONOLOGY | Age: 84
End: 2019-09-25
Payer: MEDICARE

## 2019-09-25 ENCOUNTER — TELEPHONE (OUTPATIENT)
Dept: PULMONOLOGY | Age: 84
End: 2019-09-25

## 2019-09-25 VITALS
RESPIRATION RATE: 17 BRPM | HEIGHT: 61 IN | SYSTOLIC BLOOD PRESSURE: 132 MMHG | HEART RATE: 75 BPM | OXYGEN SATURATION: 95 % | DIASTOLIC BLOOD PRESSURE: 60 MMHG | WEIGHT: 100.6 LBS | BODY MASS INDEX: 18.99 KG/M2

## 2019-09-25 DIAGNOSIS — J98.4 RESTRICTIVE LUNG DISEASE: ICD-10-CM

## 2019-09-25 DIAGNOSIS — R93.89 ABNORMAL CT OF THE CHEST: Primary | ICD-10-CM

## 2019-09-25 DIAGNOSIS — I27.20 PULMONARY HYPERTENSION (HCC): ICD-10-CM

## 2019-09-25 DIAGNOSIS — J98.51 FIBROSING MEDIASTINITIS: ICD-10-CM

## 2019-09-25 DIAGNOSIS — D72.10 EOSINOPHILIA: ICD-10-CM

## 2019-09-25 DIAGNOSIS — J41.1 MUCOPURULENT CHRONIC BRONCHITIS (HCC): ICD-10-CM

## 2019-09-25 DIAGNOSIS — J41.1 MUCOPURULENT CHRONIC BRONCHITIS (HCC): Primary | ICD-10-CM

## 2019-09-25 PROCEDURE — 3023F SPIROM DOC REV: CPT | Performed by: INTERNAL MEDICINE

## 2019-09-25 PROCEDURE — G8926 SPIRO NO PERF OR DOC: HCPCS | Performed by: INTERNAL MEDICINE

## 2019-09-25 PROCEDURE — 1036F TOBACCO NON-USER: CPT | Performed by: INTERNAL MEDICINE

## 2019-09-25 PROCEDURE — 99214 OFFICE O/P EST MOD 30 MIN: CPT | Performed by: INTERNAL MEDICINE

## 2019-09-25 PROCEDURE — G8399 PT W/DXA RESULTS DOCUMENT: HCPCS | Performed by: INTERNAL MEDICINE

## 2019-09-25 PROCEDURE — 4040F PNEUMOC VAC/ADMIN/RCVD: CPT | Performed by: INTERNAL MEDICINE

## 2019-09-25 PROCEDURE — G8420 CALC BMI NORM PARAMETERS: HCPCS | Performed by: INTERNAL MEDICINE

## 2019-09-25 PROCEDURE — 1123F ACP DISCUSS/DSCN MKR DOCD: CPT | Performed by: INTERNAL MEDICINE

## 2019-09-25 PROCEDURE — G8427 DOCREV CUR MEDS BY ELIG CLIN: HCPCS | Performed by: INTERNAL MEDICINE

## 2019-09-25 PROCEDURE — 1090F PRES/ABSN URINE INCON ASSESS: CPT | Performed by: INTERNAL MEDICINE

## 2019-09-25 RX ORDER — NEBULIZER ACCESSORIES
1 KIT MISCELLANEOUS DAILY PRN
Qty: 1 KIT | Refills: 0 | Status: SHIPPED | OUTPATIENT
Start: 2019-09-25

## 2019-09-25 RX ORDER — SODIUM CHLORIDE FOR INHALATION 3 %
4 VIAL, NEBULIZER (ML) INHALATION 2 TIMES DAILY
Qty: 240 ML | Refills: 3 | Status: SHIPPED | OUTPATIENT
Start: 2019-09-25 | End: 2020-01-01

## 2019-09-26 RX ORDER — AMIODARONE HYDROCHLORIDE 200 MG/1
100 TABLET ORAL EVERY OTHER DAY
Qty: 30 TABLET | Refills: 4 | Status: SHIPPED | OUTPATIENT
Start: 2019-09-26 | End: 2019-10-03 | Stop reason: SDUPTHER

## 2019-09-30 ENCOUNTER — TELEPHONE (OUTPATIENT)
Dept: PULMONOLOGY | Age: 84
End: 2019-09-30

## 2019-10-01 ENCOUNTER — CARE COORDINATION (OUTPATIENT)
Dept: CARE COORDINATION | Age: 84
End: 2019-10-01

## 2019-10-03 ENCOUNTER — OFFICE VISIT (OUTPATIENT)
Dept: CARDIOLOGY CLINIC | Age: 84
End: 2019-10-03
Payer: MEDICARE

## 2019-10-03 ENCOUNTER — CARE COORDINATION (OUTPATIENT)
Dept: CARE COORDINATION | Age: 84
End: 2019-10-03

## 2019-10-03 VITALS
HEIGHT: 61 IN | BODY MASS INDEX: 18.88 KG/M2 | HEART RATE: 58 BPM | DIASTOLIC BLOOD PRESSURE: 60 MMHG | SYSTOLIC BLOOD PRESSURE: 130 MMHG | WEIGHT: 100 LBS | OXYGEN SATURATION: 95 %

## 2019-10-03 DIAGNOSIS — I34.0 SEVERE MITRAL REGURGITATION: ICD-10-CM

## 2019-10-03 DIAGNOSIS — I27.29: ICD-10-CM

## 2019-10-03 DIAGNOSIS — I48.0 PAROXYSMAL ATRIAL FIBRILLATION (HCC): Primary | ICD-10-CM

## 2019-10-03 PROCEDURE — G8420 CALC BMI NORM PARAMETERS: HCPCS | Performed by: INTERNAL MEDICINE

## 2019-10-03 PROCEDURE — G8427 DOCREV CUR MEDS BY ELIG CLIN: HCPCS | Performed by: INTERNAL MEDICINE

## 2019-10-03 PROCEDURE — G8482 FLU IMMUNIZE ORDER/ADMIN: HCPCS | Performed by: INTERNAL MEDICINE

## 2019-10-03 PROCEDURE — G8399 PT W/DXA RESULTS DOCUMENT: HCPCS | Performed by: INTERNAL MEDICINE

## 2019-10-03 PROCEDURE — 93000 ELECTROCARDIOGRAM COMPLETE: CPT | Performed by: INTERNAL MEDICINE

## 2019-10-03 PROCEDURE — 4040F PNEUMOC VAC/ADMIN/RCVD: CPT | Performed by: INTERNAL MEDICINE

## 2019-10-03 PROCEDURE — 1123F ACP DISCUSS/DSCN MKR DOCD: CPT | Performed by: INTERNAL MEDICINE

## 2019-10-03 PROCEDURE — 1036F TOBACCO NON-USER: CPT | Performed by: INTERNAL MEDICINE

## 2019-10-03 PROCEDURE — 1090F PRES/ABSN URINE INCON ASSESS: CPT | Performed by: INTERNAL MEDICINE

## 2019-10-03 PROCEDURE — 99213 OFFICE O/P EST LOW 20 MIN: CPT | Performed by: INTERNAL MEDICINE

## 2019-10-03 RX ORDER — AMIODARONE HYDROCHLORIDE 200 MG/1
100 TABLET ORAL DAILY
Qty: 90 TABLET | Refills: 3 | Status: SHIPPED | OUTPATIENT
Start: 2019-10-03 | End: 2020-01-01

## 2019-10-21 ENCOUNTER — CARE COORDINATION (OUTPATIENT)
Dept: CARE COORDINATION | Age: 84
End: 2019-10-21

## 2019-10-29 LAB
AFB CULTURE (MYCOBACTERIA): NORMAL
AFB SMEAR: NORMAL

## 2019-11-04 ENCOUNTER — CARE COORDINATION (OUTPATIENT)
Dept: CARE COORDINATION | Age: 84
End: 2019-11-04

## 2019-11-19 LAB
AFB CULTURE (MYCOBACTERIA): NORMAL
AFB SMEAR: NORMAL

## 2019-11-22 PROBLEM — E46 PROTEIN CALORIE MALNUTRITION (HCC): Status: RESOLVED | Noted: 2019-06-04 | Resolved: 2019-11-22

## 2019-12-06 ENCOUNTER — CARE COORDINATION (OUTPATIENT)
Dept: CARE COORDINATION | Age: 84
End: 2019-12-06

## 2019-12-09 ENCOUNTER — HOSPITAL ENCOUNTER (OUTPATIENT)
Age: 84
Discharge: HOME | End: 2019-12-09
Payer: MEDICARE

## 2019-12-09 ENCOUNTER — HOSPITAL ENCOUNTER (OUTPATIENT)
Dept: GENERAL RADIOLOGY | Age: 84
Discharge: HOME | End: 2019-12-09
Payer: MEDICARE

## 2019-12-09 DIAGNOSIS — R05.9 COUGH: ICD-10-CM

## 2019-12-09 PROCEDURE — 71046 X-RAY EXAM CHEST 2 VIEWS: CPT

## 2019-12-11 ENCOUNTER — CARE COORDINATION (OUTPATIENT)
Dept: CARE COORDINATION | Age: 84
End: 2019-12-11

## 2019-12-17 ENCOUNTER — CARE COORDINATION (OUTPATIENT)
Dept: CARE COORDINATION | Age: 84
End: 2019-12-17

## 2019-12-27 ENCOUNTER — CARE COORDINATION (OUTPATIENT)
Dept: CARE COORDINATION | Age: 84
End: 2019-12-27

## 2020-01-01 ENCOUNTER — HOSPITAL ENCOUNTER (EMERGENCY)
Age: 85
Discharge: HOME | End: 2020-10-21
Attending: EMERGENCY MEDICINE
Payer: MEDICARE

## 2020-01-01 ENCOUNTER — APPOINTMENT (OUTPATIENT)
Dept: GENERAL RADIOLOGY | Age: 85
End: 2020-01-01
Payer: MEDICARE

## 2020-01-01 ENCOUNTER — CARE COORDINATION (OUTPATIENT)
Dept: CARE COORDINATION | Age: 85
End: 2020-01-01

## 2020-01-01 ENCOUNTER — OFFICE VISIT (OUTPATIENT)
Dept: PULMONOLOGY | Age: 85
End: 2020-01-01
Payer: MEDICARE

## 2020-01-01 ENCOUNTER — OFFICE VISIT (OUTPATIENT)
Dept: CARDIOLOGY CLINIC | Age: 85
End: 2020-01-01
Payer: MEDICARE

## 2020-01-01 ENCOUNTER — CARE COORDINATION (OUTPATIENT)
Dept: FAMILY MEDICINE CLINIC | Age: 85
End: 2020-01-01

## 2020-01-01 ENCOUNTER — TELEPHONE (OUTPATIENT)
Dept: PULMONOLOGY | Age: 85
End: 2020-01-01

## 2020-01-01 VITALS
DIASTOLIC BLOOD PRESSURE: 67 MMHG | WEIGHT: 93 LBS | BODY MASS INDEX: 18.26 KG/M2 | SYSTOLIC BLOOD PRESSURE: 130 MMHG | HEART RATE: 57 BPM | RESPIRATION RATE: 16 BRPM | OXYGEN SATURATION: 93 % | HEIGHT: 60 IN | TEMPERATURE: 98.1 F

## 2020-01-01 VITALS
HEART RATE: 73 BPM | SYSTOLIC BLOOD PRESSURE: 145 MMHG | TEMPERATURE: 98.5 F | OXYGEN SATURATION: 92 % | RESPIRATION RATE: 16 BRPM | WEIGHT: 83.33 LBS | BODY MASS INDEX: 16.28 KG/M2 | DIASTOLIC BLOOD PRESSURE: 85 MMHG

## 2020-01-01 VITALS
HEIGHT: 60 IN | SYSTOLIC BLOOD PRESSURE: 130 MMHG | DIASTOLIC BLOOD PRESSURE: 58 MMHG | OXYGEN SATURATION: 91 % | HEART RATE: 59 BPM | TEMPERATURE: 98.1 F | BODY MASS INDEX: 18.26 KG/M2 | WEIGHT: 93 LBS

## 2020-01-01 LAB
A/G RATIO: 0.9 (ref 1.1–2.2)
ALBUMIN SERPL-MCNC: 3.5 G/DL (ref 3.4–5)
ALP BLD-CCNC: 108 U/L (ref 40–129)
ALT SERPL-CCNC: 51 U/L (ref 10–40)
ANION GAP SERPL CALCULATED.3IONS-SCNC: 10 MMOL/L (ref 3–16)
AST SERPL-CCNC: 59 U/L (ref 15–37)
BASE EXCESS VENOUS: 5.3 MMOL/L
BASOPHILS ABSOLUTE: 0.1 K/UL (ref 0–0.2)
BASOPHILS RELATIVE PERCENT: 1.1 %
BILIRUB SERPL-MCNC: 0.5 MG/DL (ref 0–1)
BUN BLDV-MCNC: 19 MG/DL (ref 7–20)
CALCIUM SERPL-MCNC: 9.2 MG/DL (ref 8.3–10.6)
CARBOXYHEMOGLOBIN: 1.3 %
CHLORIDE BLD-SCNC: 103 MMOL/L (ref 99–110)
CO2: 30 MMOL/L (ref 21–32)
CREAT SERPL-MCNC: 1 MG/DL (ref 0.6–1.2)
EKG ATRIAL RATE: 65 BPM
EKG DIAGNOSIS: NORMAL
EKG P AXIS: 71 DEGREES
EKG P-R INTERVAL: 212 MS
EKG Q-T INTERVAL: 438 MS
EKG QRS DURATION: 76 MS
EKG QTC CALCULATION (BAZETT): 455 MS
EKG R AXIS: 46 DEGREES
EKG T AXIS: 43 DEGREES
EKG VENTRICULAR RATE: 65 BPM
EOSINOPHILS ABSOLUTE: 0.6 K/UL (ref 0–0.6)
EOSINOPHILS RELATIVE PERCENT: 9.8 %
GFR AFRICAN AMERICAN: >60
GFR NON-AFRICAN AMERICAN: 53
GLOBULIN: 3.7 G/DL
GLUCOSE BLD-MCNC: 98 MG/DL (ref 70–99)
HCO3 VENOUS: 32 MMOL/L (ref 23–29)
HCT VFR BLD CALC: 32.8 % (ref 36–48)
HEMOGLOBIN: 10.7 G/DL (ref 12–16)
LACTIC ACID: 1.2 MMOL/L (ref 0.4–2)
LYMPHOCYTES ABSOLUTE: 0.8 K/UL (ref 1–5.1)
LYMPHOCYTES RELATIVE PERCENT: 14.3 %
MCH RBC QN AUTO: 30.6 PG (ref 26–34)
MCHC RBC AUTO-ENTMCNC: 32.6 G/DL (ref 31–36)
MCV RBC AUTO: 93.6 FL (ref 80–100)
METHEMOGLOBIN VENOUS: 0.6 %
MONOCYTES ABSOLUTE: 0.3 K/UL (ref 0–1.3)
MONOCYTES RELATIVE PERCENT: 5.9 %
NEUTROPHILS ABSOLUTE: 4 K/UL (ref 1.7–7.7)
NEUTROPHILS RELATIVE PERCENT: 68.9 %
O2 CONTENT, VEN: 4 ML/DL
O2 SAT, VEN: 25 %
O2 THERAPY: ABNORMAL
PCO2, VEN: 59.9 MMHG (ref 40–50)
PDW BLD-RTO: 16.3 % (ref 12.4–15.4)
PH VENOUS: 7.34 (ref 7.35–7.45)
PLATELET # BLD: 122 K/UL (ref 135–450)
PMV BLD AUTO: 9.4 FL (ref 5–10.5)
PO2, VEN: 19 MMHG
POTASSIUM REFLEX MAGNESIUM: 4.1 MMOL/L (ref 3.5–5.1)
PRO-BNP: 5764 PG/ML (ref 0–449)
RBC # BLD: 3.51 M/UL (ref 4–5.2)
SODIUM BLD-SCNC: 143 MMOL/L (ref 136–145)
TCO2 CALC VENOUS: 34 MMOL/L
TOTAL PROTEIN: 7.2 G/DL (ref 6.4–8.2)
TROPONIN: <0.01 NG/ML
WBC # BLD: 5.8 K/UL (ref 4–11)

## 2020-01-01 PROCEDURE — 93000 ELECTROCARDIOGRAM COMPLETE: CPT | Performed by: NURSE PRACTITIONER

## 2020-01-01 PROCEDURE — 80053 COMPREHEN METABOLIC PANEL: CPT

## 2020-01-01 PROCEDURE — G8419 CALC BMI OUT NRM PARAM NOF/U: HCPCS | Performed by: INTERNAL MEDICINE

## 2020-01-01 PROCEDURE — 93010 ELECTROCARDIOGRAM REPORT: CPT | Performed by: INTERNAL MEDICINE

## 2020-01-01 PROCEDURE — G8926 SPIRO NO PERF OR DOC: HCPCS | Performed by: INTERNAL MEDICINE

## 2020-01-01 PROCEDURE — 71045 X-RAY EXAM CHEST 1 VIEW: CPT

## 2020-01-01 PROCEDURE — 82803 BLOOD GASES ANY COMBINATION: CPT

## 2020-01-01 PROCEDURE — G8427 DOCREV CUR MEDS BY ELIG CLIN: HCPCS | Performed by: INTERNAL MEDICINE

## 2020-01-01 PROCEDURE — 85025 COMPLETE CBC W/AUTO DIFF WBC: CPT

## 2020-01-01 PROCEDURE — 1123F ACP DISCUSS/DSCN MKR DOCD: CPT | Performed by: NURSE PRACTITIONER

## 2020-01-01 PROCEDURE — 99285 EMERGENCY DEPT VISIT HI MDM: CPT

## 2020-01-01 PROCEDURE — 1090F PRES/ABSN URINE INCON ASSESS: CPT | Performed by: NURSE PRACTITIONER

## 2020-01-01 PROCEDURE — 1090F PRES/ABSN URINE INCON ASSESS: CPT | Performed by: INTERNAL MEDICINE

## 2020-01-01 PROCEDURE — 3023F SPIROM DOC REV: CPT | Performed by: INTERNAL MEDICINE

## 2020-01-01 PROCEDURE — 4040F PNEUMOC VAC/ADMIN/RCVD: CPT | Performed by: NURSE PRACTITIONER

## 2020-01-01 PROCEDURE — 1036F TOBACCO NON-USER: CPT | Performed by: INTERNAL MEDICINE

## 2020-01-01 PROCEDURE — 4040F PNEUMOC VAC/ADMIN/RCVD: CPT | Performed by: INTERNAL MEDICINE

## 2020-01-01 PROCEDURE — 84484 ASSAY OF TROPONIN QUANT: CPT

## 2020-01-01 PROCEDURE — 99213 OFFICE O/P EST LOW 20 MIN: CPT | Performed by: NURSE PRACTITIONER

## 2020-01-01 PROCEDURE — 96374 THER/PROPH/DIAG INJ IV PUSH: CPT

## 2020-01-01 PROCEDURE — 6360000002 HC RX W HCPCS: Performed by: PHYSICIAN ASSISTANT

## 2020-01-01 PROCEDURE — G8399 PT W/DXA RESULTS DOCUMENT: HCPCS | Performed by: NURSE PRACTITIONER

## 2020-01-01 PROCEDURE — 1036F TOBACCO NON-USER: CPT | Performed by: NURSE PRACTITIONER

## 2020-01-01 PROCEDURE — 83605 ASSAY OF LACTIC ACID: CPT

## 2020-01-01 PROCEDURE — 93005 ELECTROCARDIOGRAM TRACING: CPT | Performed by: EMERGENCY MEDICINE

## 2020-01-01 PROCEDURE — G8427 DOCREV CUR MEDS BY ELIG CLIN: HCPCS | Performed by: NURSE PRACTITIONER

## 2020-01-01 PROCEDURE — G8399 PT W/DXA RESULTS DOCUMENT: HCPCS | Performed by: INTERNAL MEDICINE

## 2020-01-01 PROCEDURE — 99214 OFFICE O/P EST MOD 30 MIN: CPT | Performed by: INTERNAL MEDICINE

## 2020-01-01 PROCEDURE — 83880 ASSAY OF NATRIURETIC PEPTIDE: CPT

## 2020-01-01 PROCEDURE — G8419 CALC BMI OUT NRM PARAM NOF/U: HCPCS | Performed by: NURSE PRACTITIONER

## 2020-01-01 PROCEDURE — 1123F ACP DISCUSS/DSCN MKR DOCD: CPT | Performed by: INTERNAL MEDICINE

## 2020-01-01 RX ORDER — SODIUM CHLORIDE FOR INHALATION 3 %
VIAL, NEBULIZER (ML) INHALATION
Qty: 240 ML | Refills: 2 | Status: SHIPPED | OUTPATIENT
Start: 2020-01-01 | End: 2021-01-01

## 2020-01-01 RX ORDER — FUROSEMIDE 10 MG/ML
40 INJECTION INTRAMUSCULAR; INTRAVENOUS ONCE
Status: COMPLETED | OUTPATIENT
Start: 2020-01-01 | End: 2020-01-01

## 2020-01-01 RX ORDER — AMIODARONE HYDROCHLORIDE 200 MG/1
TABLET ORAL
Qty: 45 TABLET | Refills: 1 | Status: SHIPPED | OUTPATIENT
Start: 2020-01-01

## 2020-01-01 RX ADMIN — FUROSEMIDE 40 MG: 10 INJECTION, SOLUTION INTRAMUSCULAR; INTRAVENOUS at 17:12

## 2020-01-08 ENCOUNTER — CARE COORDINATION (OUTPATIENT)
Dept: CARE COORDINATION | Age: 85
End: 2020-01-08

## 2020-01-08 NOTE — CARE COORDINATION
Ambulatory Care Coordination Note  1/8/2020  CM Risk Score: 7  Charlson 10 Year Mortality Risk Score: 100%     ACC: Edwin Duarte, KENN    Summary Note:     Call to patient to assess ongoing Care Coordination needs. Tuan Barkley reports that she is feeling better, but is \"still a little hoarse\" from the recent bronchitis. She is weighing herself daily and continues to loose weight. Today's weight is 87.5 lbs. Down 8 lbs since last OV. Complains of altered sense of taste and poor appetite. Is unable to tolerate the full concentration of oral supplements d/t tasting \"too sweet\". She dilutes them in water. Interventions:    Educated on some new oral supplements from [employer]   Instructed to discuss her hoarseness and sense of taste concerns w/ Dr. Mickey Nelson during her OV 1/13  Coupons for [employer] sent to patients home via mail  Consulted RD for additional recommendations on food & supplement choices    Plan:    FU 1-2 weeks on weight & diet intake    Kayce Jason RN, MSN  Ambulatory Care Manager  [phone]              Care Coordination Interventions    Program Enrollment:  Complex Care  Referral from Primary Care Provider:  No  Suggested Interventions and Community Resources  No Identified Needs  Other Services: In Process (Comment: [employer] every 3 weeks)  Zone Management Tools:  Completed (Comment: CHF Zone Tool mailed 10/21/19)         Goals Addressed                 This Visit's Progress       Care Coordination     Self Monitoring (pt-stated)   On track     Daily Weights - I will weight myself as directed - Daily and write down weights  I will notify my provider of any increase in weight by 3 or more pounds in 2 days OR 5 or more pounds in a week. Barriers: forgetfulness  Plan for overcoming my barriers: Continued CC support  Confidence: 5/10  Anticipated Goal Completion Date: 12/31/19 12/11/2019 pt is not weighing due to current URI and no energy.  cr              Prior to Admission medications Charlotte Moreira MD   albuterol sulfate HFA (PROAIR HFA) 108 (90 Base) MCG/ACT inhaler Inhale 2 puffs into the lungs every 4 hours as needed for Wheezing or Shortness of Breath 10/31/18   Marshal Casanova DO   latanoprost (XALATAN) 0.005 % ophthalmic solution Place 1 drop into both eyes nightly.     Historical Provider, MD       Future Appointments   Date Time Provider Henrietta Kci   1/13/2020 12:40 PM Marshal Casanova DO [facility] PULM [facility]   3/23/2020  1:15 PM Charlotte Moreira MD [facility] MMA   4/9/2020  1:30 PM Karuna Leach MD [facility] CARD [facility]     ,   Congestive Heart Failure Assessment    Are you currently restricting fluids?:  No Restriction  Do you understand a low sodium diet?:  Yes  Do you understand how to read food labels?:   (Comment: Need to assess)  How many restaurant meals do you eat per week?:  1-2  Do you salt your food before tasting it?:  No     No patient-reported symptoms      Symptoms:   None:  Yes      Symptom course:  stable  Patient-reported weight (lb):  87.5  Weight trend:  decreasing steadily  Salt intake watch compared to last visit:  stable      and   General Assessment    Do you have any symptoms that are causing concern?:  No

## 2020-01-08 NOTE — TELEPHONE ENCOUNTER
Thank you. I have provided Tiffanie Fox with some dietary resources from our dietician to promote weight gain.

## 2020-01-08 NOTE — TELEPHONE ENCOUNTER
Appetite stimulants do not impact overall mortality, and Megace is associated with venous thrombosis.

## 2020-01-17 ENCOUNTER — OFFICE VISIT (OUTPATIENT)
Dept: PULMONOLOGY | Age: 85
End: 2020-01-17
Payer: MEDICARE

## 2020-01-17 VITALS
TEMPERATURE: 97.4 F | HEIGHT: 60 IN | HEART RATE: 54 BPM | WEIGHT: 89 LBS | OXYGEN SATURATION: 95 % | SYSTOLIC BLOOD PRESSURE: 139 MMHG | RESPIRATION RATE: 16 BRPM | BODY MASS INDEX: 17.47 KG/M2 | DIASTOLIC BLOOD PRESSURE: 62 MMHG

## 2020-01-17 PROCEDURE — 1123F ACP DISCUSS/DSCN MKR DOCD: CPT | Performed by: INTERNAL MEDICINE

## 2020-01-17 PROCEDURE — G8482 FLU IMMUNIZE ORDER/ADMIN: HCPCS | Performed by: INTERNAL MEDICINE

## 2020-01-17 PROCEDURE — 1090F PRES/ABSN URINE INCON ASSESS: CPT | Performed by: INTERNAL MEDICINE

## 2020-01-17 PROCEDURE — G8926 SPIRO NO PERF OR DOC: HCPCS | Performed by: INTERNAL MEDICINE

## 2020-01-17 PROCEDURE — 99213 OFFICE O/P EST LOW 20 MIN: CPT | Performed by: INTERNAL MEDICINE

## 2020-01-17 PROCEDURE — 3023F SPIROM DOC REV: CPT | Performed by: INTERNAL MEDICINE

## 2020-01-17 PROCEDURE — 1036F TOBACCO NON-USER: CPT | Performed by: INTERNAL MEDICINE

## 2020-01-17 PROCEDURE — G8399 PT W/DXA RESULTS DOCUMENT: HCPCS | Performed by: INTERNAL MEDICINE

## 2020-01-17 PROCEDURE — G8419 CALC BMI OUT NRM PARAM NOF/U: HCPCS | Performed by: INTERNAL MEDICINE

## 2020-01-17 PROCEDURE — G8427 DOCREV CUR MEDS BY ELIG CLIN: HCPCS | Performed by: INTERNAL MEDICINE

## 2020-01-17 PROCEDURE — 4040F PNEUMOC VAC/ADMIN/RCVD: CPT | Performed by: INTERNAL MEDICINE

## 2020-01-17 NOTE — PATIENT INSTRUCTIONS
Stop breo due to hoarseness    May need to use albuterol more    Call if breathing worsens while off breo and I will try a different inhaler    Follow up in 3 months

## 2020-01-17 NOTE — PROGRESS NOTES
Chief complaint  This is a 80y.o. year old female  who comes to see me with a chief complaint of   Chief Complaint   Patient presents with    Follow-up     copd & SOB       HPI  Here with cc of chronic bronchitis    Last time I checked for AFB. She did 3 sputums all of which were negative for growth. She is doing better now. Less SoB and less mucus but mucus is still an issues. Her main compliant is not about breathing but about hoarseness.   She is not sick and better than last time      Past Medical History:   Diagnosis Date    Anemia     Anticoagulant long-term use     Atrial fibrillation ([address] 6/2011    Christofer Harris [facility]F (congestive heart failure) ([facility])     CKD (chronic kidney disease) stage 3, GFR 30-59 ml/min ([facility]) 2/11/2019    Clostridium difficile diarrhea 09/14/2016    Depression     Humerus fracture     Hyperlipidemia     Hypertension     Hypothyroidism     Mitral regurgitation     Optic neuritis     Osteopenia     Fosamax stopped 2/2014, had been treated at least since 2004    Polymyalgia rheumatica ([address]     Pulmonary embolus ([address] 6/2011    Right pulmonary obstruction suspected to be possible chronic pulmonary embolus    Thyroid disease     Vitamin D deficiency        Past Surgical History:   Procedure Laterality Date    COLONOSCOPY  09/14/2016    Ilya    COLONOSCOPY N/A 5/31/2019    COLONOSCOPY performed by Elvis Bethea MD at [facility]  5/31/2019    BOWEL SMALL CAPSULE ENDOSCOPY DEPLOYED VIA EGD performed by Elvis Bethea MD at [address] ENTEROSCOPY N/A 6/3/2019    ENTEROSCOPY PUSH DIAGNOSTIC performed by Kathy Rojo DO at [address] [place] [address]      UPPER GASTROINTESTINAL ENDOSCOPY  09/14/2016    Ilya    UPPER GASTROINTESTINAL ENDOSCOPY N/A 5/30/2019    EGD POLYP ABLATION/OTHER performed by Elvis Bethea MD at [address] [place] [address] Place 1 drop into both eyes nightly., Disp: , Rfl:     Respiratory Therapy Supplies (NEBULIZER COMPRESSOR) KIT, 1 kit by Does not apply route once for 1 dose, Disp: 1 kit, Rfl: 0    pantoprazole (PROTONIX) 40 MG tablet, Take 1 tablet by mouth 2 times daily (before meals) (Patient not taking: Reported on 1/17/2020), Disp: 180 tablet, Rfl: 1    dimenhyDRINATE (DRAMAMINE) 50 MG tablet, Take 25 mg by mouth nightly as needed, Disp: , Rfl:     Magnesium Hydroxide (MILK OF MAGNESIA PO), Take 1,200 mg by mouth Give 30 ml by mouth PRN for constipation, Disp: , Rfl:       PHYSICAL EXAM:  Vitals:    01/17/20 1237   BP: 139/62   Pulse: 54   Resp: 16   Temp: 97.4 °F (36.3 °C)   SpO2: 95%         PE  GENERAL:  Looks like she has lost weight again. Looks frail  HEENT:  No scleral icterus, no conjunctival irritation. 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  There was associated   decrease in diffusion capacity.      Chest imaging from 8/2017 is reviewed. My interpretation is RLL effusion, elevated diaphragm, scarring     Chest imaging from 9/2019 is reviewed. My interpretation is continued RLL patchy consolidation with airspace disease. Nodular densities with continued calcified lymph nodes with compression on airways and PA. Mucus in the airways with some noted in the mid trachea. Nodules bilaterally     FLAVIO 9/2017  Normal left ventricle size, wall thickness and systolic function with an   estimated ejection fraction of 65%. No regional wall motion abnormalities   are seen.  Normal right

## 2020-01-24 ENCOUNTER — CARE COORDINATION (OUTPATIENT)
Dept: FAMILY MEDICINE CLINIC | Age: 85
End: 2020-01-24

## 2020-01-24 NOTE — CARE COORDINATION
Cholecalciferol, 10 MCG (400 UNIT) TABS Take 400 Units by mouth    Historical Provider, MD   levothyroxine (SYNTHROID) 100 MCG tablet TAKE 1 TABLET EVERY DAY 12/2/19   Alexys Simms MD   amiodarone (CORDARONE) 200 MG tablet Take 0.5 tablets by mouth daily 10/3/19   Mohsen Narvaez MD   sodium chloride, Inhalant, 3 % nebulizer solution Take 4 mLs by nebulization 2 times daily 9/25/19   Florinda Singletary DO   Respiratory Therapy Supplies (NEBULIZER COMPRESSOR) KIT 1 kit by Does not apply route once for 1 dose 9/25/19 9/25/19  Folrinda Singletary DO   Respiratory Therapy Supplies (NEBULIZER/TUBING/MOUTHPIECE) KIT 1 kit by Does not apply route daily as needed (for breathing treatment) 9/25/19   Florinda Singletary,    metoprolol tartrate (LOPRESSOR) 50 MG tablet Take 0.5 tablets by mouth 2 times daily 9/17/19   Alexys Simms MD   torsemide (DEMADEX) 20 MG tablet Take 1 tablet by mouth daily  Patient taking differently: Take 20 mg by mouth daily Indications: Taking if sees swelling  7/29/19   Alexys Simms MD   pantoprazole (PROTONIX) 40 MG tablet Take 1 tablet by mouth 2 times daily (before meals)  Patient not taking: Reported on 1/17/2020 7/29/19   Alexys Simms MD   dimenhyDRINATE (DRAMAMINE) 50 MG tablet Take 25 mg by mouth nightly as needed    Historical Provider, MD   Magnesium Hydroxide (MILK OF MAGNESIA PO) Take 1,200 mg by mouth Give 30 ml by mouth PRN for constipation    Historical Provider, MD   Multiple Vitamins-Minerals (PRESERVISION AREDS 2+MULTI VIT PO) Take by mouth    Historical Provider, MD   simvastatin (ZOCOR) 20 MG tablet TAKE 1 TABLET EVERY DAY 2/25/19   Alexys Simms MD   memantine (NAMENDA) 10 MG tablet TAKE 1 TABLET TWICE DAILY 2/11/19   Alexys Simms MD   albuterol sulfate HFA (PROAIR HFA) 108 (90 Base) MCG/ACT inhaler Inhale 2 puffs into the lungs every 4 hours as needed for Wheezing or Shortness of Breath 10/31/18   Mariana Guillen DO   latanoprost (XALATAN) 0.005 % ophthalmic solution Place 1 drop into both eyes nightly.     Historical Provider, MD       Future Appointments   Date Time Provider Henrietta Kci   3/23/2020  1:15 PM Annette Beckwith MD [facility]   4/9/2020  1:30 PM Michell Cristobal MD [facility] CARD [facility]   4/21/2020 10:40 AM Mariana Guillen DO [facility]     ,   Congestive Heart Failure Assessment    Are you currently restricting fluids?:  No Restriction  Do you understand a low sodium diet?:  Yes  Do you understand how to read food labels?:   (Comment: Need to assess)  How many restaurant meals do you eat per week?:  1-2  Do you salt your food before tasting it?:  No     No patient-reported symptoms      Symptoms:      Symptom course:  stable  Patient-reported weight (lb):  85.5  Weight trend:  decreasing steadily  Salt intake watch compared to last visit:  stable      and   General Assessment    Do you have any symptoms that are causing concern?:  No

## 2020-02-04 ENCOUNTER — TELEPHONE (OUTPATIENT)
Dept: PULMONOLOGY | Age: 85
End: 2020-02-04

## 2020-02-04 RX ORDER — LEVOFLOXACIN 500 MG/1
500 TABLET, FILM COATED ORAL DAILY
Qty: 7 TABLET | Refills: 0 | Status: SHIPPED | OUTPATIENT
Start: 2020-02-04 | End: 2020-02-11

## 2020-02-07 ENCOUNTER — CARE COORDINATION (OUTPATIENT)
Dept: FAMILY MEDICINE CLINIC | Age: 85
End: 2020-02-07

## 2020-02-07 NOTE — CARE COORDINATION
Call to patient to assess ongoing Care Coordination needs. HIPPA compliant message left with ACM contact information and request for call back.     Brodie Vargas RN, MSN  Ambulatory Care Manager  [phone]

## 2020-02-10 ENCOUNTER — CARE COORDINATION (OUTPATIENT)
Dept: CARE COORDINATION | Age: 85
End: 2020-02-10

## 2020-02-13 ENCOUNTER — CARE COORDINATION (OUTPATIENT)
Dept: FAMILY MEDICINE CLINIC | Age: 85
End: 2020-02-13

## 2020-02-13 NOTE — CARE COORDINATION
Ambulatory Care Coordination Note  2/13/2020  CM Risk Score: 7  Charlson 10 Year Mortality Risk Score: 100%     ACC: Maria De Jesus Baird, KENN    Summary Note:     Call to patient to assess ongoing Care Coordination needs. Patient reports her upper respiratory symptoms are \"finally getting better, except for being hoarse\". Has \"stopped using HHN d/t taste buds being off\". Continues to lose weight. Tried to have [address] with her son who is visiting from [place], but unable d/t tasting \"too hot\"    Weight:  83.5 lbs  Has a trace of swelling in ankles. Reports taking her diuretic on the days she is not going out of the house. Interventions:    Reviewed medications  Encouraged patient to re-schedule her PCP appt. Earlier if weight decreases by 2 or more pounds over the next 2 weeks. Plan:    FU in 2 weeks      Care Coordination Interventions    Program Enrollment:  Complex Care  Referral from Primary Care Provider:  No  Suggested Interventions and Community Resources  No Identified Needs  Other Services: In Process (Comment: [employer] every 3 weeks)  Zone Management Tools:  Completed (Comment: CHF Zone Tool mailed 10/21/19)         Goals Addressed                 This Visit's Progress       Care Coordination     Self Monitoring (pt-stated)   On track     Daily Weights - I will weight myself as directed - Daily and write down weights  I will notify my provider of any increase in weight by 3 or more pounds in 2 days OR 5 or more pounds in a week. Barriers: forgetfulness  Plan for overcoming my barriers: Continued CC support  Confidence: 5/10  Anticipated Goal Completion Date: 12/31/19 12/11/2019 pt is not weighing due to current URI and no energy. cr              Prior to Admission medications    Medication Sig Start Date End Date Taking?  Authorizing Provider   FERROUS SULFATE PO Take 1 tablet by mouth    Yes Historical Provider, MD   Vitamin D, Cholecalciferol, 10 MCG (400 UNIT) TABS Take 400 Units by

## 2020-03-02 ENCOUNTER — CARE COORDINATION (OUTPATIENT)
Dept: CARE COORDINATION | Age: 85
End: 2020-03-02

## 2020-03-02 NOTE — CARE COORDINATION
Ambulatory Care Coordination Note  3/2/2020  CM Risk Score: 7  Charlson 10 Year Mortality Risk Score: 100%     ACC: Brandy Yuan, KNEN    Summary Note:     Patient reported weight gain, 89lbs now. She is forcing herself to eat after feeling full. Denies SOB or edema greater than baseline. Her tongue feeling numb all of the time. Reported she stopped all of her inhalers thinking that would help but it has not. Interventions:  Encouraged to notify PCP when stopping any medications. Plan:  F/U on CHF/COPD        Care Coordination Interventions    Program Enrollment:  Complex Care  Referral from Primary Care Provider:  No  Suggested Interventions and Community Resources  No Identified Needs  Other Services: In Process (Comment: [employer] every 3 weeks)  Zone Management Tools:  Completed (Comment: CHF Zone Tool mailed 10/21/19)         Goals Addressed                 This Visit's Progress       Patient Stated     Self Monitoring (pt-stated)   On track     Daily Weights - I will weight myself as directed - Daily and write down weights  I will notify my provider of any increase in weight by 3 or more pounds in 2 days OR 5 or more pounds in a week. Barriers: forgetfulness  Plan for overcoming my barriers: Continued CC support  Confidence: 5/10  Anticipated Goal Completion Date: 12/31/19 12/11/2019 pt is not weighing due to current URI and no energy. cr              Prior to Admission medications    Medication Sig Start Date End Date Taking?  Authorizing Provider   FERROUS SULFATE PO Take 1 tablet by mouth     Historical Provider, MD   Vitamin D, Cholecalciferol, 10 MCG (400 UNIT) TABS Take 400 Units by mouth    Historical Provider, MD   levothyroxine (SYNTHROID) 100 MCG tablet TAKE 1 TABLET EVERY DAY 12/2/19   Theresa Bowden MD   amiodarone (CORDARONE) 200 MG tablet Take 0.5 tablets by mouth daily 10/3/19   Ольга Benson MD   sodium chloride, Inhalant, 3 % nebulizer solution Take 4 mLs by nebulization 2 times daily  Patient not taking: Reported on 2/13/2020 9/25/19   Daina Crane,    Respiratory Therapy Supplies (NEBULIZER COMPRESSOR) KIT 1 kit by Does not apply route once for 1 dose 9/25/19 9/25/19  Daina Crane, DO   Respiratory Therapy Supplies (NEBULIZER/TUBING/MOUTHPIECE) KIT 1 kit by Does not apply route daily as needed (for breathing treatment)  Patient not taking: Reported on 2/13/2020 9/25/19   Daina Crane DO   metoprolol tartrate (LOPRESSOR) 50 MG tablet Take 0.5 tablets by mouth 2 times daily 9/17/19   Feroz Parson MD   torsemide (DEMADEX) 20 MG tablet Take 1 tablet by mouth daily  Patient taking differently: Take 20 mg by mouth daily Indications: Taking if sees swelling  7/29/19   Feroz Parson MD   pantoprazole (PROTONIX) 40 MG tablet Take 1 tablet by mouth 2 times daily (before meals) 7/29/19   Feroz Parson MD   dimenhyDRINATE (DRAMAMINE) 50 MG tablet Take 25 mg by mouth nightly as needed    Historical Provider, MD   Magnesium Hydroxide (MILK OF MAGNESIA PO) Take 1,200 mg by mouth Give 30 ml by mouth PRN for constipation    Historical Provider, MD   Multiple Vitamins-Minerals (PRESERVISION AREDS 2+MULTI VIT PO) Take by mouth    Historical Provider, MD   simvastatin (ZOCOR) 20 MG tablet TAKE 1 TABLET EVERY DAY 2/25/19   Feroz Parson MD   memantine (NAMENDA) 10 MG tablet TAKE 1 TABLET TWICE DAILY 2/11/19   Feroz Parson MD   albuterol sulfate HFA (PROAIR HFA) 108 (90 Base) MCG/ACT inhaler Inhale 2 puffs into the lungs every 4 hours as needed for Wheezing or Shortness of Breath  Patient not taking: Reported on 2/13/2020 10/31/18   Daina Crane DO   latanoprost (XALATAN) 0.005 % ophthalmic solution Place 1 drop into both eyes nightly.     Historical Provider, MD       Future Appointments   Date Time Provider Henrietta More   3/23/2020  1:15 PM Feroz Parson MD [place] [facility]   4/9/2020  1:30 PM MD JARON Coleman [facility] C[facility]   4/21/2020 10:40 AM Gadiel Amin,  [facility]     ,   Congestive Heart Failure Assessment    Are you currently restricting fluids?:  No Restriction  Do you understand a low sodium diet?:  Yes  Do you understand how to read food labels?:   (Comment: Need to assess)  How many restaurant meals do you eat per week?:  1-2  Do you salt your food before tasting it?:  No     No patient-reported symptoms      Symptoms:   CHF associated angina: Neg, CHF associated dyspnea on exertion: Neg, CHF associated fatigue: Pos, CHF associated leg swelling: Neg, CHF associated shortness of breath: Neg, CHF associated weakness: Neg      Symptom course:  stable  Patient-reported weight (lb):  89  Weight trend:  increasing steadily  Salt intake watch compared to last visit:  stable      and   General Assessment

## 2020-03-17 ENCOUNTER — CARE COORDINATION (OUTPATIENT)
Dept: FAMILY MEDICINE CLINIC | Age: 85
End: 2020-03-17

## 2020-03-17 NOTE — CARE COORDINATION
Ambulatory Care Coordination Note  3/17/2020  CM Risk Score: 7  Charlson 10 Year Mortality Risk Score: 100%     ACC: Veronika Jimenez, RN    Summary Note:     Call to patient to assess ongoing Care Coordination needs. Patient was in office on 3/10 to see PCP for f/u on CHF. Patient had weight increase 93 lbs. Had some SOB at that time. Patient was not taking diuretic when going out with friends or for appointments. Weight today 88 lbs. Pt. Denies SOB except with exertion. Was out last week, but has remained homebound since. Reports taking her diuretic daily. Has an appointment with cardiology on 4/9. Questions whether she should cancel. Interventions:    Education on importance of taking her diuretic daily  Instructed to call PCP with increased edema, SOB or wt gain > 3lbs/day or > 5lbs/wk   Instructed patient to call ACM if any questions or concerns prior to next call in 2 weeks  Instructed to keep Cardiology appointment d/t recent issues with CHF-contact office closer to appointment if still community outbreak concerns    Plan:    F/U CHF in about 2 weeks        Care Coordination Interventions    Program Enrollment:  Complex Care  Referral from Primary Care Provider:  No  Suggested Interventions and Community Resources  No Identified Needs  Other Services: In Process (Comment: [employer] every 3 weeks)  Zone Management Tools: In Process (Comment: CHF Zone Tool mailed 10/21/19)         Goals Addressed                 This Visit's Progress       Care Coordination     Self Monitoring (pt-stated)   Improving     Daily Weights - I will weight myself as directed - Daily and write down weights  I will notify my provider of any increase in weight by 3 or more pounds in 2 days OR 5 or more pounds in a week.         Barriers: forgetfulness  Plan for overcoming my barriers: Continued CC support  Confidence: 5/10  Anticipated Goal Completion Date: 12/31/19 12/11/2019 pt is not weighing due to current URI and no energy. cr              Prior to Admission medications    Medication Sig Start Date End Date Taking?  Authorizing Provider   memantine (NAMENDA) 10 MG tablet TAKE 1 TABLET TWICE DAILY 3/15/20  Yes Dania Rey MD   FERROUS SULFATE PO Take 1 tablet by mouth    Yes Historical Provider, MD   Vitamin D, Cholecalciferol, 10 MCG (400 UNIT) TABS Take 400 Units by mouth   Yes Historical Provider, MD   levothyroxine (SYNTHROID) 100 MCG tablet TAKE 1 TABLET EVERY DAY 12/2/19  Yes Dania Rey MD   amiodarone (CORDARONE) 200 MG tablet Take 0.5 tablets by mouth daily 10/3/19  Yes Darin Jason MD   Respiratory Therapy Supplies (NEBULIZER/TUBING/MOUTHPIECE) KIT 1 kit by Does not apply route daily as needed (for breathing treatment) 9/25/19  Yes Taryn Ceballos DO   metoprolol tartrate (LOPRESSOR) 50 MG tablet Take 0.5 tablets by mouth 2 times daily 9/17/19  Yes Dania Rey MD   pantoprazole (PROTONIX) 40 MG tablet Take 1 tablet by mouth 2 times daily (before meals) 7/29/19  Yes Dania Rey MD   dimenhyDRINATE (DRAMAMINE) 50 MG tablet Take 25 mg by mouth nightly as needed   Yes Historical Provider, MD   Magnesium Hydroxide (MILK OF MAGNESIA PO) Take 1,200 mg by mouth Give 30 ml by mouth PRN for constipation   Yes Historical Provider, MD   Multiple Vitamins-Minerals (PRESERVISION AREDS 2+MULTI VIT PO) Take by mouth   Yes Historical Provider, MD   simvastatin (ZOCOR) 20 MG tablet TAKE 1 TABLET EVERY DAY 2/25/19  Yes Dania Rey MD   albuterol sulfate HFA (PROAIR HFA) 108 (90 Base) MCG/ACT inhaler Inhale 2 puffs into the lungs every 4 hours as needed for Wheezing or Shortness of Breath 10/31/18  Yes Taryn Ceballos DO   sodium chloride, Inhalant, 3 % nebulizer solution Take 4 mLs by nebulization 2 times daily  Patient not taking: Reported on 2/13/2020 9/25/19   Taryn Ceballos DO   Respiratory Therapy Supplies (NEBULIZER COMPRESSOR) KIT 1 kit by Does not apply route once for 1 dose 9/25/19 9/25/19  Taryn Ceballos DO   torsemide [facility]) 20 MG tablet Take 1 tablet by mouth daily 7/29/19   Dania Rey MD   latanoprost (XALATAN) 0.005 % ophthalmic solution Place 1 drop into both eyes nightly.     Historical Provider, MD       Future Appointments   Date Time Provider Henrietta Argenis   4/9/2020  1:30 PM Darin Jason MD [facility]   4/21/2020 10:40 AM [facility]   5/15/2020  1:15 PM Dania Rey MD [facility]     ,   Congestive Heart Failure Assessment    Are you currently restricting fluids?:  No Restriction  Do you understand a low sodium diet?:  Yes  Do you understand how to read food labels?:   (Comment: Need to assess)  How many restaurant meals do you eat per week?:  1-2  Do you salt your food before tasting it?:  No     No patient-reported symptoms      Symptoms:   CHF associated dyspnea on exertion: Pos      Symptom course:  stable  Patient-reported weight (lb):  88  Weight trend:  fluctuating minimally  Salt intake watch compared to last visit:  stable     ,   COPD Assessment              Symptoms:          and   General Assessment    Do you have any symptoms that are causing concern?:  No

## 2020-04-13 ENCOUNTER — HOSPITAL ENCOUNTER (OUTPATIENT)
Age: 85
Discharge: HOME | End: 2020-04-13
Payer: MEDICARE

## 2020-04-13 ENCOUNTER — HOSPITAL ENCOUNTER (OUTPATIENT)
Dept: GENERAL RADIOLOGY | Age: 85
Discharge: HOME | End: 2020-04-13
Payer: MEDICARE

## 2020-04-13 PROCEDURE — 71046 X-RAY EXAM CHEST 2 VIEWS: CPT

## 2020-05-04 ENCOUNTER — CARE COORDINATION (OUTPATIENT)
Dept: FAMILY MEDICINE CLINIC | Age: 85
End: 2020-05-04

## 2020-06-17 ENCOUNTER — HOSPITAL ENCOUNTER (OUTPATIENT)
Dept: GENERAL RADIOLOGY | Age: 85
Discharge: HOME | End: 2020-06-17
Payer: MEDICARE

## 2020-06-17 ENCOUNTER — HOSPITAL ENCOUNTER (OUTPATIENT)
Age: 85
Discharge: HOME | End: 2020-06-17
Payer: MEDICARE

## 2020-06-17 PROCEDURE — 71046 X-RAY EXAM CHEST 2 VIEWS: CPT

## 2020-06-29 NOTE — PROGRESS NOTES
[facility]  Office Visit    Essence Ashby  1935 July 2, 2020    CC:   Chief Complaint   Patient presents with    6 Month Follow-Up     paf,htn,a-fib, pt denies any cc     HPI:  The patient is 80 y.o. female with a past medical history significant for COPD, severe MR, PAF, chronic DHF, pulmonary HTN, GI bleed/anemia and chronic venous thromboembolic disease who is here for follow up. She follows with Dr. Usman Gregory from pulmonary. She is also not considered a candidate for repair of her mitral valve d/t her comorbidities (includes on functioning lung with COPD). She was hospitalized in June 2019 for esophageal food impaction and Candida esophagits, treated with fluconazole and amiodarone discontinued d/t prolonged QTc. She was last see in our office in October 2019 by Dr. Dee Reyez and here for follow up. Overall feeling well. Follows with Dr. Usman Gregory and recently started on [address] [place] [address]. Denies chest pain/discomfort, SOB, orthopnea/PND, palpitations, dizziness, syncope, edema , weight change or claudication. Chronic cough productive clear sputum. Uses cane for ambulation. Review of Systems:  Constitutional: Denies  fatigue, weakness, night sweats or fever/chills. HEENT: Denies new visual changes, ringing in ears, nosebleeds,nasal congestion  Respiratory: Denies new or change in SOB, PND, orthopnea or cough. Cardiovascular: see HPI  GI: Denies N/V, diarrhea, constipation, abdominal pain, change in bowel habits, melena or hematochezia  : Denies urinary frequency, urgency, incontinence, hematuria or dysuria. Skin: Denies rash, hives, or cyanosis  Musculoskeletal: Denies joint or muscle aches/pain  Neurological: Denies syncope or TIA-like symptoms.   Psychiatric: Denies anxiety, insomnia or depression     Past Medical History:   Diagnosis Date    Anemia     Anticoagulant long-term use     Atrial fibrillation ([address] 6/2011    Savageantonia Salazar CHF (congestive heart failure) ([address]     CKD (chronic kidney disease) stage 3, GFR 30-59 ml/min ([facility]) 2/11/2019    Clostridium difficile diarrhea 09/14/2016    Depression     Humerus fracture     Hyperlipidemia     Hypertension     Hypothyroidism     Mitral regurgitation     Optic neuritis     Osteopenia     Fosamax stopped 2/2014, had been treated at least since 2004    [facility]algia rhe[facility]a [facility]     Pulmonary embolus ([facility] 6/2011    Right pulmonary obstruction suspected to be possible chronic pulmonary embolus    Thyroid disease     Upper GI bleed 2019    Vitamin D deficiency      Past Surgical History:   Procedure Laterality Date    COLONOSCOPY  09/14/2016    Ilya    COLONOSCOPY N/A 5/31/2019    COLONOSCOPY performed by Jacquelin Amezquita MD at [facility] [address]  5/31/2019    BOWEL SMALL CAPSULE ENDOSCOPY DEPLOYED VIA EGD performed by Jacquelin Amezquita MD at [address] ENTEROSCOPY N/A 6/3/2019    ENTEROSCOPY PUSH DIAGNOSTIC performed by Ania Leary., DO at [address]  MALIGNANT SKIN LESION EXCISION      UPPER GASTROINTESTINAL ENDOSCOPY  09/14/2016    [facility]    UPPER GASTROINTESTINAL ENDOSCOPY N/A 5/30/2019    EGD POLYP ABLATION/OTHER performed by Jacquelin Amezquita MD at [facility] N/A 5/31/2019    ESOPHAGOGASTRODUODENOSCOPY WITH CAPSULE PLACEMENT performed by Jacquelin Amezquita MD at [facility] N/A 6/16/2019    EGD BIOPSY performed by Neyda Helton MD at [facility] 6/16/2019    EGD FOREIGN BODY REMOVAL performed by Neyda Helton MD at [address] [facility] History   Problem Relation Age of Onset    Cancer Mother         unknown primary    Other Father         Tuberculosis    Lung Cancer Sister     Tuberculosis Brother     Diabetes Brother      Social History     Tobacco Use    Smoking status: Former Smoker     Last attempt to quit: 1/1/1965     Years since quittin.5    Smokeless tobacco: Never Used   Substance Use Topics    Alcohol use: Not Currently    Drug use: No       Allergies   Allergen Reactions    Ace Inhibitors      coughing    Aricept [Donepezil Hydrochloride]      Can not recall    Benicar [Olmesartan Medoxomil]      Can not recall      Exelon [Rivastigmine Tartrate]      Can not recall      Pcn [Penicillins] Hives and Swelling    Quinapril Hcl      Can not recall      Doxycycline Nausea And Vomiting     Current Outpatient Medications   Medication Sig Dispense Refill    Roflumilast (DALIRESP) 250 MCG tablet Take 250 mcg by mouth daily      simvastatin (ZOCOR) 20 MG tablet TAKE 1 TABLET EVERY DAY 90 tablet 3    memantine (NAMENDA) 10 MG tablet TAKE 1 TABLET TWICE DAILY 180 tablet 3    FERROUS SULFATE PO Take 1 tablet by mouth       Vitamin D, Cholecalciferol, 10 MCG (400 UNIT) TABS Take 400 Units by mouth      levothyroxine (SYNTHROID) 100 MCG tablet TAKE 1 TABLET EVERY DAY 90 tablet 3    amiodarone (CORDARONE) 200 MG tablet Take 0.5 tablets by mouth daily 90 tablet 3    Respiratory Therapy Supplies (NEBULIZER/TUBING/MOUTHPIECE) KIT 1 kit by Does not apply route daily as needed (for breathing treatment) 1 kit 0    metoprolol tartrate (LOPRESSOR) 50 MG tablet Take 0.5 tablets by mouth 2 times daily 180 tablet 3    torsemide (DEMADEX) 20 MG tablet Take 1 tablet by mouth daily 90 tablet 3    Multiple Vitamins-Minerals (PRESERVISION AREDS 2+MULTI VIT PO) Take by mouth      albuterol sulfate HFA (PROAIR HFA) 108 (90 Base) MCG/ACT inhaler Inhale 2 puffs into the lungs every 4 hours as needed for Wheezing or Shortness of Breath 3 Inhaler 1    latanoprost (XALATAN) 0.005 % ophthalmic solution Place 1 drop into both eyes nightly.  Respiratory Therapy Supplies (NEBULIZER COMPRESSOR) KIT 1 kit by Does not apply route once for 1 dose 1 kit 0     No current facility-administered medications for this visit.         Physical Exam:   BP (!) 130/58   Pulse 59   Temp 98.1 °F (36.7 °C)   Ht 5' (1.524 m)   Wt 93 lb (42.2 kg)   SpO2 91%   BMI 18.16 kg/m²   Wt Readings from Last 2 Encounters:   07/02/20 93 lb (42.2 kg)   07/01/20 93 lb (42.2 kg)     Constitutional: She is oriented to person, place, and time. She appears thin, frail and chronically ill in appearance. In no acute distress. HEENT: Normocephalic and atraumatic. Sclerae anicteric. No xanthelasmas. EOM's intact. Neck: Supple. No JVD present. Carotids without bruits. No  thyromegaly present. No lymphadenopathy present. Cardiovascular: RRR, normal S1 and S2; II/VI MR murmur  Pulmonary/Chest: Effort normal.  Lungs with coarse breath sounds R posterior base. Chest wall nontender  Abdominal: soft, nontender, nondistended. + bowel sounds; no hepatomegaly   Extremities: No edema, cyanosis, or clubbing. Pulses are 2+ radial/carotid/DP/PT bilaterally. Cap refill brisk. Neurological: No focal deficit. Skin: Skin is warm and dry. Psychiatric: She has a normal mood and affect. Her speech is normal and behavior is normal.     Lab Review:   Lab Results   Component Value Date    TRIG 79 11/22/2019    HDL 60 11/22/2019    HDL 78 04/27/2012    LDLCALC 68 11/22/2019    LDLDIRECT 86 06/15/2020    LABVLDL 16 11/22/2019     Lab Results   Component Value Date     06/15/2020    K 4.6 06/15/2020    K 3.5 07/24/2019    CL 98 06/15/2020    CO2 32 06/15/2020    BUN 25 06/15/2020    CREATININE 1.2 06/15/2020    GLUCOSE 85 06/15/2020    CALCIUM 9.4 06/15/2020      Lab Results   Component Value Date    WBC 6.7 06/15/2020    HGB 12.6 06/15/2020    HCT 38.7 06/15/2020    MCV 93.5 06/15/2020     06/15/2020     7/2/2020 ECG:  Sinus rhythm    9/2017 LHC:  1.  Mild nonobstructive coronary artery disease with a 40% ostial LAD  lesion and 25% mid-RCA disease. This is a right-dominant coronary  arterial system. 2.  Normal left ventricular systolic function with LV ejection  fraction of 65%.   3. 04/2016  Left ventricle size is normal. Normal left ventricular wall thickness. Ejection fraction is visually estimated to be 60-65 %. LV end diastolic  diameter is 3.9 cm. No regional wall motion abnormalities are noted. Diastolic function could not be fully evaluated due to arrhythmia but  filling parameters suggest grade III diastolic dysfunction. Moderate annular calcification is present. The mitral valve leaflets are slightly thickened with normal leaflet  mobility. Severe mitral regurgitation with anteriorly directed eccentric jet. Evidence of systolic reversal of pulmonary venous flow. The left atrium is severely dilated. Aortic valve appears sclerotic but opens adequately. No evidence of significant aortic valve regurgitation. Normal right ventricular size. Right ventricular systolic function is mildly reduced . TAPSE measures 15mm. Tricuspid valve is structurally normal.  There is moderate tricuspid regurgitation with RVSP estimated at 87 mmHg. This is suggestive of severe pulmonary hypertension. The right atrium is mildly dilated. Assessment:    1. Paroxysmal atrial fibrillation (HCC)  -maintaining SR on Amiodarone  -not on long term anticoagulation (she has maintained SR)    2. Severe mitral regurgitation  -continue medical management of sxs  -she is not an invasive candidate d/t multiple comorbidities    3. Chronic diastolic congestive heart failure ([address]  -compensated  -continue BB and diuretic    4. Essential hypertension, benign  -well controlled  -continue medical management    5. Pulmonary venous HTN ([address]  -continue diuretic    6. COPD  -follows with Dr. Liang Mckeon:  Continue amiodarone, metoprolol, simvastatin and torsemide  Discussed low fat/low sodium diet and reinforced regular aerobic exercise. Labs from 6/2020 reviewed  Follow up with Dr. Vivien Scanlon in 6 months or sooner if needed    Return in about 6 months (around 1/2/2021) for with Dr. Vivien Scanlon [facility] office). Thanks for allowing me to participate in the care of this patient.       ANNA Heredia-SRAVANTHI  [facility], [address] [place] [address]) [place], [address] [place] [address] Rodrick Mora [facility]  Office: [phone]  Fax: [phone]

## 2020-07-01 NOTE — PROGRESS NOTES
Chief complaint  This is a 80y.o. year old female  who comes to see me with a chief complaint of   Chief Complaint   Patient presents with    COPD    Shortness of Breath       HPI  Here with cc of chronic bronchitis, SOB    Last visit I added on sodium chloride nebs. She had to stop taking it due to hoarseness and vocal changes. She says her breathing is doing ok at this time but she is coughing up a lot of clear stringy phlegm. She has to pull it out of her mouth at times. Saw her PCP two weeks ago and sputum culture was negative. AFB also sent. Previously had 3 negative AFBs.   Politely declined bronchoscopy in the past.  Only using albuterol with some success       Past Medical History:   Diagnosis Date    Anemia     Anticoagulant long-term use     Atrial fibrillation ([address] 6/2011    Romy Stock CHF (congestive heart failure) ([facility])     CKD (chronic kidney disease) stage 3, GFR 30-59 ml/min ([facility]) 2/11/2019    Clostridium difficile diarrhea 09/14/2016    Depression     Humerus fracture     Hyperlipidemia     Hypertension     Hypothyroidism     Mitral regurgitation     Optic neuritis     Osteopenia     Fosamax stopped 2/2014, had been treated at least since 2004    Polymyalgia rheumatica ([address]     Pulmonary embolus ([address] 6/2011    Right pulmonary obstruction suspected to be possible chronic pulmonary embolus    Thyroid disease     Upper GI bleed 2019    Vitamin D deficiency        Past Surgical History:   Procedure Laterality Date    COLONOSCOPY  09/14/2016    [facility]    COLONOSCOPY N/A 5/31/2019    COLONOSCOPY performed by Cora Briseno MD at [place] [address], [facility]  5/31/2019    BOWEL SMALL CAPSULE ENDOSCOPY DEPLOYED VIA EGD performed by Cora Briseno MD at [facility]  ENTEROSCOPY N/A 6/3/2019    ENTEROSCOPY PUSH DIAGNOSTIC performed by Yael Bowers DO at [facility] GASTROINTESTINAL ENDOSCOPY  09/14/2016    [facility]    UPPER GASTROINTESTINAL ENDOSCOPY N/A 5/30/2019    EGD POLYP ABLATION/OTHER performed by Shelton Menon MD at [address] [place] [address] [employer] N/A 5/31/2019    ESOPHAGOGASTRODUODENOSCOPY WITH CAPSULE PLACEMENT performed by Shelton Menon MD at [address] [place] [address] "[employer] [facility] N/A 6/16/2019    EGD BIOPSY performed by Namrata Coles MD at [address] [place] [address] [address] N/A 6/16/2019    EGD FOREIGN BODY REMOVAL performed by Namrata Coles MD at [address] [place] [address]         Current Outpatient Medications:     Roflumilast (DALIRESP) 250 MCG tablet, Take 1 tablet by mouth daily, Disp: 28 tablet, Rfl: 0    simvastatin (ZOCOR) 20 MG tablet, TAKE 1 TABLET EVERY DAY, Disp: 90 tablet, Rfl: 3    memantine (NAMENDA) 10 MG tablet, TAKE 1 TABLET TWICE DAILY, Disp: 180 tablet, Rfl: 3    FERROUS SULFATE PO, Take 1 tablet by mouth , Disp: , Rfl:     Vitamin D, Cholecalciferol, 10 MCG (400 UNIT) TABS, Take 400 Units by mouth, Disp: , Rfl:     levothyroxine (SYNTHROID) 100 MCG tablet, TAKE 1 TABLET EVERY DAY, Disp: 90 tablet, Rfl: 3    amiodarone (CORDARONE) 200 MG tablet, Take 0.5 tablets by mouth daily, Disp: 90 tablet, Rfl: 3    Respiratory Therapy Supplies (NEBULIZER/TUBING/MOUTHPIECE) KIT, 1 kit by Does not apply route daily as needed (for breathing treatment), Disp: 1 kit, Rfl: 0    metoprolol tartrate (LOPRESSOR) 50 MG tablet, Take 0.5 tablets by mouth 2 times daily, Disp: 180 tablet, Rfl: 3    torsemide (DEMADEX) 20 MG tablet, Take 1 tablet by mouth daily, Disp: 90 tablet, Rfl: 3    Magnesium Hydroxide (MILK OF MAGNESIA PO), Take 1,200 mg by mouth Give 30 ml by mouth PRN for constipation, Disp: , Rfl:     Multiple Vitamins-Minerals (PRESERVISION AREDS 2+MULTI VIT PO), Take by mouth, Disp: , Rfl:     albuterol sulfate HFA (PROAIR HFA) 108 (90 Base) MCG/ACT inhaler, Inhale 2 puffs into the lungs every 4 hours as needed for airspace disease, calcified granulomas    2. Mucopurulent chronic bronchitis ([address]  Failed sodium chloride due to side effects, failed Breo due to side effects. She does not tolerate inhalers too well. Will give samples of daliresp    3. Fibrosing mediastinitis    4.  Restrictive lung disease        Follow up in 6 months

## 2020-07-09 NOTE — CARE COORDINATION
Ambulatory Care Coordination Note  7/9/2020  CM Risk Score: 3  Charlson 10 Year Mortality Risk Score: 100%     ACC: Ynes Swift RN    Summary Note:     Call to patient to assess ongoing Care Coordination needs. Patient has been following up with PCP and other specialists routinely and as needed. Patient reports her weight has been stable at 93 lbs. Does continue to have some issues with her oral membranes \"feeling slimey\" and \"taste buds sensitive\". Stopped using [employer] and purchased Sensodyne toothpaste. Has had some trouble sleeping. Has tried OTC sleeping aide equivalent to Tylenol pm.  Only sleeps about 3 hours. Has purchased Melatonin to try as well. Interventions:    Encouraged patient to mouth wash and toothpaste with whitening and/or alcohol products  Encouraged to continue daily weights and report weight gain of 3lbs/day or 5lbs/wk to PCP or cardiologist  Informed patient plan to graduate from [address] [place] [address] calls    Plan:  Graduate from [employer] [facility]   Date Time Provider Henrietta More   9/15/2020  1:30 PM Vijay Peterson MD [facility]   1/4/2021  2:00 PM Alana Hernandez DO [facility]             Care Coordination Interventions    Program Enrollment:  Complex Care  Referral from Primary Care Provider:  No  Suggested Interventions and Community Resources  No Identified Needs  Other Services:   (Comment: [employer] every 3 weeks)         Goals Addressed                 This Visit's Progress       Care Coordination     COMPLETED: Self Monitoring (pt-stated)   On track     Daily Weights - I will weight myself as directed - Daily and write down weights  I will notify my provider of any increase in weight by 3 or more pounds in 2 days OR 5 or more pounds in a week.         Barriers: forgetfulness  Plan for overcoming my barriers: Continued CC support  Confidence: 5/10  Anticipated Goal Completion Date: 12/31/19 12/11/2019 pt is not weighing due to current URI and no energy. cr              Prior to Admission medications    Medication Sig Start Date End Date Taking? Authorizing Provider   Roflumilast (DALIRESP) 250 MCG tablet Take 250 mcg by mouth daily    Historical Provider, MD   simvastatin (ZOCOR) 20 MG tablet TAKE 1 TABLET EVERY DAY 4/2/20   Mar Meraz MD   memantine (NAMENDA) 10 MG tablet TAKE 1 TABLET TWICE DAILY 3/15/20   aMr Meraz MD   FERROUS SULFATE PO Take 1 tablet by mouth     Historical Provider, MD   Vitamin D, Cholecalciferol, 10 MCG (400 UNIT) TABS Take 400 Units by mouth    Historical Provider, MD   levothyroxine (SYNTHROID) 100 MCG tablet TAKE 1 TABLET EVERY DAY 12/2/19   Mar Meraz MD   amiodarone (CORDARONE) 200 MG tablet Take 0.5 tablets by mouth daily 10/3/19   Maxi Rodriguez MD   Respiratory Therapy Supplies (NEBULIZER COMPRESSOR) KIT 1 kit by Does not apply route once for 1 dose 9/25/19 9/25/19  Attila Zhou DO   Respiratory Therapy Supplies (NEBULIZER/TUBING/MOUTHPIECE) KIT 1 kit by Does not apply route daily as needed (for breathing treatment) 9/25/19   Attila Zhou DO   metoprolol tartrate (LOPRESSOR) 50 MG tablet Take 0.5 tablets by mouth 2 times daily 9/17/19   Mar Meraz MD   torsemide [facility]) 20 MG tablet Take 1 tablet by mouth daily 7/29/19   Mar Meraz MD   Multiple Vitamins-Minerals (PRESERVISION AREDS 2+MULTI VIT PO) Take by mouth    Historical Provider, MD   albuterol sulfate HFA (PROAIR HFA) 108 (90 Base) MCG/ACT inhaler Inhale 2 puffs into the lungs every 4 hours as needed for Wheezing or Shortness of Breath 10/31/18   Attila Zhou DO   latanoprost (XALATAN) 0.005 % ophthalmic solution Place 1 drop into both eyes nightly.     Historical Provider, MD       Future Appointments   Date Time Provider Henrietta More   9/15/2020  1:30 PM Mar Meraz MD [facility]   1/4/2021  2:00 PM Attila Zhou DO [facility] PUL[facility]     ,   Congestive Heart Failure Assessment    Are you currently restricting fluids?:  No Restriction  Do you understand a low sodium diet?:  Yes  Do you understand how to read food labels?:   (Comment: Need to assess)  How many restaurant meals do you eat per week?:  1-2  Do you salt your food before tasting it?:  No     No patient-reported symptoms      Symptoms:   None:  Yes      Symptom course:  stable  Weight trend:  stable  Salt intake watch compared to last visit:  stable     ,   COPD Assessment              Symptoms:          and   General Assessment    Do you have any symptoms that are causing concern?:  No

## 2020-09-03 NOTE — PROGRESS NOTES
Department of Physical Medicine & Rehabilitation  Progress Note    Patient Identification:  Jules Rodriguez  [id number]  : 1935  Admit date: 2019    Chief Complaint: Chronic diastolic congestive heart failure (HCC)    Subjective:   No acute events overnight. Patient seen this am sitting up in gym. She is feeling well overall. Still some dyspnea on exertion with therapies. O2 sats occasionally drop to 80s but quickly recover with rest breaks per PT. Patient having frequent urination overnight. Likely due to lasix, but checking UA. ROS: No f/c, n/v, cp     Objective:  Patient Vitals for the past 24 hrs:   BP Temp Temp src Pulse Resp SpO2 Weight   19 1055 (!) 92/53 97.8 °F (36.6 °C) Oral 97 18 95 % --   19 0853 -- -- -- -- 18 95 % --   19 0507 -- -- -- -- -- -- 100 lb 5 oz (45.5 kg)   19 0452 102/62 98.1 °F (36.7 °C) Oral 94 16 92 % --   19 2134 -- -- -- -- 16 93 % --   19 2057 (!) 82/53 97.6 °F (36.4 °C) Oral 83 15 92 % --   19 1539 -- -- -- -- -- 97 % --   19 1410 95/61 97.3 °F (36.3 °C) Oral 92 16 -- --   19 1216 -- -- -- -- 18 94 % --     Const: Alert. No distress, pleasant. HEENT: Normocephalic, atraumatic. Normal sclera/conjunctiva. MMM. CV: Regular rate and rhythm. Resp: No respiratory distress. Lungs slightly decreased at bases   Abd: Soft, nontender, nondistended, NABS+   Ext: No edema. Neuro: Alert, oriented, appropriately interactive. Psych: Cooperative, appropriate mood and affect    Laboratory data: Available via EMR.    Last 24 hour lab  Recent Results (from the past 24 hour(s))   Basic Metabolic Panel    Collection Time: 19  6:31 AM   Result Value Ref Range    Sodium 141 136 - 145 mmol/L    Potassium 3.7 3.5 - 5.1 mmol/L    Chloride 102 99 - 110 mmol/L    CO2 28 21 - 32 mmol/L    Anion Gap 11 3 - 16    Glucose 98 70 - 99 mg/dL    BUN 24 (H) 7 - 20 mg/dL    CREATININE 1.4 (H) 0.6 - 1.2 mg/dL    GFR Non-African American RHEUMATOLOGY FOLLOW UP VISIT      Name: Sun Alberts  : 1963       Reason for visit:  She is an established patient here for follow-up management of rheumatoid arthritis.  Acute on chronic bilateral knee pain due to OA requesting cortisone injection.      History of present illness:   Follow-up patient with rheumatoid arthritis, positive ARNAUD, Raynauds phenomenon, fibromyalgia, bilateral CTS, vitamin D deficiency here for evaluation, disease activity monitoring, therapeutic drug monitoring.     In brief:  Ms. Mann is a pleasant 57-year-old AA lady with history of asthma, smoking for >35 years, hypertension, arthralgias, and a pituitary microadenoma coming to the rheumatology clinic for evaluation of connective tissue disease as her ARNAUD is positive and SCL 70 is positive. RF/CRP/CCP positive, elevated ESR.    Bilateral hand pain and swelling much improved after starting DMARD.  She continues to take PRN Tylenol for pain. She was started on  methotrexate 4 tablets once a week, folic acid 1 mg daily. She is tolerating her medications with no side effects.  Increase methotrexate to 6 tablets once a week during her last visit 20.     Today:   She is here for her regular follow-up appointment. She report her RA, fibromyalgia, CTS is in control, no recent flare, takes her medications with no side effects. She complain of bilateral knee pain due to OA, rates pain 9/10 requesting cortisone injection.  Current everyday smoker +1 pack of cigarettes a day, uses inhaler for asthma.  Denies shortness of breath, no chest pain, no difficulties breathing.  Other review of systems:  No fatigue, no fever, no chills, no night sweats,   No skin rashes, no swollen glands, no oral ulcers, no vasculitis rashes, she has mild dry mouth, no dry eyes,   No raynauds, no alopecia, no photosensitivity, no purpura.   No morning stiffness, no low back pain and stiffness.   No chest pain, no features of angina, no heart  36 (A) >60    GFR  43 (A) >60    Calcium 8.8 8.3 - 10.6 mg/dL       Therapy progress:  PT  Position Activity Restriction  Other position/activity restrictions: Room air  Objective     Sit to Stand: Contact guard assistance, Stand by assistance  Stand to sit: Contact guard assistance, Stand by assistance  Bed to Chair: Unable to assess  Device: Rolling Walker  Other Apparatus: O2(1L/min via NC)  Assistance: Contact guard assistance, Stand by assistance  Distance: 125', 130'  OT  PT Equipment Recommendations  Equipment Needed: Yes  Mobility Devices: Margo Sneddon: Rolling  Other: may need wheeled walker - will continue to assess  Toilet - Technique: Ambulating  Equipment Used: Grab bars  Toilet Transfers Comments: CGA stand to sit, min sit to stand  Assessment        SLP          Body mass index is 19.59 kg/m². Assessment and Plan:    Debility - PT/OT    Upper GI bleed - due to small bowel AVM. Continue ppi. Holding warfarin. Monitor Hgb, transfuse prn <7. Chronic dCHF - Lasix (adjusting dose based on renal function). Daily wt. Paroxysmal afib - Metoprolol for rate control, watch soft BPs. Amiodarone. Atc on hold due to GIB. CKD - Monitor renal function as lasix adjusted. Severe protein calorie malnutrition - Nutritional supplements. Dietitian following. Urinary frequency - Likely due to lasix but checking UA. Anticipated Dispo: home  Services: HH PT, OT, RN  DME: rolling walker  ELOS: 6/21      Gena Cannon.  Elvia Nelson MD 6/14/2019, 11:08 AM failure, no pleurisy or pericardial pain.   Denies Shortness of breath, no chronic cough, no hemoptysis.   No abdominal pain, no diarrhea, no constipation, no heartburn, no blood in stool.   No dysuria, no increased frequency of urination, no blood in urine. No history of protein in urine.   No memory loss, she has seizure disorder, reports that once menopause was obtained, all her complaints resolved. no strokes, and no focal weakness of the body. History of pituitary microadenoma, no complaints, she is not following with anybody now.  No numbness, no tingling of the extremities.   No history of anemia, no blood clots, no recurrent infections.      she has following chronic conditions.  1. Rheumatoid arthritis involving multiple sites with positive rheumatoid factor (CMS/HCC)    2. Primary Raynaud's phenomenon    3. Pain in joint of left shoulder    4. Fibromyalgia syndrome    5. Vitamin D deficiency    6. 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    • Seizures (CMS/HCC)         Past Surgical History:   Procedure Laterality Date   • Cyst removal Right 2018    wrist   • Hematoma evacuation Left 2011    thigh   • Tubal ligation         Family History   Problem Relation Age of Onset   • Sjogren's Syndrome Mother    • Rheumatoid Arthritis Mother    • Blood Disorder Mother    • Hypertension Mother    • Stroke Father    • Seizure Disorder Father        Social History     Tobacco Use   • Smoking status: Current Every Day Smoker     Packs/day: 1.00     Years: 15.00     Pack years: 15.00     Types: Cigarettes   • Smokeless tobacco: Current User   Substance Use Topics   • Alcohol use: Yes     Alcohol/week: 3.0 standard drinks     Types: 3  Glasses of wine per week     Comment: Social   • Drug use: Yes     Types: Marijuana       Current Outpatient Medications   Medication Sig Last Dose   • lisinopril-hydroCHLOROthiazide (ZESTORETIC) 20-12.5 MG per tablet TAKE 2 TABLETS BY MOUTH DAILY  Taking at Unknown time   • methotrexate 2.5 MG Tab Take 6 tablets by mouth 1 day a week. Taking at Unknown time   • folic acid (FOLATE) 1 MG tablet Take 1 tablet by mouth daily. Taking at Unknown time   • DULoxetine (CYMBALTA) 60 MG capsule Take 1 capsule by mouth daily. Taking at Unknown time   • predniSONE (DELTASONE) 10 MG tablet Take 1 tablet by mouth daily. Taking at Unknown time   • pregabalin (LYRICA) 75 MG capsule Take 1 capsule by mouth 2 times daily. Taking at Unknown time   • lamotrigine (LAMICTAL) 200 MG tablet Take 1 tablet by mouth 2 times daily. Taking at Unknown time   • amLODIPine (NORVASC) 5 MG tablet Take 1 tablet by mouth daily. Taking at Unknown time   • valACYclovir (VALTREX) 500 MG tablet Take 1 tablet by mouth daily. Taking at Unknown time   • albuterol (PROAIR HFA) 108 (90 Base) MCG/ACT inhaler Inhale 2 puffs into the lungs 4 times daily as needed for Shortness of Breath or Wheezing. Taking at Unknown time   • nicotine (NICODERM) 14 MG/24HR patch Place 1 patch onto the skin every 24 hours. Taking at Unknown time   • Vitamin D, Ergocalciferol, 25781 units capsule Take 1 capsule by mouth 1 day a week. Taking at Unknown time     No current facility-administered medications for this visit.        Review of systems:   Ten or more systems reviewed are negative except as noted above in HPI       Physical examination:  Well appearing/nourished AA lady, alert oriented x 3, bilateral knee pain due to OA.  Vital signs reviewed.  Skin: No photosensitive rashes, no tophi, no digital ulcers, no sclerodactyly, no nail fold capillary dilatation. No psoriatic rashes noted. no purpura.   HEENT: FLOYD, EOMI,no pallor, no icterus, no sinus tenderness, oral mucosa is  moist, no cervical lymphadenopathy. Normal JVP   Chest: air entry bilaterally equal, occasional wheezes with diminished intensity bilaterally posteriorly  CVS: S1 S2 heard, no murmurs, regular rate and rhythm, no rubs or gallops.   Abdomen: soft non tender, no organomegaly, bowel sounds are normal.   Musculoskeletal:   Upper extremity:   Hands: s/p right dorsum hand cyst removal, no tenosynovitis, no dequervanis  Right index/pinky finger PIPs and 4th digit pain/swelling resolved.  Left index finger PIP pain/swelling. Fist formation and hand  is normal. No dactylitis   Wrist: mild right wrist synovitis, no tenderness bilaterally.  Elbows: Full range of motion, no joint tenderness or swelling.  Shoulders: Passive range of motion is painless, external and internal rotation is intact. No joint swelling or tenderness noted.  Lower extremity:  Hip: Not examined today.  Knees: Bilateral flexion extension is normal, no crepitus, no joint line tenderness, no joint effusion.   Ankle and feet: Hallux valgus bilaterally, she has a bunion and a callus on the right MTP head. Arch is maintained.  Neurology: Cranial nerves: II to XII are grossly normal, no focal neurological deficits. No motor or sensory deficit noted.  Gait is normal.  Psychiatric: mood is normal, affect and judgment .      Visit Vitals:  BP (!) 164/88 (BP Location: LUE - Left upper extremity, Patient Position: Sitting, Cuff Size: Regular)   Pulse 79   Temp 97.6 °F (36.4 °C) (Oral)   Resp 18   Ht 5' 2\" (1.575 m)   Wt 61.3 kg (135 lb 2.3 oz)   LMP  (LMP Unknown)   SpO2 95%   BMI 24.72 kg/m²       LABs:    No visits with results within 1 Day(s) from this visit.   Latest known visit with results is:   Lab Services on 05/27/2020   Component Date Value Ref Range Status   • Fasting Status 05/27/2020 UNKNOWN  hrs Final   • Sodium 05/27/2020 141  135 - 145 mmol/L Final   • Potassium 05/27/2020 4.0  3.4 - 5.1 mmol/L Final   • Chloride 05/27/2020 103  98 - 107 mmol/L  Final   • Carbon Dioxide 05/27/2020 29  21 - 32 mmol/L Final   • Anion Gap 05/27/2020 13  10 - 20 mmol/L Final   • Glucose 05/27/2020 109* 65 - 99 mg/dL Final   • BUN 05/27/2020 9  6 - 20 mg/dL Final   • Creatinine 05/27/2020 0.45* 0.51 - 0.95 mg/dL Final   • GFR Estimate,  05/27/2020 >90   Final    eGFR results = or >90 mL/min/1.73m2 = Normal kidney function.   • GFR Estimate, Non  05/27/2020 >90   Final    eGFR results = or >90 mL/min/1.73m2 = Normal kidney function.   • BUN/Creatinine Ratio 05/27/2020 20  7 - 25 Final   • CALCIUM 05/27/2020 10.0  8.4 - 10.2 mg/dL Final   • TOTAL BILIRUBIN 05/27/2020 0.7  0.2 - 1.0 mg/dL Final   • AST/SGOT 05/27/2020 19  <38 Units/L Final   • ALT/SGPT 05/27/2020 22  <64 Units/L Final   • ALK PHOSPHATASE 05/27/2020 82  45 - 117 Units/L Final   • TOTAL PROTEIN 05/27/2020 7.3  6.4 - 8.2 g/dL Final   • Albumin 05/27/2020 3.9  3.6 - 5.1 g/dL Final   • GLOBULIN 05/27/2020 3.4  2.0 - 4.0 g/dL Final   • A/G Ratio, Serum 05/27/2020 1.1  1.0 - 2.4 Final   • WBC 05/27/2020 11.0  4.2 - 11.0 K/mcL Final   • RBC 05/27/2020 5.23* 4.00 - 5.20 mil/mcL Final   • HGB 05/27/2020 18.1* 12.0 - 15.5 g/dL Final   • HCT 05/27/2020 54.2* 36.0 - 46.5 % Final   • MCV 05/27/2020 103.6* 78.0 - 100.0 fl Final   • MCH 05/27/2020 34.6* 26.0 - 34.0 pg Final   • MCHC 05/27/2020 33.4  32.0 - 36.5 g/dL Final   • RDW-CV 05/27/2020 15.9* 11.0 - 15.0 % Final   • PLT 05/27/2020 236  140 - 450 K/mcL Final   • NRBC 05/27/2020 0  0 /100 WBC Final   • DIFF TYPE 05/27/2020 AUTOMATED DIFFERENTIAL   Final   • Neutrophil 05/27/2020 83  % Final   • LYMPH 05/27/2020 9  % Final   • MONO 05/27/2020 7  % Final   • EOSIN 05/27/2020 0  % Final   • BASO 05/27/2020 0  % Final   • Percent Immature Granuloctyes 05/27/2020 1  % Final   • Absolute Neutrophil 05/27/2020 9.1* 1.8 - 7.7 K/mcL Final   • Absolute Lymph 05/27/2020 1.0  1.0 - 4.0 K/mcL Final   • Absolute Mono 05/27/2020 0.8  0.3 - 0.9 K/mcL Final   •  Absolute Eos 05/27/2020 0.0* 0.1 - 0.5 K/mcL Final   • Absolute Baso 05/27/2020 0.0  0.0 - 0.3 K/mcL Final   • Absolute Immature Granulocytes 05/27/2020 0.1  0 - 0.2 K/mcl Final       IMAGINGs:    XR ADVOCATE PROCEDURE    EXAM: XR HAND MEGHAN MIN 3V    CLINICAL INDICATION: Painful hands and wrists.  Rule out rheumatoid arthritis.    No prior hand examination for comparison.    Three views of each hand.    Right hand.    Narrowing at the 1st carpometacarpal joint with mild subluxation at that site.  No other arthritic changes are seen in the hand.  No fractures or subluxations. There is soft tissue swelling about some of the proximal interphalangeal joints.    Impression:   1st carpometacarpal joint osteoarthritis and soft tissue swelling about some of the proximal interphalangeal joints may relate to early osteoarthritis.    Left hand.    IMPRESSION: Normal left hand.    Electronically Reviewed and Approved By:           OG DEVI MD 10/09/19 5:34 pm.      XR ADVOCATE PROCEDURE    EXAM: XR SHOULDER LT 3V 10/09/2019    INDICATION: Left shoulder pain.    COMPARISON: 12/12/2018.    Three views of left shoulder are submitted.    FINDINGS: No definitive acute fracture, dislocation, or significant osseous abnormality of visualized left shoulder is seen.    Impression:   No definitive acute or significant osseous abnormality of visualized left shoulder.  Recommend followup as clinically warranted.    Electronically Reviewed and Approved By:           JONNA MONTESINOS MD  10/09/19 11:17 am      Procedure note:  After obtaining verbal consent, complications of bleeding and infection was explained to the patient  and using sterile precautions, bilateral knee joints was prepped with chlorhexidine swabs,  ethyl chloride spray was used for local anaesthesia and then using a 25 G needle, 80 mg of depomedrol and 2 ml of 1% lidocaine were injected into both joints.  The patient underwent the procedure without any complications,  minimal bleeding, bandage placed. She was asked to rest the joint for a day, and to use ice compresses and take Tylenol if there is pain in the region of injection site. If the pain worsens or if the joint becomes red or swollen she was asked to call the clinic immediately.      Assessment:    Sun was seen today for office visit, rheumatoid arthritis and knee pain.  Diagnoses and all orders for this visit:    Rheumatoid arthritis involving multiple sites with positive rheumatoid factor (CMS/HCC)  Primary Raynaud's phenomenon  Pain in joint of left shoulder  Fibromyalgia syndrome  Vitamin D deficiency  Primary osteoarthritis of both knees  -     methylPREDNISolone DEPOT (DEPO-Medrol) 80 MG/ML injection 80 mg  -     methylPREDNISolone DEPOT (DEPO-Medrol) 80 MG/ML injection 80 mg  High risk medication use      Plan:    Rheumatoid arthritis: Diagnosis criteria; bilateral pain/swelling PIP/MCP joints, wrist synovitis, positive rheumatoid factor, CRP +, CCP positive, elevated ESR.   RA: 0 swollen joint, 2 tender joints [bilateral knee pain due to OA], morning stiffness less than 30 minutes, VAS 40/100 ( Justin knee pain), ESR 34 (01/2020).  HAMMONDS 28, ESR score 3.84, moderate disease activity.   RA is in control, high HAMMONDS score is from bilateral knee pain due to OA     Today: Her RA is in control, no complaint.  She continues to take her medications with no side effects.     Plan:  -Continue methotrexate 6 tablets once a week.  -Continue on folic acid 1 mg daily  -On amlodipine 5 mg daily for Raynaud's phenomenon  -The pathophysiology and treatment of rheumatoid arthritis was discussed with patient and she expressed understanding.    -She was again advised to quit smoking completely smoking interferes with RA treatment and makes the condition worsens.  - Bilateral hand x-ray normal, no erosions, no fractures.  -Hx chronic elevated hemoglobin 18.1 most likely due to hypoxia from smoking hx >35 years versus polycythemia  vera.  She was advised to keep hydrated, quit smoking. if repeat CBC show worsens hemoglobin we will give patient a referral to see hematology/oncology.  -Pending blood work, I emphasized on today's visit emphasized the importance of patient getting her blood work done but she again left without doing it.  I called her and she promised to get it done today after work;  CBC, CMP complements, ESR, UA, vitamin D level.        Primarily bilateral knee pain due to OA: Complains of bilateral knee pain, rates pain 9/10 and requested cortisone injection.  Plan:  - I Injected bilateral knee joints with 80 mg of Depomedrol and 2 ml of lidocaine into both joints.   -The nature and prognosis of the osteoarthritis was explained in detail.   -Non pharmacological methods for control of pain was explained including warm compresses, local bengay /icy hot local creams for pain relief.   -Quadriceps exercises, core muscle exercises was emphasized.   -Acetaminophen 500 mg TID PRN for pain.   -Neoprene sleeve for knee stability was explained.         Myofascial/fibromyalgia pain: In control, she has a good and bad days.  Plan:  -Continue duloxetine 60 mg daily  -Continue on Lyrica 75 mg twice daily per PCP  - Has tapering prednisone 10 mg tablets and was instructed to take during flareups [2 tablets x 3 days, 1 tablet x 3 days, 1/2 tablet x3 days then stop.  -Continue to exercise and stretch as tolerated.  - Keep a sleeping log and practices good sleeping habits        Vitamin D deficiency: She completed taking her vitamin D supplements 50,000 unit once a week.  Pending recheck labs.        Shoulder, left joint pain: In control, no complaint today.  Continue conservative management.  Plan:  -Xray Left shoulder is normal, mild osteoarthritis  -Continue strengthening exercises  -Continue as needed Tylenol 500 mg 3 times daily         Tobacco abuse: I again reiterated quitting smoking as this is the most dangerous thing she is doing, it is  contributing to her raynauds and digital ischemia. In addition to lung damage.  Rheumatoid arthritis medication does not go well with smoking.  I have explained to her the seriousness of continuing the smoking. She will consider quitting in the future but not currently motivated enough.        High risk medication use:  Methotrexate:  The risk and benefits of methotrexate was discussed in detail, and he/she has understood the side effects and has accepted the risks, including, liver failure, skin rashes, pulmonary reactions, including rare chances of fibrosis and lymphoma. Methotrexate may cause oral ulcers hair loss GI side effects and therefore the need for taking daily folic acid. Rarely drug induced pneumonitis presenting as cough/wheezing/fever especially in smokers has been reported.   Last blood work in January 2019, 8 months ago.  Pending new labs, she was instructed to get her blood work done today after her visit but left.  I called patient and emphasized she needs to get her blood work done and she plans to get it done today after work; CBC, CMP complements, ESR, urine test, vitamin D level.          COVID-19: She is aware of the pandemic and has been practicing universal precaution.  She denies fever, no dry cough, no respiratory symptoms.        CC to Dr. Cameron,     Patient expresses understanding of the plan.   Medical compliance with plan discussed and risks of non-compliance reviewed.   Patient education completed on disease process, etiology & prognosis.   Return to clinic as clinically indicated as discussed with patient who verbalized understanding of & agreement with the plan.       Return in about 3 months (around 12/3/2020).    Antonina Paul PA-C  9/3/2020

## 2020-09-10 NOTE — TELEPHONE ENCOUNTER
I see where Dr. Beard Handing office did the 6 min walk and then sent the patient to us to be set up but [facility] is stating she has to have office notes from prescribing MD as well as an official walk test.  Please place an order for a 6 min walk and we will have to see her within 30 days of the test.  Patient will be notified of this.

## 2020-09-10 NOTE — TELEPHONE ENCOUNTER
Patient called in and states that she was supposed to get supplemental O2 and has not gotten it. It was supposed to be sent to [address]

## 2020-09-10 NOTE — TELEPHONE ENCOUNTER
Phone call to Dr. Hu Lipoma office regarding what [facility] told us and they will check with Dr. Surendra Liu about ordering th O2

## 2020-09-28 NOTE — TELEPHONE ENCOUNTER
Martha called stating the portable oxygen tanks are cumbersome. She said she has the ones in the \"black pouch\" which sounds like the conserving tanks. I spoke with Crystal at [employer] who said since she is new to oxygen, they will have to have received at least one payment on her oxygen form Medicare before they will be able to provide a POC. Said to give it a few weeks to check back to see if they have received payment and if so a new RX for POC and current office notes indicating she would benefit from a POC . I advised Cora Dior of this.   She will check with [facility] in a couple weeks and let us know back

## 2020-10-06 NOTE — TELEPHONE ENCOUNTER
Patient said that she called their office yesterday but hasn't been called back so she called this office. She will call her PCP today.

## 2020-10-06 NOTE — TELEPHONE ENCOUNTER
I believe the oxygen was ordered by Dr. Maria A Graham office, does that mean he has to discontinue it?

## 2020-10-06 NOTE — TELEPHONE ENCOUNTER
Patient called in stating that she can't use the oxygen because it is to cumbersome . She was set up 9/11/20 with o2 from [employer] and hasn't used it. She is asking if Dr. Rice Deartluis carlos with D/C the oxygen.

## 2020-10-21 NOTE — ED NOTES
Spoke with Val Solis in Case management, she stated that the pt's home oxygen company stated that she has 6 tanks and a concentrator, at home.       Kaitlynn Crook RN  10/21/20 [id number]

## 2020-10-21 NOTE — ED PROVIDER NOTES
Per my interpretation:    Electrocardiogram (ECG) 10/21/2020 3:48 PM  RATE: normal  RHYTHM: normal sinus  AXIS: normal  INTERVALS: NH interval 212 ms otherwise normal  ST-T WAVE CHANGES: No evidence of ST segment elevation or T-wave inversion  Prior for comparison 7/2/2020     Nica Carney MD  10/21/20 [id number]

## 2020-10-21 NOTE — ED NOTES
Pt resting in bed at this time. Call light remains in reach instructed pt how to use, and encouraged pt to call if needed assistance, no distress noted. RR even and unlabored, skin warm and dry. No needs at this time. Will continue to monitor closely.          Filipe Gutierres RN  10/21/20 [id number]

## 2020-10-21 NOTE — ED PROVIDER NOTES
I independently performed a history and physical on Tarun Maxwell. All diagnostic, treatment, and disposition decisions were made by myself in conjunction with the advanced practice provider. For further details of [facility] emergency department encounter, please see CHAPARRO Fine's documentation. Patient reports that she is prescribed oxygen at home and has had it for over a month but never needs it. She woke up this morning feeling short of breath so she tried to use it for a while and feels like it stopped working so she called 911. On my exam heart regular rate and rhythm and lungs with a few scattered basilar rales. Abdomen benign. Trace lower extremity edema. EKG  The Ekg interpreted by me shows  normal sinus rhythm with a rate of 65  Axis is   Normal  QTc is  normal  First-degree AV block  ST Segments: no acute change  No significant change from prior EKG dated 7/2/2020    Xr Chest Portable  Right basilar opacities similar to CT dating back to September 9th 2019. Findings may represent chronic scarring or consolidation given lack of significant change. Diffuse interstitial prominence which is slightly progressed as well as increase in hazy appearance of the lungs. Findings may be related to edema or infection.    Results for orders placed or performed during the hospital encounter of 10/21/20   CBC Auto Differential   Result Value Ref Range    WBC 5.8 4.0 - 11.0 K/uL    RBC 3.51 (L) 4.00 - 5.20 M/uL    Hemoglobin 10.7 (L) 12.0 - 16.0 g/dL    Hematocrit 32.8 (L) 36.0 - 48.0 %    MCV 93.6 80.0 - 100.0 fL    MCH 30.6 26.0 - 34.0 pg    MCHC 32.6 31.0 - 36.0 g/dL    RDW 16.3 (H) 12.4 - 15.4 %    Platelets 741 (L) 625 - 450 K/uL    MPV 9.4 5.0 - 10.5 fL    Neutrophils % 68.9 %    Lymphocytes % 14.3 %    Monocytes % 5.9 %    Eosinophils % 9.8 %    Basophils % 1.1 %    Neutrophils Absolute 4.0 1.7 - 7.7 K/uL    Lymphocytes Absolute 0.8 (L) 1.0 - 5.1 K/uL    Monocytes Absolute 0.3 0.0 - 1.3 K/uL    Eosinophils Absolute 0.6 0.0 - 0.6 K/uL    Basophils Absolute 0.1 0.0 - 0.2 K/uL   Comprehensive Metabolic Panel w/ Reflex to MG   Result Value Ref Range    Sodium 143 136 - 145 mmol/L    Potassium reflex Magnesium 4.1 3.5 - 5.1 mmol/L    Chloride 103 99 - 110 mmol/L    CO2 30 21 - 32 mmol/L    Anion Gap 10 3 - 16    Glucose 98 70 - 99 mg/dL    BUN 19 7 - 20 mg/dL    CREATININE 1.0 0.6 - 1.2 mg/dL    GFR Non- 53 (A) >60    GFR African American >60 >60    Calcium 9.2 8.3 - 10.6 mg/dL    Total Protein 7.2 6.4 - 8.2 g/dL    Alb 3.5 3.4 - 5.0 g/dL    Albumin/Globulin Ratio 0.9 (L) 1.1 - 2.2    Total Bilirubin 0.5 0.0 - 1.0 mg/dL    Alkaline Phosphatase 108 40 - 129 U/L    ALT 51 (H) 10 - 40 U/L    AST 59 (H) 15 - 37 U/L    Globulin 3.7 g/dL   Troponin   Result Value Ref Range    Troponin <0.01 <0.01 ng/mL   Brain Natriuretic Peptide   Result Value Ref Range    Pro-BNP 5,764 (H) 0 - 449 pg/mL   Blood Gas, Venous   Result Value Ref Range    pH, Jimmy 7.342 (L) 7.350 - 7.450    pCO2, Jimmy 59.9 (H) 40.0 - 50.0 mmHg    pO2, Jimmy 19 Not Established mmHg    HCO3, Venous 32 (H) 23 - 29 mmol/L    Base Excess, Jimmy 5.3 Not Established mmol/L    O2 Sat, Jimmy 25 Not Established %    Carboxyhemoglobin 1.3 %    MetHgb, Jimmy 0.6 <1.5 %    TC02 (Calc), Jimmy 34 Not Established mmol/L    O2 Content, Jimmy 4 Not Established mL/dL    O2 Therapy Unknown    Lactic Acid, Plasma   Result Value Ref Range    Lactic Acid 1.2 0.4 - 2.0 mmol/L   EKG 12 Lead   Result Value Ref Range    Ventricular Rate 65 BPM    Atrial Rate 65 BPM    P-R Interval 212 ms    QRS Duration 76 ms    Q-T Interval 438 ms    QTc Calculation (Bazett) 455 ms    P Axis 71 degrees    R Axis 46 degrees    T Axis 43 degrees    Diagnosis       Sinus rhythm with 1st degree A-V block Poor data quality, interpretation may be adversely affectedOtherwise normal ECG  probablyWhen compared with ECG of 24-JUL-2019 15:40,No significant change was foundConfirmed by [facility] ANTON NULL, Chadhung Marilee ([id number]) on 10/21/2020 3:30:35 PM     Patient is on oxygen for short time here in the ER and after that time I trialed her on room air and she maintained saturation of 90 to 92% at rest.  When she falls asleep she will drop below this number. She no longer has any distress after the Lasix treatment. I spoke with Dr. Kavitha العلي, on-call primary care physician for this patient, and he agreed to have the patient seen in the office as an outpatient. I advised her to return for new or worsening symptoms such as fever, chest pain or return of shortness of breath. The total Critical Care time is 40 minutes which excludes separately billable procedures. The critical care was concerning IV lasix for CHF. This time is exclusive of any time documented by any other providers. I estimate there is LOW risk for ACUTE CORONARY SYNDROME, CHRONIC OBSTRUCTIVE PULMONARY DISEASE, CONGESTIVE HEART FAILURE, PERICARDIAL TAMPONADE, PNEUMONIA, PNEUMOTHORAX, PULMONARY EMBOLISM, SEPSIS, and THORACIC DISSECTION,  thus I consider the discharge disposition reasonable. Ramonita Ng and I have discussed the diagnosis and risks, and we agree with discharging home to follow-up with their primary doctor. We also discussed returning to the Emergency Department immediately if new or worsening symptoms occur. We have discussed the symptoms which are most concerning (e.g., bloody sputum, fever, worsening pain or shortness of breath, vomiting) that necessitate immediate return. CLINICAL IMPRESSION:  1. Congestive heart failure, unspecified HF chronicity, unspecified heart failure type ([facility] [address]    2.  Dyspnea, unspecified type        Pulse 70   Temp 98.5 °F (36.9 °C) (Oral)   Resp 12   Wt 83 lb 5.3 oz (37.8 kg)   SpO2 99%   BMI 16.28 kg/m²          Trevor Yeung MD  10/21/20 2057

## 2020-10-21 NOTE — ED NOTES
Bed: B-07  Expected date: 10/21/20  Expected time: 1:11 PM  Means of arrival: [facility]  Comments:  RASHEED Rosas, [facility]  10/21/20 5180

## 2020-10-21 NOTE — CARE COORDINATION
Referral per MD as patient states her oxygen at home is not working. She gets it through [id number]. Called [employer]; they just serviced her equipment on 10/8. Report besides concentrator, she has 2 E tanks and 6 small portable tanks so even if concentrator is not working she has many back up tanks. They are happy to trouble shoot via phone and / or make a service call tonight if patient calls after she is home (if she feels like equipment is still not functioning properly). Info relayed to bedside RN.   Electronically signed by Karen Villarreal on 10/21/2020 at 5:48 PM

## 2020-10-21 NOTE — ED PROVIDER NOTES
[address]       Pt Name: Girish Gibson  MRN: [id number]  [place] 1935  Date of evaluation: 10/21/2020  Provider: CHAPARRO Unger    Shared Visit or Autonomous Visit:  I have seen and evaluated this patient with my supervising physician Tulio Mcclellan MD.      [address] [place] [address]       Chief Complaint   Patient presents with    Shortness of Breath     started this am        HISTORY OF PRESENT ILLNESS  (Location/Symptom, Timing/Onset, Context/Setting, Quality, Duration, Modifying Factors, Severity.)   Girish Gibson is a 80 y.o. female who presents to the emergency department with complaint of shortness of breath. Patient says she has home oxygen that she uses only when needed, not continuously, and this morning it seemed to be working well but then for some reason appeared to stop working. She says she was feeling short of breath at the time, panicked, and so called for an ambulance. She says she was feeling well before this, no other recent illnesses or recent hospitalizations. She denies any chest pain. She says she has a bit of a chronic cough but no real changes in this, no cold symptoms or fever. Says she does not eat much and has been losing weight but this has been going on for some time, no abrupt changes in appetite or vomiting or diarrhea. She says that right now she has no complaints, and she is on nasal cannula oxygen at 3 L/min. Nursing Notes were reviewed and I agree. REVIEW OF SYSTEMS    (2-9 systems for level 4, 10 or more for level 5)     Constitutional:  Negative for fever, chills. Respiratory: Positive for episodic shortness of breath. Negative for cough, wheezing. Cardiovascular:  Negative for chest pain, palpitations. Gastrointestinal:  Negative for nausea, vomiting, abdominal pain. Genitourinary:  Negative for dysuria, hematuria, flank pain, and pelvic pain.    Musculoskeletal:  Negative for myalgias, 6/16/2019    EGD FOREIGN BODY REMOVAL performed by Va Vieira MD at [address] [facility]       Previous Medications    ALBUTEROL SULFATE HFA (PROAIR HFA) 108 (90 BASE) MCG/ACT INHALER    Inhale 2 puffs into the lungs every 4 hours as needed for Wheezing or Shortness of Breath    AMIODARONE (CORDARONE) 200 MG TABLET    TAKE 1/2 TABLET EVERY DAY    FERROUS SULFATE PO    Take 1 tablet by mouth     LATANOPROST (XALATAN) 0.005 % OPHTHALMIC SOLUTION    Place 1 drop into both eyes nightly. LEVOTHYROXINE (SYNTHROID) 100 MCG TABLET    TAKE 1 TABLET EVERY DAY    MEMANTINE (NAMENDA) 10 MG TABLET    TAKE 1 TABLET TWICE DAILY    METOPROLOL TARTRATE (LOPRESSOR) 50 MG TABLET    Take 0.5 tablets by mouth 2 times daily    MULTIPLE VITAMINS-MINERALS (PRESERVISION AREDS 2+MULTI VIT PO)    Take by mouth    RESPIRATORY THERAPY SUPPLIES (NEBULIZER COMPRESSOR) KIT    1 kit by Does not apply route once for 1 dose    RESPIRATORY THERAPY SUPPLIES (NEBULIZER/TUBING/MOUTHPIECE) KIT    1 kit by Does not apply route daily as needed (for breathing treatment)    ROFLUMILAST (DALIRESP) 250 MCG TABLET    Take 250 mcg by mouth daily    SIMVASTATIN (ZOCOR) 20 MG TABLET    TAKE 1 TABLET EVERY DAY    SODIUM CHLORIDE, INHALANT, 3 % NEBULIZER SOLUTION    INHALE FOUR MILLILITERS BY NEBULIZATION TWICE A DAY    TORSEMIDE (DEMADEX) 20 MG TABLET    TAKE 1 TABLET EVERY DAY    TRAZODONE (DESYREL) 50 MG TABLET    Take 1 tablet by mouth nightly as needed for Sleep    VITAMIN D, CHOLECALCIFEROL, 10 MCG (400 UNIT) TABS    Take 400 Units by mouth       ALLERGIES     Ace inhibitors; Aricept [donepezil hydrochloride]; Benicar [olmesartan medoxomil]; Exelon [rivastigmine tartrate];  Pcn [penicillins]; Quinapril hcl; Remeron [mirtazapine]; and Doxycycline    FAMILY HISTORY           Problem Relation Age of Onset    Cancer Mother         unknown primary    Other Father         Tuberculosis    Lung Cancer Sister     Tuberculosis Brother     Diabetes Brother      Family Status   Relation Name Status    Mother   at age 48    Father   at age 40    Brother   at age 32        Diabetes Coma   [facility] Sister   at age 62        lung cancer    Sister   at age 64        \"bone\" cancer    Brother   at age 80        \"old age\"    Brother  (Not Specified)        SOCIAL HISTORY      reports that she quit smoking about 55 years ago. She has never used smokeless tobacco. She reports previous alcohol use. She reports that she does not use drugs. PHYSICAL EXAM    (up to 7 for level 4, 8 or more for level 5)     ED Triage Vitals [10/21/20 1325]   BP Temp Temp Source Pulse Resp SpO2 Height Weight   -- 98.5 °F (36.9 °C) Oral 70 12 99 % -- 83 lb 5.3 oz (37.8 kg)       Constitutional:  Appearing well-developed and well-nourished. No distress. HENT:  Normocephalic and atraumatic. Cardiovascular:  Normal rate, regular rhythm, normal heart sounds and intact distal pulses. Pulmonary/Chest:  Effort normal and breath sounds normal. No respiratory distress. Musculoskeletal:  Normal range of motion. No edema exhibited. Neurological:  Oriented to person, place, and time. No cranial nerve deficit. Skin:  Skin is warm and dry. Not diaphoretic. Psychiatric:  Normal mood, affect, behavior, judgment and thought content. DIAGNOSTIC RESULTS     RADIOLOGY:   Interpretation per the Radiologist below, if available at the time of this note:    XR CHEST PORTABLE   Final Result   Right basilar opacities similar to CT dating back to 2019. Findings may represent chronic scarring or consolidation given lack of   significant change. Diffuse interstitial prominence which is slightly progressed as well as   increase in hazy appearance of the lungs. Findings may be related to edema   or infection.              LABS:  Labs Reviewed   CBC WITH AUTO DIFFERENTIAL - Abnormal; Notable for the following components: Result Value    RBC 3.51 (*)     Hemoglobin 10.7 (*)     Hematocrit 32.8 (*)     RDW 16.3 (*)     Platelets 382 (*)     Lymphocytes Absolute 0.8 (*)     All other components within normal limits    Narrative:     Performed at:  [address] [employer]   Phone [phone]   COMPREHENSIVE METABOLIC PANEL W/ REFLEX TO MG FOR LOW K - Abnormal; Notable for the following components:    GFR Non-African American 53 (*)     Albumin/Globulin Ratio 0.9 (*)     ALT 51 (*)     AST 59 (*)     All other components within normal limits    Narrative:     Performed at:  [address] [employer]   Phone [phone]   BRAIN NATRIURETIC PEPTIDE - Abnormal; Notable for the following components:    Pro-BNP 5,764 (*)     All other components within normal limits    Narrative:     Performed at:  [address] [facility] [employer]   Phone [phone]   BLOOD GAS, VENOUS - Abnormal; Notable for the following components:    pH, Jimmy 7.342 (*)     pCO2, Jimmy 59.9 (*)     HCO3, Venous 32 (*)     All other components within normal limits    Narrative:     Performed at:  [address] [facility] [employer]   Phone [phone]   TROPONIN    Narrative:     Performed at:  [address] [facility] [employer]   Phone [phone]   LACTIC ACID, PLASMA    Narrative:     Performed at:  [address] [facility] [employer]   Phone [phone]       All other labs were within normal range or not returned as of this dictation.     EMERGENCY DEPARTMENT COURSE and DIFFERENTIAL DIAGNOSIS/MDM:   Vitals:    Vitals:    10/21/20 1325   Pulse: 70   Resp: 12   Temp: 98.5 °F (36.9 °C)   TempSrc: Oral   SpO2: 99%   Weight: 83 lb 5.3 oz (37.8 kg)       The patient's condition in the ED was good, the patient was afebrile and nontoxic in appearance, and the patient's physical exam was unremarkable. No respiratory distress. While on supplemental oxygen, patient oxygenated very well and had no complaints. Lab work-up was notable for a BNP of just over 5700, slightly higher than what appears to be the patient's baseline. Chest x-ray showed some chronic right basilar opacities, possibly progressing interstitial prominence. Patient was given IV Lasix in the ED. Social work was consulted, and they consulted the patient's home oxygen supplier, who reported that the patient's home oxygen was just recently checked within the last few days and was working normally. At this point I turned over care of the patient entirely to Dr. Magdalena Jeffers, with the expectation of discharge home. PROCEDURES:  None    FINAL IMPRESSION      1. Congestive heart failure, unspecified HF chronicity, unspecified heart failure type ([address]    2. Dyspnea, unspecified type          DISPOSITION/PLAN   DISPOSITION        PATIENT REFERRED TO:  No follow-up provider specified.     DISCHARGE MEDICATIONS:  New Prescriptions    No medications on file       (Please note that portions of this note were completed with a voice recognition program.  Efforts were made to edit the dictations but occasionally words are mis-transcribed.)    Mile Wong, [address] [place] [address]  10/21/20 2014

## 2020-10-22 NOTE — CARE COORDINATION
Patient contacted regarding recent discharge and COVID-19 risk. Discussed COVID-19 related testing which was not done at this time. Test results were not done. Patient informed of results, if available? N/A     Care Transition Nurse/ Ambulatory Care Manager contacted the patient by telephone to perform post discharge assessment. Verified name and  with patient as identifiers. Patient has following risk factors of: COPD. CTN/ACM reviewed discharge instructions, medical action plan and red flags related to discharge diagnosis. Reviewed and educated them on any new and changed medications related to discharge diagnosis. Advised obtaining a 90-day supply of all daily and as-needed medications. Education provided regarding infection prevention, and signs and symptoms of COVID-19 and when to seek medical attention with patient who verbalized understanding. Discussed exposure protocols and quarantine from [address] you at higher risk for severe illness  and given an opportunity for questions and concerns. The patient agrees to contact the COVID-19 hotline [phone] or PCP office for questions related to their healthcare. CTN/ACM provided contact information for future reference. From CDC: Are you at higher risk for severe illness?  Wash your hands often.  Avoid close contact (6 feet, which is about two arm lengths) with people who are sick.  Put distance between yourself and other people if COVID-19 is spreading in your community.  Clean and disinfect frequently touched surfaces.  Avoid all cruise travel and non-essential air travel.  Call your healthcare professional if you have concerns about COVID-19 and your underlying condition or if you are sick. For more information on steps you can take to protect yourself, see CDC's How to [place] for follow-up call in 5-7 days based on severity of symptoms and risk factors.     Ambulatory Care Coordination Note  10/22/2020  CM Risk Score: 3  Charlson 10 Year Mortality Risk Score: 100%     ACC: Bill Harry RN    Summary Note: Pt reports she is feeling better today, breathing easier today, and is not using her supplemental oxygen. She stated she only uses this PRN. She has a f/u appointment with her PCP today. She does not have any questions/concerns for ACM today. ACM discussed the Care Management program with pt today. Pt agreed to enroll in the program, however she asked that ACM call back next week to complete medication review. ACM agreed with this plan and encouraged pt to call with any questions/concerns. Pt verbalized understanding. Ambulatory Care Coordination Assessment    Care Coordination Protocol  Week 1 - Initial Assessment     Do you have all of your prescriptions and are they filled?:  Yes  Barriers to medication adherence:  None  Are you able to afford your medications?:  Yes  How often do you have trouble taking your medications the way you have been told to take them?:  I always take them as prescribed. Do you have Home O2 Therapy?:  No      Ability to seek help/take action for Emergent Urgent situations i.e. fire, crime, inclement weather or health crisis. :  Independent  Ability to ambulate to restroom:  Independent  Ability handle personal hygeine needs (bathing/dressing/grooming): Independent  Ability to manage Medications: Independent  Ability to prepare Food Preparation:  Independent  Ability to maintain home (clean home, laundry): Independent  Ability to drive and/or has transportation:  Independent  Ability to do shopping:  Independent  Ability to manage finances:   Independent  Is patient able to live independently?:  Yes     Current Housing:  Private Residence              Are you experiencing loss of meaning?:  No  Are you experiencing loss of hope and peace?:  No     Suggested Interventions and Community Resources                  Prior to Admission medications Medication Sig Start Date End Date Taking? Authorizing Provider   amiodarone (CORDARONE) 200 MG tablet TAKE 1/2 TABLET EVERY DAY 10/19/20   Cristian Moon MD   torsemide (DEMADEX) 20 MG tablet TAKE 1 TABLET EVERY DAY 10/19/20   Tamela Rosenbaum MD   traZODone (DESYREL) 50 MG tablet Take 1 tablet by mouth nightly as needed for Sleep 8/13/20   Tamela Rosenbaum MD   sodium chloride, Inhalant, 3 % nebulizer solution INHALE FOUR MILLILITERS BY NEBULIZATION TWICE A DAY 7/24/20   Micky Balderrama DO   Roflumilast (DALIRESP) 250 MCG tablet Take 250 mcg by mouth daily    Historical Provider, MD   simvastatin (ZOCOR) 20 MG tablet TAKE 1 TABLET EVERY DAY 4/2/20   Tamela Rosenbaum MD   memantine (NAMENDA) 10 MG tablet TAKE 1 TABLET TWICE DAILY 3/15/20   Tamela Rosenbaum MD   FERROUS SULFATE PO Take 1 tablet by mouth     Historical Provider, MD   Vitamin D, Cholecalciferol, 10 MCG (400 UNIT) TABS Take 400 Units by mouth    Historical Provider, MD   levothyroxine (SYNTHROID) 100 MCG tablet TAKE 1 TABLET EVERY DAY 12/2/19   Tamela Rosenbaum MD   Respiratory Therapy Supplies (NEBULIZER COMPRESSOR) KIT 1 kit by Does not apply route once for 1 dose 9/25/19 9/25/19  Micky Balderrama DO   Respiratory Therapy Supplies (NEBULIZER/TUBING/MOUTHPIECE) KIT 1 kit by Does not apply route daily as needed (for breathing treatment) 9/25/19   Micky Balderrama DO   metoprolol tartrate (LOPRESSOR) 50 MG tablet Take 0.5 tablets by mouth 2 times daily 9/17/19   Tamela Rosenbaum MD   Multiple Vitamins-Minerals (PRESERVISION AREDS 2+MULTI VIT PO) Take by mouth    Historical Provider, MD   albuterol sulfate HFA (PROAIR HFA) 108 (90 Base) MCG/ACT inhaler Inhale 2 puffs into the lungs every 4 hours as needed for Wheezing or Shortness of Breath 10/31/18   Micky Balderrama DO   latanoprost (XALATAN) 0.005 % ophthalmic solution Place 1 drop into both eyes nightly.

## 2020-10-29 NOTE — CARE COORDINATION
Ambulatory Care Coordination Note  10/29/2020  CM Risk Score: 3  Charlson 10 Year Mortality Risk Score: 100%     ACC: Russell Padilla RN    Summary Note: Pt reports she is always SOB on exertion. She reports she \"does not need to use my oxygen if I'm not doing anything\". She stated she continues to work on her breathing exercises. Pt inquired about the pulse oximetry to wear at night and ACM informed pt that per recent office visit note, it appears this is still being worked on. Pt verbalized understanding. ACM able to complete Medication Review and Fall Risk today. PLAN:    F/U Pulse Oximeter  Set Goals  Complete SDOH  Complete CC Protocol            Goals Addressed    None         Prior to Admission medications    Medication Sig Start Date End Date Taking?  Authorizing Provider   amiodarone (CORDARONE) 200 MG tablet TAKE 1/2 TABLET EVERY DAY 10/19/20  Yes Josselyn Rendon MD   torsemide (DEMADEX) 20 MG tablet TAKE 1 TABLET EVERY DAY 10/19/20  Yes Neema Rubio MD   traZODone (DESYREL) 50 MG tablet Take 1 tablet by mouth nightly as needed for Sleep 8/13/20  Yes Neema Rubio MD   simvastatin (ZOCOR) 20 MG tablet TAKE 1 TABLET EVERY DAY 4/2/20  Yes Neema Rubio MD   memantine (NAMENDA) 10 MG tablet TAKE 1 TABLET TWICE DAILY 3/15/20  Yes Neema Rubio MD   FERROUS SULFATE PO Take 1 tablet by mouth    Yes Historical Provider, MD   Vitamin D, Cholecalciferol, 10 MCG (400 UNIT) TABS Take 400 Units by mouth   Yes Historical Provider, MD   levothyroxine (SYNTHROID) 100 MCG tablet TAKE 1 TABLET EVERY DAY 12/2/19  Yes Neema Rubio MD   Respiratory Therapy Supplies (NEBULIZER/TUBING/MOUTHPIECE) KIT 1 kit by Does not apply route daily as needed (for breathing treatment) 9/25/19  Yes Diego Benitez DO   metoprolol tartrate (LOPRESSOR) 50 MG tablet Take 0.5 tablets by mouth 2 times daily 9/17/19  Yes Neema Rubio MD   Multiple Vitamins-Minerals (PRESERVISION AREDS 2+MULTI VIT PO) Take by mouth   Yes Historical Provider, MD   albuterol sulfate HFA (PROAIR HFA) 108 (90 Base) MCG/ACT inhaler Inhale 2 puffs into the lungs every 4 hours as needed for Wheezing or Shortness of Breath 10/31/18  Yes Cara Cobb DO   latanoprost (XALATAN) 0.005 % ophthalmic solution Place 1 drop into both eyes nightly.    Yes Historical Provider, MD   sodium chloride, Inhalant, 3 % nebulizer solution INHALE FOUR MILLILITERS BY NEBULIZATION TWICE A DAY  Patient not taking: Reported on 10/29/2020 7/24/20   Cara Cobb DO   Respiratory Therapy Supplies (NEBULIZER COMPRESSOR) KIT 1 kit by Does not apply route once for 1 dose 9/25/19 9/25/19  Cara Cobb DO       Future Appointments   Date Time Provider Henrietta More   11/13/2020  1:00 PM Alfa Blas MD [facility]   1/4/2021  2:00 PM [facility]     , This patient was permanently screened out of Care Coordination on 10/29/2020 for the following reason:   and 3

## 2020-11-24 NOTE — CARE COORDINATION
Ambulatory Care Coordination Note  11/24/2020  CM Risk Score: 3  Charlson 10 Year Mortality Risk Score: 100%     ACC: Tika Conway RN    Summary Note: Reports weighs daily after urinating in the am. Denies any sob, edema or wt gain. Reviewed low fat, low sodium diets. Reports is wearing O2 at night only and not during the day time. Reports checks O2 sats and usually in mid 90's. Pt dc'd from [facility] due to goals met and no further needs. Congestive Heart Failure Assessment    Are you currently restricting fluids?:  No Restriction  Do you understand a low sodium diet?:  Yes  Do you understand how to read food labels?:   (Comment: Need to assess)  How many restaurant meals do you eat per week?:  1-2  Do you salt your food before tasting it?:  No     No patient-reported symptoms      Symptoms:   None:  Yes      Symptom course:  stable  Weight trend:  stable  Salt intake watch compared to last visit:  stable             Care Coordination Interventions    Program Enrollment:  Rising Risk  Referral from Primary Care Provider:  No  Suggested Interventions and Community Resources         Goals Addressed    None         Prior to Admission medications    Medication Sig Start Date End Date Taking?  Authorizing Provider   amiodarone (CORDARONE) 200 MG tablet TAKE 1/2 TABLET EVERY DAY 10/19/20   Prem Quispe MD   torsemide (DEMADEX) 20 MG tablet TAKE 1 TABLET EVERY DAY 10/19/20   Chapin Perea MD   traZODone (DESYREL) 50 MG tablet Take 1 tablet by mouth nightly as needed for Sleep 8/13/20   Chapin Perea MD   sodium chloride, Inhalant, 3 % nebulizer solution INHALE FOUR MILLILITERS BY NEBULIZATION TWICE A DAY 7/24/20   Shayy Montana DO   simvastatin (ZOCOR) 20 MG tablet TAKE 1 TABLET EVERY DAY 4/2/20   Chapin Perea MD   memantine (NAMENDA) 10 MG tablet TAKE 1 TABLET TWICE DAILY 3/15/20   Chapin Perea MD   FERROUS SULFATE PO Take 1 tablet by mouth

## 2021-01-01 ENCOUNTER — APPOINTMENT (OUTPATIENT)
Dept: CT IMAGING | Age: 86
DRG: 542 | End: 2021-01-01
Payer: MEDICARE

## 2021-01-01 ENCOUNTER — OFFICE VISIT (OUTPATIENT)
Dept: PULMONOLOGY | Age: 86
End: 2021-01-01
Payer: MEDICARE

## 2021-01-01 ENCOUNTER — CARE COORDINATION (OUTPATIENT)
Dept: CARE COORDINATION | Age: 86
End: 2021-01-01

## 2021-01-01 ENCOUNTER — APPOINTMENT (OUTPATIENT)
Dept: GENERAL RADIOLOGY | Age: 86
DRG: 542 | End: 2021-01-01
Payer: MEDICARE

## 2021-01-01 ENCOUNTER — IMMUNIZATION (OUTPATIENT)
Dept: PRIMARY CARE CLINIC | Age: 86
End: 2021-01-01
Payer: MEDICARE

## 2021-01-01 ENCOUNTER — HOSPITAL ENCOUNTER (OUTPATIENT)
Dept: VASCULAR LAB | Age: 86
Discharge: HOME | End: 2021-05-15
Payer: MEDICARE

## 2021-01-01 ENCOUNTER — APPOINTMENT (OUTPATIENT)
Dept: GENERAL RADIOLOGY | Age: 86
End: 2021-01-01
Payer: MEDICARE

## 2021-01-01 ENCOUNTER — APPOINTMENT (OUTPATIENT)
Dept: CT IMAGING | Age: 86
End: 2021-01-01
Payer: MEDICARE

## 2021-01-01 ENCOUNTER — HOSPITAL ENCOUNTER (EMERGENCY)
Age: 86
Discharge: HOME | End: 2021-01-31
Attending: EMERGENCY MEDICINE
Payer: MEDICARE

## 2021-01-01 ENCOUNTER — HOSPITAL ENCOUNTER (INPATIENT)
Age: 86
LOS: 3 days | DRG: 542 | End: 2021-06-20
Attending: INTERNAL MEDICINE | Admitting: INTERNAL MEDICINE
Payer: MEDICARE

## 2021-01-01 VITALS
BODY MASS INDEX: 18.18 KG/M2 | WEIGHT: 92.6 LBS | OXYGEN SATURATION: 92 % | TEMPERATURE: 97.5 F | HEART RATE: 86 BPM | HEIGHT: 60 IN | RESPIRATION RATE: 14 BRPM | SYSTOLIC BLOOD PRESSURE: 138 MMHG | DIASTOLIC BLOOD PRESSURE: 68 MMHG

## 2021-01-01 VITALS
WEIGHT: 101.19 LBS | TEMPERATURE: 98.6 F | BODY MASS INDEX: 19.76 KG/M2 | OXYGEN SATURATION: 97 % | DIASTOLIC BLOOD PRESSURE: 48 MMHG | SYSTOLIC BLOOD PRESSURE: 135 MMHG | HEART RATE: 60 BPM | RESPIRATION RATE: 15 BRPM

## 2021-01-01 VITALS
HEIGHT: 60 IN | BODY MASS INDEX: 17.05 KG/M2 | OXYGEN SATURATION: 88 % | TEMPERATURE: 98.6 F | WEIGHT: 86.86 LBS | DIASTOLIC BLOOD PRESSURE: 66 MMHG | SYSTOLIC BLOOD PRESSURE: 102 MMHG | RESPIRATION RATE: 36 BRPM | HEART RATE: 122 BPM

## 2021-01-01 DIAGNOSIS — J96.11 CHRONIC RESPIRATORY FAILURE WITH HYPOXIA (HCC): ICD-10-CM

## 2021-01-01 DIAGNOSIS — R60.0 LEG EDEMA, RIGHT: ICD-10-CM

## 2021-01-01 DIAGNOSIS — S12.690A CLOSED FRACTURE OF SEVENTH CERVICAL VERTEBRA WITHOUT SPINAL CORD INJURY, INITIAL ENCOUNTER (HCC): ICD-10-CM
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
## 2021-01-01 DIAGNOSIS — S22.080A COMPRESSION FRACTURE OF T12 VERTEBRA, INITIAL ENCOUNTER (HCC): ICD-10-CM

## 2021-01-01 DIAGNOSIS — J98.51 FIBROSING MEDIASTINITIS: ICD-10-CM

## 2021-01-01 DIAGNOSIS — E87.6 HYPOKALEMIA: ICD-10-CM

## 2021-01-01 DIAGNOSIS — J98.51 FIBROSING MEDIASTINITIS: Primary | ICD-10-CM

## 2021-01-01 DIAGNOSIS — J41.1 MUCOPURULENT CHRONIC BRONCHITIS (HCC): ICD-10-CM

## 2021-01-01 DIAGNOSIS — R93.89 ABNORMAL CT OF THE CHEST: Primary | ICD-10-CM

## 2021-01-01 DIAGNOSIS — J18.9 PNEUMONIA DUE TO ORGANISM: Primary | ICD-10-CM

## 2021-01-01 DIAGNOSIS — R77.8 ELEVATED TROPONIN: ICD-10-CM

## 2021-01-01 LAB
A/G RATIO: 1 (ref 1.1–2.2)
ALBUMIN SERPL-MCNC: 3.5 G/DL (ref 3.4–5)
ALBUMIN SERPL-MCNC: 3.6 G/DL (ref 3.4–5)
ALP BLD-CCNC: 119 U/L (ref 40–129)
ALP BLD-CCNC: 82 U/L (ref 40–129)
ALT SERPL-CCNC: 18 U/L (ref 10–40)
ALT SERPL-CCNC: 6 U/L (ref 10–40)
AMORPHOUS: ABNORMAL /HPF
ANION GAP SERPL CALCULATED.3IONS-SCNC: 12 MMOL/L (ref 3–16)
ANION GAP SERPL CALCULATED.3IONS-SCNC: 16 MMOL/L (ref 3–16)
ANION GAP SERPL CALCULATED.3IONS-SCNC: 16 MMOL/L (ref 3–16)
ANION GAP SERPL CALCULATED.3IONS-SCNC: 20 MMOL/L (ref 3–16)
ANION GAP SERPL CALCULATED.3IONS-SCNC: 9 MMOL/L (ref 3–16)
APTT: 35.2 SEC (ref 24.2–36.2)
APTT: 48.3 SEC (ref 24.2–36.2)
APTT: 51.8 SEC (ref 24.2–36.2)
AST SERPL-CCNC: 14 U/L (ref 15–37)
AST SERPL-CCNC: 31 U/L (ref 15–37)
BASE EXCESS VENOUS: 14.7 MMOL/L
BASOPHILS ABSOLUTE: 0 K/UL (ref 0–0.2)
BASOPHILS ABSOLUTE: 0.1 K/UL (ref 0–0.2)
BASOPHILS ABSOLUTE: 0.1 K/UL (ref 0–0.2)
BASOPHILS RELATIVE PERCENT: 0.3 %
BASOPHILS RELATIVE PERCENT: 0.6 %
BASOPHILS RELATIVE PERCENT: 1 %
BILIRUB SERPL-MCNC: 0.5 MG/DL (ref 0–1)
BILIRUB SERPL-MCNC: 0.8 MG/DL (ref 0–1)
BILIRUBIN DIRECT: <0.2 MG/DL (ref 0–0.3)
BILIRUBIN URINE: NEGATIVE
BILIRUBIN URINE: NEGATIVE
BILIRUBIN, INDIRECT: ABNORMAL MG/DL (ref 0–1)
BLOOD, URINE: ABNORMAL
BLOOD, URINE: NEGATIVE
BUN BLDV-MCNC: 19 MG/DL (ref 7–20)
BUN BLDV-MCNC: 28 MG/DL (ref 7–20)
BUN BLDV-MCNC: 33 MG/DL (ref 7–20)
BUN BLDV-MCNC: 33 MG/DL (ref 7–20)
BUN BLDV-MCNC: 37 MG/DL (ref 7–20)
CALCIUM SERPL-MCNC: 8.9 MG/DL (ref 8.3–10.6)
CALCIUM SERPL-MCNC: 8.9 MG/DL (ref 8.3–10.6)
CALCIUM SERPL-MCNC: 9 MG/DL (ref 8.3–10.6)
CALCIUM SERPL-MCNC: 9.4 MG/DL (ref 8.3–10.6)
CALCIUM SERPL-MCNC: 9.8 MG/DL (ref 8.3–10.6)
CARBOXYHEMOGLOBIN: 1.3 %
CHLORIDE BLD-SCNC: 102 MMOL/L (ref 99–110)
CHLORIDE BLD-SCNC: 103 MMOL/L (ref 99–110)
CHLORIDE BLD-SCNC: 103 MMOL/L (ref 99–110)
CHLORIDE BLD-SCNC: 105 MMOL/L (ref 99–110)
CHLORIDE BLD-SCNC: 96 MMOL/L (ref 99–110)
CLARITY: ABNORMAL
CLARITY: ABNORMAL
CO2: 23 MMOL/L (ref 21–32)
CO2: 23 MMOL/L (ref 21–32)
CO2: 26 MMOL/L (ref 21–32)
CO2: 33 MMOL/L (ref 21–32)
CO2: 37 MMOL/L (ref 21–32)
COARSE CASTS, UA: ABNORMAL /LPF (ref 0–2)
COLOR: YELLOW
COLOR: YELLOW
CREAT SERPL-MCNC: 1.2 MG/DL (ref 0.6–1.2)
CREAT SERPL-MCNC: 1.3 MG/DL (ref 0.6–1.2)
CREAT SERPL-MCNC: 1.3 MG/DL (ref 0.6–1.2)
CREAT SERPL-MCNC: 1.4 MG/DL (ref 0.6–1.2)
CREAT SERPL-MCNC: 1.4 MG/DL (ref 0.6–1.2)
CRYSTALS, UA: ABNORMAL /HPF
EKG ATRIAL RATE: 57 BPM
EKG ATRIAL RATE: 77 BPM
EKG DIAGNOSIS: NORMAL
EKG P AXIS: 103 DEGREES
EKG P AXIS: 71 DEGREES
EKG P-R INTERVAL: 204 MS
EKG P-R INTERVAL: 218 MS
EKG Q-T INTERVAL: 300 MS
EKG Q-T INTERVAL: 434 MS
EKG Q-T INTERVAL: 434 MS
EKG QRS DURATION: 80 MS
EKG QTC CALCULATION (BAZETT): 422 MS
EKG QTC CALCULATION (BAZETT): 453 MS
EKG QTC CALCULATION (BAZETT): 491 MS
EKG R AXIS: 45 DEGREES
EKG R AXIS: 45 DEGREES
EKG R AXIS: 55 DEGREES
EKG T AXIS: 263 DEGREES
EKG T AXIS: 32 DEGREES
EKG T AXIS: 72 DEGREES
EKG VENTRICULAR RATE: 137 BPM
EKG VENTRICULAR RATE: 57 BPM
EKG VENTRICULAR RATE: 77 BPM
EOSINOPHILS ABSOLUTE: 0 K/UL (ref 0–0.6)
EOSINOPHILS ABSOLUTE: 0.1 K/UL (ref 0–0.6)
EOSINOPHILS ABSOLUTE: 0.7 K/UL (ref 0–0.6)
EOSINOPHILS RELATIVE PERCENT: 0.2 %
EOSINOPHILS RELATIVE PERCENT: 0.9 %
EOSINOPHILS RELATIVE PERCENT: 8.7 %
EPITHELIAL CELLS, UA: 10 /HPF (ref 0–5)
EPITHELIAL CELLS, UA: ABNORMAL /HPF (ref 0–5)
GFR AFRICAN AMERICAN: 43
GFR AFRICAN AMERICAN: 43
GFR AFRICAN AMERICAN: 47
GFR AFRICAN AMERICAN: 47
GFR AFRICAN AMERICAN: 51
GFR NON-AFRICAN AMERICAN: 36
GFR NON-AFRICAN AMERICAN: 36
GFR NON-AFRICAN AMERICAN: 39
GFR NON-AFRICAN AMERICAN: 39
GFR NON-AFRICAN AMERICAN: 43
GLOBULIN: 3.7 G/DL
GLUCOSE BLD-MCNC: 102 MG/DL (ref 70–99)
GLUCOSE BLD-MCNC: 122 MG/DL (ref 70–99)
GLUCOSE BLD-MCNC: 75 MG/DL (ref 70–99)
GLUCOSE BLD-MCNC: 87 MG/DL (ref 70–99)
GLUCOSE BLD-MCNC: 95 MG/DL (ref 70–99)
GLUCOSE URINE: NEGATIVE MG/DL
GLUCOSE URINE: NEGATIVE MG/DL
HCO3 VENOUS: 42 MMOL/L (ref 23–29)
HCT VFR BLD CALC: 30.4 % (ref 36–48)
HCT VFR BLD CALC: 31.8 % (ref 36–48)
HCT VFR BLD CALC: 35.8 % (ref 36–48)
HCT VFR BLD CALC: 36.3 % (ref 36–48)
HEMOGLOBIN: 10.5 G/DL (ref 12–16)
HEMOGLOBIN: 11.5 G/DL (ref 12–16)
HEMOGLOBIN: 12 G/DL (ref 12–16)
HEMOGLOBIN: 9.8 G/DL (ref 12–16)
HYALINE CASTS: 4 /LPF (ref 0–8)
KETONES, URINE: ABNORMAL MG/DL
KETONES, URINE: NEGATIVE MG/DL
LACTIC ACID: 1.3 MMOL/L (ref 0.4–2)
LEUKOCYTE ESTERASE, URINE: ABNORMAL
LEUKOCYTE ESTERASE, URINE: NEGATIVE
LV EF: 68 %
LVEF MODALITY: NORMAL
LYMPHOCYTES ABSOLUTE: 0.9 K/UL (ref 1–5.1)
LYMPHOCYTES ABSOLUTE: 0.9 K/UL (ref 1–5.1)
LYMPHOCYTES ABSOLUTE: 1 K/UL (ref 1–5.1)
LYMPHOCYTES RELATIVE PERCENT: 10.2 %
LYMPHOCYTES RELATIVE PERCENT: 12.2 %
LYMPHOCYTES RELATIVE PERCENT: 7.9 %
MAGNESIUM: 1.8 MG/DL (ref 1.8–2.4)
MCH RBC QN AUTO: 29.7 PG (ref 26–34)
MCH RBC QN AUTO: 29.7 PG (ref 26–34)
MCH RBC QN AUTO: 30.2 PG (ref 26–34)
MCH RBC QN AUTO: 30.4 PG (ref 26–34)
MCHC RBC AUTO-ENTMCNC: 32.1 G/DL (ref 31–36)
MCHC RBC AUTO-ENTMCNC: 32.2 G/DL (ref 31–36)
MCHC RBC AUTO-ENTMCNC: 33 G/DL (ref 31–36)
MCHC RBC AUTO-ENTMCNC: 33.2 G/DL (ref 31–36)
MCV RBC AUTO: 91.6 FL (ref 80–100)
MCV RBC AUTO: 91.7 FL (ref 80–100)
MCV RBC AUTO: 92.1 FL (ref 80–100)
MCV RBC AUTO: 92.3 FL (ref 80–100)
METHEMOGLOBIN VENOUS: 0.5 %
MICROSCOPIC EXAMINATION: YES
MICROSCOPIC EXAMINATION: YES
MONOCYTES ABSOLUTE: 0.7 K/UL (ref 0–1.3)
MONOCYTES RELATIVE PERCENT: 6.4 %
MONOCYTES RELATIVE PERCENT: 7.1 %
MONOCYTES RELATIVE PERCENT: 8.9 %
NEUTROPHILS ABSOLUTE: 5.2 K/UL (ref 1.7–7.7)
NEUTROPHILS ABSOLUTE: 7.6 K/UL (ref 1.7–7.7)
NEUTROPHILS ABSOLUTE: 9.5 K/UL (ref 1.7–7.7)
NEUTROPHILS RELATIVE PERCENT: 69.2 %
NEUTROPHILS RELATIVE PERCENT: 81.5 %
NEUTROPHILS RELATIVE PERCENT: 84.9 %
NITRITE, URINE: NEGATIVE
NITRITE, URINE: NEGATIVE
O2 CONTENT, VEN: 7 ML/DL
O2 SAT, VEN: 43 %
O2 THERAPY: ABNORMAL
OCCULT BLOOD DIAGNOSTIC: NORMAL
PCO2, VEN: 67.7 MMHG (ref 40–50)
PDW BLD-RTO: 13.9 % (ref 12.4–15.4)
PDW BLD-RTO: 14.1 % (ref 12.4–15.4)
PDW BLD-RTO: 14.5 % (ref 12.4–15.4)
PDW BLD-RTO: 14.7 % (ref 12.4–15.4)
PH UA: 5 (ref 5–8)
PH UA: 5.5 (ref 5–8)
PH VENOUS: 7.4 (ref 7.35–7.45)
PLATELET # BLD: 102 K/UL (ref 135–450)
PLATELET # BLD: 118 K/UL (ref 135–450)
PLATELET # BLD: 139 K/UL (ref 135–450)
PLATELET # BLD: 240 K/UL (ref 135–450)
PMV BLD AUTO: 10.3 FL (ref 5–10.5)
PMV BLD AUTO: 10.4 FL (ref 5–10.5)
PMV BLD AUTO: 9 FL (ref 5–10.5)
PMV BLD AUTO: 9.7 FL (ref 5–10.5)
PO2, VEN: 25 MMHG
POTASSIUM REFLEX MAGNESIUM: 3 MMOL/L (ref 3.5–5.1)
POTASSIUM REFLEX MAGNESIUM: 3.8 MMOL/L (ref 3.5–5.1)
POTASSIUM REFLEX MAGNESIUM: 4.2 MMOL/L (ref 3.5–5.1)
POTASSIUM SERPL-SCNC: 3.8 MMOL/L (ref 3.5–5.1)
POTASSIUM SERPL-SCNC: 4.1 MMOL/L (ref 3.5–5.1)
PRO-BNP: 4104 PG/ML (ref 0–449)
PRO-BNP: ABNORMAL PG/ML (ref 0–449)
PROCALCITONIN: 0.25 NG/ML (ref 0–0.15)
PROCALCITONIN: 0.27 NG/ML (ref 0–0.15)
PROTEIN UA: 30 MG/DL
PROTEIN UA: NEGATIVE MG/DL
RAPID INFLUENZA  B AGN: NEGATIVE
RAPID INFLUENZA A AGN: NEGATIVE
RBC # BLD: 3.3 M/UL (ref 4–5.2)
RBC # BLD: 3.47 M/UL (ref 4–5.2)
RBC # BLD: 3.88 M/UL (ref 4–5.2)
RBC # BLD: 3.96 M/UL (ref 4–5.2)
RBC UA: ABNORMAL /HPF (ref 0–4)
RBC UA: ABNORMAL /HPF (ref 0–4)
RENAL EPITHELIAL, UA: ABNORMAL /HPF (ref 0–1)
SARS-COV-2, NAAT: NOT DETECTED
SODIUM BLD-SCNC: 142 MMOL/L (ref 136–145)
SODIUM BLD-SCNC: 142 MMOL/L (ref 136–145)
SODIUM BLD-SCNC: 145 MMOL/L (ref 136–145)
SODIUM BLD-SCNC: 147 MMOL/L (ref 136–145)
SODIUM BLD-SCNC: 148 MMOL/L (ref 136–145)
SPECIFIC GRAVITY UA: 1.01 (ref 1–1.03)
SPECIFIC GRAVITY UA: 1.02 (ref 1–1.03)
TCO2 CALC VENOUS: 44 MMOL/L
TOTAL CK: 153 U/L (ref 26–192)
TOTAL CK: 249 U/L (ref 26–192)
TOTAL PROTEIN: 7 G/DL (ref 6.4–8.2)
TOTAL PROTEIN: 7.3 G/DL (ref 6.4–8.2)
TROPONIN: 0.06 NG/ML
TROPONIN: 0.08 NG/ML
TROPONIN: 0.08 NG/ML
TROPONIN: 0.11 NG/ML
TROPONIN: <0.01 NG/ML
URINE CULTURE, ROUTINE: NORMAL
URINE REFLEX TO CULTURE: ABNORMAL
URINE REFLEX TO CULTURE: YES
URINE TYPE: ABNORMAL
URINE TYPE: ABNORMAL
UROBILINOGEN, URINE: 0.2 E.U./DL
UROBILINOGEN, URINE: 0.2 E.U./DL
WBC # BLD: 11.2 K/UL (ref 4–11)
WBC # BLD: 11.7 K/UL (ref 4–11)
WBC # BLD: 7.5 K/UL (ref 4–11)
WBC # BLD: 9.3 K/UL (ref 4–11)
WBC UA: 50 /HPF (ref 0–5)
WBC UA: ABNORMAL /HPF (ref 0–5)

## 2021-01-01 PROCEDURE — 99233 SBSQ HOSP IP/OBS HIGH 50: CPT | Performed by: INTERNAL MEDICINE

## 2021-01-01 PROCEDURE — 84484 ASSAY OF TROPONIN QUANT: CPT

## 2021-01-01 PROCEDURE — 2500000003 HC RX 250 WO HCPCS: Performed by: INTERNAL MEDICINE

## 2021-01-01 PROCEDURE — 85730 THROMBOPLASTIN TIME PARTIAL: CPT

## 2021-01-01 PROCEDURE — 81001 URINALYSIS AUTO W/SCOPE: CPT

## 2021-01-01 PROCEDURE — 6360000002 HC RX W HCPCS: Performed by: INTERNAL MEDICINE

## 2021-01-01 PROCEDURE — 93971 EXTREMITY STUDY: CPT

## 2021-01-01 PROCEDURE — 71045 X-RAY EXAM CHEST 1 VIEW: CPT

## 2021-01-01 PROCEDURE — 97530 THERAPEUTIC ACTIVITIES: CPT

## 2021-01-01 PROCEDURE — U0002 COVID-19 LAB TEST NON-CDC: HCPCS

## 2021-01-01 PROCEDURE — 96360 HYDRATION IV INFUSION INIT: CPT

## 2021-01-01 PROCEDURE — 93010 ELECTROCARDIOGRAM REPORT: CPT | Performed by: INTERNAL MEDICINE

## 2021-01-01 PROCEDURE — 99213 OFFICE O/P EST LOW 20 MIN: CPT | Performed by: INTERNAL MEDICINE

## 2021-01-01 PROCEDURE — 70450 CT HEAD/BRAIN W/O DYE: CPT

## 2021-01-01 PROCEDURE — 0011A COVID-19, MODERNA VACCINE 100MCG/0.5ML DOSE: CPT | Performed by: FAMILY MEDICINE

## 2021-01-01 PROCEDURE — 93005 ELECTROCARDIOGRAM TRACING: CPT | Performed by: EMERGENCY MEDICINE

## 2021-01-01 PROCEDURE — 83735 ASSAY OF MAGNESIUM: CPT

## 2021-01-01 PROCEDURE — 85025 COMPLETE CBC W/AUTO DIFF WBC: CPT

## 2021-01-01 PROCEDURE — 96367 TX/PROPH/DG ADDL SEQ IV INF: CPT

## 2021-01-01 PROCEDURE — G0328 FECAL BLOOD SCRN IMMUNOASSAY: HCPCS

## 2021-01-01 PROCEDURE — 97166 OT EVAL MOD COMPLEX 45 MIN: CPT

## 2021-01-01 PROCEDURE — 1036F TOBACCO NON-USER: CPT | Performed by: INTERNAL MEDICINE

## 2021-01-01 PROCEDURE — 36415 COLL VENOUS BLD VENIPUNCTURE: CPT

## 2021-01-01 PROCEDURE — 72125 CT NECK SPINE W/O DYE: CPT

## 2021-01-01 PROCEDURE — G8484 FLU IMMUNIZE NO ADMIN: HCPCS | Performed by: INTERNAL MEDICINE

## 2021-01-01 PROCEDURE — 82550 ASSAY OF CK (CPK): CPT

## 2021-01-01 PROCEDURE — 51702 INSERT TEMP BLADDER CATH: CPT

## 2021-01-01 PROCEDURE — 1123F ACP DISCUSS/DSCN MKR DOCD: CPT | Performed by: INTERNAL MEDICINE

## 2021-01-01 PROCEDURE — G8419 CALC BMI OUT NRM PARAM NOF/U: HCPCS | Performed by: INTERNAL MEDICINE

## 2021-01-01 PROCEDURE — 83880 ASSAY OF NATRIURETIC PEPTIDE: CPT

## 2021-01-01 PROCEDURE — 97162 PT EVAL MOD COMPLEX 30 MIN: CPT

## 2021-01-01 PROCEDURE — 80048 BASIC METABOLIC PNL TOTAL CA: CPT

## 2021-01-01 PROCEDURE — G8926 SPIRO NO PERF OR DOC: HCPCS | Performed by: INTERNAL MEDICINE

## 2021-01-01 PROCEDURE — G8399 PT W/DXA RESULTS DOCUMENT: HCPCS | Performed by: INTERNAL MEDICINE

## 2021-01-01 PROCEDURE — 99285 EMERGENCY DEPT VISIT HI MDM: CPT

## 2021-01-01 PROCEDURE — 6370000000 HC RX 637 (ALT 250 FOR IP): Performed by: INTERNAL MEDICINE

## 2021-01-01 PROCEDURE — 2580000003 HC RX 258: Performed by: INTERNAL MEDICINE

## 2021-01-01 PROCEDURE — 82803 BLOOD GASES ANY COMBINATION: CPT

## 2021-01-01 PROCEDURE — 2060000000 HC ICU INTERMEDIATE R&B

## 2021-01-01 PROCEDURE — 96365 THER/PROPH/DIAG IV INF INIT: CPT

## 2021-01-01 PROCEDURE — 2700000000 HC OXYGEN THERAPY PER DAY

## 2021-01-01 PROCEDURE — 4040F PNEUMOC VAC/ADMIN/RCVD: CPT | Performed by: INTERNAL MEDICINE

## 2021-01-01 PROCEDURE — 80076 HEPATIC FUNCTION PANEL: CPT

## 2021-01-01 PROCEDURE — 71250 CT THORAX DX C-: CPT

## 2021-01-01 PROCEDURE — 80053 COMPREHEN METABOLIC PANEL: CPT

## 2021-01-01 PROCEDURE — 83605 ASSAY OF LACTIC ACID: CPT

## 2021-01-01 PROCEDURE — 85027 COMPLETE CBC AUTOMATED: CPT

## 2021-01-01 PROCEDURE — 87086 URINE CULTURE/COLONY COUNT: CPT

## 2021-01-01 PROCEDURE — G8427 DOCREV CUR MEDS BY ELIG CLIN: HCPCS | Performed by: INTERNAL MEDICINE

## 2021-01-01 PROCEDURE — 1090F PRES/ABSN URINE INCON ASSESS: CPT | Performed by: INTERNAL MEDICINE

## 2021-01-01 PROCEDURE — 3023F SPIROM DOC REV: CPT | Performed by: INTERNAL MEDICINE

## 2021-01-01 PROCEDURE — 92610 EVALUATE SWALLOWING FUNCTION: CPT

## 2021-01-01 PROCEDURE — 2580000003 HC RX 258: Performed by: EMERGENCY MEDICINE

## 2021-01-01 PROCEDURE — 87804 INFLUENZA ASSAY W/OPTIC: CPT

## 2021-01-01 PROCEDURE — 93005 ELECTROCARDIOGRAM TRACING: CPT | Performed by: INTERNAL MEDICINE

## 2021-01-01 PROCEDURE — 92526 ORAL FUNCTION THERAPY: CPT

## 2021-01-01 PROCEDURE — 99223 1ST HOSP IP/OBS HIGH 75: CPT | Performed by: INTERNAL MEDICINE

## 2021-01-01 PROCEDURE — 84145 PROCALCITONIN (PCT): CPT

## 2021-01-01 PROCEDURE — 0012A COVID-19, MODERNA VACCINE 100MCG/0.5ML DOSE: CPT | Performed by: FAMILY MEDICINE

## 2021-01-01 PROCEDURE — 97129 THER IVNTJ 1ST 15 MIN: CPT

## 2021-01-01 PROCEDURE — 91301 COVID-19, MODERNA VACCINE 100MCG/0.5ML DOSE: CPT | Performed by: FAMILY MEDICINE

## 2021-01-01 PROCEDURE — 2580000003 HC RX 258: Performed by: PHYSICIAN ASSISTANT

## 2021-01-01 PROCEDURE — 94761 N-INVAS EAR/PLS OXIMETRY MLT: CPT

## 2021-01-01 PROCEDURE — 99283 EMERGENCY DEPT VISIT LOW MDM: CPT

## 2021-01-01 PROCEDURE — 93306 TTE W/DOPPLER COMPLETE: CPT

## 2021-01-01 PROCEDURE — 6370000000 HC RX 637 (ALT 250 FOR IP): Performed by: EMERGENCY MEDICINE

## 2021-01-01 PROCEDURE — 94760 N-INVAS EAR/PLS OXIMETRY 1: CPT

## 2021-01-01 PROCEDURE — 99232 SBSQ HOSP IP/OBS MODERATE 35: CPT | Performed by: INTERNAL MEDICINE

## 2021-01-01 PROCEDURE — 6360000002 HC RX W HCPCS: Performed by: EMERGENCY MEDICINE

## 2021-01-01 RX ORDER — METOPROLOL TARTRATE 5 MG/5ML
2.5 INJECTION INTRAVENOUS EVERY 6 HOURS PRN
Status: DISCONTINUED | OUTPATIENT
Start: 2021-01-01 | End: 2021-01-01 | Stop reason: HOSPADM

## 2021-01-01 RX ORDER — HEPARIN SODIUM 10000 [USP'U]/100ML
0-3000 INJECTION, SOLUTION INTRAVENOUS CONTINUOUS
Status: DISCONTINUED | OUTPATIENT
Start: 2021-01-01 | End: 2021-01-01

## 2021-01-01 RX ORDER — MEMANTINE HYDROCHLORIDE 10 MG/1
10 TABLET ORAL 2 TIMES DAILY
Status: DISCONTINUED | OUTPATIENT
Start: 2021-01-01 | End: 2021-01-01

## 2021-01-01 RX ORDER — ACETAMINOPHEN 325 MG/1
650 TABLET ORAL EVERY 6 HOURS PRN
Status: DISCONTINUED | OUTPATIENT
Start: 2021-01-01 | End: 2021-01-01

## 2021-01-01 RX ORDER — METOPROLOL TARTRATE 5 MG/5ML
5 INJECTION INTRAVENOUS EVERY 6 HOURS PRN
Status: DISCONTINUED | OUTPATIENT
Start: 2021-01-01 | End: 2021-01-01

## 2021-01-01 RX ORDER — ONDANSETRON 2 MG/ML
4 INJECTION INTRAMUSCULAR; INTRAVENOUS EVERY 6 HOURS PRN
Status: DISCONTINUED | OUTPATIENT
Start: 2021-01-01 | End: 2021-01-01 | Stop reason: HOSPADM

## 2021-01-01 RX ORDER — PREDNISONE 50 MG/1
50 TABLET ORAL DAILY
Qty: 5 TABLET | Refills: 0 | Status: SHIPPED | OUTPATIENT
Start: 2021-01-01 | End: 2021-01-01

## 2021-01-01 RX ORDER — IPRATROPIUM BROMIDE AND ALBUTEROL SULFATE 2.5; .5 MG/3ML; MG/3ML
1 SOLUTION RESPIRATORY (INHALATION) EVERY 4 HOURS PRN
Status: DISCONTINUED | OUTPATIENT
Start: 2021-01-01 | End: 2021-01-01 | Stop reason: HOSPADM

## 2021-01-01 RX ORDER — CEFUROXIME AXETIL 250 MG/1
250 TABLET ORAL 2 TIMES DAILY
Qty: 14 TABLET | Refills: 0 | Status: SHIPPED | OUTPATIENT
Start: 2021-01-01 | End: 2021-01-01

## 2021-01-01 RX ORDER — SODIUM CHLORIDE 9 MG/ML
25 INJECTION, SOLUTION INTRAVENOUS PRN
Status: DISCONTINUED | OUTPATIENT
Start: 2021-01-01 | End: 2021-01-01 | Stop reason: HOSPADM

## 2021-01-01 RX ORDER — HEPARIN SODIUM 1000 [USP'U]/ML
30 INJECTION, SOLUTION INTRAVENOUS; SUBCUTANEOUS PRN
Status: DISCONTINUED | OUTPATIENT
Start: 2021-01-01 | End: 2021-01-01

## 2021-01-01 RX ORDER — AZITHROMYCIN 250 MG/1
TABLET, FILM COATED ORAL
Qty: 1 PACKET | Refills: 0 | Status: SHIPPED | OUTPATIENT
Start: 2021-01-01 | End: 2021-01-01

## 2021-01-01 RX ORDER — ACETAMINOPHEN 650 MG/1
650 SUPPOSITORY RECTAL EVERY 6 HOURS PRN
Status: DISCONTINUED | OUTPATIENT
Start: 2021-01-01 | End: 2021-01-01 | Stop reason: HOSPADM

## 2021-01-01 RX ORDER — TRAZODONE HYDROCHLORIDE 50 MG/1
50 TABLET ORAL NIGHTLY PRN
Status: DISCONTINUED | OUTPATIENT
Start: 2021-01-01 | End: 2021-01-01 | Stop reason: HOSPADM

## 2021-01-01 RX ORDER — HEPARIN SODIUM 1000 [USP'U]/ML
1260 INJECTION, SOLUTION INTRAVENOUS; SUBCUTANEOUS ONCE
Status: COMPLETED | OUTPATIENT
Start: 2021-01-01 | End: 2021-01-01

## 2021-01-01 RX ORDER — MORPHINE SULFATE 2 MG/ML
2 INJECTION, SOLUTION INTRAMUSCULAR; INTRAVENOUS
Status: DISCONTINUED | OUTPATIENT
Start: 2021-01-01 | End: 2021-01-01 | Stop reason: HOSPADM

## 2021-01-01 RX ORDER — LATANOPROST 50 UG/ML
1 SOLUTION/ DROPS OPHTHALMIC NIGHTLY
Status: DISCONTINUED | OUTPATIENT
Start: 2021-01-01 | End: 2021-01-01 | Stop reason: HOSPADM

## 2021-01-01 RX ORDER — SODIUM CHLORIDE 0.9 % (FLUSH) 0.9 %
5-40 SYRINGE (ML) INJECTION PRN
Status: DISCONTINUED | OUTPATIENT
Start: 2021-01-01 | End: 2021-01-01 | Stop reason: HOSPADM

## 2021-01-01 RX ORDER — LEVOTHYROXINE SODIUM 0.1 MG/1
100 TABLET ORAL DAILY
Status: DISCONTINUED | OUTPATIENT
Start: 2021-01-01 | End: 2021-01-01

## 2021-01-01 RX ORDER — MORPHINE SULFATE 2 MG/ML
1 INJECTION, SOLUTION INTRAMUSCULAR; INTRAVENOUS
Status: DISCONTINUED | OUTPATIENT
Start: 2021-01-01 | End: 2021-01-01 | Stop reason: HOSPADM

## 2021-01-01 RX ORDER — SODIUM CHLORIDE 0.9 % (FLUSH) 0.9 %
5-40 SYRINGE (ML) INJECTION EVERY 12 HOURS SCHEDULED
Status: DISCONTINUED | OUTPATIENT
Start: 2021-01-01 | End: 2021-01-01 | Stop reason: HOSPADM

## 2021-01-01 RX ORDER — AMIODARONE HYDROCHLORIDE 200 MG/1
100 TABLET ORAL DAILY
Status: DISCONTINUED | OUTPATIENT
Start: 2021-01-01 | End: 2021-01-01

## 2021-01-01 RX ORDER — FUROSEMIDE 10 MG/ML
20 INJECTION INTRAMUSCULAR; INTRAVENOUS ONCE
Status: COMPLETED | OUTPATIENT
Start: 2021-01-01 | End: 2021-01-01

## 2021-01-01 RX ORDER — CIPROFLOXACIN 2 MG/ML
400 INJECTION, SOLUTION INTRAVENOUS ONCE
Status: COMPLETED | OUTPATIENT
Start: 2021-01-01 | End: 2021-01-01

## 2021-01-01 RX ORDER — METOPROLOL TARTRATE 5 MG/5ML
5 INJECTION INTRAVENOUS ONCE
Status: COMPLETED | OUTPATIENT
Start: 2021-01-01 | End: 2021-01-01

## 2021-01-01 RX ORDER — ONDANSETRON 4 MG/1
4 TABLET, ORALLY DISINTEGRATING ORAL EVERY 8 HOURS PRN
Status: DISCONTINUED | OUTPATIENT
Start: 2021-01-01 | End: 2021-01-01 | Stop reason: HOSPADM

## 2021-01-01 RX ORDER — LORAZEPAM 2 MG/ML
0.5 INJECTION INTRAMUSCULAR EVERY 4 HOURS PRN
Status: DISCONTINUED | OUTPATIENT
Start: 2021-01-01 | End: 2021-01-01 | Stop reason: HOSPADM

## 2021-01-01 RX ORDER — SODIUM CHLORIDE 9 MG/ML
INJECTION, SOLUTION INTRAVENOUS ONCE
Status: COMPLETED | OUTPATIENT
Start: 2021-01-01 | End: 2021-01-01

## 2021-01-01 RX ORDER — ALBUTEROL SULFATE 90 UG/1
2 AEROSOL, METERED RESPIRATORY (INHALATION) EVERY 4 HOURS PRN
Status: DISCONTINUED | OUTPATIENT
Start: 2021-01-01 | End: 2021-01-01 | Stop reason: HOSPADM

## 2021-01-01 RX ORDER — POTASSIUM CHLORIDE 750 MG/1
40 TABLET, FILM COATED, EXTENDED RELEASE ORAL ONCE
Status: COMPLETED | OUTPATIENT
Start: 2021-01-01 | End: 2021-01-01

## 2021-01-01 RX ORDER — POLYETHYLENE GLYCOL 3350 17 G/17G
17 POWDER, FOR SOLUTION ORAL DAILY PRN
Status: DISCONTINUED | OUTPATIENT
Start: 2021-01-01 | End: 2021-01-01 | Stop reason: HOSPADM

## 2021-01-01 RX ORDER — ATORVASTATIN CALCIUM 10 MG/1
10 TABLET, FILM COATED ORAL NIGHTLY
Status: DISCONTINUED | OUTPATIENT
Start: 2021-01-01 | End: 2021-01-01

## 2021-01-01 RX ORDER — LEVOTHYROXINE SODIUM 20 UG/ML
50 INJECTION, SOLUTION INTRAVENOUS DAILY
Status: DISCONTINUED | OUTPATIENT
Start: 2021-06-23 | End: 2021-01-01 | Stop reason: HOSPADM

## 2021-01-01 RX ORDER — GUAIFENESIN 600 MG/1
600 TABLET, EXTENDED RELEASE ORAL 2 TIMES DAILY PRN
Status: DISCONTINUED | OUTPATIENT
Start: 2021-01-01 | End: 2021-01-01

## 2021-01-01 RX ADMIN — LEVOTHYROXINE SODIUM 100 MCG: 0.1 TABLET ORAL at 05:10

## 2021-01-01 RX ADMIN — MEMANTINE HYDROCHLORIDE 10 MG: 10 TABLET ORAL at 21:37

## 2021-01-01 RX ADMIN — LATANOPROST 1 DROP: 50 SOLUTION OPHTHALMIC at 23:00

## 2021-01-01 RX ADMIN — METOPROLOL TARTRATE 25 MG: 25 TABLET, FILM COATED ORAL at 21:37

## 2021-01-01 RX ADMIN — AMIODARONE HYDROCHLORIDE 0.5 MG/MIN: 50 INJECTION, SOLUTION INTRAVENOUS at 06:05

## 2021-01-01 RX ADMIN — ATORVASTATIN CALCIUM 10 MG: 10 TABLET, FILM COATED ORAL at 21:37

## 2021-01-01 RX ADMIN — LORAZEPAM 0.5 MG: 2 INJECTION INTRAMUSCULAR; INTRAVENOUS at 02:28

## 2021-01-01 RX ADMIN — SODIUM CHLORIDE, PRESERVATIVE FREE 10 ML: 5 INJECTION INTRAVENOUS at 21:51

## 2021-01-01 RX ADMIN — LORAZEPAM 0.5 MG: 2 INJECTION INTRAMUSCULAR; INTRAVENOUS at 22:08

## 2021-01-01 RX ADMIN — POTASSIUM CHLORIDE 40 MEQ: 750 TABLET, FILM COATED, EXTENDED RELEASE ORAL at 03:30

## 2021-01-01 RX ADMIN — SODIUM CHLORIDE, PRESERVATIVE FREE 10 ML: 5 INJECTION INTRAVENOUS at 23:00

## 2021-01-01 RX ADMIN — HEPARIN SODIUM 580 UNITS/HR: 10000 INJECTION, SOLUTION INTRAVENOUS at 08:27

## 2021-01-01 RX ADMIN — FUROSEMIDE 20 MG: 10 INJECTION, SOLUTION INTRAMUSCULAR; INTRAVENOUS at 22:18

## 2021-01-01 RX ADMIN — CIPROFLOXACIN 400 MG: 2 INJECTION, SOLUTION INTRAVENOUS at 03:32

## 2021-01-01 RX ADMIN — MEMANTINE HYDROCHLORIDE 10 MG: 10 TABLET ORAL at 10:01

## 2021-01-01 RX ADMIN — CEFTRIAXONE 1000 MG: 1 INJECTION, POWDER, FOR SOLUTION INTRAMUSCULAR; INTRAVENOUS at 10:35

## 2021-01-01 RX ADMIN — AMIODARONE HYDROCHLORIDE 0.5 MG/MIN: 50 INJECTION, SOLUTION INTRAVENOUS at 13:46

## 2021-01-01 RX ADMIN — HEPARIN SODIUM 1260 UNITS: 1000 INJECTION INTRAVENOUS; SUBCUTANEOUS at 08:29

## 2021-01-01 RX ADMIN — LORAZEPAM 0.5 MG: 2 INJECTION INTRAMUSCULAR; INTRAVENOUS at 12:38

## 2021-01-01 RX ADMIN — METOPROLOL TARTRATE 5 MG: 1 INJECTION, SOLUTION INTRAVENOUS at 21:46

## 2021-01-01 RX ADMIN — AMIODARONE HYDROCHLORIDE 100 MG: 200 TABLET ORAL at 22:18

## 2021-01-01 RX ADMIN — SODIUM CHLORIDE: 4.5 INJECTION, SOLUTION INTRAVENOUS at 18:18

## 2021-01-01 RX ADMIN — TRAZODONE HYDROCHLORIDE 50 MG: 50 TABLET ORAL at 00:21

## 2021-01-01 RX ADMIN — AZITHROMYCIN MONOHYDRATE 500 MG: 500 INJECTION, POWDER, LYOPHILIZED, FOR SOLUTION INTRAVENOUS at 04:48

## 2021-01-01 RX ADMIN — AMIODARONE HYDROCHLORIDE 1 MG/MIN: 50 INJECTION, SOLUTION INTRAVENOUS at 23:25

## 2021-01-01 RX ADMIN — CEFTRIAXONE 1000 MG: 1 INJECTION, POWDER, FOR SOLUTION INTRAMUSCULAR; INTRAVENOUS at 10:01

## 2021-01-01 RX ADMIN — LATANOPROST 1 DROP: 50 SOLUTION OPHTHALMIC at 22:08

## 2021-01-01 RX ADMIN — SODIUM CHLORIDE: 9 INJECTION, SOLUTION INTRAVENOUS at 14:46

## 2021-01-01 RX ADMIN — METOPROLOL TARTRATE 2.5 MG: 5 INJECTION, SOLUTION INTRAVENOUS at 02:53

## 2021-01-01 RX ADMIN — GUAIFENESIN 600 MG: 600 TABLET ORAL at 21:49

## 2021-01-01 RX ADMIN — ENOXAPARIN SODIUM 30 MG: 30 INJECTION SUBCUTANEOUS at 21:37

## 2021-01-01 RX ADMIN — METOPROLOL TARTRATE 25 MG: 25 TABLET, FILM COATED ORAL at 10:01

## 2021-01-01 RX ADMIN — AMIODARONE HYDROCHLORIDE 150 MG: 50 INJECTION, SOLUTION INTRAVENOUS at 23:15

## 2021-01-01 RX ADMIN — HEPARIN SODIUM 500 UNITS/HR: 10000 INJECTION, SOLUTION INTRAVENOUS at 00:14

## 2021-01-01 SDOH — HEALTH STABILITY: MENTAL HEALTH
STRESS IS WHEN SOMEONE FEELS TENSE, NERVOUS, ANXIOUS, OR CAN'T SLEEP AT NIGHT BECAUSE THEIR MIND IS TROUBLED. HOW STRESSED ARE YOU?: NOT AT ALL

## 2021-01-01 SDOH — HEALTH STABILITY: PHYSICAL HEALTH: ON AVERAGE, HOW MANY DAYS PER WEEK DO YOU ENGAGE IN MODERATE TO STRENUOUS EXERCISE (LIKE A BRISK WALK)?: 0 DAYS

## 2021-01-01 SDOH — ECONOMIC STABILITY: TRANSPORTATION INSECURITY
IN THE PAST 12 MONTHS, HAS LACK OF TRANSPORTATION KEPT YOU FROM MEETINGS, WORK, OR FROM GETTING THINGS NEEDED FOR DAILY LIVING?: NO

## 2021-01-01 SDOH — SOCIAL STABILITY: SOCIAL NETWORK: HOW OFTEN DO YOU ATTEND CHURCH OR RELIGIOUS SERVICES?: NEVER

## 2021-01-01 SDOH — SOCIAL STABILITY: SOCIAL NETWORK
DO YOU BELONG TO ANY CLUBS OR ORGANIZATIONS SUCH AS CHURCH GROUPS UNIONS, FRATERNAL OR ATHLETIC GROUPS, OR SCHOOL GROUPS?: NO

## 2021-01-01 SDOH — ECONOMIC STABILITY: FOOD INSECURITY: WITHIN THE PAST 12 MONTHS, THE FOOD YOU BOUGHT JUST DIDN'T LAST AND YOU DIDN'T HAVE MONEY TO GET MORE.: NEVER TRUE

## 2021-01-01 SDOH — ECONOMIC STABILITY: FOOD INSECURITY: WITHIN THE PAST 12 MONTHS, YOU WORRIED THAT YOUR FOOD WOULD RUN OUT BEFORE YOU GOT MONEY TO BUY MORE.: NEVER TRUE

## 2021-01-01 SDOH — ECONOMIC STABILITY: INCOME INSECURITY: HOW HARD IS IT FOR YOU TO PAY FOR THE VERY BASICS LIKE FOOD, HOUSING, MEDICAL CARE, AND HEATING?: NOT HARD AT ALL

## 2021-01-01 ASSESSMENT — PAIN SCALES - GENERAL
PAINLEVEL_OUTOF10: 0
PAINLEVEL_OUTOF10: 6
PAINLEVEL_OUTOF10: 0
PAINLEVEL_OUTOF10: 0

## 2021-01-01 ASSESSMENT — ENCOUNTER SYMPTOMS
BACK PAIN: 0
NAUSEA: 0
EYE ITCHING: 0
VOMITING: 0
EYE DISCHARGE: 0
CHOKING: 0
EYE DISCHARGE: 0
ABDOMINAL PAIN: 0
APNEA: 0
CONSTIPATION: 0
COLOR CHANGE: 0
FACIAL SWELLING: 0
ABDOMINAL PAIN: 0
SORE THROAT: 0
EYE REDNESS: 0
SHORTNESS OF BREATH: 1
COUGH: 1
VOMITING: 0
SHORTNESS OF BREATH: 0

## 2021-01-01 ASSESSMENT — PAIN DESCRIPTION - LOCATION: LOCATION: BACK;COCCYX

## 2021-01-01 ASSESSMENT — PAIN DESCRIPTION - PAIN TYPE: TYPE: ACUTE PAIN

## 2021-01-04 NOTE — PATIENT INSTRUCTIONS
Continue with breathing treatments 4 times a day and as needed    Use oxygen with sleep and exertion (if needed)    Follow up in 6 months

## 2021-01-04 NOTE — PROGRESS NOTES
Chief complaint  This is a 80y.o. year old female  who comes to see me with a chief complaint of   Chief Complaint   Patient presents with    Shortness of Breath    COPD     6mo f/u       HPI  Here with cc of chronic bronchitis, SOB    Doing ok. Just came off of omnicef and prednisone for bronchitis. Improved. Now on duonebs qid. Using oxygen with exertion and sleep. Seems to be in a steady state at this time.   Flu shot up to date       Past Medical History:   Diagnosis Date    Anemia     Anticoagulant long-term use     Atrial fibrillation ([address] 6/2011    Odette Friday CHF (congestive heart failure) (HCC)     CKD (chronic kidney disease) stage 3, GFR 30-59 ml/min 2/11/2019    Clostridium difficile diarrhea 09/14/2016    Depression     Humerus fracture     Hyperlipidemia     Hypertension     Hypothyroidism     Mitral regurgitation     Optic neuritis     Osteopenia     Fosamax stopped 2/2014, had been treated at least since 2004    Polymyalgia rheumatica ([address]     Pulmonary embolus ([address] 6/2011    Right pulmonary obstruction suspected to be possible chronic pulmonary embolus    Thyroid disease     Upper GI bleed 2019    Vitamin D deficiency        Past Surgical History:   Procedure Laterality Date    COLONOSCOPY  09/14/2016    Ilya    COLONOSCOPY N/A 5/31/2019    COLONOSCOPY performed by Priyanka Pugh MD at [facility], [facility]  5/31/2019    BOWEL SMALL CAPSULE ENDOSCOPY DEPLOYED VIA EGD performed by Priyanka Pugh MD at [address] ENTEROSCOPY N/A 6/3/2019    ENTEROSCOPY PUSH DIAGNOSTIC performed by Geraldine Burns DO at [address] [place] [address]      UPPER GASTROINTESTINAL ENDOSCOPY  09/14/2016    Ilya    UPPER GASTROINTESTINAL ENDOSCOPY N/A 5/30/2019    EGD POLYP ABLATION/OTHER performed by Priyanka Pugh MD at [address] [place] [address] N/A 5/31/2019 ESOPHAGOGASTRODUODENOSCOPY WITH CAPSULE PLACEMENT performed by Osiel Talavera MD at [facility] N/A 6/16/2019    EGD BIOPSY performed by Javy London MD at [facility] N/A 6/16/2019    EGD FOREIGN BODY REMOVAL performed by Javy London MD at [facility]         Current Outpatient Medications:     metoprolol tartrate (LOPRESSOR) 50 MG tablet, TAKE 1/2 TABLET TWICE DAILY, Disp: 90 tablet, Rfl: 3    ipratropium-albuterol (DUONEB) 0.5-2.5 (3) MG/3ML SOLN nebulizer solution, Inhale 3 mLs into the lungs every 4 hours as needed for Shortness of Breath, Disp: 180 vial, Rfl: 2    levothyroxine (SYNTHROID) 100 MCG tablet, TAKE 1 TABLET EVERY DAY, Disp: 90 tablet, Rfl: 3    amiodarone (CORDARONE) 200 MG tablet, TAKE 1/2 TABLET EVERY DAY, Disp: 45 tablet, Rfl: 1    torsemide (DEMADEX) 20 MG tablet, TAKE 1 TABLET EVERY DAY, Disp: 90 tablet, Rfl: 3    traZODone (DESYREL) 50 MG tablet, Take 1 tablet by mouth nightly as needed for Sleep, Disp: 90 tablet, Rfl: 3    sodium chloride, Inhalant, 3 % nebulizer solution, INHALE FOUR MILLILITERS BY NEBULIZATION TWICE A DAY, Disp: 240 mL, Rfl: 2    simvastatin (ZOCOR) 20 MG tablet, TAKE 1 TABLET EVERY DAY, Disp: 90 tablet, Rfl: 3    memantine (NAMENDA) 10 MG tablet, TAKE 1 TABLET TWICE DAILY, Disp: 180 tablet, Rfl: 3    FERROUS SULFATE PO, Take 1 tablet by mouth , Disp: , Rfl:     Vitamin D, Cholecalciferol, 10 MCG (400 UNIT) TABS, Take 400 Units by mouth, Disp: , Rfl:     Respiratory Therapy Supplies (NEBULIZER/TUBING/MOUTHPIECE) KIT, 1 kit by Does not apply route daily as needed (for breathing treatment), Disp: 1 kit, Rfl: 0    Multiple Vitamins-Minerals (PRESERVISION AREDS 2+MULTI VIT PO), Take by mouth, Disp: , Rfl:  There is prolapse of the P2 segment of the posterior leaflet resulting in   moderately severe mitral regurgitation. There is no stenosis.   Moderate tricuspid regurgitation. No shunt at the atrial level by agitated saline contrast study. No thrombus visualized in the left atrium, left atrial appendage or left   ventricle. Heart cath 9/2017  1. Mild nonobstructive coronary artery disease with a 40% ostial LAD  lesion and 25% mid-RCA disease. This is a right-dominant coronary  arterial system. 2.  Normal left ventricular systolic function with LV ejection  fraction of 65%. 3.  Mildly elevated left ventricular end-diastolic pressure of 15  mmHg. 4.  Moderate to severe mitral regurgitation with moderate dilatation  of the left atrium on the left ventriculogram.  5.  No gradient across the aortic valve on pullback to suggest aortic  stenosis. 6.  Evidence of mild volume overload with a pulmonary capillary wedge  pressure of 22 and a left ventricular end-diastolic pressure on repeat  of 15 to 20.  7.  Evidence of pulmonary hypertension, possibly pulmonary arterial  hypertension, arteriovenous malformation and prior pneumonectomy. Instantaneous pulmonary artery pressure was 68/19 for a mean pulmonary  arterial pressure of 37 mmHg. 8.  Oxygen step-up in the right pulmonary artery to 90%, pulmonary  artery main was 62% with a right atrial pressure of 52%. Inferior  vena cava was 57%. 9.  Pulmonary angiogram showing minimal pulmonary arterial flow to the  right lung with _____ flow in the right upper lobe but no flow into  the right lower middle lobe.   In fact, there appears to be a possible  arteriovenous malformation with backflow of blood towards the pigtail  catheter into the right pulmonary artery and an oxygen saturation of  90% in the right lower lobe.    6 MWT 10/2017 The patient ambulated 305 meters which is normal-77% predicted. Her  heart rate response was normal, the blood pressure response was normal, oxygen saturations were normal.  The patient desaturated to 89% while ambulating on room air. The degree of symptoms based on the Kofi Dyspnea/Fatigue scale were increased with testing. This test does indicate the need for supplemental oxygen due to the presence of pulmonary hypertension    6 MWT 2/2018  The patient ambulated 323 meters which is normal-83% predicted. Her  heart rate response was normal, the blood pressure response was normal, oxygen saturations were abnormal in that she desaturated while on room air. The patient desaturated to 87% while ambulating on room air, and was placed on 2 L of oxygen to keep saturations above 90%. The degree of symptoms based on the Kofi Dyspnea/Fatigue scale were increased with testing. This test does indicate the need for supplemental oxygen. She walked an additional 18 meters when compared to pre-rehab walk test.  This is not a substantial change  Lab Results   Component Value Date    WBC 7.0 11/13/2020    HGB 11.1 (L) 11/13/2020    HCT 33.5 (L) 11/13/2020    MCV 93.4 11/13/2020     11/13/2020       No results found for: BNP    Lab Results   Component Value Date    CREATININE 1.0 10/21/2020    BUN 19 10/21/2020     10/21/2020    K 4.1 10/21/2020     10/21/2020    CO2 30 10/21/2020     COPD Assessment Test    1. I never cough vs I cough all the time:  3  2. I have no phlegm vs I am completely full of phlegm. 3  3. My chest does not feel tight vs my chest feel vs tight. 2  4. Not breathless with inclines vs breathless with inclines. 4  5. Not limited with ADLs vs Very limited with ADLs. 4  6. Confidence leaving home vs no confidence.  0  7. I sleep soundly vs I don't sleep soundly. 1  8. I have lots of energy vs I have no energy.   4    TOTAL POINTS:  21    Scoring: A) >30. Very high impact on quality of life. Optimize therapy including rehab  B) >20. High impact on quality of life. C) 10-20. Medium impact on qualify of life  D) <10. Low impact on life  E)  5.  Upper limit of normal in health non-smoker      Assessment/Plan:  1. Abnormal CT of the chest  Sequela of #4 with likely repeated infections    2. Mucopurulent chronic bronchitis (HCC)  Stable on duonebs q 4 hours. Reacted to saline nebs    3. Chronic respiratory failure with hypoxia (HCC)  Uses 2 liters of oxygen with exertion and sleep. Original order from Dr. Ruby Fleming office. She does benefit from using it    4.  Fibrosing mediastinitis    Flu shot up to date    Follow up in 6 months

## 2021-01-31 NOTE — ED NOTES
Pt presents to ED via EMS for complaints of generalized weakness x 1 month. Pt also reports a loss of appetite. Pt denies any pain or any other symptoms. Pt reports she lives at home alone and has not been around anyone with known illness such as covid or flu. Pt is alert and oriented x4, skin warm and dry respirations even and easy. Vital signs within defined limits. EKG performed via ED tech. Call light within reach.       Jenniffer Moore RN  01/31/21 [address] [place], RN  01/31/21 [id number]

## 2021-01-31 NOTE — ED NOTES
Report to [facility] care EMS. Pt. Alert and oriented and VSS upon departure.       Latoya Caballero RN  01/31/21 [id number]

## 2021-01-31 NOTE — ED PROVIDER NOTES
[address] [facility]      Pt Name: Patricia Keyes  MRN: [id number]  [place] 1935  Date of evaluation: 1/31/2021  Provider: Ok Delgado MD    CHIEF COMPLAINT       Chief Complaint   Patient presents with    Fatigue     x 1 month/ loss of appetite/ no other symptoms       HISTORY OF PRESENT ILLNESS    Patricia Keyes is a 80 y.o. female who presents to the emergency department with fatigue and cough. Patient endorses one month history of fatigue and shortness of breath and cough. Has been taking OTC medications with minimal relief. Nothing makes symptoms better but movement makes symptoms worse. This has never happened before. No other associated symptoms other than previously mentioned. Patient endorses chronic oxygen use. Denies leg swelling or orthopnea. Nursing Notes were reviewed. Including nursing noted for FM, Surgical History, Past Medical History, Social History, vitals, and allergies; agree with all. REVIEW OF SYSTEMS       Review of Systems   Constitutional: Positive for fatigue. Negative for diaphoresis and unexpected weight change. HENT: Negative for congestion and dental problem. Eyes: Negative for discharge and itching. Respiratory: Positive for cough and shortness of breath. Cardiovascular: Negative for chest pain and leg swelling. Gastrointestinal: Negative for abdominal pain, constipation and vomiting. Endocrine: Negative for cold intolerance and heat intolerance. Genitourinary: Negative for vaginal bleeding, vaginal discharge and vaginal pain. Musculoskeletal: Negative for neck pain and neck stiffness. Skin: Negative for color change and pallor. Neurological: Negative for tremors and weakness. Psychiatric/Behavioral: Negative for agitation and behavioral problems. Except as noted above the remainder of the review of systems was reviewed and negative.      PAST MEDICAL HISTORY     Past Medical History:   Diagnosis Date    Anemia     Anticoagulant long-term use     Atrial fibrillation ([facility] [address] 6/2011    Farooq Watts CHF (congestive heart failure) (HCC)     CKD (chronic kidney disease) stage 3, GFR 30-59 ml/min 2/11/2019    Clostridium difficile diarrhea 09/14/2016    Depression     Humerus fracture     Hyperlipidemia     Hypertension     Hypothyroidism     Mitral regurgitation     Optic neuritis     Osteopenia     Fosamax stopped 2/2014, had been treated at least since 2004    Polymyalgia rheumatica ([facility] [address]     Pulmonary embolus ([facility] [address] 6/2011    Right pulmonary obstruction suspected to be possible chronic pulmonary embolus    Thyroid disease     Upper GI bleed 2019    Vitamin D deficiency        SURGICAL HISTORY       Past Surgical History:   Procedure Laterality Date    COLONOSCOPY  09/14/2016    Ilya    COLONOSCOPY N/A 5/31/2019    COLON[facility] Steve Urban MD at [place] [address]  5/[facility]2019    BOWEL SMALL CAPSULE ENDOSCOPY DEPLOYED VIA EGD performed by Steve Urban MD at [address] ENTEROSCOPY N/A 6/3/2019    ENTEROSCOPY PUSH DIAGNOSTIC performed by Yovani Denny DO at [address] [place] [address]      UPPER GASTROINTESTINAL ENDOSCOPY  09/14/2016    Ilya    UPPER GASTROINTESTINAL ENDOSCOPY N/A 5/30/2019    EGD POLYP ABLATION/OTHER performed by Steve Urban MD at [facility] N/A 5/31/2019    ESOPHAGOGASTRODUODENOSCOPY WITH CAPSULE PLACEMENT performed by Steve Urban MD at [address] [place] [address] 6/16/2019    EGD BIOPSY performed by Jesus Alberto Sue MD at [address] [place] [address] 6/16/2019    EGD FOREIGN BODY REMOVAL performed by Jesus Alberto Sue MD at [facility]       Previous Medications    ALBUTEROL SULFATE HFA (PROAIR HFA) 108 (90 BASE) MCG/ACT INHALER Inhale 2 puffs into the lungs every 4 hours as needed for Wheezing or Shortness of Breath    AMIODARONE (CORDARONE) 200 MG TABLET    TAKE 1/2 TABLET EVERY DAY    FERROUS SULFATE PO    Take 1 tablet by mouth     IPRATROPIUM-ALBUTEROL (DUONEB) 0.5-2.5 (3) MG/3ML SOLN NEBULIZER SOLUTION    Inhale 3 mLs into the lungs every 4 hours as needed for Shortness of Breath    LATANOPROST (XALATAN) 0.005 % OPHTHALMIC SOLUTION    Place 1 drop into both eyes nightly. LEVOTHYROXINE (SYNTHROID) 100 MCG TABLET    TAKE 1 TABLET EVERY DAY    MEMANTINE (NAMENDA) 10 MG TABLET    TAKE 1 TABLET TWICE DAILY    METOPROLOL TARTRATE (LOPRESSOR) 50 MG TABLET    TAKE 1/2 TABLET TWICE DAILY    MULTIPLE VITAMINS-MINERALS (PRESERVISION AREDS 2+MULTI VIT PO)    Take by mouth    RESPIRATORY THERAPY SUPPLIES (NEBULIZER COMPRESSOR) KIT    1 kit by Does not apply route once for 1 dose    RESPIRATORY THERAPY SUPPLIES (NEBULIZER/TUBING/MOUTHPIECE) KIT    1 kit by Does not apply route daily as needed (for breathing treatment)    SIMVASTATIN (ZOCOR) 20 MG TABLET    TAKE 1 TABLET EVERY DAY    SODIUM CHLORIDE, INHALANT, 3 % NEBULIZER SOLUTION    INHALE FOUR MILLILITERS BY NEBULIZATION TWICE A DAY    TORSEMIDE (DEMADEX) 20 MG TABLET    TAKE 1 TABLET EVERY DAY    TRAZODONE (DESYREL) 50 MG TABLET    Take 1 tablet by mouth nightly as needed for Sleep    VITAMIN D, CHOLECALCIFEROL, 10 MCG (400 UNIT) TABS    Take 400 Units by mouth       ALLERGIES     Ace inhibitors, Aricept [donepezil hydrochloride], Benicar [olmesartan medoxomil], Exelon [rivastigmine tartrate], Pcn [penicillins], Quinapril hcl, Remeron [mirtazapine], and Doxycycline    FAMILY HISTORY        Family History   Problem Relation Age of Onset    Cancer Mother         unknown primary    Other Father         Tuberculosis    Lung Cancer Sister     Tuberculosis Brother     Diabetes Brother        SOCIAL HISTORY       Social History     Socioeconomic History    Marital status:       Spouse name: None    Number of children: 2    Years of education: None    Highest education level: None   Occupational History    None   Social Needs    Financial resource strain: None    Food insecurity     Worry: None     Inability: None    Transportation needs     Medical: None     Non-medical: None   Tobacco Use    Smoking status: Former Smoker     Quit date: 1965     Years since quittin.2    Smokeless tobacco: Never Used   Substance and Sexual Activity    Alcohol use: Not Currently    Drug use: No    Sexual activity: None   Lifestyle    Physical activity     Days per week: None     Minutes per session: None    Stress: None   Relationships    Social connections     Talks on phone: None     Gets together: None     Attends Quaker service: None     Active member of club or organization: None     Attends meetings of clubs or organizations: None     Relationship status: None    Intimate partner violence     Fear of current or ex partner: None     Emotionally abused: None     Physically abused: None     Forced sexual activity: None   Other Topics Concern    None   Social History Narrative    None       PHYSICAL EXAM       ED Triage Vitals   BP Temp Temp src Pulse Resp SpO2 Height Weight   -- -- -- -- -- -- -- --       Physical Exam  Vitals signs and nursing note reviewed. Constitutional:       General: She is not in acute distress. Appearance: She is well-developed. She is not ill-appearing, toxic-appearing or diaphoretic. HENT:      Head: Normocephalic and atraumatic. Right Ear: External ear normal.      Left Ear: External ear normal.   Eyes:      General:         Right eye: No discharge. Left eye: No discharge. Conjunctiva/sclera: Conjunctivae normal.      Pupils: Pupils are equal, round, and reactive to light. Neck:      Musculoskeletal: Normal range of motion and neck supple. Cardiovascular:      Rate and Rhythm: Normal rate and regular rhythm.       [facility]  [address] [place], [place] [employer]   Phone [phone]   BASIC METABOLIC PANEL W/ REFLEX TO MG FOR LOW K - Abnormal; Notable for the following components:    Potassium reflex Magnesium 3.0 (*)     Chloride 96 (*)     CO2 37 (*)     CREATININE 1.3 (*)     GFR Non- 39 (*)     GFR  52 (*)     All other components within normal limits    Narrative:     Performed at:  [facility]  [address] [facility] [address]   Phone [phone]   HEPATIC FUNCTION PANEL - Abnormal; Notable for the following components:    ALT 6 (*)     AST 14 (*)     All other components within normal limits    Narrative:     Performed at:  [facility]  [address] [facility] [place]   Phone [phone]   BRAIN NATRIURETIC PEPTIDE - Abnormal; Notable for the following components:    Pro-BNP 4,104 (*)     All other components within normal limits    Narrative:     Performed at:  [facility]  [address] [facility] [place]   Phone [phone]   URINE RT REFLEX TO CULTURE - Abnormal; Notable for the following components:    Clarity, UA CLOUDY (*)     Leukocyte Esterase, Urine MODERATE (*)     All other components within normal limits    Narrative:     Performed at:  [facility]  [address]   Phone [phone]   BLOOD GAS, VENOUS - Abnormal; Notable for the following components:    pCO2, Jimmy 67.7 (*)     HCO3, Venous 42 (*)     All other components within normal limits    Narrative:     Performed at:  [facility]  [address] [facility] [address]   Phone [phone]   MICROSCOPIC URINALYSIS - Abnormal; Notable for the following components:    Renal Epithelial, UA 2-5 (*)     Crystals, UA 2+ Ca.  Oxalate (*)     WBC, UA 50 (*)     Epithelial Cells, UA 10 (*)     All other components within normal limits    Narrative:     Performed at:  [facility]  [address] [facility] [place] [address]   Phone [phone]   RAPID INFLUENZA A/B ANTIGENS    Narrative:     Performed at:  [facility]  [address] [place] [facility] [place] [address]   Phone [phone]   CULTURE, URINE   TROPONIN    Narrative:     Performed at:  [facility]  [address] [facility] [place] [address]   Phone [phone]    Narrative:     Performed at:  [facility]  [address] [facility] [address]   Phone [phone]   MAGNESIUM    Narrative:     Performed at:  [facility]  [address] [facility] [address] [address]   Phone [phone]   BLOOD OCCULT STOOL DIAGNOSTIC    Narrative:     ORDER#: [id number]                          ORDERED BY: Adan Murphy  SOURCE: Stool                              COLLECTED:  01/31/21 02:14  ANTIBIOTICS AT [facility].:                      RECEIVED :  01/31/21 02:53  Performed at:  [facility]  [address] [facility] [address] [address]   Phone [phone]       All other labs were withinnormal range or not returned as of this dictation. EMERGENCY DEPARTMENT COURSE and DIFFERENTIAL DIAGNOSIS/MDM:     PMH, Surgical Hx, FH, Social Hx reviewed by myself (ETOH usage, Tobacco usage, Drug usage reviewed by myself, no pertinent Hx)- No Pertinent Hx     Old records were reviewed by me    Hemoglobin around baseline Hemoccult negative     Recommended admission for IV antibiotics and further inpatient assessment. Patient wants to go home. Discussed close outpatient follow-up. Recommended admission for IV antibiotics. Touted my note to Dr Homero Baxter.       I estimate there is LOW risk for Sepsis, MI, Stroke, Tamponade, PTX, Toxicity or other life threatening etiology thus I consider the discharge disposition reasonable. The patient is at low risk for mortality based on demographic, history and clinical factors. Given the best available information and clinical assessment, I estimate the risk of hospitalization to be greater than risk of treatment at home. I have explained to the patient that the risk could rapidly change, given precautions for return and instructions. Explained to patient that the risk for mortality is low based on demographic, history and clinical factors. I discussed with patient the results of evaluation in the ED, diagnosis, care, and prognosis. The plan is to discharge to home. Patient is in agreement with plan and questions have been answered. I also discussed with patient the reasons which may require a return visit and the importance of follow-up care. The patient is well-appearing, nontoxic, and improved at the time of discharge. Patient agrees to call to arrange follow-up care as directed. Patient understands to return immediately for worsening/change in symptoms. CRITICAL CARE TIME   Total Critical Caretime was 21 minutes, excluding separately reportable procedures. There was a high probability of clinically significant/life threatening deterioration in the patient's condition which required my urgent intervention. PROCEDURES:  Unlessotherwise noted below, none    FINAL IMPRESSION      1. Pneumonia due to organism          DISPOSITION/PLAN   DISPOSITION      PATIENT REFERRED TO:  Amanda Candelaria 3  [phone]    Call today        DISCHARGE MEDICATIONS:  New Prescriptions    AZITHROMYCIN (ZITHROMAX Z-TRUDY) 250 MG TABLET    Take 2 tablets (500 mg) on Day 1, and then take 1 tablet (250 mg) on days 2 through 5.     CEFUROXIME (CEFTIN) 250 MG TABLET    Take 1 tablet by mouth 2 times daily for 7 days    PREDNISONE (DELTASONE) 50 MG TABLET    Take 1 tablet by mouth daily for 5 days          (Please note that portions ofthis note were completed with a voice recognition program.  Efforts were made to edit the dictations but occasionally words are mis-transcribed.)    Shyam Villanueva MD(electronically signed)  Attending Emergency Physician        Shyam Villanueva MD  01/31/21 1969

## 2021-01-31 NOTE — ED NOTES
Pt awaiting for transport home, eta 0900 via [address], [facility]  01/31/21 6005

## 2021-01-31 NOTE — ED NOTES
Fall risk screening completed. Fall risk bracelet applied to patient. Non-skid socks provided and placed on patient. The fall risk is indicated using  dome light . Based on score, a bed alarm was indicated and applied. The call light is within the patient's reach, and instructions for use were provided. The bed is in the lowest position with wheels locked. The patient has been advised to notify staff, using the call light, if there is a need to get up or use restroom. The patient verbalized understanding of safety precautions and how to contact staff for assistance.         Ganga Abraham RN  01/31/21 [id number]

## 2021-02-01 NOTE — CARE COORDINATION
Patient contacted regarding [facility]. Discussed COVID-19 related testing which was available at this time. Test results were negative. Patient informed of results, if available? Yes    Care Transition Nurse/ Ambulatory Care Manager contacted the patient by telephone to perform post discharge assessment. Call within 2 business days of discharge: Yes. Verified name and  with patient as identifiers. Provided introduction to self, and explanation of the CTN/ACM role, and reason for call due to risk factors for infection and/or exposure to COVID-19. Symptoms reviewed with patient who verbalized the following symptoms: no new symptoms and no worsening symptoms. Due to no new or worsening symptoms encounter was not routed to provider for escalation. Discussed follow-up appointments. If no appointment was previously scheduled, appointment scheduling offered: already has a call into [facility] on   [facility] follow up appointment(s):   Future Appointments   Date Time Provider Henrietta More   2021  1:15 PM Benedict Moran MD [facility] JARON   2021  1:00 PM Deuce Ramirez DO [facility]     Non-[facility] follow up appointment(s): na    Non-face-to-face services provided:  see above     Advance Care Planning:   Does patient have an Advance Directive:  reviewed and needs to be updated. Patient has following risk factors of: heart failure. CTN/ACM reviewed discharge instructions, medical action plan and red flags such as increased shortness of breath, increasing fever and signs of decompensation with patient who verbalized understanding. Discussed exposure protocols and quarantine with CDC Guidelines What to do if you are sick with coronavirus disease .  Patient was given an opportunity for questions and concerns.  The patient agrees to contact the [facility] exposure line [phone], local health department [place] Department of Health: ([phone]) and PCP office for questions related to their healthcare. CTN/ACM provided contact information for future needs. Reviewed and educated patient on any new and changed medications related to discharge diagnosis     Patient/family/caregiver given information for [facility] Loop and agrees to enroll no  Patient's preferred e-mail: na   Patient's preferred phone number: nancy  Based on Loop alert triggers, patient will be contacted by nurse care manager for worsening symptoms. Plan for follow-up call in 5-7 days based on severity of symptoms and risk factors. Reports did get her prescriptions filled that she received from the ER and is taking them without difficulty.

## 2021-02-15 NOTE — CARE COORDINATION
You Patient resolved from the Care Transitions episode on 2/1/21  Discussed COVID-19 related testing which was available at this time. Test results were negative. Patient informed of results, if available? Yes    Patient/family has been provided the following resources and education related to COVID-19:                         Signs, symptoms and red flags related to COVID-19            CDC exposure and quarantine guidelines            [facility] exposure contact - [phone]            Contact for their local Department of Health                 Patient currently reports that the following symptoms have improved:  no new/worsening symptoms     No further outreach scheduled with this CTN/ACM. Episode of Care resolved. Patient has this CTN/ACM contact information if future needs arise. Reports is doing much better now.

## 2021-02-24 NOTE — CARE COORDINATION
Ability to prepare Food Preparation: Independent  Ability to maintain home (clean home, laundry): Independent  Ability to drive and/or has transportation: Independent  Ability to do shopping: Independent  Ability to manage finances: Independent  Is patient able to live independently?: Yes     Current Housing: Private Residence        Per the Fall Risk Screening, did the patient have 2 or more falls or 1 fall with injury in the past year?: No        Are you experiencing loss of meaning?: No  Are you experiencing loss of hope and peace?: No     Thinking about your patient's physical health needs, are there any symptoms or problems (risk indicators) you are unsure about that require further investigation?: No identified areas of uncertainly or problems already being investigated   Are the patients physical health problems impacting on their mental well-being?: No identified areas of concern   Are there any problems with your patients lifestyle behaviors (alcohol, drugs, diet, exercise) that are impacting on physical or mental well-being?: No identified areas of concern   Do you have any other concerns about your patients mental well-being?  How would you rate their severity and impact on the patient?: No identified areas of concern   How would you rate their home environment in terms of safety and stability (including domestic violence, insecure housing, neighbor harassment)?: Consistently safe, supportive, stable, no identified problems   How do daily activities impact on the patient's well-being? (include current or anticipated unemployment, work, caregiving, access to transportation or other): No identified problems or perceived positive benefits   How would you rate their social network (family, work, friends)?: Adequate participation with social networks How would you rate their financial resources (including ability to afford all required medical care)?: Financially secure, resources adequate, no identified problems   How wells does the patient now understand their health and well-being (symptoms, signs or risk factors) and what they need to do to manage their health?: Reasonable to good understanding and already engages in managing health or is willing to undertake better management   How well do you think your patient can engage in healthcare discussions? (Barriers include language, deafness, aphasia, alcohol or drug problems, learning difficulties, concentration): Clear and open communication, no identified barriers   Do other services need to be involved to help this patient?: Other care/services not required at this time   Suggested Interventions and Community Resources   Disease Assocation: In Process   Zone Management Tools: In Process                  Prior to Admission medications    Medication Sig Start Date End Date Taking?  Authorizing Provider   metoprolol tartrate (LOPRESSOR) 50 MG tablet TAKE 1/2 TABLET TWICE DAILY 12/29/20   Travis Delong MD   ipratropium-albuterol (DUONEB) 0.5-2.5 (3) MG/3ML SOLN nebulizer solution Inhale 3 mLs into the lungs every 4 hours as needed for Shortness of Breath 12/28/20   Travis Delong MD   levothyroxine (SYNTHROID) 100 MCG tablet TAKE 1 TABLET EVERY DAY 11/25/20   Travis Delong MD   amiodarone (CORDARONE) 200 MG tablet TAKE 1/2 TABLET EVERY DAY 10/19/20   Jonnie Beltran MD   torsemide (DEMADEX) 20 MG tablet TAKE 1 TABLET EVERY DAY 10/19/20   Travis Delong MD   traZODone (DESYREL) 50 MG tablet Take 1 tablet by mouth nightly as needed for Sleep 8/13/20   Travis Delong MD   sodium chloride, Inhalant, 3 % nebulizer solution INHALE FOUR MILLILITERS BY NEBULIZATION TWICE A DAY 7/24/20   Danis Morrison, DO simvastatin (ZOCOR) 20 MG tablet TAKE 1 TABLET EVERY DAY 4/2/20   Conrado Solorio MD   memantine [facility]) 10 MG tablet TAKE 1 TABLET TWICE DAILY 3/15/20   Conrado Solorio MD   FERROUS SULFATE PO Take 1 tablet by mouth     Historical Provider, MD   Vitamin D, Cholecalciferol, 10 MCG (400 UNIT) TABS Take 400 Units by mouth    Historical Provider, MD   Respiratory Therapy Supplies (NEBULIZER COMPRESSOR) KIT 1 kit by Does not apply route once for 1 dose 9/25/19 9/25/19  Jamie Rosario DO   Respiratory Therapy Supplies (NEBULIZER/TUBING/MOUTHPIECE) KIT 1 kit by Does not apply route daily as needed (for breathing treatment) 9/25/19   Jamie Rosario, DO   Multiple Vitamins-Minerals (PRESERVISION AREDS 2+MULTI VIT PO) Take by mouth    Historical Provider, MD   albuterol sulfate HFA (PROAIR HFA) 108 (90 Base) MCG/ACT inhaler Inhale 2 puffs into the lungs every 4 hours as needed for Wheezing or Shortness of Breath 10/31/18   Jamie Rosario DO   latanoprost (XALATAN) 0.005 % ophthalmic solution Place 1 drop into both eyes nightly.     Historical Provider, MD       Future Appointments   Date Time Provider Henrietta More   7/8/2021  1:00 PM Jamie Rosario DO [facility]

## 2021-03-11 NOTE — CARE COORDINATION
Ambulatory Care Coordination Note  3/11/2021  CM Risk Score: 7  Charlson 10 Year Mortality Risk Score: 100%     ACC: Tika Conway RN    Summary Note: Matias is feeling good. Reports breathing is doing ok and about the same as it usually is. No sob noted while speaking on phone. Matias has been eating more and her wt was 96 lbs today. Denies any edema and is taking medications as prescribed. .Reinforced daily wts and to call office with > 3 lbs gain in 1-2 days or > 5 lbs in 1 week. Reviewed low fat, low sodium diets. Matias uses very little salt. Offered dietician but declined at this time. Matias gets her 2nd covid vaccine on 3/31. PLAN  1) fu appt  2) review wt, sob, edema    Congestive Heart Failure Assessment    Are you currently restricting fluids?: No Restriction  Do you understand a low sodium diet?: Yes  Do you understand how to read food labels?: Yes  How many restaurant meals do you eat per week?: 0  Do you salt your food before tasting it?: No     No patient-reported symptoms      Symptoms:  None: Yes      Symptom course: stable  Patient-reported weight (lb): 96  Weight trend: stable  Salt intake watch compared to last visit: stable                 Care Coordination Interventions    Program Enrollment: Complex Care  Referral from Primary Care Provider: No  Suggested Interventions and Community Resources  Disease Association: Completed (Comment: CHF)  Registered Dietician: Declined  Zone Management Tools: Completed (Comment: CHF)         Goals Addressed                 This Visit's Progress     Self Monitoring (pt-stated)   Improving     Daily Weights - I will weight myself as directed - Daily and write down weights  I will notify my provider of any increase in weight by 3 or more pounds in 2 days OR 5 or more pounds in a week.         Barriers: forgetfulness  Plan for overcoming my barriers: Continued CC support  Confidence: 5/10  Anticipated Goal Completion Date: 5/24/21 Prior to Admission medications    Medication Sig Start Date End Date Taking?  Authorizing Provider   escitalopram (LEXAPRO) 5 MG tablet Take 1 tablet by mouth daily 2/25/21   Brissa Kramer MD   metoprolol tartrate (LOPRESSOR) 50 MG tablet TAKE 1/2 TABLET TWICE DAILY 12/29/20   Brissa Kramer MD   ipratropium-albuterol (DUONEB) 0.5-2.5 (3) MG/3ML SOLN nebulizer solution Inhale 3 mLs into the lungs every 4 hours as needed for Shortness of Breath 12/28/20   Brissa Kramer MD   levothyroxine (SYNTHROID) 100 MCG tablet TAKE 1 TABLET EVERY DAY 11/25/20   Brissa Kramer MD   amiodarone (CORDARONE) 200 MG tablet TAKE 1/2 TABLET EVERY DAY 10/19/20   Shruti Herny MD   torsemide (DEMADEX) 20 MG tablet TAKE 1 TABLET EVERY DAY 10/19/20   Brissa Kramer MD   traZODone (DESYREL) 50 MG tablet Take 1 tablet by mouth nightly as needed for Sleep 8/13/20   Brissa Kramer MD   sodium chloride, Inhalant, 3 % nebulizer solution INHALE FOUR MILLILITERS BY NEBULIZATION TWICE A DAY 7/24/20   Efrain Murry,    simvastatin (ZOCOR) 20 MG tablet TAKE 1 TABLET EVERY DAY 4/2/20   Brissa Kramer MD   memantine (NAMENDA) 10 MG tablet TAKE 1 TABLET TWICE DAILY 3/15/20   Brissa Kramer MD   FERROUS SULFATE PO Take 1 tablet by mouth     Historical Provider, MD   Vitamin D, Cholecalciferol, 10 MCG (400 UNIT) TABS Take 400 Units by mouth    Historical Provider, MD   Respiratory Therapy Supplies (NEBULIZER COMPRESSOR) KIT 1 kit by Does not apply route once for 1 dose 9/25/19 9/25/19  Efrain Murry DO   Respiratory Therapy Supplies (NEBULIZER/TUBING/MOUTHPIECE) KIT 1 kit by Does not apply route daily as needed (for breathing treatment) 9/25/19   Efrain Murry DO   Multiple Vitamins-Minerals (PRESERVISION AREDS 2+MULTI VIT PO) Take by mouth    Historical Provider, MD   albuterol sulfate HFA (PROAIR HFA) 108 (90 Base) MCG/ACT inhaler Inhale 2 puffs into the lungs every 4 hours as needed for Wheezing or Shortness of Breath 10/31/18   Aiyana Nolasco DO   latanoprost (XALATAN) 0.005 % ophthalmic solution Place 1 drop into both eyes nightly.     Historical Provider, MD       Future Appointments   Date Time Provider Henrietta Argenis   3/31/2021  1:30  [facility] 28 DAY SECOND DOSE MHCX WST [facility]   7/8/2021  1:00 PM [facility]

## 2021-04-13 NOTE — CARE COORDINATION
Call to patient to assess ongoing Care Coordination needs. HIPPA compliant vm message left with ACM contact information and request for return call.     Collins Lozoya RN, MSN  Ambulatory Care Manager  [phone]

## 2021-05-04 NOTE — CARE COORDINATION
Ambulatory Care Coordination Note  5/4/2021  CM Risk Score: 7  Charlson 10 Year Mortality Risk Score: 100%     ACC: Keisha Swift RN    Summary Note:    Call to patient to assess ongoing Care Coordination needs. Patient reports she is \"awfully weak\"  Attributes to lack of exercise and activity. Is dependent on O2, but rarely goes out of her home d/t not understanding how to use her portable oxygen. She had [facility] come to the home once, but they were not helpful. Reports she drives and does not wear her oxygen to appointments. CHF:  Denies edema, Patient has stopped weighing herself. Weighed during the call 94lbs. Medications:  During review patient reports not using inhalers nor HHN. Reports she does not know how to use the [facility] and does not know when to use the inhaler. When educated on indications for use (wheezing or SOB)  Patient reports \"I'm always SOB\". Patient shares that her son Rere Rivero is coming Thursday and she is hoping he can show her how to use the portable oxygen. Interventions:    Reviewed medications  Provided education on inhaler and HHN  Discussed HHC    Plan:    [facility] to PCP  F/U in next week      Care Coordination Interventions    Program Enrollment: Complex Care  Referral from Primary Care Provider: No  Suggested Interventions and Community Resources  Disease Association: Completed (Comment: CHF)  Home Health Services: In Process  Registered Dietician: Declined  Zone Management Tools: Completed (Comment: CHF)         Goals Addressed                 This Visit's Progress       Care Coordination     Self Monitoring (pt-stated)   Worsening     Daily Weights - I will weight myself as directed - Daily and write down weights  I will notify my provider of any increase in weight by 3 or more pounds in 2 days OR 5 or more pounds in a week.         Barriers: forgetfulness  Plan for overcoming my barriers: Continued CC support  Confidence: 5/10  Anticipated Goal Completion 5/4/2021 7/24/20   Kathi Pompa DO   Respiratory Therapy Supplies (NEBULIZER COMPRESSOR) KIT 1 kit by Does not apply route once for 1 dose 9/25/19 9/25/19  Kathi Pompa DO   albuterol sulfate A [facility] HFA) 108 (90 Base) MCG/ACT inhaler Inhale 2 puffs into the lungs every 4 hours as needed for Wheezing or Shortness of Breath  Patient not taking: Reported on 5/4/2021 10/31/18   Kathi Pompa DO       Future Appointments   Date Time Provider Henrietta More   5/25/2021  1:30 PM Estrella Irizarry MD [facility]   7/8/2021  1:00 PM Kathi Pompa DO [address] [place] [address]     ,   Congestive Heart Failure Assessment    Are you currently restricting fluids?: No Restriction  Do you understand a low sodium diet?: Yes  Do you understand how to read food labels?: Yes  How many restaurant meals do you eat per week?: 0  Do you salt your food before tasting it?: No     No patient-reported symptoms      Symptoms:     Symptom course: stable  Patient-reported weight (lb): 94  Weight trend: stable  Salt intake watch compared to last visit: stable      and   General Assessment    Do you have any symptoms that are causing concern?: Yes  Progression since Onset: Gradually Worsening  Reported Symptoms: Weakness

## 2021-05-05 NOTE — CARE COORDINATION
Call placed to [facility] to communicated home care referral.  Spoke to [facility] who took the demographics and will submit to her director for approval and call me back.     Nelson Campos RN, MSN  Ambulatory Care Manager  [phone]

## 2021-05-13 NOTE — CARE COORDINATION
Ambulatory Care Coordination Note  5/13/2021  CM Risk Score: 7  Charlson 10 Year Mortality Risk Score: 100%     ACC: Yue Solorzano, KENN    Summary Note:     Call to patient to assess ongoing Care Coordination needs. HHC from [facility] [employer] started. Patient getting SN, PT and OT. Reports the therapy is helping. CHF:  Weight was up 5 lbs last week and some pedal edema. PCP increased Diuretic x 1 day. Weight today 92 lbs. Denies pedal edema, but does report L leg is swollen and hard. Denies redness, pain or warmth    RN due for home visit today. [facility] and call the office if noted concerns. Plan:    F/U in about 1 week. Care Coordination Interventions    Program Enrollment: Complex Care  Referral from Primary Care Provider: No  Suggested Interventions and Community Resources  Disease Association: Completed (Comment: CHF)  Home Health Services: Completed (Comment: Active with [address] RN, PT, OT)  Registered Dietician: Declined  Zone Management Tools: Completed (Comment: CHF)         Goals Addressed                 This Visit's Progress       Care Coordination     Self Monitoring (pt-stated)   Improving     Daily Weights - I will weight myself as directed - Daily and write down weights  I will notify my provider of any increase in weight by 3 or more pounds in 2 days OR 5 or more pounds in a week. Barriers: forgetfulness  Plan for overcoming my barriers: Continued CC support  Confidence: 5/10  Anticipated Goal Completion Date: 5/24/21              Prior to Admission medications    Medication Sig Start Date End Date Taking?  Authorizing Provider   simvastatin (ZOCOR) 20 MG tablet TAKE 1 TABLET EVERY DAY 4/8/21   Estefania Estrada MD   memantine [facility]) 10 MG tablet TAKE 1 TABLET TWICE DAILY 4/8/21   Estefania Estrada MD   escitalopram [facility]) 5 MG tablet Take 1 tablet by mouth daily 2/25/21   Estefania Estrada MD   metoprolol Provider Henrietta More   5/25/2021  1:30 PM Al Judge MD [facility]   7/8/2021  1:00 PM Mercedes Weinberg DO [facility]     ,   Congestive Heart Failure Assessment    Are you currently restricting fluids?: No Restriction  Do you understand a low sodium diet?: Yes  Do you understand how to read food labels?: Yes  How many restaurant meals do you eat per week?: 0  Do you salt your food before tasting it?: No     No patient-reported symptoms      Symptoms:  None: Yes      Symptom course: stable  Patient-reported weight (lb): 92  Weight trend: stable  Salt intake watch compared to last visit: stable      and   General Assessment    Do you have any symptoms that are causing concern?: Yes  Progression since Onset: Unchanged  Reported Symptoms: Other (Comment: L leg edema/firm)

## 2021-05-20 NOTE — CARE COORDINATION
Ambulatory Care Coordination Note  5/20/2021  CM Risk Score: 7  Charlson 10 Year Mortality Risk Score: 100%     ACC: Shyla Matamoros RN    Summary Note:     Call to patient to assess ongoing Care Coordination needs. Patient reports her right leg \"is almost back to normal\". Has venous US and was negative for DVT. Is unable to apply compression stockings, but currently denies edema. CHF:  Weight stable at 92 lbs. Denies edema or SOB. \"I'm fine as long as I wear my Oxygen\"    Mobility and strength improving with PT. Is doing exercises prescribed. Has a copy on the fridge and microwave to remind her. Last PT visit next week. RN out today for visit. No issues noted. Interventions:    Reminded to continue daily weights and report weight gain >3lbs/day or > 5lbs/wk  Encouraged to continue exercised after PT discharges    Plan:    F/U on CHF/mobility in about 1 month-Graduate from CM if no issues          Care Coordination Interventions    Program Enrollment: Complex Care  Referral from Primary Care Provider: No  Suggested Interventions and Community Resources  Disease Association: Completed (Comment: CHF)  Home Health Services: Completed (Comment: Active with [address] RN, PT, OT)  Registered Dietician: Declined  Zone Management Tools: Completed (Comment: CHF)         Goals Addressed                    This Visit's Progress       Care Coordination      Self Monitoring (pt-stated)   On track      Daily Weights - I will weight myself as directed - Daily and write down weights  I will notify my provider of any increase in weight by 3 or more pounds in 2 days OR 5 or more pounds in a week. Barriers: forgetfulness  Plan for overcoming my barriers: Continued CC support  Confidence: 5/10  Anticipated Goal Completion Date: 5/24/21              Prior to Admission medications    Medication Sig Start Date End Date Taking?  Authorizing Provider   simvastatin (ZOCOR) 20 MG tablet TAKE 1 TABLET EVERY DAY 4/8/21 Nicole Hill MD   memantine [facility]) 10 MG tablet TAKE 1 TABLET TWICE DAILY 4/8/21   Nicole Hill MD   escitalopram [facility]) 5 MG tablet Take 1 tablet by mouth daily 2/25/21   Nicole Hill MD   metoprolol tartrate (LOPRESSOR) 50 MG tablet TAKE 1/2 TABLET TWICE DAILY 12/29/20   Nicole Hill MD   ipratropium-albuterol (DUONEB) 0.5-2.5 (3) MG/3ML SOLN nebulizer solution Inhale 3 mLs into the lungs every 4 hours as needed for Shortness of Breath 12/28/20   Nicole Hill MD   levothyroxine (SYNTHROID) 100 MCG tablet TAKE 1 TABLET EVERY DAY 11/25/20   Nicole Hill MD   amiodarone (CORDARONE) 200 MG tablet TAKE 1/2 TABLET EVERY DAY 10/19/20   Samir Sims MD   torsemide (DEMADEX) 20 MG tablet TAKE 1 TABLET EVERY DAY 10/19/20   Nicole Hill MD   traZODone (DESYREL) 50 MG tablet Take 1 tablet by mouth nightly as needed for Sleep 8/13/20   Nicole Hill MD   sodium chloride, Inhalant, 3 % nebulizer solution INHALE FOUR MILLILITERS BY NEBULIZATION TWICE A DAY 7/24/20   Gissell Ennis, DO   FERROUS SULFATE PO Take 1 tablet by mouth     Historical Provider, MD   Vitamin D, Cholecalciferol, 10 MCG (400 UNIT) TABS Take 400 Units by mouth    Historical Provider, MD   Respiratory Therapy Supplies (NEBULIZER COMPRESSOR) KIT 1 kit by Does not apply route once for 1 dose 9/25/19 9/25/19  Gissell Ennis, DO   Respiratory Therapy Supplies (NEBULIZER/TUBING/MOUTHPIECE) KIT 1 kit by Does not apply route daily as needed (for breathing treatment) 9/25/19   Gissell Ennis, DO   Multiple Vitamins-Minerals (PRESERVISION AREDS 2+MULTI VIT PO) Take by mouth    Historical Provider, MD   albuterol sulfate HFA (PROAIR HFA) 108 (90 Base) MCG/ACT inhaler Inhale 2 puffs into the lungs every 4 hours as needed for Wheezing or Shortness of Breath 10/31/18   Gissell Conn, DO   latanoprost (XALATAN) 0.005 %

## 2021-06-17 PROBLEM — S22.000A COMPRESSION FRACTURE OF THORACIC VERTEBRA, CLOSED, INITIAL ENCOUNTER (HCC): Status: ACTIVE | Noted: 2021-01-01

## 2021-06-17 PROBLEM — D69.6 THROMBOCYTOPENIA (HCC): Status: ACTIVE | Noted: 2021-01-01

## 2021-06-17 PROBLEM — E86.0 DEHYDRATION: Status: ACTIVE | Noted: 2021-01-01
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
## 2021-06-17 PROBLEM — R79.89 ELEVATED TROPONIN: Status: ACTIVE | Noted: 2021-01-01

## 2021-06-17 PROBLEM — R74.8 ELEVATED CPK: Status: ACTIVE | Noted: 2021-01-01

## 2021-06-17 PROBLEM — R77.8 ELEVATED TROPONIN: Status: ACTIVE | Noted: 2021-01-01

## 2021-06-17 NOTE — ED NOTES
Report called to   [facility]      RN   To Room   [id number]  Cardiac monitor on during transfer  Pt's pain level   Denies   VSS, Afebrile   IV site is clean, dry and intact, Normal saline locked   Family updated on transfer            Reanna Iraheta RN  06/17/21 [id number]

## 2021-06-17 NOTE — CONSULTS
Discussed patient with ER and reviewed her CT cervical spine  Dirk Nissen a few days ago. NO LOC, denies pain. Presented with generalized weakness or inabililty to ambulate and has been crawling/scooting in a chair at home. In ER she is not in distress, moves all extremities, denies radicular pain or numbness. CT c spine shows nondisplace lamina fracture of C7 into facet, stable,  Anterior compression wedging of T1, minimal, stable, no retropulsion of bone into canal.    Patient will be admitted  Asked for her to be placed in hard collar,  Order MRI c spine. Will see her after MRI completed.       [id number]

## 2021-06-17 NOTE — CARE COORDINATION
INITIAL CASE MANAGEMENT ASSESSMENT    Reviewed chart, met with patient to assess possible discharge needs. Explained Case Management role/services. Living Situation: Lives alone in [place]    ADLs:      DME: Has Home O2- on 2 liters continuous     PT/OT Recs: TBD     Active Services: Active with [facility]- RN,  Has [employer] private pay. Transportation: recently stopped driving     Medications: [employer] in [place]    PCP: Nicole Hill MD      HD/PD: N/A    PLAN/COMMENTS: Patient understands that she may not be able to go home alone at this time, open to going to SNF for rehab. Patient provided with home care list and SNF list.  Patient has been at [facility] in the past and is fine with referral to [facility], referral sent. The Plan for Transition of Care is related to the following treatment goals: SNF then home. The Patient and/or patient representative - son Willard Rasheed was provided with a choice of provider and agrees   with the discharge plan. [x] Yes [] No    Freedom of choice list was provided with basic dialogue that supports the patient's individualized plan of care/goals, treatment preferences and shares the quality data associated with the providers. [x] Yes [] No    SW/CM provided contact information for patient or family to call with any questions. SW/CM will follow and assist as needed. Advanced Care Planning completed.

## 2021-06-17 NOTE — ED NOTES

## 2021-06-17 NOTE — PROGRESS NOTES
Medication Reconciliation    List of medications for Jean-Paul Jacques is currently taking is complete. Source of information:   Epic records  Conversation with patient, with prompting     Allergies  Allergy list not thoroughly reviewed with patient at this time  Allergies listed in Epic as follows:  Ace inhibitors, Aricept [donepezil hydrochloride], Benicar [olmesartan medoxomil], Exelon [rivastigmine tartrate], Pcn [penicillins], Quinapril hcl, Quinolones, Remeron [mirtazapine], and Doxycycline     Notes regarding home medications:   Patient did not receive any of their home medications prior to arrival to the emergency department  Removed escitalopram as it appears to only have been filled once for a 30 day supply in February 2021 and patient did not seem familiar with it  Stopped taking ferrous sulfate      Hank Sullivan [facility], PharmD   6/17/2021 10:53 AM

## 2021-06-17 NOTE — ED NOTES
Pt arrived to the ED [facility], pt states that she \"fell 2 days ago\" and layed on the floor, she was found by her neighbor, pt states that she lives alone and 2 children live in [place], pt is alert and orient, vss afebrile, pt has oxygen at  Home and remains on 2 liters nasal cannula      Annika Jacinto RN  06/17/21 [id number]

## 2021-06-17 NOTE — ACP (ADVANCE CARE PLANNING)
Advance Care Planning     Advance Care Planning Activator (Inpatient)  Conversation Note      Date of ACP Conversation: 6/17/2021     Conversation Conducted with: Patient with Decision Making Capacity    ACP Activator[facility]     Current Designated Health Care Decision Maker:     Primary Decision Maker: Dae Jones - [phone]    Secondary Decision Maker: Angelique Guerra - [phone]    Today we documented Decision Maker(s) consistent with ACP documents on file. Care Preferences    Ventilation: \"If you were in your present state of health and suddenly became very ill and were unable to breathe on your own, what would your preference be about the use of a ventilator (breathing machine) if it were available to you? \"      Would the patient desire the use of ventilator (breathing machine)?: yes    \"If your health worsens and it becomes clear that your chance of recovery is unlikely, what would your preference be about the use of a ventilator (breathing machine) if it were available to you? \"     Would the patient desire the use of ventilator (breathing machine)?: No      Resuscitation  \"CPR works best to restart the heart when there is a sudden event, like a heart attack, in someone who is otherwise healthy. Unfortunately, CPR does not typically restart the heart for people who have serious health conditions or who are very sick. \"    \"In the event your heart stopped as a result of an underlying serious health condition, would you want attempts to be made to restart your heart (answer \"yes\" for attempt to resuscitate) or would you prefer a natural death (answer \"no\" for do not attempt to resuscitate)? \" yes       [x] Yes   [] No   Educated Patient / Edith Rodriguez regarding differences between Advance Directives and portable DNR orders.     Length of ACP Conversation in minutes:10      Conversation Outcomes:  [x] ACP discussion completed  [x] Existing

## 2021-06-17 NOTE — ED PROVIDER NOTES
**ADVANCED PRACTICE PROVIDER, I HAVE EVALUATED THIS PATIENT**        [address] [facility] ENCOUNTER      Pt Name: Alex Reilly  QTJ:[id number]  [place]ie 1935  Date of evaluation: 6/17/2021  Provider: Brandy Calloway PA-C      Chief Complaint:    Chief Complaint   Patient presents with    Fall     pt states that 2 days ago she \" lost her balance\" and fell she has been laying on the floor since, [facility]         Nursing Notes, Past Medical Hx, Past Surgical Hx, Social Hx, Allergies, and Family Hx were all reviewed and agreed with or any disagreements were addressed in the HPI.    HPI: (Location, Duration, Timing, Severity, Quality, Assoc Sx, Context, Modifying factors)  This is a  80 y.o. female who presents with a Chief Complaint of fall. Patient states she fell about 2 days ago. She was reaching to  some lint on her carpet and fell back. Unable to get herself up. Been crawling on the floor for the last 2 days. She lives alone. Her stepdaughter and son lives in [place]. The stepdaughter states that she tries to call her at least twice a week and check on her. The last time she talked to her was on Tuesday. Patient denies headache, denies hitting her head. No loss of consciousness, no chest pain, no abdominal pain, no extremity weakness.       PastMedical/Surgical History:      Diagnosis Date    Anemia     Anticoagulant long-term use     Atrial fibrillation ([address] 6/2011    Ryland Cushing CHF (congestive heart failure) ([facility])     CKD (chronic kidney disease) stage 3, GFR 30-59 ml/min ([facility]) 2/11/2019    Clostridium difficile diarrhea 09/14/2016    Depression     Humerus fracture     Hyperlipidemia     Hypertension     Hypothyroidism     Mitral regurgitation     Optic neuritis     Osteopenia     Fosamax stopped 2/2014, had been treated at least since 2004    Polymyalgia rheumatica ([address]     Pulmonary embolus ([address] 6/2011 Right pulmonary obstruction suspected to be possible chronic pulmonary embolus    Thyroid disease     Upper GI bleed 2019    Vitamin D deficiency          Procedure Laterality Date    COLONOSCOPY  09/14/2016    Ilya    COLONOSCOPY N/A 5/31/2019    COLONOSCOPY performed by Ale Ba MD at [facility], [address]  5/31/2019    BOWEL SMALL CAPSULE ENDOSCOPY DEPLOYED VIA EGD performed by Ale Ba MD at [address] ENTEROSCOPY N/A 6/3/2019    ENTEROSCOPY PUSH DIAGNOSTIC performed by Gricelda Fitch DO at [facility] GASTROINTESTINAL ENDOSCOPY  09/14/2016    [facility]    UPPER GASTROINTESTINAL ENDOSCOPY N/A 5/30/2019    EGD POLYP ABLATION/OTHER performed by Ale Ba MD at [place] [address] N/A 5/31/2019    ESOPHAGOGASTRODUODENOSCOPY WITH CAPSULE PLACEMENT performed by Ale Ba MD at [facility] 6/16/2019    EGD BIOPSY performed by Flako Helton MD at [place] [address] 6/16/2019    EGD FOREIGN BODY REMOVAL performed by Flako Helton MD at [facility]       Medications:  Previous Medications    ALBUTEROL SULFATE HFA (PROAIR HFA) 108 (90 BASE) MCG/ACT INHALER    Inhale 2 puffs into the lungs every 4 hours as needed for Wheezing or Shortness of Breath    AMIODARONE (CORDARONE) 200 MG TABLET    TAKE 1/2 TABLET EVERY DAY    IPRATROPIUM-ALBUTEROL (DUONEB) 0.5-2.5 (3) MG/3ML SOLN NEBULIZER SOLUTION    Inhale 3 mLs into the lungs every 4 hours as needed for Shortness of Breath    LATANOPROST (XALATAN) 0.005 % OPHTHALMIC SOLUTION    Place 1 drop into both eyes nightly.     LEVOTHYROXINE (SYNTHROID) 100 MCG TABLET    TAKE 1 TABLET EVERY DAY    MEMANTINE (NAMENDA) 10 MG TABLET    TAKE 1 TABLET TWICE DAILY    METOPROLOL TARTRATE (LOPRESSOR) 50 MG TABLET    TAKE 1/2 TABLET TWICE DAILY    MULTIPLE sounds. No murmur heard. No friction rub. No gallop. Pulmonary:      Effort: Pulmonary effort is normal. No respiratory distress. Breath sounds: Normal breath sounds. No wheezing or rales. Chest:      Chest wall: No tenderness. Abdominal:      General: Abdomen is flat. Bowel sounds are normal. There is no distension. Palpations: Abdomen is soft. There is no mass. Tenderness: There is no abdominal tenderness. There is no guarding or rebound. Musculoskeletal:         General: No swelling or tenderness. Normal range of motion. Cervical back: Normal range of motion and neck supple. Left lower leg: No edema. Skin:     General: Skin is warm and dry. Neurological:      General: No focal deficit present. Mental Status: She is alert and oriented to person, place, and time. Mental status is at baseline. Sensory: No sensory deficit.    Psychiatric:         Behavior: Behavior normal.         MEDICAL DECISION MAKING    Vitals:    Vitals:    06/17/21 1345 06/17/21 1400 06/17/21 1415 06/17/21 1430   BP: (!) 153/79 (!) 158/72 (!) 171/68 (!) 127/95   Pulse: 90 89 93    Resp: (!) 36 (!) 34 (!) 31    Temp:       TempSrc:       SpO2: 94% 97% 96% 90%   Weight:           LABS:  Labs Reviewed   CBC WITH AUTO DIFFERENTIAL - Abnormal; Notable for the following components:       Result Value    RBC 3.47 (*)     Hemoglobin 10.5 (*)     Hematocrit 31.8 (*)     Platelets 730 (*)     All other components within normal limits    Narrative:     Performed at:  [address] [address]   Phone [phone]   COMPREHENSIVE METABOLIC PANEL W/ REFLEX TO MG FOR LOW K - Abnormal; Notable for the following components:    Sodium 147 (*)     CO2 33 (*)     Glucose 102 (*)     BUN 33 (*)     CREATININE 1.4 (*)     GFR Non- 36 (*)     GFR African American 43 (*)     Albumin/Globulin Ratio 1.0 (*)     All other components within normal limits    Narrative:     Performed at:  [facility]  [address] [facility] [employer] [address]   Phone [phone]   CK - Abnormal; Notable for the following components: Total  (*)     All other components within normal limits    Narrative:     Performed at:  [address] [employer] [address]   Phone [phone]   URINE RT REFLEX TO CULTURE - Abnormal; Notable for the following components:    Clarity, UA CLOUDY (*)     Ketones, Urine TRACE (*)     Blood, Urine TRACE (*)     Protein, UA 30 (*)     All other components within normal limits    Narrative:     Performed at:  [address] [employer] [address]   Phone [phone]   MICROSCOPIC URINALYSIS - Abnormal; Notable for the following components:    Coarse Casts, UA 6-10 (*)     All other components within normal limits    Narrative:     Performed at:  [address] [employer] [address]   Phone [phone]   TROPONIN - Abnormal; Notable for the following components:    Troponin 0.06 (*)     All other components within normal limits    Narrative:     Performed at:  [address] [employer] [address]   Phone [phone]   LACTIC ACID, PLASMA    Narrative:     Performed at:  [address] [employer] [address]   Phone [phone] of labs reviewed and were negative at this time or not returned at the time of this note.     RADIOLOGY:   Non-plain film images such as CT, Ultrasound and MRI are read by the radiologist. Neela Champagne PA-C have directly visualized the radiologic plain film image(s) with the below findings:      Interpretation per the Radiologist below, if available at the time of this note:    [address] [place] [address] CONTRAST   Preliminary Result   1. No evidence of acute intracranial abnormality. 2. Paranasal sinus disease most pronounced in right ethmoid and right   maxillary sinus regions as described above. CT CERVICAL SPINE WO CONTRAST   Final Result   Mild acute wedge compression fracture of T1 without retropulsion. Also   nondisplaced fracture through the right lamina of C7. Both fractures are   stable. Severe degenerative changes at C5-C6. Severe facet arthropathy. XR CHEST PORTABLE   Final Result   Bilateral pulmonary disease almost identical to the comparison. Patient has   chronic pulmonary disease. Recurrent mild/faint superimposed interstitial   edema or infection may be present. Findings are also very similar to a more   remote chest x-ray from July 2019      There is unchanged chronic consolidation of the right lateral lung base. XR CHEST PORTABLE    Result Date: 6/17/2021  EXAMINATION: ONE XRAY VIEW OF THE CHEST 6/17/2021 8:19 am COMPARISON: January 31, 2021 HISTORY: ORDERING SYSTEM PROVIDED HISTORY: sob,cough TECHNOLOGIST PROVIDED HISTORY: Reason for exam:->sob,cough Reason for Exam: sob Acuity: Acute Type of Exam: Initial Relevant Medical/Surgical History: cough FINDINGS: Diffuse ill-defined interstitial opacities of both lungs are present very similar to the comparison. No pneumothorax. Unchanged blunting of the right costophrenic angle. No acute findings of the cardiac or mediastinal structures     Bilateral pulmonary disease almost identical to the comparison. Patient has chronic pulmonary disease. Recurrent mild/faint superimposed interstitial edema or infection may be present. Findings are also very similar to a more remote chest x-ray from July 2019 There is unchanged chronic consolidation of the right lateral lung base.           MEDICAL DECISION MAKING / ED COURSE:      PROCEDURES:   Procedures    None    Patient was given:  Medications   0.9 % sodium chloride infusion (has no administration in time range)     Emergency room course: Patient on exam scalp show no hematoma. She has no midline tender cervical, thoracic or lumbar spine. Cardiovascular regular rate rhythm, lungs are clear. No wheeze rales or rhonchi noted. No chest wall tenderness with palpation. Abdomen is soft nontender. Nondistended. Normal bowel sounds all 4 quadrant. Pelvic stable anterior compression. Patient has negative straight raise legs bilaterally. Good strength against resisted plantar and dorsiflexion. She has no facial drooping or slurred speech noted.  strength 5+ equal bilaterally. Alert oriented answer questions appropriately. Does not appear to be in acute distress. Lab result from today shows:  CBC White count 9.3, RBC 3.47, hemoglobin 10.5 and hematocrit 31.8. CMP showed sodium 147, potassium 3.8, chloride 102 with BUN of 33 creatinine 1.4. This is her baseline. Normal transaminases.     Urinalysis negative for leukocytes, negative for nitrites, trace blood, trace ketones. Lactic acid 1.3    Troponin 0 0.06. This is elevated from her normal most likely from dehydration. CT of head shows no evidence of acute intracranial abnormality. CT of cervical spine shows mild acute wedge compression fracture of T1 without retropulsion and nondisplaced fracture through the right lamina of C7. Both fractures are stable. Severe degenerative changes at C5-C6. I did discuss patient CT results and lab results from today with her. Discussed admission plan after I talked to the neurosurgeon. Patient also tells me that she wished to be admitted here and not a Jehovah's witness. Also talked to her about getting a life alert button for home. Also place a call out and spoke with her son who is coming from [place] state he will be here around 4:00 today. Did place a call to neurosurgery. Spoke with Dr. Torie Toribio.   He said these are stable and there was nothing surgical. Patient will be okay to be admitted here. Placed a call out to Dr. Vi Morse. Patient primary care provider was okay with admission. Patient will be admitted in stable condition. .      The patient tolerated their visit well. I evaluated the patient. The physician was available for consultation as needed. The patient and / or the family were informed of the results of any tests, a time was given to answer questions, a plan was proposed and they agreed with plan. CLINICAL IMPRESSION:  1. Fall, initial encounter    2. Compression fracture of T12 vertebra, initial encounter ([facility]    3. Closed fracture of seventh cervical vertebra without spinal cord injury, initial encounter ([facility]    4. Elevated troponin        DISPOSITION  DISPOSITION Admitted 06/17/2021 03:30:52 PM          PATIENT REFERRED TO:  No follow-up provider specified.     DISCHARGE MEDICATIONS:  New Prescriptions    No medications on file       DISCONTINUED MEDICATIONS:  Discontinued Medications    ESCITALOPRAM (LEXAPRO) 5 MG TABLET    Take 1 tablet by mouth daily    FERROUS SULFATE PO    Take 1 tablet by mouth     SODIUM CHLORIDE, INHALANT, 3 % NEBULIZER SOLUTION    INHALE FOUR MILLILITERS BY NEBULIZATION TWICE A DAY              (Please note the MDM and HPI sections of this note were completed with a voice recognition program.  Efforts were made to edit the dictations but occasionally words are mis-transcribed.)    Electronically signed, Kristian Magana PA-C,          Kristian Magana PA-C  06/17/21 4442

## 2021-06-17 NOTE — CARE COORDINATION
[facility]  Patient is active with General acute hospital, Will follow for discharge to home with orders to resume care.   Alona Romano LPN  CTN with  General acute hospital,  [address] [facility] [address], Fax [phone]

## 2021-06-17 NOTE — ED PROVIDER NOTES
Pt Name: Judit Jain  MRN: [id number]  [place] 1935  Date of evaluation: 6/17/2021    EKG Interpretation    The purpose of this note is for preliminary EKG interpretation only. This patient was seen independently by the mid-level provider and was not seen by this provider. EKG visualized preliminarily interpreted by myself shows sinus rhythm at a rate of 77. Borderline first-degree AV block with a MA interval of 204 ms. The axis is 45. There is motion artifact and electrical interference affecting interpretation. QT is a bit prolonged but other than that I do not see any obvious signs of acute injury or acute ischemia barring for technical difficulty.         Tamanna Yun MD  06/17/21 1898

## 2021-06-17 NOTE — PROGRESS NOTES
4 Eyes Skin Assessment     NAME:  Ana Mckenzie  YOB: 1935  MEDICAL RECORD NUMBER:  [id number]    The patient is being assess for  Admission    I agree that 2 RN's have performed a thorough Head to Toe Skin Assessment on the patient. ALL assessment sites listed below have been assessed. Areas assessed by both nurses:    Head, Face, Ears, Shoulders, Back, Chest, Arms, Elbows, Hands, Sacrum. Buttock, Coccyx, Ischium and Legs. Feet and Heels        Does the Patient have a Wound?  No noted wound(s)       Jef Prevention initiated:  Yes   Wound Care Orders initiated:  No    Pressure Injury (Stage 3,4, Unstageable, DTI, NWPT, and Complex wounds) if present place consult order under [de-identified] No    New and Established Ostomies if present place consult order under : No      Nurse 1 eSignature: Electronically signed by Shabbir Kim RN on 6/17/21 at 6:46 PM EDT    **SHARE this note so that the co-signing nurse is able to place an eSignature**    Nurse 2 eSignature: Electronically signed by Victor Manuel Ramirez RN on 6/17/21 at 6:47 PM EDT

## 2021-06-17 NOTE — ED NOTES
Bed: A-16  Expected date:   Expected time:   Means of arrival: [facility]  Comments:  Found down     Alexis Lake RN  06/17/21 0511

## 2021-06-18 PROBLEM — Z87.19 HISTORY OF GI BLEED: Status: ACTIVE | Noted: 2021-01-01

## 2021-06-18 PROBLEM — R05.8 COUGH WITH SPUTUM: Status: ACTIVE | Noted: 2021-01-01

## 2021-06-18 PROBLEM — E43 SEVERE MALNUTRITION (HCC): Chronic | Status: ACTIVE | Noted: 2021-01-01

## 2021-06-18 NOTE — PROGRESS NOTES
listen to posterior right lung.   Abd- BS+, soft, nontender, nondistended  Ext-no edema    The Following Labs Were Reviewed Today:    Recent Results (from the past 24 hour(s))   CBC Auto Differential    Collection Time: 06/17/21  8:20 AM   Result Value Ref Range    WBC 9.3 4.0 - 11.0 K/uL    RBC 3.47 (L) 4.00 - 5.20 M/uL    Hemoglobin 10.5 (L) 12.0 - 16.0 g/dL    Hematocrit 31.8 (L) 36.0 - 48.0 %    MCV 91.6 80.0 - 100.0 fL    MCH 30.2 26.0 - 34.0 pg    MCHC 33.0 31.0 - 36.0 g/dL    RDW 14.1 12.4 - 15.4 %    Platelets 648 (L) 345 - 450 K/uL    MPV 9.7 5.0 - 10.5 fL    Neutrophils % 81.5 %    Lymphocytes % 10.2 %    Monocytes % 7.1 %    Eosinophils % 0.9 %    Basophils % 0.3 %    Neutrophils Absolute 7.6 1.7 - 7.7 K/uL    Lymphocytes Absolute 1.0 1.0 - 5.1 K/uL    Monocytes Absolute 0.7 0.0 - 1.3 K/uL    Eosinophils Absolute 0.1 0.0 - 0.6 K/uL    Basophils Absolute 0.0 0.0 - 0.2 K/uL   Comprehensive Metabolic Panel w/ Reflex to MG    Collection Time: 06/17/21  8:20 AM   Result Value Ref Range    Sodium 147 (H) 136 - 145 mmol/L    Potassium reflex Magnesium 3.8 3.5 - 5.1 mmol/L    Chloride 102 99 - 110 mmol/L    CO2 33 (H) 21 - 32 mmol/L    Anion Gap 12 3 - 16    Glucose 102 (H) 70 - 99 mg/dL    BUN 33 (H) 7 - 20 mg/dL    CREATININE 1.4 (H) 0.6 - 1.2 mg/dL    GFR Non- 36 (A) >60    GFR  43 (A) >60    Calcium 8.9 8.3 - 10.6 mg/dL    Total Protein 7.3 6.4 - 8.2 g/dL    Albumin 3.6 3.4 - 5.0 g/dL    Albumin/Globulin Ratio 1.0 (L) 1.1 - 2.2    Total Bilirubin 0.8 0.0 - 1.0 mg/dL    Alkaline Phosphatase 82 40 - 129 U/L    ALT 18 10 - 40 U/L    AST 31 15 - 37 U/L    Globulin 3.7 g/dL   CK    Collection Time: 06/17/21  8:20 AM   Result Value Ref Range    Total  (H) 26 - 192 U/L   Urine, reflex to culture    Collection Time: 06/17/21  8:20 AM    Specimen: Urine, clean catch   Result Value Ref Range    Color, UA YELLOW Straw/Yellow    Clarity, UA CLOUDY (A) Clear    Glucose, Ur Negative Negative mg/dL    Bilirubin Urine Negative Negative    Ketones, Urine TRACE (A) Negative mg/dL    Specific Gravity, UA 1.016 1.005 - 1.030    Blood, Urine TRACE (A) Negative    pH, UA 5.0 5.0 - 8.0    Protein, UA 30 (A) Negative mg/dL    Urobilinogen, Urine 0.2 <2.0 E.U./dL    Nitrite, Urine Negative Negative    Leukocyte Esterase, Urine Negative Negative    Microscopic Examination YES     Urine Type NotGiven     Urine Reflex to Culture Not Indicated    Lactic Acid, Plasma    Collection Time: 06/17/21  8:20 AM   Result Value Ref Range    Lactic Acid 1.3 0.4 - 2.0 mmol/L   Microscopic Urinalysis    Collection Time: 06/17/21  8:20 AM   Result Value Ref Range    Coarse Casts, UA 6-10 (A) 0 - 2 /LPF    WBC, UA None seen 0 - 5 /HPF    RBC, UA 0-2 0 - 4 /HPF    Epithelial Cells, UA 2-5 0 - 5 /HPF    Amorphous, UA 3+ /HPF   Troponin    Collection Time: 06/17/21  8:20 AM   Result Value Ref Range    Troponin 0.06 (H) <0.01 ng/mL   EKG 12 Lead    Collection Time: 06/17/21  8:20 AM   Result Value Ref Range    Ventricular Rate 77 BPM    Atrial Rate 77 BPM    P-R Interval 204 ms    QRS Duration 80 ms    Q-T Interval 434 ms    QTc Calculation (Bazett) 491 ms    P Axis 103 degrees    R Axis 45 degrees    T Axis 32 degrees    Diagnosis       Normal sinus rhythmProlonged QTAbnormal ECGWhen compared with ECG of 31-JAN-2021 01:16,QT has lengthenedConfirmed by [facility] ([id number]) on 6/17/2021 3:09:04 PM   Troponin    Collection Time: 06/17/21  5:51 PM   Result Value Ref Range    Troponin 0.08 (H) <0.01 ng/mL   Basic Metabolic Panel    Collection Time: 06/17/21  5:51 PM   Result Value Ref Range    Sodium 148 (H) 136 - 145 mmol/L    Potassium 3.8 3.5 - 5.1 mmol/L    Chloride 105 99 - 110 mmol/L    CO2 23 21 - 32 mmol/L    Anion Gap 20 (H) 3 - 16    Glucose 75 70 - 99 mg/dL    BUN 28 (H) 7 - 20 mg/dL    CREATININE 1.2 0.6 - 1.2 mg/dL    GFR Non- 43 (A) >60    GFR  52 (A) >60    Calcium 9.4 8.3 - 10.6 mg/dL   Brain Natriuretic Peptide    Collection Time: 06/17/21  5:51 PM   Result Value Ref Range    Pro-BNP 17,995 (H) 0 - 449 pg/mL   EKG 12 Lead    Collection Time: 06/17/21  8:29 PM   Result Value Ref Range    Ventricular Rate 137 BPM    Atrial Rate 147 BPM    QRS Duration 80 ms    Q-T Interval 300 ms    QTc Calculation (Bazett) 453 ms    R Axis 45 degrees    T Axis 263 degrees    Diagnosis       Atrial fibrillation with rapid ventricular response with premature ventricular or aberrantly conducted complexesST & T wave abnormality, consider inferior ischemiaAbnormal ECGWhen compared with ECG of 17-JUN-2021 08:20,Atrial fibrillation has replaced Sinus rhythmVent.  rate has increased BY  60 BPMST now depressed in Inferior leadsST now depressed in Lateral leadsT wave inversion now evident in Inferior leads   Troponin    Collection Time: 06/17/21 11:20 PM   Result Value Ref Range    Troponin 0.11 (H) <0.01 ng/mL   APTT    Collection Time: 06/17/21 11:20 PM   Result Value Ref Range    aPTT 35.2 24.2 - 36.2 sec   Troponin    Collection Time: 06/18/21  7:10 AM   Result Value Ref Range    Troponin 0.08 (H) <0.01 ng/mL   Basic Metabolic Panel w/ Reflex to MG    Collection Time: 06/18/21  7:10 AM   Result Value Ref Range    Sodium 145 136 - 145 mmol/L    Potassium reflex Magnesium 4.2 3.5 - 5.1 mmol/L    Chloride 103 99 - 110 mmol/L    CO2 26 21 - 32 mmol/L    Anion Gap 16 3 - 16    Glucose 122 (H) 70 - 99 mg/dL    BUN 33 (H) 7 - 20 mg/dL    CREATININE 1.3 (H) 0.6 - 1.2 mg/dL    GFR Non-African American 39 (A) >60    GFR  47 (A) >60    Calcium 9.0 8.3 - 10.6 mg/dL   CBC auto differential    Collection Time: 06/18/21  7:10 AM   Result Value Ref Range    WBC 11.2 (H) 4.0 - 11.0 K/uL    RBC 3.88 (L) 4.00 - 5.20 M/uL    Hemoglobin 11.5 (L) 12.0 - 16.0 g/dL    Hematocrit 35.8 (L) 36.0 - 48.0 %    MCV 92.3 80.0 - 100.0 fL    MCH 29.7 26.0 - 34.0 pg    MCHC 32.1 31.0 - 36.0 g/dL    RDW 14.7 12.4 - 15.4 % candidate for anticoagulation. GI bleed 2 years ago thought to be due to small bowel AVMs and GI was recommending against long-term anticoagulation due to high risk of rebleed. Essential hypertension, benign-pressure is much better controlled today. Continue to monitor. Hypothyroidism due to medication-continue current thyroid supplementation. TSH in March was therapeutic. Chronic atrial fibrillation-currently on amiodarone drip with much better control. Remains on metoprolol. Await cardiology recommendations today. Again, as noted above, not a good long-term anticoagulation candidate. CKD (chronic kidney disease) stage 3-creatinine back to baseline. Continue to monitor    Cough with sputum-chest x-ray difficult to interpret. Procalcitonin pending. Cough with sputum may have been related to pulmonary vascular overload last evening.   Cough seems better today    Deanne Castro MD, Liana Church  8:17 AM  6/18/2021

## 2021-06-18 NOTE — PROGRESS NOTES
Physical Therapy    Facility/Department: [address] PROGRESSIVE CARE  Initial Assessment/Treatment Session    NAME: Gurvinder Kelley  : 1935  MRN: [id number]    Date of Service: 2021    Discharge Recommendations:  Patient would benefit from continued therapy after discharge, 5-7 sessions per week   PT Equipment Recommendations  Equipment Needed: No    Assessment   Body structures, Functions, Activity limitations: Decreased functional mobility ; Decreased strength;Decreased balance;Decreased endurance;Decreased ADL status  Assessment: Pt is an 80 y.o. F. admitted  s/p fall, C7 and T1 fx; currently in cervical collar. Pt presents pleasant and agreeable to evaluation, c/o fatigue, but denying any N/T.  PT noted (B) UE/LE strength/ROM at least 3/5, but pt did require Min A to stand to stedy from elevated EOB. She would benefit from continued therapy to gradually progress OOB activity/ambulation. Recommend high-frequency therapy upon D/C to maximize independence with self-care and mobility tasks prior to returning home alone. Gurvinder Kelley scored a  on the AM-PAC short mobility form. Current research shows that an AM-PAC score of 17 or less is typically not associated with a discharge to the patient's home setting. Based on the patient's AM-PAC score and their current functional mobility deficits, it is recommended that the patient have 5-7 sessions per week of Physical Therapy at d/c to increase the patient's independence. At this time, this patient demonstrates the endurance, and/or tolerance for 3 hours of therapy each day, with a treatment frequency of 5-7x/wk. Please see assessment section for further patient specific details. If patient discharges prior to next session this note will serve as a discharge summary. Please see below for the latest assessment towards goals.      Specific instructions for Next Treatment: Improve strength, balance, ambulate as able  Prognosis: Good  Decision Making: Medium Complexity  History: See below  Exam: Strength; ROM; Balance; Ambulation  Clinical Presentation: Evolving  PT Education: Goals; General Safety;Gait Training;PT Role;Plan of Care;Disease Specific Education;Orientation; Functional Mobility Training;Transfer Training  Barriers to Learning: Fatigue  REQUIRES PT FOLLOW UP: Yes  Activity Tolerance  Activity Tolerance: Patient limited by endurance; Patient limited by fatigue       Patient Diagnosis(es): The primary encounter diagnosis was Fall, initial encounter. Diagnoses of Compression fracture of T12 vertebra, initial encounter [facility] Closed fracture of seventh cervical vertebra without spinal cord injury, initial encounter ([facility] and Elevated troponin were also pertinent to this visit. has a past medical history of Anemia, Anticoagulant long-term use, Atrial fibrillation ([facility]), CHF (congestive heart failure) ([facility] CKD (chronic kidney disease) stage 3, GFR 30-59 ml/min ([facility]), Clostridium difficile diarrhea, Depression, Humerus fracture, Hyperlipidemia, Hypertension, Hypothyroidism, Mitral regurgitation, Optic neuritis, Osteopenia, Polymyalgia rheumatica ([facility] [facility] Pulmonary embolus ([facility] Thyroid disease, Upper GI bleed, and Vitamin D deficiency. has a past surgical history that includes Lung surgery; malignant skin lesion excision; Colonoscopy (09/14/2016); Upper gastrointestinal endoscopy (09/14/2016); Upper gastrointestinal endoscopy (N/A, 5/30/2019); Colonoscopy (N/A, 5/31/2019); Upper gastrointestinal endoscopy (N/A, 5/31/2019); Endoscopy, small bowel with ileum (5/31/2019); Enteroscopy (N/A, 6/3/2019); Upper gastrointestinal endoscopy (N/A, 6/16/2019); and Upper gastrointestinal endoscopy (N/A, 6/16/2019).     Restrictions  Restrictions/Precautions  Restrictions/Precautions: Fall Risk  Position Activity Restriction  Spinal Precautions: No Bending, No Lifting, No Twisting  Other position/activity restrictions: Cervical collar; C7 and T1 fxs     Vision/Hearing  Vision: Impaired  Vision Exceptions: Wears glasses at all times  Hearing: Within functional limits       Subjective  General  Chart Reviewed: Yes  Additional Pertinent Hx: Per Chris Marquez PA-C, \"This is a  80 y.o. female who presents with a Chief Complaint of fall. Patient states she fell about 2 days ago. She was reaching to  some lint on her carpet and fell back. Unable to get herself up. Been crawling on the floor for the last 2 days. She lives alone. Her stepdaughter and son lives in [place]. The stepdaughter states that she tries to call her at least twice a week and check on her. The last time she talked to her was on Tuesday. Patient denies headache, denies hitting her head. No loss of consciousness, no chest pain, no abdominal pain, no extremity weakness. \"  Response To Previous Treatment: Not applicable  Referring Practitioner: Dr. Abby Harrington  Referral Date : 06/17/21  Diagnosis: Fall; Non-displaced C7 fx; T1 compression fx; Elevated troponin  Follows Commands: Within Functional Limits  Subjective  Subjective: Pt denies pain. Coughing intermittently. RN noted SLP consult is being placed. Pain Screening  Patient Currently in Pain: Denies    Orientation  Orientation  Overall Orientation Status: Within Functional Limits     Social/Functional History  Social/Functional History  Lives With: Alone  Type of Home:  (Cond)  Home Layout: One level  Home Access: Level entry  Bathroom Shower/Tub: Walk-in shower  Bathroom Toilet: Standard  Bathroom Equipment: Grab bars in shower, Shower chair  Home Equipment: Cane  ADL Assistance: Independent  Homemaking Assistance:  (Cleaning lady comes 1x/3weeks.   Pt able to cook for herself.)  Ambulation Assistance: Independent ((I) without cane at home; uses cane in community)  Transfer Assistance: Independent  Active : Yes  Mode of Transportation: Car  Additional Comments: Denies any additional falls  Cognition Objective    AROM RLE (degrees)  RLE AROM: WFL  RLE General AROM: Hip Flex, Knee Flex/Ext, and Ankle PF/DF WFL  AROM LLE (degrees)  LLE AROM : WFL  LLE General AROM: Hip Flex, Knee Flex/Ext, and Ankle PF/DF WFL  AROM RUE (degrees)  RUE AROM : WFL  RUE General AROM: Shoulder Flex, Elbow Flex/Ext WFL  AROM LUE (degrees)  LUE AROM : WFL  LUE General AROM: Shoulder Flex, Elbow Flex/Ext WFL     Strength RLE  Strength RLE: WFL  Comment: Hip Flex, Knee Flex/Ext, and Ankle PF/DF at least 3/5  Strength LLE  Strength LLE: WFL  Comment: Hip Flex, Knee Flex/Ext, and Ankle PF/DF at least 3/5  Strength RUE  Strength RUE: WFL  Comment: Shoulder Flex, Elbow Flex/Ext WFL  Strength LUE  Strength LUE: WFL  Comment: Shoulder Flex, Elbow Flex/Ext WFL     Tone RLE  RLE Tone: Normotonic  Tone LLE  LLE Tone: Normotonic  Motor Control  Gross Motor?: WFL     Bed mobility  Supine to Sit: Minimal assistance (HOB elevated)     Transfers  Sit to Stand: Minimal Assistance;Contact guard assistance (From elevated EOB to stedy)  Stand to sit: Contact guard assistance  Bed to Chair: Dependent/Total (Using stedy)     Ambulation  Ambulation?: No  Stairs/Curb  Stairs?: No     Balance  Comments: Pt able to maintain sitting balance at EOB with CGA-SBA. Pt able to maintain static standing balance at stedy with CGA. Plan   Plan  Times per week: 2-3x  Specific instructions for Next Treatment: Improve strength, balance, ambulate as able  Current Treatment Recommendations: Strengthening, Balance Training, Endurance Training, Functional Mobility Training, Transfer Training, Gait Training, Stair training, Positioning, Equipment Evaluation, Education, & procurement, Patient/Caregiver Education & Training, Home Exercise Program, Neuromuscular Re-education, Safety Education & Training  Safety Devices  Type of devices:  All fall risk precautions in place, Call light within reach, Chair alarm in place, Gait belt, Left in chair, Nurse

## 2021-06-18 NOTE — PROGRESS NOTES
Speech Language Pathology  Facility/Department: [address]   CLINICAL BEDSIDE SWALLOW EVALUATION    NAME: Jose Reid  : 1935  MRN: [id number]    ADMISSION DATE: 2021  ADMITTING DIAGNOSIS: Dehydration [E86.0]     Jose Reid has Disorder of bone and cartilage; Edema; Essential hypertension, benign; Other and unspecified hyperlipidemia; Hypothyroidism due to medication; Paroxysmal atrial fibrillation ([address] Anemia; Vitamin D deficiency; Fatigue; Osteopenia; Chronic diastolic congestive heart failure ([address] Non-rheumatic mitral regurgitation; Pulmonary hypertension; Weight loss; Abnormal chest x-ray; S/P right heart catheterization; Chronic atrial fibrillation ([address] Dyspnea; Hx of venous thromboembolic disease; CKD (chronic kidney disease) stage 3, GFR 30-59 ml/min ([address] Acute blood loss anemia; Lightheaded; Disuse muscle atrophy; Esophageal dysphagia; Dysphagia; Candida esophagitis ([address] Dehydration; Elevated CPK; Fall from standing; Elevated troponin; Compression fracture of thoracic vertebra, closed, initial encounter ([address] Thrombocytopenia ([address] History of GI bleed; Cough with sputum; and Severe malnutrition ([address] on their problem list.    ONSET DATE: 2021    CHART REVIEW:  2021 ADMITTED WITH DEHYDRATION: ADMISSION H&P HPI: This is an 54-year-old white female who has multiple medical problems who presented to the emergency room after being found down and reportedly was down for the last two days. The patient states that within the last couple of days, she bent over to  a piece of lint off of the floor and she fell backwards hitting her head. She was able to get up off the ground after that incident, but then she reports that she did a similar thing again picking up a piece of lint, fell over, hit her head and this time, was on the ground for two days.   Her neighbor stopped in to check on her and found her on the ground today and she was brought to the emergency room for further evaluation and treatment. Incredibly in the emergency room, the patient's dehydration was only mild and her CPK elevation was only mild. The patient was given reportedly 2 liters of fluid boluses in the emergency room and she was sent to the floor for evaluation and observation. Upon this examiner's interview this evening, the patient appears tachypneic and is currently tachycardic with an irregular rhythm. Stat EKG has been ordered along with stat BMP and stat BNP. Telemetry will be placed on the patient as well. She does appear to be somewhat volume overloaded at this point and her maintenance IV fluids have been discontinued. Additionally, she has an infiltrated right antecubital IV. The ER workup also revealed a mildly elevated troponin and a T1 compression fracture as well as a nondisplaced fracture through the lamina of C7.  CT of the head showed no intracranial bleed. Cervical Collar placed d/t compression fracture    6/17/2021 CT CERVICAL SPINE  Impression   Mild acute wedge compression fracture of T1 without retropulsion.  Also   nondisplaced fracture through the right lamina of C7.  Both fractures are   stable.  Severe degenerative changes at C5-C6.  Severe facet arthropathy. 6/18/2021 CXR  Impression   No significant change in appearance of the chest, stable mild pulmonary edema   superimposed on chronic pulmonary fibrosis.  There is stable chronic   elevation of the right hemidiaphragm with stable small right pleural effusion.        REMOTE ESOPHAGRAM: 7/12/2019  FINDINGS:   The study was initially performed at 3 frames per second in lateral view to   evaluate the upper/cervical phase of swallowing with thin contrast material.   Laryngeal penetration was demonstrated but no obvious other abnormality was   appreciated during this portion of the evaluation.       Subsequently gas crystals were administered followed by thick barium   contrast.  Intermittent esophageal spasm was present without an obvious   obstructing mucosal lesion.  Appearing to arise from the right sided anterior   aspect of the mid esophagus is a diverticulum which would intermittently fill   and empty the with the enteric contrast.  No obvious gross mucosal   abnormality is appreciated.       Contrast promptly passed into the stomach.       The patient said she was unable to do the portion of the procedure where she   lays flat to drink and the study is somewhat limited. Date of Eval: 6/18/2021  Evaluating Therapist: DAVIE Thomas    Current Diet level:  Current Diet : Regular  Current Liquid Diet : Thin      Primary Complaint  Patient Complaint: patient denies dysphagia; RN concern for coughing with PO    Pain:  Pain Level: 0    Reason for Referral  Judit Jain was referred for a bedside swallow evaluation to assess the efficiency of her swallow function, identify signs and symptoms of aspiration and make recommendations regarding safe dietary consistencies, effective compensatory strategies, and safe eating environment. Impression  Dysphagia Diagnosis: Severe pharyngeal stage dysphagia; Suspected needs further assessment  · Accepted and tolerated evaluation keith bedside. · patient alert, cooperative, pleasant, follows dx, verbally responsive but vague historian; oriented to self, place, situation, moth, and year. · Patient with limited recall of dysphagia history; suspect potential for pharyngeal dysphagia prior to current admission/episode with concern for potential chronic nature to impairments and potential need for consideration for long-term alternate nutrition pending acute dysphagia course. · Oral phase adequate for items assessed. · Concern for severe pharyngeal dysphagia characterized by delayed swallow and decreased laryngeal elevation with high suspicion for reduced pharyngeal peristalsis. Overt signs/symptoms of penetration/aspiration with all PO.  Repeat swallows with all PO with suspicion for poor pharyngeal clearance? · Recommend NPO with re-assessment at a later date. Should dysphagia persist, there may be potential need to consider Modified Barium Swallow as well as potential need to consider long-term alternate nutrition pending goals of care. Dysphagia Outcome Severity Scale: Level 1: Severe dysphagia- NPO. Unable to tolerate any PO safely     Treatment Plan  Requires SLP Intervention: Yes  Duration/Frequency of Treatment: ST to tx 3-5 times per week durign acute admission  D/C Recommendations: To be determined       Recommended Diet and Intervention  Diet Solids Recommendation: NPO  Liquid Consistency Recommendation: NPO  Recommended Form of Meds:  (IV/rectal administration would be most optimal; if PO meds are critical, rec consideration for crushed in honey thick liquid)    Therapeutic Interventions: Diet tolerance monitoring;Patient/Family education; Therapeutic PO trials with SLP    Treatment/Goals  Dysphagia Goals: The patient will tolerate repeat BSE when able. General  Chart Reviewed: Yes  Behavior/Cognition: Alert; Cooperative;Pleasant mood (vague historian at times)  Breath Sounds: Wet  Communication Observation: Functional  Follows Directions: Simple  Dentition: Adequate  Patient Positioning: Upright in bed (cervical collar in place)  Baseline Vocal Quality: Wet  Volitional Cough: Weak;Wet  Volitional Swallow: Absent  Prior Dysphagia History: no prior ST notes located during EMR review at this time; patient endorses speech therapy for dysphagia in the past but unable to provide details; 2019 esophagram noted - patient reports surgical repair of diverticulum  Consistencies Administered: Thin - cup;Nectar - cup;Honey - cup;Dysphagia Pureed (Dysphagia I)    Vision/Hearing  Vision Exceptions: Wears glasses at all times  Hearing: Within functional limits    Oral Motor Deficits  Oral Motor:  Within functional limits    Oral Phase Dysfunction  Oral Phase:  (adequate for items assessed)     Indicators of Pharyngeal Phase Dysfunction  Delayed Swallow: All  Decreased Laryngeal Elevation: All  Wet Vocal Quality: All  Cough - Immediate: Thin - cup;Nectar - cup  Cough - Delayed: Honey - cup;Puree  Pharyngeal: hard, audible, WEAK swallow with concern for reduced pharyngeal peristalsis and potentially poor pharyngeal clearance; wet vocal quality with all; increasing wetness and coughing as trials progress; concern for potential chronic nature to dysphagia that has been undetected    Prognosis  Prognosis for safe diet advancement: fair  Barriers to reach goals: severity of dysphagia  Consulted and agree with results and recommendations: Patient;RN    Education  Patient Education: Completed on results/recs/plan  Patient Education Response: Needs reinforcement         Therapy Time  SLP Individual Minutes  Time In: 3625  Time Out: 1300  Minutes: [facility].  [facility], [address] [place] [address], [id number]  Speech-Language Pathologist  Portable phone: [phone]  6/18/2021 1:00 PM

## 2021-06-18 NOTE — H&P
[address]                              HISTORY AND PHYSICAL    PATIENT NAME: Dayna La                 :        1935  MED REC NO:   [id number]                          ROOM:       4467  ACCOUNT NO:   [de-identified]                           ADMIT DATE: 2021  PROVIDER:     Demi Sutton MD    REASON FOR ADMISSION:  Dehydration. HISTORY OF PRESENT ILLNESS:  This is an 44-year-old white female who has  multiple medical problems who presented to the emergency room after  being found down and reportedly was down for the last two days. The  patient states that within the last couple of days, she bent over to   a piece of lint off of the floor and she fell backwards hitting  her head. She was able to get up off the ground after that incident,  but then she reports that she did a similar thing again picking up a  piece of lint, fell over, hit her head and this time, was on the ground  for two days. Her neighbor stopped in to check on her and found her on  the ground today and she was brought to the emergency room for further  evaluation and treatment. Incredibly in the emergency room, the  patient's dehydration was only mild and her CPK elevation was only mild. The patient was given reportedly 2 liters of fluid boluses in the  emergency room and she was sent to the floor for evaluation and  observation. Upon this examiner's interview this evening, the patient  appears tachypneic and is currently tachycardic with an irregular  rhythm. Stat EKG has been ordered along with stat BMP and stat BNP. Telemetry will be placed on the patient as well. She does appear to be  somewhat volume overloaded at this point and her maintenance IV fluids  have been discontinued. Additionally, she has an infiltrated right  antecubital IV.     The ER workup also revealed a mildly elevated troponin and a T1  compression fracture as well as a nondisplaced fracture through the  lamina of C7.  CT of the head showed no intracranial bleed. REVIEW OF SYSTEMS:  The patient states that she was in her normal state  of health prior to the fall, just describing profound fatigue. She  denies chest pain. She has chronic shortness of breath. She states  that she has been coughing up more sputum over the last week as well. She has had no fever. No nausea, vomiting or diarrhea. She has not had  bowel movements since she had fallen. The review of systems is  otherwise negative. PAST MEDICAL HISTORY:  Significant for chronic atrial fibrillation,  history of congestive heart failure with her last echocardiogram  revealing an ejection fraction of 65% and this was in 09/2017. Patient  has a history of Clostridium difficile diarrhea in 2016, history of  hypertension, hyperlipidemia, hypothyroidism, polymyalgia rheumatica. She had a pulmonary embolus in 2011 which actually was a right pulmonary  artery obstruction suspected to be possible chronic pulmonary embolus. She had an upper GI bleed in 2019 and she has vitamin D deficiency as  well. She also had a history of humerus fracture in the past.   Fibrosing mediastinitis. PAST SURGICAL HISTORY:  Significant for a lung surgery in the remote  past as well as a malignant skin lesion excision, details of which are  unknown. ALLERGIES:  The patient is allergic to ACE INHIBITORS which cause a  cough. ARICEPT, BENICAR, and EXELON had side effects she could not  recall. PENICILLIN causes hives and swelling. MIRTAZAPINE caused a dry  mouth. DOXYCYCLINE causes nausea and vomiting and she has a listed  allergy to QUINOLONES but not more specific than that.     MEDICATIONS AT HOME:  Include simvastatin 20 mg daily, Namenda 10 mg  b.i.d., metoprolol 25 mg b.i.d., DuoNeb nebulizer every four hours as  needed, levothyroxine 100 mcg daily, amiodarone 100 mg daily, Demadex 20  mg daily, trazodone 50 mg daily, Xalatan eyedrops one drop both eyes at  night, Lexapro 5 mg daily. SOCIAL HISTORY:  She is a  with two children. She quit smoking 50+  years ago. Does not drink alcohol. She currently lives alone. FAMILY HISTORY:  Reviewed and noncontributory. PHYSICAL EXAMINATION:  VITAL SIGNS:  Last recorded blood pressure 169/73, pulse currently is  approximately 120 and irregular, respiratory rate 18. She is afebrile. GENERAL:  This is a frail, elderly lady, lying in bed in mild distress  given mild tachypnea. The patient is saturating 90% on 3 liters of  oxygen. The patient appears very frail and weak. She has a  McKinley cervical collar on currently given her C7 and T1 fractures. HEENT:  Pupils are equal, round, reactive to light. Extraocular  movements are intact. Mucous membranes are moist.  Tongue and uvula are  midline. NECK:  Without lymphadenopathies. No carotid bruit. Carotid upstroke  is normal.  Thyroid is without goiter or nodule. CARDIOVASCULAR:  Irregularly regular and tachycardic. She has a  systolic murmur that is fairly harsh. LUNGS:  She has coarse rhonchi bilaterally and some mild wheezing noted. The right lung sounds worse than the left lung. ABDOMEN:  Bowel sounds are positive. The abdomen is soft, nontender,  nondistended. There is no hepatosplenomegaly noted. EXTREMITIES:  No clubbing, cyanosis, or tenderness. She does have some  antecubital and a right upper arm swelling related to her infiltrated  IV. Peripheral pulses 2+ x4 extremities. SKIN:  No other rashes or lesions. LYMPH:  No supraclavicular or cervical adenopathy is noted. NEUROLOGIC:  The patient is alert and oriented x3 but somewhat forgetful  as to the details of the last several days. Some short-term memory  troubles. Cranial nerves II through XII appear grossly intact. She  does move all extremities.     DIAGNOSTIC STUDIES:  White blood cell count was 9.3, hemoglobin 10.5,  platelet count 866. Chest x-ray showed bilateral pulmonary disease and was identical to the  comparison and very similar to x-rays going back as far as 07/2019. Lactic acid 1.3. Total . Comprehensive metabolic panel showed a  sodium of 147, potassium 3.8, chloride of 102. A CO2 of 33, glucose  102, BUN 33, creatinine 1.4. Hepatic profile was unremarkable. Urinalysis shows specific gravity of 1.016 with trace ketones and trace  blood, 30 proteins. There are no white blood cells and no red blood  cells seen in the urine. Troponin initially was 0.06 and on repeat was  0.08. CT of the head just showed some paranasal sinus disease and CT of the  cervical spine revealed a nondisplaced fracture through the right lamina  of C7 as well as a mild wedge compression of T1. EKG showed sinus  rhythm with a prolonged QT interval.    ASSESSMENT:  1.  Dehydration. 2.  Elevated CPK. 3.  Fall from standing. 4.  Chronic diastolic congestive heart failure. 5.  Pulmonary hypertension. 6.  Elevated troponin. 7.  Compression fracture of T1.  8.  Thrombocytopenia. 9.  Hypertension. 10.  Hypothyroidism. 11.  Chronic AFib. 12.  CKD stage III. Slightly worse than baseline. PLAN:  At this point, the patient was given a fair amount of IV fluids  in the emergency room. The initial plan to hydrate her overnight with  half-normal saline has been abandoned at the moment given her apparent  dyspnea. A stat BNP and BMP will be ordered and we will determine  whether or not we should continue with more IV fluids. Currently, the  IV fluids are on hold. Repeat chest x-ray will be obtained in the  morning. Troponins are trending upward. We will continue to rule her  out for an MI. She denied any chest pain associated with this event. Stat EKG will be obtained at this time and she has been placed on  telemetry.   We may need to dress what appears to be at least AFib with  rapid ventricular response. As of tomorrow, assuming she is stable, PT  and OT will be consulted and Social Work will be consulted because she  likely will need ECF placement at least in the short term. Home  medicines have been reordered and additional workup evaluation will be  based on her course overnight and evaluation in the morning. We will  consider Cardiology consultation.   We will consider repeat  echocardiogram.        Monisha Guillaume MD    D: 06/17/2021 19:41:51       T: 06/17/2021 19:49:58     [id number]  Job#: [id number]     Doc#: [id number]    CC:

## 2021-06-18 NOTE — PLAN OF CARE
Problem: Pain:  Description: Pain management should include both nonpharmacologic and pharmacologic interventions. Goal: Pain level will decrease  Description: Pain level will decrease  Outcome: Ongoing  Note: Pt has had no complaints of pain. Problem: Pain:  Description: Pain management should include both nonpharmacologic and pharmacologic interventions. Goal: Control of acute pain  Description: Control of acute pain  Outcome: Ongoing  Note: Pain/discomfort being managed with PRN analgesics per MD orders. Pt able to express presence and absence of pain and rate pain appropriately using numerical scale. Problem: Pain:  Description: Pain management should include both nonpharmacologic and pharmacologic interventions. Goal: Control of chronic pain  Description: Control of chronic pain  Outcome: Ongoing  Note: Pt has had no complaints of pain, acute or chronic. Problem: OXYGENATION/RESPIRATORY FUNCTION  Goal: Patient will maintain patent airway  Outcome: Ongoing  Note: Pt does have a congested cough that is productive at times. Pt has a yankar suction within reach to suction her own mouth. Problem: OXYGENATION/RESPIRATORY FUNCTION  Goal: Patient will achieve/maintain normal respiratory rate/effort  Description: Respiratory rate and effort will be within normal limits for the patient  Outcome: Ongoing  Note: Respirations easy even no distress. No shortness of breath noted. Problem: HEMODYNAMIC STATUS  Goal: Patient has stable vital signs and fluid balance  Outcome: Ongoing  Note: VSS. Labs being monitored. I/O being monitored. Problem: FLUID AND ELECTROLYTE IMBALANCE  Goal: Fluid and electrolyte balance are achieved/maintained  Outcome: Ongoing  Note: I/O being monitored. Labs being monitored. Problem: ACTIVITY INTOLERANCE/IMPAIRED MOBILITY  Goal: Mobility/activity is maintained at optimum level for patient  Outcome: Ongoing  Note: Pt has PT and OT and ST ordered.  She did sit up in

## 2021-06-18 NOTE — PROGRESS NOTES
4 Eyes Skin Assessment     NAME:  Sumit January  YOB: 1935  MEDICAL RECORD NUMBER:  [id number]    The patient is being assess for  Transfer to New Unit    I agree that 2 RN's have performed a thorough Head to Toe Skin Assessment on the patient. ALL assessment sites listed below have been assessed. Areas assessed by both nurses:    Head, Face, Ears, Shoulders, Back, Chest, Arms, Elbows, Hands, Sacrum. Buttock, Coccyx, Ischium and Legs. Feet and Heels        Does the Patient have a Wound?  No noted wound(s)       Jef Prevention initiated:  Yes   Wound Care Orders initiated:  NA    Pressure Injury (Stage 3,4, Unstageable, DTI, NWPT, and Complex wounds) if present place consult order under [de-identified] NA    New and Established Ostomies if present place consult order under : NA      Nurse 1 eSignature: Electronically signed by Ry Pena RN on 6/18/21 at 5:36 AM EDT    **SHARE this note so that the co-signing nurse is able to place an eSignature**    Nurse 2 eSignature: {Esignature:[id number]}

## 2021-06-18 NOTE — DISCHARGE INSTR - COC
Continuity of Care Form    Patient Name: Jean-Paul Jacques   :  1935  MRN:  [id number]    Admit date:  2021  Discharge date:  ***    Code Status Order: DNR-CCA   Advance Directives:   [facility] Documentation     Date/Time Healthcare Directive Type of Healthcare Directive Copy in [facility] [address] Agent's Name Healthcare Agent's Phone Number    21 1802  Yes, patient has an advance directive for healthcare treatment  Living will;Durable power of  for health care  --  Adult Ajit Fernandes  [phone]          Admitting Physician:  Kassy Taylor MD  PCP: Robyn Craven MD    Discharging Nurse: [facility] Unit/Room#: R4F-6232/5280-01  Discharging Unit Phone Number: ***    Emergency Contact:   Extended Emergency Contact Information  Primary Emergency Contact: LITO Luna [address] Phone: [phone]  Mobile Phone: [phone]  Relation: Child  Secondary Emergency Contact: [address] [place] [address], [facility] Phone: [phone]  Mobile Phone: [phone]  Relation: None    Past Surgical History:  Past Surgical History:   Procedure Laterality Date    COLONOSCOPY  2016    Ilya    COLONOSCOPY N/A 2019    COLONOSCOPY performed by Jumana Boothe MD at [place] [address]  2019    BOWEL SMALL CAPSULE ENDOSCOPY DEPLOYED VIA EGD performed by Jumana Boothe MD at [address] ENTEROSCOPY N/A 6/3/2019    ENTEROSCOPY PUSH DIAGNOSTIC performed by Gene Jo DO at [facility] GASTROINTESTINAL ENDOSCOPY  2016    Ilya    UPPER GASTROINTESTINAL ENDOSCOPY N/A 2019    EGD POLYP ABLATION/OTHER performed by Jumana Boothe MD at [address] [place] [address] [address] 2019    ESOPHAGOGASTRODUODENOSCOPY WITH CAPSULE PLACEMENT performed by Andrew Miller MD at [facility] N/A 6/16/2019    EGD BIOPSY performed by Morris Dupree MD at [facility] 6/16/2019    EGD FOREIGN BODY REMOVAL performed by Morris Dupree MD at [address] [place] [address]       Immunization History:   Immunization History   Administered Date(s) Administered    COVID-19, Moderna, PF, 100mcg/0.5mL 03/03/2021, 03/31/2021    Influenza 10/14/2011    Influenza Virus Vaccine 11/04/2014, 08/14/2015    Influenza Whole 10/23/2012    Influenza, High Dose (Fluzone 65 yrs and older) 10/03/2017, 10/09/2018    Influenza, Joyceann Moment, IM, PF (6 mo and older Fluzone, Flulaval, Fluarix, and 3 yrs and older Afluria) 08/15/2016    Influenza, Triv, inactivated, subunit, adjuvanted, IM (Fluad 65 yrs and older) 09/13/2019    Pneumococcal Conjugate 13-valent ([id number]) 11/04/2014    Pneumococcal Polysaccharide (Runodtkzf13) 07/27/2017       Active Problems:  Patient Active Problem List   Diagnosis Code    Disorder of bone and cartilage M89.9, M94.9    Edema R60.9    Essential hypertension, benign I10    Other and unspecified hyperlipidemia E78.5    Hypothyroidism due to medication E03.2    Paroxysmal atrial fibrillation (HCC) I48.0    Anemia D64.9    Vitamin D deficiency E55.9    Fatigue R53.83    Osteopenia M85.80    Chronic diastolic congestive heart failure (HCC) I50.32    Non-rheumatic mitral regurgitation I34.0    Pulmonary hypertension I27.20    Weight loss R63.4    Abnormal chest x-ray R93.89    S/P right heart catheterization Z98.890    Chronic atrial fibrillation (HCC) I48.20    Dyspnea R06.00    Hx of venous thromboembolic disease R28.957    CKD (chronic kidney disease) stage 3, GFR 30-59 ml/min ([facility]) N18.30    Acute blood loss anemia D62    Lightheaded R42    Disuse muscle atrophy M62.50    Esophageal dysphagia R13.10    Dysphagia R13.10    Candida esophagitis (HCC) B37.81    Dehydration E86.0    Elevated CPK R74.8    Fall from standing W19. XXXA    Elevated troponin R77.8    Compression fracture of thoracic vertebra, closed, initial encounter ([facility]) S22.000A    Thrombocytopenia ([facility]) D69.6    History of GI bleed Z87.19    Cough with sputum R05    Severe malnutrition ([address] E43       Isolation/Infection:   Isolation          No Isolation        Patient Infection Status     Infection Onset Added Last Indicated Last Indicated By Review Planned Expiration Resolved Resolved By    None active    Resolved    COVID-19 Rule Out 01/31/21 01/31/21 01/31/21 COVID-19 (Ordered)   01/31/21 Rule-Out Test Resulted          Nurse Assessment:  Last Vital Signs: /68   Pulse 94   Temp 97.5 °F (36.4 °C) (Oral)   Resp 20   Ht 5' (1.524 m)   Wt 89 lb 1.1 oz (40.4 kg)   SpO2 100%   BMI 17.39 kg/m²     Last documented pain score (0-10 scale): Pain Level: 0  Last Weight:   Wt Readings from Last 1 Encounters:   06/18/21 89 lb 1.1 oz (40.4 kg)     Mental Status:  {IP PT MENTAL STATUS:20030}    IV Access:  { NAHUN IV ACCESS:867407537}    Nursing Mobility/ADLs:  Walking   {P DME IKAA:019409598}  Transfer  {CHP DME IOQA:212165481}  Bathing  {CHP DME UVCB:266924908}  Dressing  {P DME SPNN:238868317}  Toileting  {P DME MIIE:432251748}  Feeding  {P DME CWUC:588300657}  Med Admin  {P DME JTQY:801144677}  Med Delivery   { NAHUN MED Delivery:660091686}    Wound Care Documentation and Therapy:        Elimination:  Continence:   · Bowel: {YES / FO:78388}  · Bladder: {YES / GE:04369}  Urinary Catheter: {Urinary Catheter:387176566}   Colostomy/Ileostomy/Ileal Conduit: {YES / XN:12655}       Date of Last BM: ***    Intake/Output Summary (Last 24 hours) at 6/18/2021 1439  Last data filed at 6/18/2021 1400  Gross per 24 hour   Intake 506.65 ml   Output 300 ml   Net 206.65 ml     I/O last 3 completed shifts:   In: 332.8 [I.V.:332.8]  Out: -     Safety Concerns:     Jeanie8 Emelia Mariee [facility] Safety Concerns:645668693}    Impairments/Disabilities:      [address] NAHUN Impairments/Disabilities:839326621}    Nutrition Therapy:  Current Nutrition Therapy:   [address] NAHUN Diet List:129569338}    Routes of Feeding: {CHP DME Other Feedings:402468596}  Liquids: {Slp liquid thickness:86151}  Daily Fluid Restriction: {CHP DME Yes amt example:011279452}  Last Modified Barium Swallow with Video (Video Swallowing Test): {Done Not Done YJXF:574372770}    Treatments at the Time of Hospital Discharge:   Respiratory Treatments: ***  Oxygen Therapy:  {Therapy; copd oxygen:49513}  Ventilator:    {MH CC Vent NNK}     Heart Failure Instructions for Daily Management  Patient was treated for chronic diastolic heart failure. she  will require the following:     Please weigh daily on the same scale and approximately the same time of day. Report weight gain of 3 pounds/day or 5 pounds/week to : [facility] [phone].  Please use hospital discharge weight as baseline reference.  Please monitor for signs and symptoms of and report to MD:  o Worsening Heart Failure: sudden weight gain, shortness of breath, lower extremity or general edema/swelling, abdominal bloating/swelling, inability to lie flat, intolerance to usual activity, or cough (especially at night). Report these finding even if no increase in weight.  o Dehydration:  having difficulty or a decrease in urination, dizziness, worsening fatigue, or new onset/worsening of generalized weakness.  Please continue a LOW SODIUM diet and LIMIT fluid intake to 48 - 64 ounces ( 1.5 - 2 liters) per day. [employer] Call [facility] [phone] and/or Stella Sharma @ [phone] with any questions or concerns.  Please continue heart failure education to patient and family/support system.  See After Visit Summary for hospital follow up appointment details.    Consider spiritual care referral for support and/or completion of advance directives [phone].  Consider: having the facility MD complete required 7 day follow up, [place] [address] telehealth program if patient agreeable and able to participate, palliative care consult for ongoing goals of care, end of life, and/or chronic disease management discussions and referral to [facility] [phone]) once SNF/HHC complete. Dr Gerald Jain is pt's primary cardiologist.       Rehab Therapies: {THERAPEUTIC INTERVENTION:0278507216}  Weight Bearing Status/Restrictions: 50Yvette Mariee CC Weight Bearin}  Other Medical Equipment (for information only, NOT a DME order):  {EQUIPMENT:718504880}  Other Treatments: ***    Patient's personal belongings (please select all that are sent with patient):  {CHP DME Belongings:653759276}    RN SIGNATURE:  {Esignature:920703513}    CASE MANAGEMENT/SOCIAL WORK SECTION    Inpatient Status Date: ***    Readmission Risk Assessment Score:  Readmission Risk              Risk of Unplanned Readmission:  19           Discharging to Facility/ Agency   · Name:   · Address:  · Phone:  · Fax:    Dialysis Facility (if applicable)   · Name:  · Address:  · Dialysis Schedule:  · Phone:  · Fax:    / signature: {Esignature:678914208}    PHYSICIAN SECTION    Prognosis: {Prognosis:9431174495}    Condition at Discharge: 50Yvette Mariee Patient Condition:861702659}    Rehab Potential (if transferring to Rehab): {Prognosis:4575739237}    Recommended Labs or Other Treatments After Discharge: ***    Physician Certification: I certify the above information and transfer of Jose Reid  is necessary for the continuing treatment of the diagnosis listed and that she requires {Admit to Appropriate Level of Care:83194} for {GREATER/LESS:376485816} 30 days.      Update Admission H&P: {CHP DME Changes in WKECK:437571642}    PHYSICIAN SIGNATURE:  {Esignature:183113149}

## 2021-06-18 NOTE — PROGRESS NOTES
Comprehensive Nutrition Assessment    Type and Reason for Visit:  Positive Nutrition Screen    Nutrition Recommendations/Plan:   Continue Regular diet. Start Ensure TID. Pt meets criteria for Severe Malnutrition AEB poor PO intakes >1 month and severe fat and muscle wasting. Nutrition Assessment:  +nutrition screen. Pt with PMH of CHF, CKD, and HLD presented after fall at home. Pt with compression fx. Noted pt very thin. Pt reports has no desire for much food and appetite has been poor for months. Pt noted with severe muscle and fat wasting. Pt reports he has maintained ~90# recently but use to weigh 160# \"years ago\". Pt reports she drink 1 Boost at home. Encouraged pt to drink more when not eating. Pt favorable to trial Ensure ONS. Malnutrition Assessment:  Malnutrition Status:  Severe malnutrition    Context:  Chronic Illness     Findings of the 6 clinical characteristics of malnutrition:  Energy Intake:  7 - 75% or less estimated energy requirements for 1 month or longer  Weight Loss:  No significant weight loss     Body Fat Loss:  7 - Severe body fat loss Orbital, Triceps, Buccal region   Muscle Mass Loss:  7 - Severe muscle mass loss Hand (interosseous), Temples (temporalis)  Fluid Accumulation:  No significant fluid accumulation     Strength:  Not Performed    Estimated Daily Nutrient Needs:  Energy (kcal):  0410-4283 kcal (30-35 kcal/kg CBW)  Protein (g):  >60 gm pro (1.5gm/kg CBW)  Fluid (ml/day):  1 mL/kcal    Nutrition Related Findings:  No BM or edema noted; Wounds:  None       Current Nutrition Therapies:    ADULT DIET;  Regular    Anthropometric Measures:  · Height: 5' (152.4 cm)  · Current Body Weight: 89 lb (40.4 kg)   · Admission Body Weight: 92 lb (41.7 kg)    · Ideal Body Weight: 100 lbs; % Ideal Body Weight 89 %   · BMI: 17.4  · BMI Categories: Underweight (BMI less than 22) age over 72       Nutrition Diagnosis:   · Severe malnutrition related to  (poor appetite) as evidenced by poor intake prior to admission, weight loss, severe loss of subcutaneous fat, severe muscle loss      Nutrition Interventions:   Food and/or Nutrient Delivery:  Continue Current Diet, Start Oral Nutrition Supplement  Nutrition Education/Counseling:  No recommendation at this time   Coordination of Nutrition Care:  Continue to monitor while inpatient    Goals:  consume <50% of meals and ONS during admission       Nutrition Monitoring and Evaluation:   Food/Nutrient Intake Outcomes:  Food and Nutrient Intake, Supplement Intake  Physical Signs/Symptoms Outcomes:  Biochemical Data, Skin, Weight, Nutrition Focused Physical Findings     Discharge Planning:     Too soon to determine     Electronically signed by Kamilah Vivar RD, LD on 6/18/21 at 1:15 PM EDT    Contact: [phone]

## 2021-06-18 NOTE — FLOWSHEET NOTE
Dr. Pablo Ruano office called. Message left re pt current status. Dr. Lea Rondon is on call. Waiting for return reply.

## 2021-06-18 NOTE — PROGRESS NOTES
[address] Day: 2   Diagnosis: fall, acute T1 fracture, C7 right laminal fracture extending into the pedicle, atrial fib  Code Status: DNR/CC    /67   Pulse 98   Temp 97.4 °F (36.3 °C) (Oral)   Resp 18   Wt 89 lb 1.1 oz (40.4 kg)   SpO2 99%   BMI 17.39 kg/m²     Patient sitting in the recliner with hard cervical collar. Supplemental oxygen, moist non-productive cough. No obvious shortness of breath. Denies neck pain, numbness or tingling. Taking small amount of oral intake. Voiding. She states she fell at home twice. She was leaning over to  some lint on the floor and lost her balance. The first time she was able to stand back up. The second time she could not get up and laid on the floor for 2 days. Her neighbors called for help. The collar is uncomfortable. Neurological Exam:   Moves arms and legs well. Strength is symmetric, 4/5. Imaging Studies:   CT of the cervical spine:  Mild acute wedge compression fracture of T1 without retropulsion.  Also   nondisplaced fracture through the right lamina of C7.  Both fractures are   stable.  Severe degenerative changes at C5-C6.  Severe facet arthropathy. Labs: PTT 48.3. Urine neg for culture. Med list reviewed. Heparin drip    She is DNR code status and not interested in any MRI imaging. \"What is the point? \"   Further recommendations per Dr. Charisse Romero.

## 2021-06-18 NOTE — PROGRESS NOTES
Dr. Ganga Hauser called to check on patient. Patient is stable. Verbal orders for PRN Lopressor if HR is > 110. Heparin and Amiodarone is infusing at this time in separate IV's. Will continue to monitor. Neck brace on. MD aware of increasing Troponin. No chest pain present.

## 2021-06-18 NOTE — FLOWSHEET NOTE
Dr. De Los Santos Dates returned call with NO's:  5 mg IV Metoprolol now. MD then wants IV Cardizem and/or Cardizem drip. Pt may be transferred to PCU depending on results of IV Metoprolol. Charge nurse aware.

## 2021-06-18 NOTE — PROGRESS NOTES
Clinical Pharmacy Note  Heparin Dosing       Lab Results   Component Value Date    APTT 51.8 06/18/2021     Lab Results   Component Value Date    HGB 11.5 06/18/2021    HCT 35.8 06/18/2021     06/18/2021    INR 1.43 05/30/2019       Current Infusion Rate: 580 units/hr    Plan:  Continue Rate: 580 units/hr  Next aPTT: 2100    Pharmacy will continue to monitor and adjust based on aPTT results.   Electronically signed by Joni Espinosa, [address] [place] [address] on 6/18/2021 at 3:55 PM

## 2021-06-18 NOTE — CONSULTS
Referring Physician: Dr. Cristino Flores  Reason for Consultation: \"Elevated troponin, A. fib with RVR. Currently on heparin drip. Patient with history of GI bleed so has not been chronically anticoagulated. \"  Chief Complaint: I fell    Subjective:   History of Present Illness:  Jean-Paul Jacques is a 80 y.o. patient who presented to the hospital with complaints of falling at home. The patient is quite thin and frail appearing but seems to be answering questions appropriately. There are no family members present bedside. She reports having several falls at home without lightheadedness or syncope. However, she does not recall exactly what happened on the most recent fall but was unable to get up. She says that she was found by her neighbors and thinks she was on the ground for 2 days. The patient has a known history of paroxysmal atrial fibrillation but was not chronically anticoagulated secondary to recurrent GI bleeds. The patient has a very wet sounding cough but she denies fevers or chills. She denies choking on her food but begins coughing after attempting to eat pudding with the speech pathologist.  She was subsequently made n.p.o. secondary to her dysphagia. She was found to have acute compression fracture of T1 and a nondisplaced fracture of C7. She is placed in a [place] collar. The patient denies palpitations or any awareness of her elevated heart rate. She denies associated chest pains or shortness of breath at rest.  She endorses progressive weight loss and her weight is currently recorded as 89 pounds. She was started on IV heparin secondary to minimally elevated troponins. The patient typically follows with Dr. Ronaldo De Dios.     Past Medical History:   has a past medical history of Anemia, Anticoagulant long-term use, Atrial fibrillation ([address] CHF (congestive heart failure) ([address] CKD (chronic kidney disease) stage 3, GFR 30-59 ml/min ([facility]), Clostridium difficile diarrhea, Depression, Humerus fracture, Hyperlipidemia, Hypertension, Hypothyroidism, Mitral regurgitation, Optic neuritis, Osteopenia, Polymyalgia rheumatica ([address] Pulmonary embolus ([address] Thyroid disease, Upper GI bleed, and Vitamin D deficiency. Surgical History:   has a past surgical history that includes Lung surgery; malignant skin lesion excision; Colonoscopy (09/14/2016); Upper gastrointestinal endoscopy (09/14/2016); Upper gastrointestinal endoscopy (N/A, 5/30/2019); Colonoscopy (N/A, 5/31/2019); Upper gastrointestinal endoscopy (N/A, 5/31/2019); Endoscopy, small bowel with ileum (5/31/2019); Enteroscopy (N/A, 6/3/2019); Upper gastrointestinal endoscopy (N/A, 6/16/2019); and Upper gastrointestinal endoscopy (N/A, 6/16/2019). Social History:   reports that she quit smoking about 56 years ago. She has never used smokeless tobacco. She reports previous alcohol use. She reports that she does not use drugs. Family History:  family history includes Cancer in her mother; Diabetes in her brother; Joe Gaurav in her sister; Other in her father; Tuberculosis in her brother. Home Medications:  Were reviewed and are listed in nursing record and/or below  Prior to Admission medications    Medication Sig Start Date End Date Taking?  Authorizing Provider   simvastatin (ZOCOR) 20 MG tablet TAKE 1 TABLET EVERY DAY  Patient taking differently: Take 20 mg by mouth nightly  4/8/21  Yes Anderson Dobbins MD   memantine (NAMENDA) 10 MG tablet TAKE 1 TABLET TWICE DAILY  Patient taking differently: Take 10 mg by mouth 2 times daily TAKE 1 TABLET TWICE DAILY 4/8/21  Yes Anderson Dobbins MD   metoprolol tartrate (LOPRESSOR) 50 MG tablet TAKE 1/2 TABLET TWICE DAILY  Patient taking differently: Take 25 mg by mouth 2 times daily  12/29/20  Yes Anderson Dobbins MD   ipratropium-albuterol (DUONEB) 0.5-2.5 (3) MG/3ML SOLN nebulizer solution Inhale 3 mLs into the lungs every 4 hours as needed for Shortness of Breath 12/28/20 Yes Asim Pollard MD   levothyroxine (SYNTHROID) 100 MCG tablet TAKE 1 TABLET EVERY DAY  Patient taking differently: Take 100 mcg by mouth Daily  11/25/20  Yes Asim Pollard MD   amiodarone (CORDARONE) 200 MG tablet TAKE 1/2 TABLET EVERY DAY 10/19/20  Yes Ilir Rios MD   torsemide (DEMADEX) 20 MG tablet TAKE 1 TABLET EVERY DAY  Patient taking differently: Take 20 mg by mouth daily  10/19/20  Yes Asim Pollard MD   traZODone (DESYREL) 50 MG tablet Take 1 tablet by mouth nightly as needed for Sleep 8/13/20  Yes Asim Pollard MD   Vitamin D, Cholecalciferol, 10 MCG (400 UNIT) TABS Take 400 Units by mouth daily    Yes Historical Provider, MD   Multiple Vitamins-Minerals (PRESERVISION AREDS 2+MULTI VIT PO) Take 1 capsule by mouth daily    Yes Historical Provider, MD   albuterol sulfate HFA (PROAIR HFA) 108 (90 Base) MCG/ACT inhaler Inhale 2 puffs into the lungs every 4 hours as needed for Wheezing or Shortness of Breath 10/31/18  Yes Sharad Hart DO   latanoprost (XALATAN) 0.005 % ophthalmic solution Place 1 drop into both eyes nightly.    Yes Historical Provider, MD   Respiratory Therapy Supplies (NEBULIZER COMPRESSOR) KIT 1 kit by Does not apply route once for 1 dose 9/25/19 9/25/19  Sharad Hart DO   Respiratory Therapy Supplies (NEBULIZER/TUBING/MOUTHPIECE) KIT 1 kit by Does not apply route daily as needed (for breathing treatment) 9/25/19   Sharad Hart DO        CURRENT Medications:  metoprolol (LOPRESSOR) injection 5 mg, Q6H PRN  cefTRIAXone (ROCEPHIN) 1000 mg IVPB in 50 mL D5W minibag, Q24H  albuterol sulfate  (90 Base) MCG/ACT inhaler 2 puff, Q4H PRN  ipratropium-albuterol (DUONEB) nebulizer solution 3 mL, Q4H PRN  latanoprost (XALATAN) 0.005 % ophthalmic solution 1 drop, Nightly  levothyroxine (SYNTHROID) tablet 100 mcg, Daily  memantine (NAMENDA) tablet 10 mg, BID  metoprolol tartrate (LOPRESSOR) tablet 25 mg, BID  atorvastatin (LIPITOR) tablet 10 mg, Nightly  traZODone (DESYREL) tablet 50 mg, Nightly PRN  sodium chloride flush 0.9 % injection 5-40 mL, 2 times per day  sodium chloride flush 0.9 % injection 5-40 mL, PRN  0.9 % sodium chloride infusion, PRN  ondansetron (ZOFRAN-ODT) disintegrating tablet 4 mg, Q8H PRN   Or  ondansetron (ZOFRAN) injection 4 mg, Q6H PRN  polyethylene glycol (GLYCOLAX) packet 17 g, Daily PRN  acetaminophen (TYLENOL) tablet 650 mg, Q6H PRN   Or  acetaminophen (TYLENOL) suppository 650 mg, Q6H PRN  guaiFENesin (MUCINEX) extended release tablet 600 mg, BID PRN  amiodarone (CORDARONE) 450 mg in dextrose 5 % 250 mL infusion, Continuous  heparin 25,000 unit in sodium chloride 0.45% 250 mL (premix) infusion, Continuous    Allergies:  Ace inhibitors, Aricept [donepezil hydrochloride], Benicar [olmesartan medoxomil], Exelon [rivastigmine tartrate], Pcn [penicillins], Quinapril hcl, Quinolones, Remeron [mirtazapine], and Doxycycline     Review of Systems:   · Constitutional: no unanticipated weight gain. There's been no change in energy level, sleep pattern, or activity level. No fevers, chills. · Eyes: No visual changes or diplopia. No scleral icterus. · ENT: No Headaches, hearing loss or vertigo. No mouth sores or sore throat. · Cardiovascular: as reviewed in HPI  · Respiratory: + Cough. No hemoptysis. · Gastrointestinal: No abdominal pain, blood in stools. No change in bowel or bladder habits. · Genitourinary: No dysuria, trouble voiding, or hematuria. · Musculoskeletal:  No gait disturbance, no joint complaints. · Integumentary: No rash or pruritis. · Neurological: No headache, diplopia, change in muscle strength, numbness or tingling. · Psychiatric: No anxiety or depression. · Endocrine: No temperature intolerance. No excessive thirst, fluid intake, or urination. No tremor.   · Hematologic/Lymphatic: No abnormal bruising or bleeding, blood clots or swollen lymph nodes.  · Allergic/Immunologic: No nasal congestion or hives. Objective:   PHYSICAL EXAM:    Vitals:    06/18/21 0834   BP: 110/67   Pulse: 98   Resp: 18   Temp: 97.4 °F (36.3 °C)   SpO2:     Weight: 89 lb 1.1 oz (40.4 kg)       General Appearance:  Alert, cooperative, no respiratory distress. Elderly. Thin/frail. Wearing [place] J collar. Head:  Normocephalic, without obvious abnormality, atraumatic. Eyes:  Pupils equal and round. No scleral icterus. Mouth: Moist mucosa, no pharyngeal erythema. Nose: Nares normal. No drainage or sinus tenderness. Neck: Supple, symmetrical, trachea midline. No adenopathy. No tenderness/mass/nodules. No carotid bruit or elevated JVD. Lungs:   Respirations unlabored. + Rhonchi. Chest Wall:  No tenderness or deformity. Heart:  Irregularly irregular with a normal rate. II/VI systolic murmur. Abdomen:   Soft, non-tender, bowel sounds active. Musculoskeletal: + Muscle wasting. No digital clubbing. Extremities: Extremities normal, atraumatic. No cyanosis or edema. Pulses: 2+ radial and carotid pulses, symmetric. Skin: No rashes or lesions. Pysch: Normal mood and affect. Alert and oriented x 3. Neurologic: Moves all extremities. Follows simple commands.       Labs     CBC:   Lab Results   Component Value Date    WBC 11.2 06/18/2021    RBC 3.88 06/18/2021    HGB 11.5 06/18/2021    HCT 35.8 06/18/2021    MCV 92.3 06/18/2021    RDW 14.7 06/18/2021     06/18/2021     CMP:  Lab Results   Component Value Date     06/18/2021    K 4.2 06/18/2021     06/18/2021    CO2 26 06/18/2021    BUN 33 06/18/2021    CREATININE 1.3 06/18/2021    GFRAA 47 06/18/2021    GFRAA >60 04/26/2013    AGRATIO 1.0 06/17/2021    LABGLOM 39 06/18/2021    GLUCOSE 122 06/18/2021    PROT 7.3 06/17/2021    PROT 6.8 10/26/2012    CALCIUM 9.0 06/18/2021    BILITOT 0.8 06/17/2021    ALKPHOS 82 06/17/2021    AST 31 06/17/2021    ALT 18 06/17/2021     PT/INR:  No results found regurgitation with moderate dilatation of the left atrium on the left ventriculogram.  5.  No gradient across the aortic valve on pullback to suggest aortic stenosis. 6.  Evidence of mild volume overload with a pulmonary capillary wedge pressure of 22 and a left ventricular end-diastolic pressure of 15 to 20.  7.  Evidence of pulmonary hypertension, possibly pulmonary arterial hypertension, arteriovenous malformation and prior pneumonectomy. Instantaneous pulmonary artery pressure was 68/19 for a mean pulmonary arterial pressure of 37 mmHg. Telemetry: Personally interpreted. Assessment and Plan   1) Paroxysmal atrial fibrillation/atrial fibrillation with rapid ventricular response. Patient is a poor anticoagulation candidate secondary to advanced age, comorbidities, and falling. Will discontinue IV heparin. Patient is n.p.o. secondary to dysphagia/aspiration. Was on amiodarone as an outpatient and will continue IV for rate control. Ultimately decision will need to be made if the patient can tolerate any p.o. or is transitioned to purely comfort care goals. 2) Chronic diastolic heart failure. Patient does not appear grossly volume overloaded but BNP significantly above baseline. Suggest IV Lasix as needed. 3) Pulmonary hypertension. Likely secondary to valvular disease. Avoid hypoxia and diurese as needed. 4) Valvular heart disease/severe mitral and tricuspid regurgitation. Patient is not a candidate for invasive cardiac therapies or cardiac surgery. Would suggest transitioning to comfort care measures only. 5) Demand ischemia. History is not consistent with acute coronary syndrome. Will discontinue IV heparin. Troponin elevation may be related to diastolic heart failure and CKD. Patient is not a candidate for additional cardiac testing at this time. 6) CAD. If patient able to resume oral medications, could consider resuming statin.   If the patient transitions to comfort care only would suggest discontinuing statin. Code status: Patient DNR/DNI-CCA but consideration of hospice would seem appropriate particularly if it is unclear if the patient can safely eat. Overall, the problems requiring hospitalization are high in severity. Thank you for allowing us to participate in the care of Sumit Moscoso. Kati Sy.  [place] [address]  6/18/2021 11:22 AM

## 2021-06-18 NOTE — PROGRESS NOTES
Occupational Therapy   Occupational Therapy Initial Assessment  Date: 2021   Patient Name: Matty Fields  MRN: [id number]     : 1935    Date of Service: 2021    Discharge Recommendations:  5-7 sessions per week, Patient would benefit from continued therapy after discharge  OT Equipment Recommendations  Other: TBD by d/c site. Matty Fields scored a 15/24 on the AM-PAC ADL Inpatient form. Current research shows that an AM-PAC score of 17 or less is typically not associated with a discharge to the patient's home setting. Based on the patient's AM-PAC score and their current ADL deficits, it is recommended that the patient have 5-7 sessions per week of Occupational Therapy at d/c to increase the patient's independence. At this time, this patient demonstrates the endurance, and/or tolerance for 3 hours of therapy each day, with a treatment frequency of 5-7x/wk. Please see assessment section for further patient specific details. If patient discharges prior to next session this note will serve as a discharge summary. Please see below for the latest assessment towards goals. Assessment   Performance deficits / Impairments: Decreased functional mobility ; Decreased ADL status; Decreased endurance;Decreased balance;Decreased cognition;Decreased strength  Assessment: Pt is an 80 y.o. F. admitted  s/p fall, C7 and T1 fx; currently in cervical collar. At baseline, pt lives alone in [facility], independent ADLs and fxl mobility with no AD in home, cane in community. Pt currently functioning below baseline d/t the above deficits, today requiring Mod A bed mobility, min A fxl transfer with use of lift equipment (quentin stedy), and anticipate pt would require overall mod/max A for ADLs d/t the above deficits and cervical restrictions. Pt unsafe to return home and demonstrates need for ongoing skilled OT at d/c to maximize pt's safety and independence.  Will cont to follow while hospitalized. Prognosis: Good  Decision Making: Medium Complexity  History: see below  Exam: self-care, ROM, balance, fxl transfers, cognition  Assistance / Modification: anticipate overall mod/max A for ADLs  OT Education: OT Role;Plan of Care;Precautions;Orientation;Transfer Training  Barriers to Learning: cognition  REQUIRES OT FOLLOW UP: Yes  Activity Tolerance  Activity Tolerance: Patient limited by fatigue;Patient Tolerated treatment well  Safety Devices  Safety Devices in place: Yes  Type of devices: Call light within reach; Chair alarm in place; Left in chair;Gait belt;Nurse notified           Patient Diagnosis(es): The primary encounter diagnosis was Fall, initial encounter. Diagnoses of Compression fracture of T12 vertebra, initial encounter [facility]), Closed fracture of seventh cervical vertebra without spinal cord injury, initial encounter ([facility] and Elevated troponin were also pertinent to this visit. has a past medical history of Anemia, Anticoagulant long-term use, Atrial fibrillation (HCC), CHF (congestive heart failure) ([facility] CKD (chronic kidney disease) stage 3, GFR 30-59 ml/min ([facility]), Clostridium difficile diarrhea, Depression, Humerus fracture, Hyperlipidemia, Hypertension, Hypothyroidism, Mitral regurgitation, Optic neuritis, Osteopenia, Polymyalgia rheumatica ([facility] Pulmonary embolus ([facility] Thyroid disease, Upper GI bleed, and Vitamin D deficiency. has a past surgical history that includes Lung surgery; malignant skin lesion excision; Colonoscopy (09/14/2016); Upper gastrointestinal endoscopy (09/14/2016); Upper gastrointestinal endoscopy (N/A, 5/30/2019); Colonoscopy (N/A, 5/31/2019); Upper gastrointestinal endoscopy (N/A, 5/31/2019); Endoscopy, small bowel with ileum (5/31/2019); Enteroscopy (N/A, 6/3/2019); Upper gastrointestinal endoscopy (N/A, 6/16/2019); and Upper gastrointestinal endoscopy (N/A, 6/16/2019).            Restrictions  Restrictions/Precautions  Restrictions/Precautions: Fall Risk  Position Activity Restriction  Spinal Precautions: No Bending, No Lifting, No Twisting  Other position/activity restrictions: [place] J collar; C7 and T1 fxs    Subjective   General  Chart Reviewed: Yes  Additional Pertinent Hx: Per Dr. Bryant Asa H&P: \"This is an 80-year-old white female who has multiple medical problems who presented to the emergency room after being found down and reportedly was down for the last two days. The patient states that within the last couple of days, she bent over to  a piece of lint off of the floor and she fell backwards hitting her head. She was able to get up off the ground after that incident, but then she reports that she did a similar thing again picking up a piece of lint, fell over, hit her head and this time, was on the ground for two days. Her neighbor stopped in to check on her and found her on the ground today and she was brought to the emergency room for further evaluation and treatment. Incredibly in the emergency room, the patient's dehydration was only mild and her CPK elevation was only mild. The patient was given reportedly 2 liters of fluid boluses in the emergency room and she was sent to the floor for evaluation and observation. \" Pt found to have acute T1 fracture, C7 right laminal fracture extending into the pedicle. Neurosurgery following. Family / Caregiver Present: No  Referring Practitioner: Beth Farias MD  Diagnosis: Fall; Non-displaced C7 fx; T1 compression fx; Elevated troponin  Subjective  Subjective: Pt met b/s for OT eval/tx. Pt in bed on arrival, requesting to get in the chair. Pt agreeable to participate in therapy. pt denies pain at rest, but c/o neck pain with bed mobility--did not rate. Pt intermittently confused and has to be re-oriented.     Social/Functional History  Social/Functional History  Lives With: Alone  Type of Home:  (Cond)  Home Layout: One level  Home Access: Level entry  Bathroom Shower/Tub: Walk-in made;Assistance required to generate solutions  Insights: Decreased awareness of deficits  Initiation: Requires cues for some     LUE AROM (degrees)  LUE AROM : WFL  RUE AROM (degrees)  RUE AROM : WFL                      Plan   Plan  Times per week: 3-5  Current Treatment Recommendations: Functional Mobility Training, Balance Training, Strengthening, Cognitive Reorientation, Self-Care / ADL, Endurance Training, Safety Education & Training, Equipment Evaluation, Education, & procurement      AM-PAC Score        AM-PAC Inpatient Daily Activity Raw Score: 15 (06/18/21 1708)  AM-PAC Inpatient ADL T-Scale Score : 34.69 (06/18/21 1708)  ADL Inpatient CMS 0-100% Score: 56.46 (06/18/21 1708)  ADL Inpatient CMS G-Code Modifier : CK (06/18/21 1708)    Goals  Short term goals  Time Frame for Short term goals: Prior to d/c:  Short term goal 1: Pt will complete UB dressing/bathing with SBA. Short term goal 2: Pt will complete LB bathing/dressing with mod A. Short term goal 3: Pt will complete toileting with min A. Short term goal 4: Pt will complete fxl mobility and fxl transfers to/from ADL surfaces with CGA/min A using AD. Short term goal 5: Pt will tolerate standing >5 minutes for functional task with CGA. Long term goals  Time Frame for Long term goals : STGs=LTGs  Patient Goals   Patient goals : to eventually return home.        Therapy Time   Individual Concurrent Group Co-treatment   Time In 1626         Time Out 1700         Minutes 34         Timed Code Treatment Minutes: [address] [place], OTR/L 5414

## 2021-06-18 NOTE — PROGRESS NOTES
[address] [place] ([phone]) regarding Dr. Los Church order for [facility].  Face sheet and ordered [facility] [phone][facility].

## 2021-06-18 NOTE — PROGRESS NOTES
Clinical Pharmacy Note  Heparin Dosing       Lab Results   Component Value Date    APTT 48.3 06/18/2021     Lab Results   Component Value Date    HGB 11.5 06/18/2021    HCT 35.8 06/18/2021     06/18/2021    INR 1.43 05/30/2019       Current Infusion Rate: 500 units/hr    Plan:  Bolus: 1260 units  Rate: 580 units/hr  Next aPTT: 1500    Pharmacy will continue to monitor and adjust based on aPTT results.

## 2021-06-18 NOTE — PROGRESS NOTES
Physician Progress Note      Hector Chavarria  [facility] #:                  [id number]  :                       1935  ADMIT DATE:       2021 8:06 AM  DISCH DATE:  RESPONDING  PROVIDER #:        Steve Russell MD          QUERY TEXT:    Pt admitted with Compression fracture of thoracic vertebra. Pt noted to have   osteopenia. If possible, please document in progress notes and discharge   summary if you are evaluating and/or treating any of the following: The medical record reflects the following:  Risk Factors: 80 yr female Osteopenia Vit D deficiency  BMI 17  Clinical Indicators: BMI 17, \"Frail\" , 2019 DEXA Scan \"Osteopenia\"  Treatment: Pt takes Vitamin D and Multi vitamin  Options provided:  -- Osteoporotic Compression fracture of T 1 vertebra  -- Osteoporotic Compression fracture of T 1 vertebra following fall which   would not usually break a normal, healthy bone  -- Traumatic Compression fracture of T 1 vertebra  -- Other - I will add my own diagnosis  -- Disagree - Not applicable / Not valid  -- Disagree - Clinically unable to determine / Unknown  -- Refer to Clinical Documentation Reviewer    PROVIDER RESPONSE TEXT:    This patient has an osteoporotic Compression fracture of T 1 vertebra   following fall which would not break a normal, healthy bone.     Query created by: Devaughn Morrow on 2021 8:54 AM      Electronically signed by:  Steve Russell MD 2021 8:57 AM

## 2021-06-19 NOTE — PROGRESS NOTES
[address]   Daily Progress Note      Admit Date:  6/17/2021    CC: \"  Elvis Mcknight is a 80 y.o. patient who presented to the hospital with complaints of falling at home. The patient is quite thin and frail appearing but seems to be answering questions appropriately. There are no family members present bedside. She reports having several falls at home without lightheadedness or syncope. However, she does not recall exactly what happened on the most recent fall but was unable to get up. She says that she was found by her neighbors and thinks she was on the ground for 2 days. The patient has a known history of paroxysmal atrial fibrillation but was not chronically anticoagulated secondary to recurrent GI bleeds. The patient has a very wet sounding cough but she denies fevers or chills. She denies choking on her food but begins coughing after attempting to eat pudding with the speech pathologist.  She was subsequently made n.p.o. secondary to her dysphagia. She was found to have acute compression fracture of T1 and a nondisplaced fracture of C7. She is placed in a [place] J collar. The patient denies palpitations or any awareness of her elevated heart rate. She denies associated chest pains or shortness of breath at rest.  She endorses progressive weight loss and her weight is currently recorded as 89 pounds. She was started on IV heparin secondary to minimally elevated troponins. The patient typically follows with Dr. Sujey Law.     Subjective:  Patient feels extremely fatigued and tired.   Her heart rate continues to remain high in spite of being on amiodarone therapy she is being considered for hospice care    Objective:   /78   Pulse 114   Temp 98.3 °F (36.8 °C) (Oral)   Resp 20   Ht 5' (1.524 m)   Wt 83 lb 5.3 oz (37.8 kg)   SpO2 98%   BMI 16.28 kg/m²       Intake/Output Summary (Last 24 hours) at 6/19/2021 1044  Last data filed at 6/18/2021 1645  Gross per 24 hour   Intake 187.3 ml   Output 300 ml   Net -112.7 ml     Wt Readings from Last 3 Encounters:   06/19/21 83 lb 5.3 oz (37.8 kg)   05/25/21 89 lb (40.4 kg)   05/14/21 92 lb (41.7 kg)     Telemetry: Atrial fibrillation    Physical Exam:  General:  NAD, Awake, alert and oriented X4  Skin:  Warm and dry  Neck:  Supple, no JVP appreciated, no bruit  Chest:  Clear to auscultation, no wheezes/rhonchi/rales  Cardiovascular: Irregularly irregular. S1S2  Abdomen:  Soft, nontender, +bowel sounds  Extremities:  No LE edema    Cardiac Diagnosis:  CHF and atrial fibrillation    Medications:    cefTRIAXone (ROCEPHIN) IV  1,000 mg Intravenous Q24H    [START ON 6/23/2021] Levothyroxine Sodium  50 mcg Intravenous Daily    latanoprost  1 drop Both Eyes Nightly    [Held by provider] memantine  10 mg Oral BID    [Held by provider] metoprolol tartrate  25 mg Oral BID    [Held by provider] atorvastatin  10 mg Oral Nightly    sodium chloride flush  5-40 mL Intravenous 2 times per day      sodium chloride      amiodarone 0.5 mg/min (06/18/21 0605)     metoprolol, albuterol sulfate HFA, ipratropium-albuterol, traZODone, sodium chloride flush, sodium chloride, ondansetron **OR** ondansetron, polyethylene glycol, [DISCONTINUED] acetaminophen **OR** acetaminophen    Lab Data:  CBC:   Recent Labs     06/17/21  0820 06/18/21  0710 06/19/21  0644   WBC 9.3 11.2* 11.7*   HGB 10.5* 11.5* 12.0   * 118* 139     BMP:    Recent Labs     06/17/21  1751 06/18/21  0710 06/19/21  0644   * 145 142   K 3.8 4.2 4.1   CO2 23 26 23   BUN 28* 33* 37*   CREATININE 1.2 1.3* 1.4*     LIVR:   Recent Labs     06/17/21  0820   AST 31   ALT 18     INR:  No results for input(s): INR in the last 72 hours. APTT:   Recent Labs     06/17/21  2320 06/18/21  0710 06/18/21  1433   APTT 35.2 48.3* 51.8*     BNP:  No results for input(s): BNP in the last 72 hours. Imaging:Patient appears to be in atrial fibrillation.    Normal left ventricle size, wall thickness, and systolic function with an   estimated ejection fraction of 65-70%. No regional wall motion abnormalities   are seen. Ungraded diastolic dysfunction with elevated LV filling pressures. The right ventricle appears normal in size with moderately reduced systolic   function. The left and right atria are severely dilated. Severe eccentric mitral regurgitation. Severe eccentric tricuspid regurgitation. Mild aortic regurgitation. Estimated pulmonary artery systolic pressure is elevated at 58 mmHg assuming   a right atrial pressure of 8 mmHg. Assessment:Plan:  )  atrial fibrillation/atrial fibrillation with rapid ventricular response  - rate remanis high in spite of being on amio drip. Will Ct till she is accepted by hospice  . Patient is a poor anticoagulation candidate secondary to advanced age, comorbidities, and falling.e goals.    2) Chronic diastolic heart failure. Patient does not appear grossly volume overloaded e. Suggest IV Lasix as needed.    3) Pulmonary hypertension. Likely secondary to valvular disease. Avoid hypoxia and diurese as needed.    4) Valvular heart disease/severe mitral and tricuspid regurgitation. Patient is not a candidate for invasive cardiac therapies or cardiac surgery. Would suggest transitioning to comfort care measures only.    5) Demand ischemia. History is not consistent with acute coronary syndrome. .  Troponin elevation may be related to diastolic heart failure and CKD. Patient is not a candidate for additional cardiac testing at this time.    6) CAD. If patient able to resume oral medications, could consider resuming statin.   If the patient transitions to comfort care only would suggest discontinuing statin.             Electronically signed by Anatoly Gutpa MD on 6/19/2021 at 10:44 AM

## 2021-06-19 NOTE — PROGRESS NOTES
[facility] Internal Medicine Note      Chief Complaint: I am so weak    Subjective/Interval History: This morning the patient looks very weak and very tired. She states she is miserable. Telemetry shows continued A. fib with RVR. Nursing reports no new problems other than just profound weakness. Patient states she is ready for hospice. No chest pain. No nausea, vomiting, diarrhea. No abdominal pain. No dysuria. The remainder of the review of systems is negative. PMH, PSH, FH/SH reviewed and unchanged as documented in the H&P personally documented at admission 21    Medication list reviewed    Objective:    /78   Pulse 114   Temp 98.3 °F (36.8 °C) (Oral)   Resp 20   Ht 5' (1.524 m)   Wt 83 lb 5.3 oz (37.8 kg)   SpO2 98%   BMI 16.28 kg/m²   Temp  Av.9 °F (36.6 °C)  Min: 97.4 °F (36.3 °C)  Max: 98.7 °F (37.1 °C)    Gen-chronically ill-appearing elderly female with cervical collar in place, looks very uncomfortable, appears profoundly weak today. CV-irregularly irregular rhythm, RVR today. Rate 120  Pulm-respirations easy, but scattered rhonchi throughout.   Abd- BS+, soft, nontender, nondistended  Ext-no edema    The Following Labs Were Reviewed Today:    Recent Results (from the past 24 hour(s))   APTT    Collection Time: 21  2:33 PM   Result Value Ref Range    aPTT 51.8 (H) 24.2 - 36.2 sec   CBC    Collection Time: 21  6:44 AM   Result Value Ref Range    WBC 11.7 (H) 4.0 - 11.0 K/uL    RBC 3.96 (L) 4.00 - 5.20 M/uL    Hemoglobin 12.0 12.0 - 16.0 g/dL    Hematocrit 36.3 36.0 - 48.0 %    MCV 91.7 80.0 - 100.0 fL    MCH 30.4 26.0 - 34.0 pg    MCHC 33.2 31.0 - 36.0 g/dL    RDW 14.5 12.4 - 15.4 %    Platelets 399 815 - 501 K/uL    MPV 10.3 5.0 - 10.5 fL   Procalcitonin    Collection Time: 21  6:44 AM   Result Value Ref Range    Procalcitonin 0.27 (H) 0.00 - 0.15 ng/mL   Basic Metabolic Panel    Collection Time: 21  6:44 AM   Result Value Ref Range    Sodium 142 136 - 145 mmol/L    Potassium 4.1 3.5 - 5.1 mmol/L    Chloride 103 99 - 110 mmol/L    CO2 23 21 - 32 mmol/L    Anion Gap 16 3 - 16    Glucose 95 70 - 99 mg/dL    BUN 37 (H) 7 - 20 mg/dL    CREATININE 1.4 (H) 0.6 - 1.2 mg/dL    GFR Non- 36 (A) >60    GFR  43 (A) >60    Calcium 8.9 8.3 - 10.6 mg/dL       ASSESSMENT/PLAN:      Principal Problem:    Dehydration-patient at the moment appears euvolemic. Active Problems:    Dysphagia-patient now strict n.p.o. Due to poor swallow eval yesterday    Elevated CPK-CPK improved    Fall from standing-PT/OT evaluations reviewed    Chronic diastolic congestive heart failure-appreciate cardiology input. Agree with as needed Lasix. Pulmonary hypertension-continue supplemental oxygen. Elevated troponin-demand ischemia. Heparin discontinued. No signs of acute coronary syndrome    Compression fracture of thoracic vertebra-neurosurgery note appreciated. Patient looks a little more comfortable in this new [place] J collar. Thrombocytopenia-platelet count improved    History of GI bleed-patient will not be a good long-term candidate for anticoagulation. GI bleed 2 years ago thought to be due to small bowel AVMs and GI was recommending against long-term anticoagulation due to high risk of rebleed. Essential hypertension, benign- blood pressure is better    Hypothyroidism due to medication-converted to IV Synthroid. Chronic atrial fibrillation-remains on amiodarone drip for the time being. Still running a bit high. CKD (chronic kidney disease) stage 3-continue to monitor renal function. Obviously with no p.o. intake she is at risk for dehydration again. Cough with sputum-patient was started on Rocephin yesterday, procalcitonin is slightly high and remained so. May consider adding IV Flagyl. Disposition-son does not currently at the bedside.   The patient looks very weak and very tired and honestly she looks like she is ready

## 2021-06-19 NOTE — PROGRESS NOTES
presented to the emergency room after being found down and reportedly was down for the last two days.  The patient states that within the last couple of days, she bent over to  a piece of lint off of the floor and she fell backwards hitting her head.  She was able to get up off the ground after that incident, but then she reports that she did a similar thing again picking up a piece of lint, fell over, hit her head and this time, was on the ground for two days.  Her neighbor stopped in to check on her and found her on the ground today and she was brought to the emergency room for further evaluation and treatment.  Incredibly in the emergency room, the patient's dehydration was only mild and her CPK elevation was only mild. The patient was given reportedly 2 liters of fluid boluses in the emergency room and she was sent to the floor for evaluation and observation. Manju Shutters this examiner's interview this evening, the patient appears tachypneic and is currently tachycardic with an irregular rhythm.  Stat EKG has been ordered along with stat BMP and stat BNP. Telemetry will be placed on the patient as well.  She does appear to be somewhat volume overloaded at this point and her maintenance IV fluids have been discontinued.  Additionally, she has an infiltrated right antecubital IV.  The ER workup also revealed a mildly elevated troponin and a T1 compression fracture as well as a nondisplaced fracture through the lamina of C7.  CT of the head showed no intracranial bleed.      Cervical Collar placed d/t compression fracture     6/17/2021 CT CERVICAL SPINE  Impression   Mild acute wedge compression fracture of T1 without retropulsion.  Also   nondisplaced fracture through the right lamina of C7.  Both fractures are   stable.  Severe degenerative changes at C5-C6.  Severe facet arthropathy.      6/18/2021 CXR  Impression   No significant change in appearance of the chest, stable mild pulmonary edema   superimposed on chronic pulmonary fibrosis. Bridgett Harry is stable chronic   elevation of the right hemidiaphragm with stable small right pleural effusion.         REMOTE ESOPHAGRAM: 7/12/2019  FINDINGS:   The study was initially performed at 3 frames per second in lateral view to   evaluate the upper/cervical phase of swallowing with thin contrast material.   Laryngeal penetration was demonstrated but no obvious other abnormality was   appreciated during this portion of the evaluation.       Subsequently gas crystals were administered followed by thick barium   contrast.  Intermittent esophageal spasm was present without an obvious   obstructing mucosal lesion.  Appearing to arise from the right sided anterior   aspect of the mid esophagus is a diverticulum which would intermittently fill   and empty the with the enteric contrast.  No obvious gross mucosal   abnormality is appreciated.       Contrast promptly passed into the stomach.       The patient said she was unable to do the portion of the procedure where she   lays flat to drink and the study is somewhat limited.         6/19 MD note: Lengthy discussion with the son and the patient. Patient is ready for hospice care. [facility] to be consulted. Subjective:     Current diet  NPO    Comments regarding tolerating Current Diet:   RN reports pt requesting something to drink  MD requests most comfortable diet recs for pt this AM  Objective:     Pain   Patient Currently in Pain: Denies    Cognitive/Behavior   Behavior/Cognition: Alert, Cooperative, Pleasant mood (vague historian at times)    Presentations   Consistencies Administered:  Thin - straw, Nectar - straw, Honey - straw, Dysphagia Pureed (Dysphagia I)    Positioning   Upright in bed    PO Trials:  · Thin Liquids: delayed swallow; decreased laryngeal elevation; multiple hard audible swallows; gurgle post swallow  · Nectar thick liquids delayed swallow; decreased laryngeal elevation; multiple hard audible swallows  · Honey Thick liquids delayed swallow; decreased laryngeal elevation; multiple hard audible swallows  · Puree  delayed swallow; decreased laryngeal elevation; delayed cough  · Soft food NT  · Regular food NT    Dysphagia Tx:   · Pt awake and alert, appears very deconditioned in bed, difficulty sustaining head in upright position. Very weak breathy vocal quality, nonproductive cough. Oral suction setup bedside. · Pt verbalizes desire and plan for hospice care and focus on comfort measures, but also verbalizes she is tired of coughing and choking, and desires ST input re: most comfortable optimal PO diet. · PO trials as described. Cannot r/o silent aspiration with any PO. However, audible gurgle post swallow only occurs with thin liquids. Pt is agreeable to mildly nectar thick liquid downgrade, and reported it was the most comfortable and palpable consistency. · Pt only trialled one bite of puree, and politely declined additional trials. It is suspected she is too deconditioned to effectively masticate any textured solids, and appears most optimal with puree textures. She was educated and in agreement. Goals:   Dysphagia Goals: The patient will tolerate repeat BSE when able. met  The pt will tolerate recommended diet/liquid level without immediate overt difficulty    Assessment:   Impressions:   Dysphagia Diagnosis: Moderate oral dysphagia, Severe pharyngeal dysphagia  · Chart reviewed and discussed with MD; plan for hospice care. However, pt verbalizes she is tired of coughing and choking, and desires ST input re: most comfortable optimal PO diet. · Cannot r/o silent aspiration with any PO. However, audible gurgle post swallow only occurs with thin liquids. · Pt is agreeable to mildly nectar thick liquid downgrade, and reported it was the most comfortable and palpable consistency. · Recommend puree textures and mildly nectar thick liquids.   If pt desires textured solids or thin liquids/water, they may be given on request.  · Pt was educated and in agreement with recommendations. Diet Recommendations:  Puree diet texture  Mildly nectar thick liquids  Recommended Form of Meds: crushed in puree v crushed in thickened liquid    Strategies:   Fully upright during and 30 min after PO; small bites/sips; eat/feed slowly; oral care, oral suction    Education:  Consulted and agree with results and recommendations: Patient, RN  Patient Education: Completed on results/recs/plan  Patient Education Response: Needs reinforcement    Prognosis:   guarded    Plan:     Continue Dysphagia Therapy: pending hospice plan  Interventions: Therapeutic Interventions: Diet tolerance monitoring, Patient/Family education, Therapeutic PO trials with SLP  Duration/Frequency of therapy while on unit: Duration/Frequency of Treatment  Duration/Frequency of Treatment: ST to tx 3-5 times per week durign acute admission  Discharge Instructions:   Anticipate No for further skilled Speech Therapy for Dysphagia at discharge d/t anticipated hospice care    This note serves as a D/C Summary in the event that this patient is discharged prior to the next therapy session.     Coded treatment time:10  Total treatment time: 28    Electronically signed by  Wood Flanagan MS, CCC-SLP #[id number]  Speech Language Pathologist   on 6/19/2021 at 1:16 PM

## 2021-06-19 NOTE — CONSULTS
[address]                                  CONSULTATION    PATIENT NAME: Bucky Holliday                 :        1935  MED REC NO:   [id number]                          ROOM:       5280  ACCOUNT NO:   [de-identified]                           ADMIT DATE: 2021  PROVIDER:     Deondre Woodson MD    CONSULT DATE:  2021    REASON FOR CONSULTATION:  Cervical fracture. HISTORY OF PRESENT ILLNESS:  The patient is an 80year-old who fell a  few days ago without loss of consciousness, presented to the emergency  department with generalized weakness, or inability to ambulate and has  been crawling/scooting in a chair at home. When she is in ER, she is  not in distress. She moved all extremities. She denied radicular pain  or numbness. A CT scan was performed which showed a fracture of the  lamina of C7 which was stable. There is an anterior compression wedge  fracture of T1 which is minimal and also stable. I recommend placement  of the collar and MRI. She, by report, fell a couple of times at home. In the emergency room, she had dehydration, mild CPK elevation. She had  an irregular rhythm. She was admitted. Troponin was mildly elevated  and Cardiology was also consulted. REVIEW OF SYSTEMS:  CONSTITUTIONAL:  Negative for fever or chills. GI:   Negative for nausea, vomiting, diarrhea, or constipation. :  Negative  for dysuria. CARDIAC:  Negative for chest pain. RESPIRATORY:  Positive  for shortness of breath and cough. INTEGUMENT:  Negative for rash. NEUROLOGIC:  Negative for headache. ENDOCRINE:  Negative for thyroid  disease. MUSCULOSKELETAL:  Negative for neck pain. Remainder of  systems reviewed is noncontributory.     PAST MEDICAL HISTORY:  AFib, congestive heart failure, ejection fraction  65 done in 2017, C. diff, hypertension, hyperlipidemia,

## 2021-06-19 NOTE — PLAN OF CARE
Problem: Pain:  Goal: Pain level will decrease  Description: Pain level will decrease  Outcome: Ongoing  Goal: Control of acute pain  Description: Control of acute pain  Outcome: Ongoing  Goal: Control of chronic pain  Description: Control of chronic pain  Outcome: Ongoing     Problem: OXYGENATION/RESPIRATORY FUNCTION  Goal: Patient will maintain patent airway  Outcome: Ongoing  Goal: Patient will achieve/maintain normal respiratory rate/effort  Description: Respiratory rate and effort will be within normal limits for the patient  Outcome: Ongoing     Problem: HEMODYNAMIC STATUS  Goal: Patient has stable vital signs and fluid balance  Outcome: Ongoing     Problem: FLUID AND ELECTROLYTE IMBALANCE  Goal: Fluid and electrolyte balance are achieved/maintained  Outcome: Ongoing     Problem: ACTIVITY INTOLERANCE/IMPAIRED MOBILITY  Goal: Mobility/activity is maintained at optimum level for patient  Outcome: Ongoing     Problem: Skin Integrity:  Goal: Will show no infection signs and symptoms  Description: Will show no infection signs and symptoms  Outcome: Ongoing  Goal: Absence of new skin breakdown  Description: Absence of new skin breakdown  Outcome: Ongoing     Problem: Falls - Risk of:  Goal: Will remain free from falls  Description: Will remain free from falls  Outcome: Ongoing  Goal: Absence of physical injury  Description: Absence of physical injury  Outcome: Ongoing     Problem: Nutrition  Goal: Optimal nutrition therapy  Outcome: Ongoing

## 2021-06-19 NOTE — CARE COORDINATION
Received call from [facility] rn who states son can meet with Hospice rn on 6/20. He would like for patient to be a part of the hospice conversation and she was given a sleeping pill and would not be able to participate today. [address] Billie Golden RN  To contact son in am for appt. Time.    Electronically signed by YON Mendoza on 6/19/2021 at 5:17 PM

## 2021-06-19 NOTE — CARE COORDINATION
Received order today for [facility]. Spoke with son Jesus Smith  1 [phone]  who is in town from [place]. Provided Hospice listing and he has selected [facility]. Referral made to [facility] in [place] [address]. Hospice Rn to call son to set appointment. Son states he is not interested in [address].     Electronically signed by YON Ordoñez on 6/19/2021 at 3:57 PM

## 2021-06-20 NOTE — PROGRESS NOTES
*Late Entry  PCA came to this RN after noticing pt's increased RR of 36 and difference in breathing pattern. This RN came to the bedside and noted pt with Kussmaul respirations; pt unresponsive to pain/sternal rub. Bedside RN notified and came to bedside.

## 2021-06-20 NOTE — DEATH NOTES
Death Pronouncement Note  Patient's Name: Anmol Reinoso   Patient's YOB: 1935  MRN Number: [id number]    Admitting Provider: Chaitanya Garza MD  Attending Provider: Chaitanya Garza, *    Patient was examined and the following were absent: Pulses, Blood Pressure, and Respiratory effort    I declared the patient dead on 06/20/2021  at  [id number]    Preliminary Cause of Death:  cardiac arrest    Electronically signed by Viktoriya Bryant DO on 6/20/21 at 4:46 AM EDT

## 2021-06-20 NOTE — FLOWSHEET NOTE
Pt's son Fouzia Quintero at bedside, belongings given to him; [facility] info provided from [facility] on [address]

## 2021-06-20 NOTE — PROGRESS NOTES
Patient with HR in the 140s and elevated BPs. Administered 2.5mg of Lopressor on order for HR > 120. HR came down to 110s. Will continue to monitor.

## 2021-06-20 NOTE — PROGRESS NOTES
Received call from Floor Charge FEMI Prado at [phone] stating patient's HR had dropped to the 62s. Came to Patient room and patient was in Asystole. Paged Hospitalist Amy to bedside. Notified PCP, Dr. Estefani Polanco.

## 2021-06-24 NOTE — PROGRESS NOTES
Physician Progress Note      Rolly Ryan  CSN #:                  [id number]  :                       1935  ADMIT DATE:       2021 8:06 AM  DISCH DATE:        2021 9:28 AM  RESPONDING  PROVIDER #:        Severiano Bruin MD          QUERY TEXT:    Pt admitted with T1 compression fracture and dehydration . Noted   documentation of severe malnutrition in dietician consult. If possible,   please document in progress notes and discharge summary:    The medical record reflects the following:  Risk Factors: hx of CHF, CKD  Clinical Indicators: per dietician consult - meets criteria for severe   malnutrition. poor PO intake  greater than 1 month and severe fat and muscle   wasting  - poor appetite - BMI - 17.4  Treatment: Ensure, dietician consult, monitor intake  Options provided:  -- severe malnutrition confirmed present on admission  -- severe malnutrition ruled out  -- Defer to dietician consultant documentation regarding severe malnutrition  -- Other - I will add my own diagnosis  -- Disagree - Not applicable / Not valid  -- Disagree - Clinically unable to determine / Unknown  -- Refer to Clinical Documentation Reviewer    PROVIDER RESPONSE TEXT:    The diagnosis of severe malnutrition was confirmed as present on admission.     Query created by: Jackie Han on 2021 11:06 AM      Electronically signed by:  Severiano Bruin MD 2021 9:09 AM

## 2021-08-07 NOTE — DISCHARGE SUMMARY
atrial fibrillation with rapid ventricular response,  and a markedly elevated BNP. She was given a dose of Lasix and started  on amiodarone drip and also on low-dose heparin given her upper trending  troponins. Overall that next morning, the patient looked quite  miserable. Cardiology consulted on the patient and given her multiple  medical problems and advanced age recommended conservative management  and consideration for hospice given her multiple medical problems and  age. Again, Neurosurgery recommended conservative management as well  and recommend canceling her MRI given her refusing to do this, and  again, her overall debility and desire for comfort. The next morning, the day prior to her death, the patient looked very  weak and tired, stated she was miserable and stated she wanted to be a  hospice patient. A lengthy discussion was had with her son and the  patient had Hospice consulted and met with Hospice and agreed to sign up  with Hospice. The patient was to be transported to hospice on  06/20/2021, but overnight at approximately 4:26 in the morning, the  patient developed increased respiratory rate, shortness of breath, and  eventually, the patient developed asystole. Since she was a do not  resuscitate, she was pronounced dead at 4:36 a.m. by the hospitalist.   Preliminary cause of death was cardiac arrest.    DISCHARGE DIAGNOSES:  1.  Dehydration and acute kidney injury related to being on the ground  for approximately two days at home. 2.  Elevated CPK related to, again, falling and being on the ground for  48 hours. 3.  Acute-on-chronic diastolic congestive heart failure and severe  valvular disease. 4.  Elevated troponin related to demand ischemia. 5.  Compression fracture of her T1 thoracic vertebra and fracture of her  C7 lamina. 6.  Hypertension. 7.  Hypothyroidism. 8.  Chronic atrial fibrillation with rapid ventricular response.   9.  Chronic kidney disease stage Abril Iraheta MD    D: 08/07/2021 11:09:12       T: 08/07/2021 11:12:13     [email or web address]  Job#: [id number]     Doc#: [id number]    CC:

## 2021-08-25 NOTE — TELEPHONE ENCOUNTER
Pt calls in. Symbicort sample given on 3/27/19. Edwardo Russell is not sure it is helping. Continues with sob. Wonders if anything else can be tried, before getting a 3 month script of Symbicort? Using [employer] as well. Please advise. No

## (undated) DEVICE — FORCEPS BX 240CM 2.4MM L NDL RAD JAW 4 M00513334

## (undated) DEVICE — ENDOSCOPY KIT: Brand: MEDLINE INDUSTRIES, INC.

## (undated) DEVICE — CONTAINER SPEC 480ML CLR POLYSTYR 10% NEUT BUFF FRMLN ZN

## (undated) DEVICE — BITE BLK 60FR GRN ENDOSCP AD W STRP SLD DISPOSABLE

## (undated) DEVICE — THE DISPOSABLE RAPTOR GRASPING DEVICE IS USED TO GRASP TISSUE AND/OR RETRIEVE FOREIGN BODIES, EXCISED TISSUE AND STENTS DURING ENDOSCOPIC PROCEDURES.: Brand: RAPTOR

## (undated) DEVICE — FEEDING TUBE ATTACHMENT DEVICE- ACCOMMODATES 5FR TO 18FR TUBES: Brand: HOLLISTER

## (undated) DEVICE — DUAL LUMEN STOMACH TUBE MULTI-FUNCTIONAL PORT: Brand: SALEM SUMP

## (undated) DEVICE — FIAPC® PROBE W/ FILTER 2200 A OD 2.3MM/6.9FR; L 2.2M/7.2FT: Brand: ERBE

## (undated) DEVICE — CAPSULE ENDOSCP L26.2MM DIA11.4MM BIOCOMPATIBLE PLAS SB 3

## (undated) DEVICE — THE DISPOSABLE ADVANCE CAPSULE ENDOSCOPE DELIVERY DEVICE IS A 2.5MM SINGLE SHEATHED DEVICE INDICATED FOR TRANSENDOSCOPIC DELIVERY OF CAPSULES (WITH THE DIMENSIONS OF 10.5MM - 11.5MM IN DIAMETER AND 23.5MM - 26.5MM IN LENGTH), TO THE STOMACH OR DUODENUM. THIS DEVICE IS INTENDED FOR PATIENTS WHO ARE EITHER UNABLE TO SWALLOW THE CAPSULE, OR UNABLE TO PASS THE CAPSULE BEYOND THE PYLORUS IN SUFFICIENT TIME TO COMPLETE THE DESIRED DIAGNOSTIC EVALUATION.: Brand: ADVANCE

## (undated) DEVICE — ELECTRODE PT RET AD L9FT HI MOIST COND ADH HYDRGEL CORDED